# Patient Record
Sex: MALE | Race: BLACK OR AFRICAN AMERICAN | NOT HISPANIC OR LATINO | URBAN - METROPOLITAN AREA
[De-identification: names, ages, dates, MRNs, and addresses within clinical notes are randomized per-mention and may not be internally consistent; named-entity substitution may affect disease eponyms.]

---

## 2023-07-02 ENCOUNTER — INPATIENT (INPATIENT)
Facility: HOSPITAL | Age: 63
LOS: 4 days | Discharge: ANOTHER HOSPITAL | End: 2023-07-07
Attending: STUDENT IN AN ORGANIZED HEALTH CARE EDUCATION/TRAINING PROGRAM | Admitting: STUDENT IN AN ORGANIZED HEALTH CARE EDUCATION/TRAINING PROGRAM
Payer: COMMERCIAL

## 2023-07-02 VITALS
OXYGEN SATURATION: 100 % | HEART RATE: 86 BPM | TEMPERATURE: 98 F | RESPIRATION RATE: 17 BRPM | SYSTOLIC BLOOD PRESSURE: 146 MMHG | DIASTOLIC BLOOD PRESSURE: 65 MMHG

## 2023-07-02 DIAGNOSIS — Z71.1 PERSON WITH FEARED HEALTH COMPLAINT IN WHOM NO DIAGNOSIS IS MADE: ICD-10-CM

## 2023-07-02 LAB
ALBUMIN SERPL ELPH-MCNC: 3.5 G/DL — SIGNIFICANT CHANGE UP (ref 3.3–5)
ALP SERPL-CCNC: 135 U/L — HIGH (ref 40–120)
ALT FLD-CCNC: 17 U/L — SIGNIFICANT CHANGE UP (ref 4–41)
ANION GAP SERPL CALC-SCNC: 14 MMOL/L — SIGNIFICANT CHANGE UP (ref 7–14)
APTT BLD: 30.5 SEC — SIGNIFICANT CHANGE UP (ref 27–36.3)
APTT BLD: 30.8 SEC — SIGNIFICANT CHANGE UP (ref 27–36.3)
AST SERPL-CCNC: 44 U/L — HIGH (ref 4–40)
B PERT DNA SPEC QL NAA+PROBE: SIGNIFICANT CHANGE UP
B PERT+PARAPERT DNA PNL SPEC NAA+PROBE: SIGNIFICANT CHANGE UP
BASOPHILS # BLD AUTO: 0 K/UL — SIGNIFICANT CHANGE UP (ref 0–0.2)
BASOPHILS NFR BLD AUTO: 0 % — SIGNIFICANT CHANGE UP (ref 0–2)
BILIRUB SERPL-MCNC: 1.1 MG/DL — SIGNIFICANT CHANGE UP (ref 0.2–1.2)
BLD GP AB SCN SERPL QL: NEGATIVE — SIGNIFICANT CHANGE UP
BORDETELLA PARAPERTUSSIS (RAPRVP): SIGNIFICANT CHANGE UP
BUN SERPL-MCNC: 17 MG/DL — SIGNIFICANT CHANGE UP (ref 7–23)
C PNEUM DNA SPEC QL NAA+PROBE: SIGNIFICANT CHANGE UP
CALCIUM SERPL-MCNC: 9.2 MG/DL — SIGNIFICANT CHANGE UP (ref 8.4–10.5)
CHLORIDE SERPL-SCNC: 97 MMOL/L — LOW (ref 98–107)
CO2 SERPL-SCNC: 21 MMOL/L — LOW (ref 22–31)
CREAT SERPL-MCNC: 0.93 MG/DL — SIGNIFICANT CHANGE UP (ref 0.5–1.3)
EGFR: 93 ML/MIN/1.73M2 — SIGNIFICANT CHANGE UP
EOSINOPHIL # BLD AUTO: 0.68 K/UL — HIGH (ref 0–0.5)
EOSINOPHIL NFR BLD AUTO: 0.9 % — SIGNIFICANT CHANGE UP (ref 0–6)
FLUAV SUBTYP SPEC NAA+PROBE: SIGNIFICANT CHANGE UP
FLUBV RNA SPEC QL NAA+PROBE: SIGNIFICANT CHANGE UP
GLUCOSE SERPL-MCNC: 143 MG/DL — HIGH (ref 70–99)
HADV DNA SPEC QL NAA+PROBE: SIGNIFICANT CHANGE UP
HAPTOGLOB SERPL-MCNC: <20 MG/DL — LOW (ref 34–200)
HCOV 229E RNA SPEC QL NAA+PROBE: SIGNIFICANT CHANGE UP
HCOV HKU1 RNA SPEC QL NAA+PROBE: SIGNIFICANT CHANGE UP
HCOV NL63 RNA SPEC QL NAA+PROBE: SIGNIFICANT CHANGE UP
HCOV OC43 RNA SPEC QL NAA+PROBE: SIGNIFICANT CHANGE UP
HCT VFR BLD CALC: 19 % — CRITICAL LOW (ref 39–50)
HCT VFR BLD CALC: 19.3 % — CRITICAL LOW (ref 39–50)
HGB BLD-MCNC: 5.5 G/DL — CRITICAL LOW (ref 13–17)
HGB BLD-MCNC: 5.7 G/DL — CRITICAL LOW (ref 13–17)
HMPV RNA SPEC QL NAA+PROBE: SIGNIFICANT CHANGE UP
HPIV1 RNA SPEC QL NAA+PROBE: SIGNIFICANT CHANGE UP
HPIV2 RNA SPEC QL NAA+PROBE: SIGNIFICANT CHANGE UP
HPIV3 RNA SPEC QL NAA+PROBE: SIGNIFICANT CHANGE UP
HPIV4 RNA SPEC QL NAA+PROBE: SIGNIFICANT CHANGE UP
IANC: 1.88 K/UL — SIGNIFICANT CHANGE UP (ref 1.8–7.4)
INR BLD: 1.44 RATIO — HIGH (ref 0.88–1.16)
INR BLD: 1.49 RATIO — HIGH (ref 0.88–1.16)
LDH SERPL L TO P-CCNC: 925 U/L — HIGH (ref 135–225)
LYMPHOCYTES # BLD AUTO: 19.41 K/UL — HIGH (ref 1–3.3)
LYMPHOCYTES # BLD AUTO: 25.7 % — SIGNIFICANT CHANGE UP (ref 13–44)
M PNEUMO DNA SPEC QL NAA+PROBE: SIGNIFICANT CHANGE UP
MAGNESIUM SERPL-MCNC: 2 MG/DL — SIGNIFICANT CHANGE UP (ref 1.6–2.6)
MCHC RBC-ENTMCNC: 21.6 PG — LOW (ref 27–34)
MCHC RBC-ENTMCNC: 22.6 PG — LOW (ref 27–34)
MCHC RBC-ENTMCNC: 28.5 GM/DL — LOW (ref 32–36)
MCHC RBC-ENTMCNC: 30 GM/DL — LOW (ref 32–36)
MCV RBC AUTO: 75.4 FL — LOW (ref 80–100)
MCV RBC AUTO: 75.7 FL — LOW (ref 80–100)
MONOCYTES # BLD AUTO: 1.36 K/UL — HIGH (ref 0–0.9)
MONOCYTES NFR BLD AUTO: 1.8 % — LOW (ref 2–14)
NEUTROPHILS # BLD AUTO: 10.65 K/UL — HIGH (ref 1.8–7.4)
NEUTROPHILS NFR BLD AUTO: 14.1 % — LOW (ref 43–77)
PHOSPHATE SERPL-MCNC: 2.9 MG/DL — SIGNIFICANT CHANGE UP (ref 2.5–4.5)
PLATELET # BLD AUTO: 85 K/UL — LOW (ref 150–400)
PLATELET # BLD AUTO: 87 K/UL — LOW (ref 150–400)
POTASSIUM SERPL-MCNC: 5.1 MMOL/L — SIGNIFICANT CHANGE UP (ref 3.5–5.3)
POTASSIUM SERPL-SCNC: 5.1 MMOL/L — SIGNIFICANT CHANGE UP (ref 3.5–5.3)
PROT SERPL-MCNC: 6.6 G/DL — SIGNIFICANT CHANGE UP (ref 6–8.3)
PROTHROM AB SERPL-ACNC: 16.8 SEC — HIGH (ref 10.5–13.4)
PROTHROM AB SERPL-ACNC: 17.3 SEC — HIGH (ref 10.5–13.4)
RAPID RVP RESULT: SIGNIFICANT CHANGE UP
RBC # BLD: 2.52 M/UL — LOW (ref 4.2–5.8)
RBC # BLD: 2.55 M/UL — LOW (ref 4.2–5.8)
RBC # BLD: 2.55 M/UL — LOW (ref 4.2–5.8)
RBC # FLD: 19.9 % — HIGH (ref 10.3–14.5)
RBC # FLD: 20 % — HIGH (ref 10.3–14.5)
RETICS #: 98.2 K/UL — SIGNIFICANT CHANGE UP (ref 25–125)
RETICS/RBC NFR: 3.9 % — HIGH (ref 0.5–2.5)
RH IG SCN BLD-IMP: POSITIVE — SIGNIFICANT CHANGE UP
RSV RNA SPEC QL NAA+PROBE: SIGNIFICANT CHANGE UP
RV+EV RNA SPEC QL NAA+PROBE: SIGNIFICANT CHANGE UP
SARS-COV-2 RNA SPEC QL NAA+PROBE: SIGNIFICANT CHANGE UP
SODIUM SERPL-SCNC: 132 MMOL/L — LOW (ref 135–145)
TROPONIN T, HIGH SENSITIVITY RESULT: 27 NG/L — SIGNIFICANT CHANGE UP
TSH SERPL-MCNC: 12.35 UIU/ML — HIGH (ref 0.27–4.2)
URATE SERPL-MCNC: 7.5 MG/DL — SIGNIFICANT CHANGE UP (ref 3.4–8.8)
WBC # BLD: 75.51 K/UL — CRITICAL HIGH (ref 3.8–10.5)
WBC # BLD: 94.84 K/UL — CRITICAL HIGH (ref 3.8–10.5)
WBC # FLD AUTO: 75.51 K/UL — CRITICAL HIGH (ref 3.8–10.5)
WBC # FLD AUTO: 94.84 K/UL — CRITICAL HIGH (ref 3.8–10.5)

## 2023-07-02 PROCEDURE — 71045 X-RAY EXAM CHEST 1 VIEW: CPT | Mod: 26

## 2023-07-02 PROCEDURE — 76700 US EXAM ABDOM COMPLETE: CPT | Mod: 26

## 2023-07-02 PROCEDURE — 85060 BLOOD SMEAR INTERPRETATION: CPT

## 2023-07-02 PROCEDURE — 70498 CT ANGIOGRAPHY NECK: CPT | Mod: 26,MA

## 2023-07-02 PROCEDURE — 70496 CT ANGIOGRAPHY HEAD: CPT | Mod: 26,MA

## 2023-07-02 PROCEDURE — 99291 CRITICAL CARE FIRST HOUR: CPT

## 2023-07-02 PROCEDURE — G0452: CPT | Mod: 26

## 2023-07-02 RX ORDER — INSULIN LISPRO 100/ML
VIAL (ML) SUBCUTANEOUS
Refills: 0 | Status: DISCONTINUED | OUTPATIENT
Start: 2023-07-02 | End: 2023-07-07

## 2023-07-02 RX ORDER — SODIUM CHLORIDE 9 MG/ML
1000 INJECTION INTRAMUSCULAR; INTRAVENOUS; SUBCUTANEOUS ONCE
Refills: 0 | Status: COMPLETED | OUTPATIENT
Start: 2023-07-02 | End: 2023-07-02

## 2023-07-02 RX ORDER — HYDROXYUREA 500 MG/1
1500 CAPSULE ORAL
Refills: 0 | Status: DISCONTINUED | OUTPATIENT
Start: 2023-07-02 | End: 2023-07-07

## 2023-07-02 RX ORDER — DEXTROSE 50 % IN WATER 50 %
15 SYRINGE (ML) INTRAVENOUS ONCE
Refills: 0 | Status: DISCONTINUED | OUTPATIENT
Start: 2023-07-02 | End: 2023-07-07

## 2023-07-02 RX ORDER — DEXTROSE 50 % IN WATER 50 %
25 SYRINGE (ML) INTRAVENOUS ONCE
Refills: 0 | Status: DISCONTINUED | OUTPATIENT
Start: 2023-07-02 | End: 2023-07-07

## 2023-07-02 RX ORDER — DEXTROSE 50 % IN WATER 50 %
12.5 SYRINGE (ML) INTRAVENOUS ONCE
Refills: 0 | Status: DISCONTINUED | OUTPATIENT
Start: 2023-07-02 | End: 2023-07-07

## 2023-07-02 RX ORDER — SODIUM CHLORIDE 9 MG/ML
1000 INJECTION, SOLUTION INTRAVENOUS
Refills: 0 | Status: DISCONTINUED | OUTPATIENT
Start: 2023-07-02 | End: 2023-07-07

## 2023-07-02 RX ORDER — ALLOPURINOL 300 MG
300 TABLET ORAL DAILY
Refills: 0 | Status: DISCONTINUED | OUTPATIENT
Start: 2023-07-02 | End: 2023-07-07

## 2023-07-02 RX ORDER — SODIUM CHLORIDE 9 MG/ML
1000 INJECTION INTRAMUSCULAR; INTRAVENOUS; SUBCUTANEOUS
Refills: 0 | Status: DISCONTINUED | OUTPATIENT
Start: 2023-07-02 | End: 2023-07-05

## 2023-07-02 RX ORDER — GLUCAGON INJECTION, SOLUTION 0.5 MG/.1ML
1 INJECTION, SOLUTION SUBCUTANEOUS ONCE
Refills: 0 | Status: DISCONTINUED | OUTPATIENT
Start: 2023-07-02 | End: 2023-07-07

## 2023-07-02 RX ADMIN — SODIUM CHLORIDE 1000 MILLILITER(S): 9 INJECTION INTRAMUSCULAR; INTRAVENOUS; SUBCUTANEOUS at 16:41

## 2023-07-02 RX ADMIN — HYDROXYUREA 1500 MILLIGRAM(S): 500 CAPSULE ORAL at 23:36

## 2023-07-02 RX ADMIN — SODIUM CHLORIDE 100 MILLILITER(S): 9 INJECTION INTRAMUSCULAR; INTRAVENOUS; SUBCUTANEOUS at 23:35

## 2023-07-02 RX ADMIN — Medication 300 MILLIGRAM(S): at 23:36

## 2023-07-02 NOTE — ED PROVIDER NOTE - CARE PLAN
1 Principal Discharge DX:	Malaise   Principal Discharge DX:	Concern about leukemia without diagnosis

## 2023-07-02 NOTE — ED PROVIDER NOTE - ATTENDING CONTRIBUTION TO CARE
I performed a face-to-face evaluation of the patient and performed a history and physical examination. I agree with the history and physical examination. If this was a PA visit, I personally saw the patient with the PA and performed a substantive portion of the visit including all aspects of the medical decision making.    Jasbir: Recent hiccoughs (now stopped). P/w 4 days fatigue, loss of smell/taste/appetite, loss of balance when stands, malaise. Consider sub-acute CVA, COVID, ACS. Check trop, EKG, CT brain/neck angio, COVID. Consider CDU for MRI. I performed a face-to-face evaluation of the patient and performed a history and physical examination. I agree with the history and physical examination. If this was a PA visit, I personally saw the patient with the PA and performed a substantive portion of the visit including all aspects of the medical decision making.    Jasbir: Recent hiccoughs (now stopped). P/w 4 days fatigue, loss of smell/taste/appetite, loss of balance when stands, malaise. Consider sub-acute CVA, COVID, ACS. Check trop, EKG, CT brain/neck angio, COVID. Consider CDU for MRI.    I have personally provided the amount of critical care time documented below, excluding time spent on separate procedures. I spoke with several family member(s).

## 2023-07-02 NOTE — CONSULT NOTE ADULT - ASSESSMENT
64yo gentleman with PMH of DM on insulin p/w weight loss of 15-20 lbs, gait instability, fatigue and body aches, found to have leukocytosis, anemia and thrombocytopenia concerning for leukemia.    #Suspected leukemia  CBC found WBC 75.5, Hgb 5.5, plt 85, MCV 75.7, RDW 19.9  54% reactive lymphocytes  LDH is 925, uric acid 7.5.    Peripheral smear and cellovision reviewed, found polychromasia, some hypochromic RBCs, few elliptocyes. 1-2 schistocytes per HPF. True thrombocytopenia without platelet clumps or giant platelets, about 9 platelets per HPF. WBCs are mostly lymphocytes of varying maturity, some with visible nucleoli and high N:C ratio, some very minimal visible cytoplasm.    - High suspicion for ALL vs CLL, lower for lymphoma.   - Start hydrea 1500mg BID.   - Uric acid is 7.5. Start allopurinol 300mg PO qdaily.  - Provide supportive transfusion to Hgb of 7.   - Iron studies found 15% iron sat, ferritin ordered. B12 and folate ordered.   - Retic low at 3.9%   - Check G6PD level  - Check HIV, hep B SAg, hep B SAb, hep B total core Ab, HCV  - Check TTE   - Start IVNS at 100 cc/hr  - DAILY LABS: CBC with Diff, CMP, coags, uric acid, LDH, Phos, fibrinogen and d-dimer  - Provided Flow cytometry form to ED to to send green and purple tubes.  - transfuse for hg < 7.0 and platelets < 10k, < 20k if febrile and < 50k if bleeding  - Check Abd US to assess for hepatosplenomegaly    #Coagulopathy:   -PT elevated at 16.8, fibrinogen low at 161, D-dimer 4565, likely low level DIC  Give 10 units cryoprecipitate for fibrinogen under 150    #Elevated TSH: 12.35  - Check free T4    Studies and medications ordered.    Note not finalized until signed by attending.   Please do not hesitate to page with questions.     Alejandra Castillo MD PGY5   [phone]  Hematology-Oncology Fellow  [facility]- Please call  to page on-call fellow   64yo gentleman with PMH of DM on insulin p/w weight loss of 15-20 lbs, gait instability, fatigue and body aches, found to have leukocytosis, anemia and thrombocytopenia concerning for leukemia.    #Suspected leukemia  CBC found WBC 75.5, Hgb 5.5, plt 85, MCV 75.7, RDW 19.9  54% reactive lymphocytes  LDH is 925, uric acid 7.5.    Peripheral smear and cellovision reviewed, found polychromasia, some hypochromic RBCs, few elliptocyes. 1-2 schistocytes per HPF. True thrombocytopenia without platelet clumps or giant platelets, about 9 platelets per HPF. WBCs are mostly lymphocytes of varying maturity, some with visible nucleoli and high N:C ratio, some very minimal visible cytoplasm.    - High suspicion for ALL vs CLL, lower for lymphoma.   - Start hydrea 1500mg BID.   - Uric acid is 7.5. Start allopurinol 300mg PO qdaily.  - Provide supportive transfusion to Hgb of 7.   - Iron studies found 15% iron sat, ferritin ordered. B12 and folate ordered.   - Retic low at 3.9%   - Check G6PD level  - Check HIV, hep B SAg, hep B SAb, hep B total core Ab, HCV  - Check TTE   - Start IVNS at 100 cc/hr  - DAILY LABS: CBC with Diff, CMP, coags, uric acid, LDH, Phos, fibrinogen and d-dimer  - Provided Flow cytometry form to ED to to send green and purple tubes.  - transfuse for hg < 7.0 and platelets < 10k, < 20k if febrile and < 50k if bleeding  - Check Abd US to assess for hepatosplenomegaly    #Coagulopathy:   -PT elevated at 16.8, fibrinogen low at 161, D-dimer 4565, likely low level DIC  Give 10 units cryoprecipitate for fibrinogen under 150    #Elevated TSH: 12.35  - Check free T4    Studies and medications ordered.  CASE discussed with Dr. Garcia ([facility] attending) and. Dr. Childers. We will be awaiting result of flow cytometry to determine if transfer to [facility] is necessary. This result will probably come back tomorrow.     Note not finalized until signed by attending.   Please do not hesitate to page with questions.     Alejandra Castillo MD PGY5   [phone]  Hematology-Oncology Fellow  [facility]- Please call  to page on-call fellow

## 2023-07-02 NOTE — ED ADULT NURSE NOTE - NSFALLUNIVINTERV_ED_ALL_ED
Bed/Stretcher in lowest position, wheels locked, appropriate side rails in place/Call bell, personal items and telephone in reach/Instruct patient to call for assistance before getting out of bed/chair/stretcher/Non-slip footwear applied when patient is off stretcher/[place] to call system/Physically safe environment - no spills, clutter or unnecessary equipment/Purposeful proactive rounding/Room/bathroom lighting operational, light cord in reach

## 2023-07-02 NOTE — ED PROVIDER NOTE - CLINICAL SUMMARY MEDICAL DECISION MAKING FREE TEXT BOX
Jasbir: Recent hiccoughs (now stopped). P/w 4 days fatigue, loss of smell/taste/appetite, loss of balance when stands, malaise. Consider sub-acute CVA, COVID, ACS. Check trop, EKG, CT brain/neck angio, COVID. Consider CDU for MRI.

## 2023-07-02 NOTE — ED PROVIDER NOTE - PROGRESS NOTE DETAILS
al MUELLER PGY-1: Anemic to 5.6.  White count at 90.  Will repeat labs, adding on leukemia labs such as LDH, retic count, uric acid. al MUELLER PGY-1: Repeat white count at 75.  Blood count 5.5.  Heme-onc paged at this time. al MUELLER PGY-1: At this time, oncology discussed with.  They will see the patient.

## 2023-07-02 NOTE — ED ADULT TRIAGE NOTE - CHIEF COMPLAINT QUOTE
patient c/o low energy, unsteady gait, and general malaise x4 days. patient is well appearing. past medical history of diabetes mellitus.

## 2023-07-02 NOTE — CONSULT NOTE ADULT - SUBJECTIVE AND OBJECTIVE BOX
HPI:  This patient is a 64yo gentleman with PMH of DM on insulin p/w weight loss, gait instability and fatigue. Patient has had 15-20 lbs weight loss in the last 3 weeks, subject fevers, chills, sweats and diffuse body aches. He has not had regular medical care in the last 5-10 years. He has occasional blurry vision, but reports vision has been normal today. He denies any falls. No headache or lightheadedness. No chest pain, palpitations, cough, dyspnea. No abdominal pain, N/V/ diarrhea. No rashes.   He denies any known history of any blood disorders.   He denies any bleeding or bruising, gingival bleeding, epistaxis.     PAST MEDICAL & SURGICAL HISTORY:  DM on insulin    FAMILY HISTORY:  mother- breast cancer  father- CVA, heart disease  2 brothers and 1 sister- good health    Social History:  lives with older brother  social alcohol use  smoked cigarettes for about 7 years , less than 1ppd, quit 20 years ago  no drug use  works in sales at [employer]    REVIEW OF SYSTEMS  CONSTITUTIONAL: + fever, + chills, + fatigue, + sweats  EYES: No eye pain, + occasional blurry vision   ENMT:  No difficulty hearing, no throat pain  RESPIRATORY: No cough,  No shortness of breath  CARDIOVASCULAR: No chest pain, no palpitations  GASTROINTESTINAL: No abdominal pain, no nausea, no vomiting, no diarrhea, no constipation,  GENITOURINARY: No dysuria, no hematuria  NEUROLOGICAL: No numbness , no loss of strength, + occasional gait unsteadiness  SKIN: No itching, no rashes,  HEME/LYMPH: No easy bruising, bleeding    >>> <<<>>> <<<  >>> <<<  Allergies  Allergies    No Known Allergies    Intolerances        Medications  MEDICATIONS  (STANDING):    MEDICATIONS  (PRN):      PHYSICAL EXAM:  GENERAL: NAD, well-groomed  HEAD:  Atraumatic, Normocephalic  EYES: EOMI, PERRLA, conjunctiva and sclera clear  ENMT: No oropharyngeal exudates, Moist mucous membranes  NECK: Supple, no cervical lymphadenopathy  NERVOUS SYSTEM:  alert and conversant, moving all extremities spontaneously   CHEST/LUNG: Clear to auscultation bilaterally; no rhonchi  HEART: Regular rate and rhythm; No murmurs  ABDOMEN: soft, + splenomegaly 4cm below rib, nontender to palpation  EXTREMITIES:  2+ radial Pulses, No cyanosis, +trace pedal edema  SKIN: warm, dry, spots of hyperpigmentation  LYMPH: shotty LAD in axilla bilaterally  LABS:                        5.5    75.51 )-----------( 85       ( 02 Jul 2023 17:47 )             19.3     07-02    132<L>  |  97<L>  |  17  ----------------------------<  143<H>  5.1   |  21<L>  |  0.93    Ca    9.2      02 Jul 2023 16:51  Phos  2.9     07-02  Mg     2.00     07-02    TPro  6.6  /  Alb  3.5  /  TBili  1.1  /  DBili  x   /  AST  44<H>  /  ALT  17  /  AlkPhos  135<H>  07-02    PT/INR - ( 02 Jul 2023 18:02 )   PT: 16.8 sec;   INR: 1.44 ratio         PTT - ( 02 Jul 2023 18:02 )  PTT:30.8 sec  Urinalysis Basic - ( 02 Jul 2023 16:51 )    Color: x / Appearance: x / SG: x / pH: x  Gluc: 143 mg/dL / Ketone: x  / Bili: x / Urobili: x   Blood: x / Protein: x / Nitrite: x   Leuk Esterase: x / RBC: x / WBC x   Sq Epi: x / Non Sq Epi: x / Bacteria: x    RADIOLOGY & ADDITIONAL STUDIES:  PATHOLOGY:

## 2023-07-02 NOTE — ED PROVIDER NOTE - PHYSICAL EXAMINATION
Exam as stated below:   CONSTITUTIONAL: In NAD.   SKIN: Warm dry. No rashes noted.   HEAD: NCAT.   EYES: No scleral icterus. Conjunctiva pink.  CARD: RRR. No murmurs.  RESP: Clear to ausculation b/l. No Crackles noted. No Wheezing noted.  ABD: Soft. Non-tender. Not distended.   MSK: No pedal edema. No calf tenderness.  NEURO: UE/LE grossly intact. Motor UE/LE sensation grossly intact. CN II-XII grossly intact. romberg +.   Negative dysmetria bilateral, negative dysdiadochokinesia.  speech is slowed, able to follow commands, able to name multiple objects and recall intact.  PSYCH: Cooperative, appropriate.

## 2023-07-02 NOTE — ED PROVIDER NOTE - OBJECTIVE STATEMENT
HPI & ROS: 63-year-old male history of diabetes, other history unknown as he has not seen a primary in approximately a year, no surgical history or cardiac history,  presenting with 4 days of malaise.  4 days ago, patient started feeling malaise, generalized weakness associated  with instability on his feet.  Patient does not describe this as the room spinning or dizziness.  Patient also with hiccups.   Patient has not followed up with a primary care physician in a long time as he lost insurance.  Patient also with recent weight loss of approximately 10 pounds.  No flulike illnesses in the past week.  No chest pain or shortness of breath accompanying the past 4 days.  No abdominal pain.  No dark tarry stools.  No nausea no vomiting.   Sister who is present with him states that his speech is slower and it is taking him longer to process, this is not normal for him.    PCP: none

## 2023-07-02 NOTE — ED ADULT NURSE NOTE - OBJECTIVE STATEMENT
pt received to intake  , a&ox4 , ambulatory , phx of DM , p/w malaise, weight loss, vertigo symptoms x 4 day s . Pt breathing even and unlabored on room air. NSR on cardiac monitor. Denies fever, chills, cough, SOB, chest pain, palpitations, dizziness, N/V/D, constipation, numbness, tingling. IV placed. Labs collected and sent. EKG pending being preformed by HAI Ruiz .  pt educated on fall precautions and confirms understanding via teach back method. Stretcher locked in lowest position with siderails up x2. Call bell and personal items within reach.

## 2023-07-03 DIAGNOSIS — E11.9 TYPE 2 DIABETES MELLITUS WITHOUT COMPLICATIONS: ICD-10-CM

## 2023-07-03 DIAGNOSIS — R79.89 OTHER SPECIFIED ABNORMAL FINDINGS OF BLOOD CHEMISTRY: ICD-10-CM

## 2023-07-03 DIAGNOSIS — R09.89 OTHER SPECIFIED SYMPTOMS AND SIGNS INVOLVING THE CIRCULATORY AND RESPIRATORY SYSTEMS: ICD-10-CM

## 2023-07-03 DIAGNOSIS — D75.89 OTHER SPECIFIED DISEASES OF BLOOD AND BLOOD-FORMING ORGANS: ICD-10-CM

## 2023-07-03 DIAGNOSIS — C91.00 ACUTE LYMPHOBLASTIC LEUKEMIA NOT HAVING ACHIEVED REMISSION: ICD-10-CM

## 2023-07-03 DIAGNOSIS — D72.829 ELEVATED WHITE BLOOD CELL COUNT, UNSPECIFIED: ICD-10-CM

## 2023-07-03 DIAGNOSIS — Z29.9 ENCOUNTER FOR PROPHYLACTIC MEASURES, UNSPECIFIED: ICD-10-CM

## 2023-07-03 DIAGNOSIS — R01.1 CARDIAC MURMUR, UNSPECIFIED: ICD-10-CM

## 2023-07-03 DIAGNOSIS — D65 DISSEMINATED INTRAVASCULAR COAGULATION [DEFIBRINATION SYNDROME]: ICD-10-CM

## 2023-07-03 LAB
BASOPHILS # BLD AUTO: 0.05 K/UL — SIGNIFICANT CHANGE UP (ref 0–0.2)
BASOPHILS NFR BLD AUTO: 0.1 % — SIGNIFICANT CHANGE UP (ref 0–2)
EOSINOPHIL # BLD AUTO: 0.03 K/UL — SIGNIFICANT CHANGE UP (ref 0–0.5)
EOSINOPHIL NFR BLD AUTO: 0 % — SIGNIFICANT CHANGE UP (ref 0–6)
GLUCOSE BLDC GLUCOMTR-MCNC: 136 MG/DL — HIGH (ref 70–99)
GLUCOSE BLDC GLUCOMTR-MCNC: 162 MG/DL — HIGH (ref 70–99)
GLUCOSE BLDC GLUCOMTR-MCNC: 165 MG/DL — HIGH (ref 70–99)
GLUCOSE BLDC GLUCOMTR-MCNC: 200 MG/DL — HIGH (ref 70–99)
GLUCOSE BLDC GLUCOMTR-MCNC: 203 MG/DL — HIGH (ref 70–99)
HCT VFR BLD CALC: 18.5 % — CRITICAL LOW (ref 39–50)
HGB BLD-MCNC: 5.5 G/DL — CRITICAL LOW (ref 13–17)
IANC: 1.73 K/UL — LOW (ref 1.8–7.4)
IMM GRANULOCYTES NFR BLD AUTO: 0.2 % — SIGNIFICANT CHANGE UP (ref 0–0.9)
LYMPHOCYTES # BLD AUTO: 66.33 K/UL — HIGH (ref 1–3.3)
LYMPHOCYTES # BLD AUTO: 89.4 % — HIGH (ref 13–44)
MCHC RBC-ENTMCNC: 22.5 PG — LOW (ref 27–34)
MCHC RBC-ENTMCNC: 29.7 GM/DL — LOW (ref 32–36)
MCV RBC AUTO: 75.8 FL — LOW (ref 80–100)
MONOCYTES # BLD AUTO: 5.95 K/UL — HIGH (ref 0–0.9)
MONOCYTES NFR BLD AUTO: 8 % — SIGNIFICANT CHANGE UP (ref 2–14)
NEUTROPHILS # BLD AUTO: 1.73 K/UL — LOW (ref 1.8–7.4)
NEUTROPHILS NFR BLD AUTO: 2.3 % — LOW (ref 43–77)
NRBC # BLD: 0 /100 WBCS — SIGNIFICANT CHANGE UP (ref 0–0)
NRBC # FLD: 0.13 K/UL — HIGH (ref 0–0)
PLATELET # BLD AUTO: 66 K/UL — LOW (ref 150–400)
RBC # BLD: 2.44 M/UL — LOW (ref 4.2–5.8)
RBC # FLD: 20.2 % — HIGH (ref 10.3–14.5)
RH IG SCN BLD-IMP: POSITIVE — SIGNIFICANT CHANGE UP
TM INTERPRETATION: SIGNIFICANT CHANGE UP
WBC # BLD: 74.22 K/UL — CRITICAL HIGH (ref 3.8–10.5)
WBC # FLD AUTO: 74.22 K/UL — CRITICAL HIGH (ref 3.8–10.5)

## 2023-07-03 PROCEDURE — 88342 IMHCHEM/IMCYTCHM 1ST ANTB: CPT | Mod: 26,59

## 2023-07-03 PROCEDURE — 93971 EXTREMITY STUDY: CPT | Mod: 26,59

## 2023-07-03 PROCEDURE — 88364 INSITU HYBRIDIZATION (FISH): CPT | Mod: 26

## 2023-07-03 PROCEDURE — 88313 SPECIAL STAINS GROUP 2: CPT | Mod: 26

## 2023-07-03 PROCEDURE — 99233 SBSQ HOSP IP/OBS HIGH 50: CPT | Mod: GC

## 2023-07-03 PROCEDURE — 88341 IMHCHEM/IMCYTCHM EA ADD ANTB: CPT | Mod: 26,59

## 2023-07-03 PROCEDURE — 88305 TISSUE EXAM BY PATHOLOGIST: CPT | Mod: 26

## 2023-07-03 PROCEDURE — 88360 TUMOR IMMUNOHISTOCHEM/MANUAL: CPT | Mod: 26,59

## 2023-07-03 PROCEDURE — 85097 BONE MARROW INTERPRETATION: CPT

## 2023-07-03 PROCEDURE — 93970 EXTREMITY STUDY: CPT | Mod: 26

## 2023-07-03 PROCEDURE — 12345: CPT | Mod: NC

## 2023-07-03 PROCEDURE — 99223 1ST HOSP IP/OBS HIGH 75: CPT | Mod: GC

## 2023-07-03 PROCEDURE — 88365 INSITU HYBRIDIZATION (FISH): CPT | Mod: 26,59

## 2023-07-03 PROCEDURE — 93306 TTE W/DOPPLER COMPLETE: CPT | Mod: 26

## 2023-07-03 PROCEDURE — 88189 FLOWCYTOMETRY/READ 16 & >: CPT

## 2023-07-03 RX ORDER — LANOLIN ALCOHOL/MO/W.PET/CERES
3 CREAM (GRAM) TOPICAL AT BEDTIME
Refills: 0 | Status: DISCONTINUED | OUTPATIENT
Start: 2023-07-03 | End: 2023-07-07

## 2023-07-03 RX ORDER — HEPARIN SODIUM 5000 [USP'U]/ML
5000 INJECTION INTRAVENOUS; SUBCUTANEOUS ONCE
Refills: 0 | Status: DISCONTINUED | OUTPATIENT
Start: 2023-07-03 | End: 2023-07-05

## 2023-07-03 RX ORDER — LEVOTHYROXINE SODIUM 125 MCG
100 TABLET ORAL DAILY
Refills: 0 | Status: DISCONTINUED | OUTPATIENT
Start: 2023-07-03 | End: 2023-07-07

## 2023-07-03 RX ORDER — INSULIN LISPRO 100/ML
VIAL (ML) SUBCUTANEOUS AT BEDTIME
Refills: 0 | Status: DISCONTINUED | OUTPATIENT
Start: 2023-07-03 | End: 2023-07-07

## 2023-07-03 RX ORDER — LIDOCAINE HCL 20 MG/ML
20 VIAL (ML) INJECTION ONCE
Refills: 0 | Status: DISCONTINUED | OUTPATIENT
Start: 2023-07-03 | End: 2023-07-07

## 2023-07-03 RX ORDER — ACETAMINOPHEN 500 MG
650 TABLET ORAL EVERY 6 HOURS
Refills: 0 | Status: DISCONTINUED | OUTPATIENT
Start: 2023-07-03 | End: 2023-07-07

## 2023-07-03 RX ADMIN — Medication 4: at 12:39

## 2023-07-03 RX ADMIN — Medication 650 MILLIGRAM(S): at 15:57

## 2023-07-03 RX ADMIN — HYDROXYUREA 1500 MILLIGRAM(S): 500 CAPSULE ORAL at 05:56

## 2023-07-03 RX ADMIN — HYDROXYUREA 1500 MILLIGRAM(S): 500 CAPSULE ORAL at 17:57

## 2023-07-03 RX ADMIN — Medication 2: at 17:58

## 2023-07-03 RX ADMIN — Medication 300 MILLIGRAM(S): at 12:38

## 2023-07-03 RX ADMIN — Medication 3 MILLIGRAM(S): at 22:15

## 2023-07-03 RX ADMIN — Medication 650 MILLIGRAM(S): at 16:57

## 2023-07-03 RX ADMIN — Medication 100 MICROGRAM(S): at 05:56

## 2023-07-03 NOTE — PROCEDURE NOTE - ADDITIONAL PROCEDURE DETAILS
Hematology/Oncology Procedure Note    Bone Marrow Aspiration/Biopsy    Indication: Suspected leukemia/lymphoma    Bone marrow aspiration and biopsy procedure description, risks, and benefits were discussed in detail with the patient.  All questions were answered.  Informed consent was obtained and time-out performed.      The area of the left posterior iliac crest was prepped and draped using sterile technique. Local anesthetic with 2% Lidocaine.    Bone marrow aspiration and biopsy was performed using sterile technique by Dr. Derrek Cordero with Dr. Alejandra Castillo assist and supervision. Specimens were obtained. No complications and less than 2 cc of blood loss.     The procedure was well tolerated and no local bleeding or other complications were observed.  Pressure was applied to the procedure site and a wound dressing was placed.  The patient and nursing staff were advised that the patient is to lie flat for 30 minutes post procedure and not to shower or change the dressing for 24 hours. Tylenol may be used if no contraindications for pain at the procedure site.

## 2023-07-03 NOTE — H&P ADULT - NSHPREVIEWOFSYSTEMS_GEN_ALL_CORE
CONSTITUTIONAL: + fevers, +chills, +malaise, +night sweats, +weight loss  EYES/ENT: +rare blurry vision;  No vertigo or throat pain   NECK: No pain or stiffness  RESPIRATORY: No cough, wheezing, hemoptysis; No shortness of breath  CARDIOVASCULAR: No chest pain or palpitations. No orthopnea, paroxysmal nocturnal dysuria, or lower extremity edema  GASTROINTESTINAL: No abdominal or epigastric pain. No nausea, vomiting, or hematemesis; No diarrhea or constipation. No melena or hematochezia.  GENITOURINARY: No dysuria, frequency or hematuria  NEUROLOGICAL: +gait instability, No numbness, weakness, tingling. No headache, no visual changes  SKIN: No itching, rashes  HEME: no easy bruising, no bleeding  [x] All other systems negative  [ ] Unable to assess ROS because ________ CONSTITUTIONAL: + fevers, +chills, +malaise, +night sweats, +weight loss  EYES/ENT: +rare blurry vision;  No vertigo or throat pain   NECK: No pain or stiffness  RESPIRATORY: No cough, wheezing, hemoptysis; No shortness of breath  CARDIOVASCULAR: No chest pain or palpitations. No orthopnea, paroxysmal nocturnal dysuria, or lower extremity edema  GASTROINTESTINAL: No abdominal or epigastric pain. No nausea, vomiting, or hematemesis; No diarrhea or constipation. No melena or hematochezia.  GENITOURINARY: No dysuria, frequency or hematuria  NEUROLOGICAL: +gait instability, No focal numbness, weakness, tingling. No headache, no visual changes  SKIN: No itching, rashes  HEME: no easy bruising, no bleeding  [x] All other systems negative  [ ] Unable to assess ROS because ________

## 2023-07-03 NOTE — H&P ADULT - NSICDXFAMILYHX_GEN_ALL_CORE_FT
FAMILY HISTORY:  Father  Still living? Unknown  FH: CVA (cerebrovascular accident), Age at diagnosis: Age Unknown  FH: heart disease, Age at diagnosis: Age Unknown    Mother  Still living? Unknown  FH: breast cancer, Age at diagnosis: Age Unknown

## 2023-07-03 NOTE — PROGRESS NOTE ADULT - PROBLEM SELECTOR PLAN 4
Pt. found to have elevated TSH to 12.35 on admission w/ symptoms of malaise, fatigue. Pt. has not seen a PCP in some time    Plan:  - f/u FT4  - start levothyroxine 100mcg qd (1.7 mcg/kg); repeat TFTs as an outpatient in 4-6 weeks for dose adjustments

## 2023-07-03 NOTE — H&P ADULT - NSHPLABSRESULTS_GEN_ALL_CORE
EKG: personally reviewed-    LABS: personally reviewed                        5.5    75.51 )-----------( 85       ( 02 Jul 2023 17:47 )             19.3     07-02    132<L>  |  97<L>  |  17  ----------------------------<  143<H>  5.1   |  21<L>  |  0.93    Ca    9.2      02 Jul 2023 16:51  Phos  2.9     07-02  Mg     2.00     07-02    TPro  6.6  /  Alb  3.5  /  TBili  1.1  /  DBili  x   /  AST  44<H>  /  ALT  17  /  AlkPhos  135<H>  07-02    PT/INR - ( 02 Jul 2023 18:02 )   PT: 16.8 sec;   INR: 1.44 ratio     PTT - ( 02 Jul 2023 18:02 )  PTT:30.8 sec    Urinalysis Basic - ( 02 Jul 2023 16:51 )    Color: x / Appearance: x / SG: x / pH: x  Gluc: 143 mg/dL / Ketone: x  / Bili: x / Urobili: x   Blood: x / Protein: x / Nitrite: x   Leuk Esterase: x / RBC: x / WBC x   Sq Epi: x / Non Sq Epi: x / Bacteria: x    IMAGING: personally reviewed  < from: CT Head and CT Angio Head/Neck w/ IV Cont (07.02.23 @ 17:21) >    IMPRESSION:    CT brain:  No hydrocephalus, acute intracranial hemorrhage, mass effect, or brain   edema.    No abnormal intracranial enhancement    CTA brain:  Limited by poor timing of the contrast bolus.  No gross evidence for flow-limiting stenosis or vascular aneurysm.    CTA neck:  No flow-limiting stenosis, evidence for arterial dissection, or vascular   aneurysm.    < from: Xray Chest 1 View- PORTABLE-Urgent (07.02.23 @ 18:51) >    The heart is not accurately assessed in this AP projection.  Low lung volumes. The lungs are clear.  There is no pleural effusion.  There is no pneumothorax.  No acute bony abnormality.    IMPRESSION:  Clear lungs.    < from: US Abdomen Complete (US Abdomen Complete .) (07.02.23 @ 23:18) >    FINDINGS:  Liver: Enlarged at 19.4 cm. There is hepatopedal flow on color Doppler of   the main portal vein  Bile ducts: Normal caliber. Common bile duct measures 3 mm.  Gallbladder: Contracted gallbladder filled with stones. No   pericholecystic fluid. Negative sonographic Mello sign.  Pancreas: Poorly visualized.  Spleen: Markedly enlarged at  27.3 cm.  Right kidney: 13.2 cm. No hydronephrosis.  Left kidney: 13.2 cm. No hydronephrosis.  Ascites: None.  Aorta and IVC: Ectatic proximal aorta measuring 2.5 cm. Increased caliber   of the IVC measuring 2.5 cm    IMPRESSION:  Contracted gallbladder filled with stones. No secondary findings of acute   cholecystitis.    Hepatosplenomegaly. EKG: personally reviewed- sinus rhythm w/ 1st degree AV block (ID 240ms), no ST/T-wave changes, QTc 420ms    LABS: personally reviewed                        5.5    75.51 )-----------( 85       ( 02 Jul 2023 17:47 )             19.3     07-02    132<L>  |  97<L>  |  17  ----------------------------<  143<H>  5.1   |  21<L>  |  0.93    Ca    9.2      02 Jul 2023 16:51  Phos  2.9     07-02  Mg     2.00     07-02    TPro  6.6  /  Alb  3.5  /  TBili  1.1  /  DBili  x   /  AST  44<H>  /  ALT  17  /  AlkPhos  135<H>  07-02    PT/INR - ( 02 Jul 2023 18:02 )   PT: 16.8 sec;   INR: 1.44 ratio     PTT - ( 02 Jul 2023 18:02 )  PTT:30.8 sec    Urinalysis Basic - ( 02 Jul 2023 16:51 )    Color: x / Appearance: x / SG: x / pH: x  Gluc: 143 mg/dL / Ketone: x  / Bili: x / Urobili: x   Blood: x / Protein: x / Nitrite: x   Leuk Esterase: x / RBC: x / WBC x   Sq Epi: x / Non Sq Epi: x / Bacteria: x    IMAGING: personally reviewed  < from: CT Head and CT Angio Head/Neck w/ IV Cont (07.02.23 @ 17:21) >    IMPRESSION:    CT brain:  No hydrocephalus, acute intracranial hemorrhage, mass effect, or brain   edema.    No abnormal intracranial enhancement    CTA brain:  Limited by poor timing of the contrast bolus.  No gross evidence for flow-limiting stenosis or vascular aneurysm.    CTA neck:  No flow-limiting stenosis, evidence for arterial dissection, or vascular   aneurysm.    < from: Xray Chest 1 View- PORTABLE-Urgent (07.02.23 @ 18:51) >    The heart is not accurately assessed in this AP projection.  Low lung volumes. The lungs are clear.  There is no pleural effusion.  There is no pneumothorax.  No acute bony abnormality.    IMPRESSION:  Clear lungs.    < from: US Abdomen Complete (US Abdomen Complete .) (07.02.23 @ 23:18) >    FINDINGS:  Liver: Enlarged at 19.4 cm. There is hepatopedal flow on color Doppler of   the main portal vein  Bile ducts: Normal caliber. Common bile duct measures 3 mm.  Gallbladder: Contracted gallbladder filled with stones. No   pericholecystic fluid. Negative sonographic Mello sign.  Pancreas: Poorly visualized.  Spleen: Markedly enlarged at  27.3 cm.  Right kidney: 13.2 cm. No hydronephrosis.  Left kidney: 13.2 cm. No hydronephrosis.  Ascites: None.  Aorta and IVC: Ectatic proximal aorta measuring 2.5 cm. Increased caliber   of the IVC measuring 2.5 cm    IMPRESSION:  Contracted gallbladder filled with stones. No secondary findings of acute   cholecystitis.    Hepatosplenomegaly. EKG: personally reviewed- sinus rhythm w/ 1st degree AV block (PA 240ms), no ST/T-wave changes, QTc 420ms    LABS: personally reviewed                        5.5    75.51 )-----------( 85       ( 02 Jul 2023 17:47 )             19.3     07-02    132<L>  |  97<L>  |  17  ----------------------------<  143<H>  5.1   |  21<L>  |  0.93    Ca    9.2      02 Jul 2023 16:51  Phos  2.9     07-02  Mg     2.00     07-02    Trop 27 -> 22    DDimer 4565  Fibrinogen 161  Uric Acid 7.5    TSH 12.35    TPro  6.6  /  Alb  3.5  /  TBili  1.1  /  DBili  x   /  AST  44<H>  /  ALT  17  /  AlkPhos  135<H>  07-02    PT/INR - ( 02 Jul 2023 18:02 )   PT: 16.8 sec;   INR: 1.44 ratio     PTT - ( 02 Jul 2023 18:02 )  PTT:30.8 sec    RVP negative    Urinalysis Basic - ( 02 Jul 2023 16:51 )  Color: x / Appearance: x / SG: x / pH: x  Gluc: 143 mg/dL / Ketone: x  / Bili: x / Urobili: x   Blood: x / Protein: x / Nitrite: x   Leuk Esterase: x / RBC: x / WBC x   Sq Epi: x / Non Sq Epi: x / Bacteria: x    IMAGING: personally reviewed  < from: CT Head and CT Angio Head/Neck w/ IV Cont (07.02.23 @ 17:21) >    IMPRESSION:    CT brain:  No hydrocephalus, acute intracranial hemorrhage, mass effect, or brain   edema.    No abnormal intracranial enhancement    CTA brain:  Limited by poor timing of the contrast bolus.  No gross evidence for flow-limiting stenosis or vascular aneurysm.    CTA neck:  No flow-limiting stenosis, evidence for arterial dissection, or vascular   aneurysm.    < from: Xray Chest 1 View- PORTABLE-Urgent (07.02.23 @ 18:51) >    The heart is not accurately assessed in this AP projection.  Low lung volumes. The lungs are clear.  There is no pleural effusion.  There is no pneumothorax.  No acute bony abnormality.    IMPRESSION:  Clear lungs.    < from: US Abdomen Complete (US Abdomen Complete .) (07.02.23 @ 23:18) >    FINDINGS:  Liver: Enlarged at 19.4 cm. There is hepatopedal flow on color Doppler of   the main portal vein  Bile ducts: Normal caliber. Common bile duct measures 3 mm.  Gallbladder: Contracted gallbladder filled with stones. No   pericholecystic fluid. Negative sonographic Mello sign.  Pancreas: Poorly visualized.  Spleen: Markedly enlarged at  27.3 cm.  Right kidney: 13.2 cm. No hydronephrosis.  Left kidney: 13.2 cm. No hydronephrosis.  Ascites: None.  Aorta and IVC: Ectatic proximal aorta measuring 2.5 cm. Increased caliber   of the IVC measuring 2.5 cm    IMPRESSION:  Contracted gallbladder filled with stones. No secondary findings of acute   cholecystitis.    Hepatosplenomegaly.

## 2023-07-03 NOTE — PROGRESS NOTE ADULT - PROBLEM SELECTOR PLAN 5
Hx of DM2, on insulin (Novolin N) 35U qAM/20U qPM at home  - BG since admission 140s-170s    Plan:  - f/u A1c and lipid panel in AM  - hold home antidiabetic medications at this time  - start ISS; adjust per patient's insulin requirements  - monitor FS for goal -180 while inpatient none required

## 2023-07-03 NOTE — H&P ADULT - NSHPPHYSICALEXAM_GEN_ALL_CORE
VITALS:   Vital Signs Last 24 Hrs  T(C): 37.1 (03 Jul 2023 00:57), Max: 37.1 (03 Jul 2023 00:57)  T(F): 98.7 (03 Jul 2023 00:57), Max: 98.7 (03 Jul 2023 00:57)  HR: 83 (03 Jul 2023 00:57) (83 - 86)  BP: 133/60 (03 Jul 2023 00:57) (133/60 - 146/65)  BP(mean): --  RR: 19 (03 Jul 2023 00:57) (17 - 19)  SpO2: 100% (03 Jul 2023 00:57) (100% - 100%)    Parameters below as of 03 Jul 2023 00:57  Patient On (Oxygen Delivery Method): room air    GENERAL: NAD, lying in bed comfortably  HEAD:  Atraumatic, normocephalic  EYES: EOMI, PERRLA, conjunctiva and sclera clear  ENT: Moist mucous membranes  NECK: Supple, no JVD, no cervical LAD  HEART: S1, S2, regular rate and rhythm, no murmurs, rubs, or gallops  LUNGS: Unlabored respirations.  Clear to auscultation bilaterally, no crackles, wheezing, or rhonchi  ABDOMEN: Soft, nontender, nondistended, +BS, +splenomegaly  EXTREMITIES: 2+ peripheral pulses bilaterally. +trace edema in feet b/l  NERVOUS SYSTEM:  A&Ox3, no focal deficits   SKIN: +scattered hyperpigmentation. No ecchymoses or petechiae  LYMPH: +axillary LAD VITALS:   Vital Signs Last 24 Hrs  T(C): 37.1 (03 Jul 2023 00:57), Max: 37.1 (03 Jul 2023 00:57)  T(F): 98.7 (03 Jul 2023 00:57), Max: 98.7 (03 Jul 2023 00:57)  HR: 83 (03 Jul 2023 00:57) (83 - 86)  BP: 133/60 (03 Jul 2023 00:57) (133/60 - 146/65)  BP(mean): --  RR: 19 (03 Jul 2023 00:57) (17 - 19)  SpO2: 100% (03 Jul 2023 00:57) (100% - 100%)    Parameters below as of 03 Jul 2023 00:57  Patient On (Oxygen Delivery Method): room air    GENERAL: NAD, lying in bed comfortably  HEAD:  Atraumatic, normocephalic  EYES: EOMI, PERRLA, conjunctiva and sclera clear  ENT: Moist mucous membranes  NECK: Supple, no JVD, no cervical LAD  HEART: S1, S2, regular rate and rhythm, no murmurs, rubs, or gallops  LUNGS: Unlabored respirations.  Clear to auscultation bilaterally, no crackles, wheezing, or rhonchi  ABDOMEN: Soft, nontender, nondistended, +BS, +splenomegaly  EXTREMITIES: 2+ peripheral pulses bilaterally. +trace edema in feet b/l  NERVOUS SYSTEM:  A&Ox3, CN II-XII grossly intact, normal strength/sensation in all extremities  SKIN: +scattered hyperpigmentation. No ecchymoses or petechiae  LYMPH: +axillary LAD VITALS:   Vital Signs Last 24 Hrs  T(C): 37.1 (03 Jul 2023 00:57), Max: 37.1 (03 Jul 2023 00:57)  T(F): 98.7 (03 Jul 2023 00:57), Max: 98.7 (03 Jul 2023 00:57)  HR: 83 (03 Jul 2023 00:57) (83 - 86)  BP: 133/60 (03 Jul 2023 00:57) (133/60 - 146/65)  BP(mean): --  RR: 19 (03 Jul 2023 00:57) (17 - 19)  SpO2: 100% (03 Jul 2023 00:57) (100% - 100%)    Parameters below as of 03 Jul 2023 00:57  Patient On (Oxygen Delivery Method): room air    GENERAL: NAD, lying in bed comfortably  HEAD:  Atraumatic, normocephalic  EYES: EOMI, PERRLA, conjunctiva and sclera clear  ENT: Moist mucous membranes  NECK: Supple, no JVD, no cervical LAD  HEART: S1, S2, regular rate and rhythm, +systolic murmur best appreciated in LUSB  LUNGS: Unlabored respirations.  Clear to auscultation bilaterally, no crackles, wheezing, or rhonchi  ABDOMEN: Soft, nontender, nondistended, +BS, +splenomegaly  EXTREMITIES: 2+ peripheral pulses bilaterally. +trace edema in feet b/l  NERVOUS SYSTEM:  A&Ox3, CN II-XII grossly intact, normal strength/sensation in all extremities  SKIN: +scattered hyperpigmentation. No ecchymoses or petechiae  LYMPH: +axillary LAD VITALS:   Vital Signs Last 24 Hrs  T(C): 37.1 (03 Jul 2023 00:57), Max: 37.1 (03 Jul 2023 00:57)  T(F): 98.7 (03 Jul 2023 00:57), Max: 98.7 (03 Jul 2023 00:57)  HR: 83 (03 Jul 2023 00:57) (83 - 86)  BP: 133/60 (03 Jul 2023 00:57) (133/60 - 146/65)  BP(mean): --  RR: 19 (03 Jul 2023 00:57) (17 - 19)  SpO2: 100% (03 Jul 2023 00:57) (100% - 100%)    Parameters below as of 03 Jul 2023 00:57  Patient On (Oxygen Delivery Method): room air    GENERAL: NAD, lying in bed comfortably  HEAD:  Atraumatic, normocephalic  EYES: EOMI, PERRL, conjunctiva and sclera clear  ENT: Moist mucous membranes  NECK: Supple, no JVD, no cervical LAD  HEART: S1, S2, regular rate and rhythm, +systolic murmur best appreciated in LUSB  LUNGS: Unlabored respirations.  Clear to auscultation bilaterally, no crackles, wheezing, or rhonchi  ABDOMEN: Soft, nontender, nondistended, +BS, +splenomegaly  EXTREMITIES: 2+ peripheral pulses bilaterally. +trace edema in feet b/l  NERVOUS SYSTEM:  A&Ox3, CN II-XII grossly intact, normal strength/sensation in all extremities  SKIN: +scattered hyperpigmentation. No ecchymoses or petechiae  LYMPH: +axillary LAD

## 2023-07-03 NOTE — H&P ADULT - PROBLEM SELECTOR PLAN 6
DVT PPx: holding VTE ppx given severe anemia and thrombocytopenia, if VTE study negative consider SCDs  Diet: Consistent carbs  Dispo: pending clinical improvement  Code Status: full code

## 2023-07-03 NOTE — PROGRESS NOTE ADULT - ASSESSMENT
64yo gentleman with PMH of DM on insulin p/w weight loss of 15-20 lbs, gait instability, fatigue and body aches, found to have leukocytosis, anemia and thrombocytopenia concerning for leukemia.    #Suspected leukemia  On admission: CBC found WBC 75.5, Hgb 5.5, plt 85, MCV 75.7, RDW 19.9- 54% reactive lymphocytes  LDH is 925, uric acid 7.5.    Peripheral smear and cellovision reviewed, found polychromasia, some hypochromic RBCs, few elliptocyes. 1-2 schistocytes per HPF. True thrombocytopenia without platelet clumps or giant platelets, about 9 platelets per HPF. WBCs are mostly lymphocytes of varying maturity, some with visible nucleoli and high N:C ratio, some very minimal visible cytoplasm.    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   The differential diagnosis includes B-prolymphocytic leukemia/lymphoma (based on morphology), marginal zone lineage, lymphoplasmacytic lineage, mantle cell lineage (which may occasionally be CD5 negative; suggest FISH for t(11;14) to rule out), CD10 negative follicular lineage, or other B-cell lymphoma.   - Patient was started on hydrea 1500mg BID and allopurinol 300mg qd on 7/3/23.   - Iron studies found 15% iron sat, ferritin ordered. B12 and folate ordered.   - Retic low at 3.9%   - Labs are still pending:  G6PD level, HIV, hep B SAg, hep B SAb, hep B total core Ab, HCV  - Check TTE   - Start IVNS at 100 cc/hr. Continue IVF.   - Please obtain DAILY LABS: CBC with Diff, CMP, coags, uric acid, LDH, Phos, fibrinogen and d-dimer  - Continue supportive transfusion for hg < 7.0 and platelets < 10k, < 20k if febrile and < 50k if bleeding  - 7/3/23- Abd US found hepatomegaly (19.4cm). Contracted gallbladder filled with stones. Splenomegaly (27.3cm)   Duplex BLE US- No DVT  - Bone marrow biopsy and aspirate performed on 7/3/23. Will send studies for Clonoseq.   We discussed with the patient that his labs are concerning for possible lymphocytic lymphoma vs leukemia. Bone marrow biopsy and aspirate will assist in making a definitive diagnosis so that we can develop a treatment plan.    #Thrombophlebitis  Duplex BUE US- No evidence of deep vein thrombosis in the left upper extremity.  Superficial vein thrombosis noted within the the left basilic vein at the distal upper arm.  Patient reports thrombophlebitis was related to peripheral IV use. Please apply hot compresses.     #Coagulopathy:   -PT elevated at 16.8, fibrinogen low at 161, D-dimer 4565, likely low level DIC  Give 10 units cryoprecipitate for fibrinogen under 150    #Elevated TSH: 12.35  - Check free T4    Note not finalized until signed by attending.   Please do not hesitate to page with questions.     Alejandra Castillo MD PGY5   [phone]  Hematology-Oncology Fellow  [facility]- Please call  to page on-call fellow

## 2023-07-03 NOTE — H&P ADULT - PROBLEM SELECTOR PLAN 5
DVT PPx: holding VTE ppx given severe anemia and thrombocytopenia  Diet: Consistent carbs  Dispo: pending clinical improvement  Code Status: full code Hx of DM2, on insulin (Novolin N) 35U qAM/20U qPM at home  - BG since admission 140s-170s    Plan:  - f/u A1c and lipid panel in AM  - hold home antidiabetic medications at this time  - start ISS; adjust per patient's insulin requirements  - monitor FS for goal -180 while inpatient

## 2023-07-03 NOTE — H&P ADULT - PROBLEM SELECTOR PLAN 2
Pt. found to have severe anemia (Hgb 5.7) and thrombocytopenia (plt 87k), likely 2/2 bone marrow suppression 2/2 suspected leukemia as above  - hematology following; appreciate recs  - no active signs of bleeding currently  - ordered for 2U PRBC    Plan:  - f/u post-transfusion CBC  - monitor CBC w/ diff and transfuse for Hgb<7, plt<10k, <20k and febrile, or <50k and actively bleeding/pending procedure   - management of leukocytosis/suspected leukemia as above Pt. found to have elevated TSH to 12.35 on admission w/ symptoms of malaise, fatigue. Pt. has not seen a PCP in some time    Plan:  - f/u FT4  - start levothyroxine 100mcg qd (1.7 mcg/kg); repeat TFTs as an outpatient in 4-6 weeks for dose adjustments Noted to have systolic murmur on exam, best appreciated in LUSB, possibly flow murmur 2/2 severe anemia vs. undiagnosed aortic valve pathology     Plan:  - f/u TTE as above  - management of acute anemia as above Patient with anemia and thrombocytopenia with some schistocytes on smear, labs with elevated DDimer, low fibrinogen, prolonged PT, elevated LDH with low haptoglobin -> concerning for acute DIC    Plan:  - Trend coags, DDimer, fibrinogen, TBili/LFTs  - cryoprecipitate for fibrinogen < 150  - Noted to have trace b/l LE edema and new L UE edema (pt states he had IV infiltration on the L UE) -> [facility] Duplex L UE and b/l LE ordered for DVT r/o, monitor L UE edema/sensation/strength/pulse (currently with 4+/5 strength of the L UE when compared to R UE likely in setting of the significant edema from IV infiltration but otherwise no acute findings)

## 2023-07-03 NOTE — PROGRESS NOTE ADULT - PROBLEM SELECTOR PLAN 1
Pt. p/w malaise, B-symptoms (subjective fevers/chills, weight loss, night sweats), marked leukocytosis (95k --> 75k), severe anemia (Hgb 5.7), thrombocytopenia (plt 87k), and hepatosplenomegaly c/f new-onset leukemia  - hematology following; appreciate recs- high suspicion for ALL vs. CLL, lower for lymphoma  - peripheral smear showing thrombocytopenia, lymphocytes of varying maturity w/ high N:C ratio, visible nucleoli, 1-2 schistocytes per HPF  - ordered for 2U PRBC    Plan:  - f/u post-transfusion CBC  - f/u HIV, HepBsAg, HepBsAb, HepB total core Ab, HCV, G6PD (ordered by hematology)  - f/u flow cytometry; pending results, may be transferred to [facility] for treatment  - f/u TTE   - c/w Hydrea 1500mg BID  - c/w allopurinol 300mg qd  - c/w NS 100cc/hr  - monitor daily labs: CBC w/ diff, CMP, coags, uric acid, LDH, phos, fibrinogen, d-dimer  - transfuse for Hgb<7, plt<10k, <20k and febrile, or <50k and actively bleeding/pending procedure   - monitor temperature curve and vital signs Pt. p/w malaise, B-symptoms (subjective fevers/chills, weight loss, night sweats), marked leukocytosis (95k --> 75k), severe anemia (Hgb 5.7), thrombocytopenia (plt 87k), and hepatosplenomegaly c/f new-onset leukemia  - hematology following; appreciate recs- high suspicion for ALL vs. CLL, lower for lymphoma  - peripheral smear showing thrombocytopenia, lymphocytes of varying maturity w/ high N:C ratio, visible nucleoli, 1-2 schistocytes per HPF  - s/p 2U PRBC, had fever 100.4 after 2nd unit treated with Tylenol 650mg PRN    Plan:  - f/u post-transfusion CBC  - f/u HIV, HepBsAg, HepBsAb, HepB total core Ab, HCV, G6PD (ordered by hematology)  - May be transferred to [facility] for treatment per flow cytometry results  - f/u TTE   - c/w Hydrea 1500mg BID  - c/w allopurinol 300mg qd  - c/w NS 100cc/hr  - monitor daily labs: CBC w/ diff, CMP, coags, uric acid, LDH, phos, fibrinogen, d-dimer  - transfuse for Hgb<7, plt<10k, <20k and febrile, or <50k and actively bleeding/pending procedure   - monitor temperature curve and vital signs Pt. 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  - peripheral smear showing thrombocytopenia, lymphocytes of varying maturity w/ high N:C ratio, visible nucleoli, 1-2 schistocytes per HPF  - s/p 2U PRBC, fever 100.4 post 2nd transfusion given Tylenol 650mg     Plan:  - f/u post-transfusion CBC  - f/u HIV, HepBsAg, HepBsAb, HepB total core Ab, HCV, G6PD (ordered by hematology)  - May be transferred to [facility] for treatment per flow cytometry results  - f/u TTE   - c/w Hydrea 1500mg BID  - c/w allopurinol 300mg qd  - c/w NS 100cc/hr  - monitor daily labs: CBC w/ diff, CMP, coags, uric acid, LDH, phos, fibrinogen, d-dimer  - transfuse for Hgb<7, plt<10k, <20k and febrile, or <50k and actively bleeding/pending procedure   - monitor temperature curve and vital signs

## 2023-07-03 NOTE — H&P ADULT - HISTORY OF PRESENT ILLNESS
63M w/ DM2 but no other known hx (pt. has not seen PCP in some time) who presents with 4d of generalized malaise.    Pt. reports feeling malaise, generalized weakness, and gait instability that began 4d ago associated with 15-20lb weight loss over 3 weeks, subjective fevers/chills, night sweats, and diffuse fatigue and body aches. He reports losing his health insurance and has not followed up with a PCP in some time. He denies chest pain, palpitations, SOB, cough, dizziness/lightheadedness, any candace bleeding (hematemesis, hematochezia, melena, hemoptysis, hematuria, gingival, epistaxis), prior anticoagulant/antiplatelet/NSAID use, family or personal hx of blood disorders, or easy bruising, He denies headaches, abdominal pain, N/V/D but does report occasional blurry vision which is not currently present.    On admission, vitals were overall stable. Labs notable for marked leukocytosis (95k), severe anemia (Hgb 5.7), thrombocytopenia (87k) w/ smear showing smudge cells. D-dimer elevated (4565) and fibrinogen decreased (161). Haptoglobin decreased (<20) w/ LDH elevated (925). Trops borderline (27 --> 22). TSH elevated (12.35). Neuroimaging (CT head, CTA head/neck) negative. CXR negative. US abdomen showing hepatosplenomegaly. Pt. seen by hematology and started on Hydrea, allopurinol, IVFs and ordered for 2U PRBC.  63M w/ DM2 but no other known hx (pt. has not seen PCP in some time) who presents with 4d of generalized malaise.    Pt. reports feeling malaise, generalized weakness, and gait instability that began 4d ago. Pt. reports that he felt unsteady on his feet, as if he was off balance and needed to grab onto something but denies vertigo-like symptoms ("room spinning"). Pt. has also had associated 15-20lb weight loss over 1.5 weeks, subjective fevers/chills, night sweats, and diffuse fatigue and body aches. He reports losing his health insurance and has not followed up with a PCP in some time. He denies chest pain, palpitations, SOB, cough, lightheadedness, any candace bleeding (hematemesis, hematochezia, melena, hemoptysis, hematuria, gingival, epistaxis), prior anticoagulant/antiplatelet/NSAID use, family or personal hx of blood disorders, or easy bruising, He denies headaches, abdominal pain, N/V/D but does report occasional blurry vision which is not currently present.    On admission, vitals were overall stable. Labs notable for marked leukocytosis (95k), severe anemia (Hgb 5.7), thrombocytopenia (87k) w/ smear showing smudge cells. D-dimer elevated (4565) and fibrinogen decreased (161). Haptoglobin decreased (<20) w/ LDH elevated (925). Trops borderline (27 --> 22). TSH elevated (12.35). Neuroimaging (CT head, CTA head/neck) negative. CXR negative. US abdomen showing hepatosplenomegaly. Pt. seen by hematology and started on Hydrea, allopurinol, IVFs and ordered for 2U PRBC.  63M w/ DM2 but no other known hx (pt. has not seen PCP in some time) who presents with 4d of generalized malaise.    Pt. reports feeling malaise, generalized weakness, and gait instability that began 4d ago. Pt. reports that he felt unsteady on his feet, as if he was off balance and needed to grab onto something but denies vertigo-like symptoms ("room spinning"). or focal neurological complaints Pt. also has associated 15-20lb weight loss over 3-4  weeks, subjective fevers/chills, night sweats, and diffuse fatigue and body aches. He reports losing his health insurance and has not followed up with a PCP in some time. He denies chest pain, palpitations, SOB, cough, lightheadedness, any candace bleeding (hematemesis, hematochezia, melena, hemoptysis, hematuria, gingival, epistaxis), prior anticoagulant/antiplatelet/NSAID use, family or personal hx of blood disorders, or easy bruising, He denies headaches, abdominal pain, N/V/D but does report occasional blurry vision which is not currently present.    On admission, vitals were overall stable. Labs notable for marked leukocytosis (95k), severe anemia (Hgb 5.7), thrombocytopenia (87k) w/ smear showing smudge cells. D-dimer elevated (4565) and fibrinogen decreased (161). Haptoglobin decreased (<20) w/ LDH elevated (925). Trops borderline (27 --> 22). TSH elevated (12.35). Neuroimaging (CT head, CTA head/neck) negative. CXR negative. US abdomen showing hepatosplenomegaly. Pt. seen by hematology and started on Hydrea, allopurinol, IVFs and ordered for 2U PRBC.

## 2023-07-03 NOTE — PROGRESS NOTE ADULT - SUBJECTIVE AND OBJECTIVE BOX
Hematology Oncology Follow-up    INTERVAL HPI/OVERNIGHT EVENTS:        VITAL SIGNS:  T(F): 99.3 (07-03-23 @ 21:11)  HR: 80 (07-03-23 @ 21:11)  BP: 137/55 (07-03-23 @ 21:11)  RR: 18 (07-03-23 @ 21:11)  SpO2: 100% (07-03-23 @ 21:11)  Wt(kg): --    07-03-23 @ 07:01  -  07-03-23 @ 21:48  --------------------------------------------------------  IN: 1100 mL / OUT: 339 mL / NET: 761 mL        PHYSICAL EXAM:  GENERAL: NAD, well-groomed  HEAD:  Atraumatic, Normocephalic  EYES: EOMI, PERRLA, conjunctiva and sclera clear  ENMT: No oropharyngeal exudates, Moist mucous membranes  NECK: Supple, no cervical lymphadenopathy  NERVOUS SYSTEM:  alert and conversant, moving all extremities spontaneously   CHEST/LUNG: Clear to auscultation bilaterally; no rhonchi  HEART: Regular rate and rhythm; No murmurs  ABDOMEN: Soft, Nontender, Nondistended  EXTREMITIES:  2+ radial Pulses, No cyanosis or edema  SKIN: warm, dry    Medications  MEDICATIONS  (STANDING):  allopurinol 300 milliGRAM(s) Oral daily  dextrose 5%. 1000 milliLiter(s) (50 mL/Hr) IV Continuous <Continuous>  dextrose 5%. 1000 milliLiter(s) (100 mL/Hr) IV Continuous <Continuous>  dextrose 50% Injectable 25 Gram(s) IV Push once  dextrose 50% Injectable 12.5 Gram(s) IV Push once  dextrose 50% Injectable 25 Gram(s) IV Push once  glucagon  Injectable 1 milliGRAM(s) IntraMuscular once  heparin   Injectable 5000 Unit(s) SubCutaneous once  hydroxyurea 1500 milliGRAM(s) Oral two times a day  insulin lispro (ADMELOG) corrective regimen sliding scale   SubCutaneous three times a day before meals  insulin lispro (ADMELOG) corrective regimen sliding scale   SubCutaneous at bedtime  levothyroxine 100 MICROGram(s) Oral daily  lidocaine 2% Injectable 20 milliLiter(s) Local Injection once  melatonin 3 milliGRAM(s) Oral at bedtime  sodium chloride 0.9%. 1000 milliLiter(s) (100 mL/Hr) IV Continuous <Continuous>    MEDICATIONS  (PRN):  acetaminophen     Tablet .. 650 milliGRAM(s) Oral every 6 hours PRN Temp greater or equal to 38C (100.4F), Mild Pain (1 - 3)  dextrose Oral Gel 15 Gram(s) Oral once PRN Blood Glucose LESS THAN 70 milliGRAM(s)/deciliter      Allergies: No Known Allergies      LABS:                        5.5    75.51 )-----------( 85       ( 02 Jul 2023 17:47 )             19.3     07-02    132<L>  |  97<L>  |  17  ----------------------------<  143<H>  5.1   |  21<L>  |  0.93    Ca    9.2      02 Jul 2023 16:51  Phos  2.9     07-02  Mg     2.00     07-02    TPro  6.6  /  Alb  3.5  /  TBili  1.1  /  DBili  x   /  AST  44<H>  /  ALT  17  /  AlkPhos  135<H>  07-02    PT/INR - ( 02 Jul 2023 18:02 )   PT: 16.8 sec;   INR: 1.44 ratio         PTT - ( 02 Jul 2023 18:02 )  PTT:30.8 sec   Urinalysis Basic - ( 02 Jul 2023 16:51 )    Color: x / Appearance: x / SG: x / pH: x  Gluc: 143 mg/dL / Ketone: x  / Bili: x / Urobili: x   Blood: x / Protein: x / Nitrite: x   Leuk Esterase: x / RBC: x / WBC x   Sq Epi: x / Non Sq Epi: x / Bacteria: x        Iron - Total Binding Capacity.: 224 ug/dL (07-02-23 @ 17:47)  % Saturation, Iron: 15 % (07-02-23 @ 17:47)  Iron Total: 33 ug/dL *L* (07-02-23 @ 17:47)  Unsaturated Iron Binding Capacity: 191 ug/dL (07-02-23 @ 17:47)          RADIOLOGY & ADDITIONAL TESTS:  Studies reviewed. Hematology Oncology Follow-up    INTERVAL HPI/OVERNIGHT EVENTS:  Patient state he feels improved from yesterday. He did not have a good night of sleep because he was in the ED, and then had trouble with his IV in the LUE.  He still feels a bit unsteady when he tries to walk.     VITAL SIGNS:  T(F): 99.3 (07-03-23 @ 21:11)  HR: 80 (07-03-23 @ 21:11)  BP: 137/55 (07-03-23 @ 21:11)  RR: 18 (07-03-23 @ 21:11)  SpO2: 100% (07-03-23 @ 21:11)  Wt(kg): --    07-03-23 @ 07:01  -  07-03-23 @ 21:48  --------------------------------------------------------  IN: 1100 mL / OUT: 339 mL / NET: 761 mL    PHYSICAL EXAM:  GENERAL: NAD, well-groomed  HEAD:  Atraumatic, Normocephalic  EYES: EOMI, PERRLA, conjunctiva and sclera clear  ENMT: No oropharyngeal exudates, Moist mucous membranes  NECK: Supple, no cervical lymphadenopathy  NERVOUS SYSTEM:  alert and conversant, moving all extremities spontaneously   CHEST/LUNG: Clear to auscultation bilaterally; no rhonchi  HEART: Regular rate and rhythm; No murmurs  ABDOMEN: Soft, + hepatosplenomegaly   EXTREMITIES:  2+ radial Pulses, No cyanosis or edema  SKIN: warm, dry    Medications  MEDICATIONS  (STANDING):  allopurinol 300 milliGRAM(s) Oral daily  dextrose 5%. 1000 milliLiter(s) (50 mL/Hr) IV Continuous <Continuous>  dextrose 5%. 1000 milliLiter(s) (100 mL/Hr) IV Continuous <Continuous>  dextrose 50% Injectable 25 Gram(s) IV Push once  dextrose 50% Injectable 12.5 Gram(s) IV Push once  dextrose 50% Injectable 25 Gram(s) IV Push once  glucagon  Injectable 1 milliGRAM(s) IntraMuscular once  heparin   Injectable 5000 Unit(s) SubCutaneous once  hydroxyurea 1500 milliGRAM(s) Oral two times a day  insulin lispro (ADMELOG) corrective regimen sliding scale   SubCutaneous three times a day before meals  insulin lispro (ADMELOG) corrective regimen sliding scale   SubCutaneous at bedtime  levothyroxine 100 MICROGram(s) Oral daily  lidocaine 2% Injectable 20 milliLiter(s) Local Injection once  melatonin 3 milliGRAM(s) Oral at bedtime  sodium chloride 0.9%. 1000 milliLiter(s) (100 mL/Hr) IV Continuous <Continuous>    MEDICATIONS  (PRN):  acetaminophen     Tablet .. 650 milliGRAM(s) Oral every 6 hours PRN Temp greater or equal to 38C (100.4F), Mild Pain (1 - 3)  dextrose Oral Gel 15 Gram(s) Oral once PRN Blood Glucose LESS THAN 70 milliGRAM(s)/deciliter      Allergies: No Known Allergies      LABS:                        5.5    75.51 )-----------( 85       ( 02 Jul 2023 17:47 )             19.3     07-02    132<L>  |  97<L>  |  17  ----------------------------<  143<H>  5.1   |  21<L>  |  0.93    Ca    9.2      02 Jul 2023 16:51  Phos  2.9     07-02  Mg     2.00     07-02    TPro  6.6  /  Alb  3.5  /  TBili  1.1  /  DBili  x   /  AST  44<H>  /  ALT  17  /  AlkPhos  135<H>  07-02    PT/INR - ( 02 Jul 2023 18:02 )   PT: 16.8 sec;   INR: 1.44 ratio         PTT - ( 02 Jul 2023 18:02 )  PTT:30.8 sec   Urinalysis Basic - ( 02 Jul 2023 16:51 )    Color: x / Appearance: x / SG: x / pH: x  Gluc: 143 mg/dL / Ketone: x  / Bili: x / Urobili: x   Blood: x / Protein: x / Nitrite: x   Leuk Esterase: x / RBC: x / WBC x   Sq Epi: x / Non Sq Epi: x / Bacteria: x        Iron - Total Binding Capacity.: 224 ug/dL (07-02-23 @ 17:47)  % Saturation, Iron: 15 % (07-02-23 @ 17:47)  Iron Total: 33 ug/dL *L* (07-02-23 @ 17:47)  Unsaturated Iron Binding Capacity: 191 ug/dL (07-02-23 @ 17:47)          RADIOLOGY & ADDITIONAL TESTS:  Studies reviewed.

## 2023-07-03 NOTE — H&P ADULT - ATTENDING COMMENTS
Patient seen and examined on 7/3/23, case discussed with Dr. Jayant Lee. 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 Patient denies other acute complaints at present. Currently on examination, patient laying in bed in NAD, heart sounds regular without m/r/g, lungs CTAB, abdomen soft with good bowel sounds with significant hepatosplenomegaly, L UE with edema (pt reports IV infiltration) with good sensation intact pulses but slightly decreased strength when compared to R UE, b/l LE with trace edema with good peripheral pulses. Labs, EKG, and imaging reviewed. Hematology consult appreciated.     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

## 2023-07-03 NOTE — PROCEDURE NOTE - GENERAL PROCEDURE NAME
[facility] ambulatory encounter  FAMILY PRACTICE OFFICE VISIT    CHIEF COMPLAINT:    Chief Complaint   Patient presents with   • ER F/U     Headaches       SUBJECTIVE:  Osbaldo Tipton is a 62 year old male who presented requesting evaluation for follow up on his ER visit from 2/25/17 for headaches. Patient has been experiencing an intermittent sharp/stabbing left-sided temporal headache for the past 2 weeks. It occurs approximately every other day and lasts for up to 4 hours at a time. It causes his left eyelid water. No associated eye redness. Prior to the onset of headache, he feels a tingling in his left cheek. He also has photosensitivity associated with the headache. He otherwise denies any numbness/tingling/weakness in his extremities, confusion, or nausea. No prior history of headaches. There is no family history of neurological disease. Patient denies any changes in stress, caffeine, alcohol, or tobacco use. He initially thought that the headache was related to dental issues, as he was started on amoxicillin by his dentist for cavities; however, he had his cavities filled 5 days ago and continues to get these headaches. In the ER, patient had a normal CBC, CMP, and head CT.    Review of systems:   All other systems are reviewed and are negative except as documented in the history of present illness.     OBJECTIVE:  PROBLEM LIST:   There is no problem list on file for this patient.      PAST HISTORIES:   I have reviewed the past medical history, family history, social history, medications and allergies listed in the medical record as obtained by my nursing staff and support staff and agree with their documentation.  ALLERGIES:  No Known Allergies  Current Outpatient Prescriptions   Medication Sig Dispense Refill   • SUMAtriptan (IMITREX) 20 MG/ACT nasal spray 1 spray by Right Nare route as needed for Migraine. 6 each 0   • predniSONE (DELTASONE) 10 MG tablet 60 mg daily for 5 days, then 50 mg QD x 1 day, 40 mg  QD x 1 day, 30 mg QD x 1 day, 20 mg QD x 1 day, 10 mg QD x 1 day 45 tablet 0     No current facility-administered medications for this visit.      Past Medical History:   Diagnosis Date   • Sinus infection        PHYSICAL EXAM:   Vital Signs:    Visit Vitals   • /78   • Pulse 67   • Temp 97 °F (36.1 °C) (Tympanic)   • Resp 16   • Ht 5' 9\" (1.753 m)   • Wt 65.3 kg   • BMI 21.27 kg/m2     General:   Alert, cooperative, conversive in no acute distress.  Skin:  Warm and dry without rash.    Head:  Normocephalic, atraumatic. No lymphadenopathy.  Neck:  Trachea is midline. No adenopathy.   Eyes:  Normal conjunctivae and sclerae.  Pupils equal, round and reactive to light. Extraocular movements intact.    ENT:   Mucous membranes are moist.  Normal tympanic membranes and external auditory canals bilaterally.  No pharyngeal erythema or exudate.  No facial tenderness.  Normal nasal mucosa.  Cardiovascular:  Symmetrical pulses.  Regular rate and rhythm without murmur.  Respiratory:   Normal respiratory effort. Clear to auscultation.  No wheezes, rales or rhonchi.  Neurologic:   Oriented x4.  Cranial nerves II-XII are intact.  No nystagmus.  No focal deficits or lateralizing signs.  Normal gait without ataxia.  Psychiatric:   Cooperative.  Appropriate mood and affect.  Normal judgment.    LAB RESULTS:   No lab tests performed today    ASSESSMENT:   1. New onset of headaches    2. Encounter for screening for malignant neoplasm of colon        PLAN:   Orders Placed This Encounter   • SERVICE TO GASTROENTEROLOGY   • SUMAtriptan (IMITREX) 20 MG/ACT nasal spray   • predniSONE (DELTASONE) 10 MG tablet       Prednisone taper: 60 mg daily ×5 days and then taper down by 10 mg each day until done. Also prescribed Imitrex to use as needed for headache. We will check a brain MRI, further recommendations pending results. Provided the patient with a referral for colonoscopy.    No Follow-up on file.    Instructions provided as  documented in the after visit summary.    The patient indicated understanding of the diagnosis and agreed with the plan of care.     Mary Delgado PA-C     Bone Marrow Biopsy and Aspiration normal (ped)...

## 2023-07-03 NOTE — H&P ADULT - PROBLEM SELECTOR PLAN 3
Pt. found to have elevated TSH to 12.35 on admission w/ symptoms of malaise, fatigue. Pt. has not seen a PCP in some time    Plan:  - f/u FT4  - start levothyroxine? Pt. found to have elevated TSH to 12.35 on admission w/ symptoms of malaise, fatigue. Pt. has not seen a PCP in some time    Plan:  - f/u FT4  - start levothyroxine 100mcg qd; repeat TFTs as an outpatient in 4-6 weeks for dose adjustments Hx of DM2, on insulin (unknown type) 35U qAM/20U qPM at home  - BG since admission 140s-170s    Plan:  - f/u A1c and lipid panel in AM  - hold home antidiabetic medications at this time  - start ISS; adjust per patient's insulin requirements  - monitor FS for goal -180 while inpatient Pt. found to have elevated TSH to 12.35 on admission w/ symptoms of malaise, fatigue. Pt. has not seen a PCP in some time    Plan:  - f/u FT4  - start levothyroxine 100mcg qd (1.7 mcg/kg); repeat TFTs as an outpatient in 4-6 weeks for dose adjustments Noted to have systolic murmur on exam, best appreciated in LUSB, possibly flow murmur 2/2 severe anemia vs. undiagnosed aortic valve pathology     Plan:  - f/u TTE as above  - management of acute anemia as above

## 2023-07-03 NOTE — PROGRESS NOTE ADULT - PROBLEM SELECTOR PLAN 6
DVT PPx: holding VTE ppx given severe anemia and thrombocytopenia, if VTE study negative consider SCDs  Diet: Consistent carbs  Dispo: pending clinical improvement  Code Status: full code DVT PPx: holding VTE ppx given severe anemia and thrombocytopenia  Diet: Consistent carbs  Dispo: pending clinical improvement  Code Status: full code

## 2023-07-03 NOTE — H&P ADULT - ASSESSMENT
63M w/ DM2 but no other known hx (pt. has not seen PCP in some time), who presents with malaise, B-symptoms (fevers/chills, weight loss, night sweats), hepatosplenomegaly and labs c/f hematologic malignancy (marked leukocytosis, severe anemia, thrombocytopenia), admitted for further workup and management.

## 2023-07-03 NOTE — PROGRESS NOTE ADULT - SUBJECTIVE AND OBJECTIVE BOX
Srdihar Pastrana MD  PGY 1 Department of Internal Medicine        Patient is a 63y old  Male who presents with a chief complaint of anemia, general malaise (03 Jul 2023 03:40)      SUBJECTIVE / OVERNIGHT EVENTS: Pt seen and examined. No acute overnight events. Denies fevers, chills, CP, SOB, Abdominal pain, N/V, Constipation, Diarrhea        MEDICATIONS  (STANDING):  allopurinol 300 milliGRAM(s) Oral daily  dextrose 5%. 1000 milliLiter(s) (50 mL/Hr) IV Continuous <Continuous>  dextrose 5%. 1000 milliLiter(s) (100 mL/Hr) IV Continuous <Continuous>  dextrose 50% Injectable 12.5 Gram(s) IV Push once  dextrose 50% Injectable 25 Gram(s) IV Push once  dextrose 50% Injectable 25 Gram(s) IV Push once  glucagon  Injectable 1 milliGRAM(s) IntraMuscular once  hydroxyurea 1500 milliGRAM(s) Oral two times a day  insulin lispro (ADMELOG) corrective regimen sliding scale   SubCutaneous at bedtime  insulin lispro (ADMELOG) corrective regimen sliding scale   SubCutaneous three times a day before meals  levothyroxine 100 MICROGram(s) Oral daily  sodium chloride 0.9%. 1000 milliLiter(s) (100 mL/Hr) IV Continuous <Continuous>    MEDICATIONS  (PRN):  acetaminophen     Tablet .. 650 milliGRAM(s) Oral every 6 hours PRN Temp greater or equal to 38C (100.4F), Mild Pain (1 - 3)  dextrose Oral Gel 15 Gram(s) Oral once PRN Blood Glucose LESS THAN 70 milliGRAM(s)/deciliter      I&O's Summary      Vital Signs Last 24 Hrs  T(C): 37.2 (03 Jul 2023 07:02), Max: 37.2 (03 Jul 2023 07:02)  T(F): 99 (03 Jul 2023 07:02), Max: 99 (03 Jul 2023 07:02)  HR: 74 (03 Jul 2023 07:02) (72 - 86)  BP: 116/52 (03 Jul 2023 07:02) (116/52 - 146/65)  BP(mean): --  RR: 19 (03 Jul 2023 07:02) (17 - 19)  SpO2: 100% (03 Jul 2023 07:02) (100% - 100%)    Parameters below as of 03 Jul 2023 07:02  Patient On (Oxygen Delivery Method): room air        CAPILLARY BLOOD GLUCOSE      POCT Blood Glucose.: 165 mg/dL (03 Jul 2023 00:28)  POCT Blood Glucose.: 118 mg/dL (02 Jul 2023 18:52)  POCT Blood Glucose.: 174 mg/dL (02 Jul 2023 15:55)      PHYSICAL EXAM:  GENERAL: NAD,   HEAD:  Atraumatic, Normocephalic  EYES: EOMI, PERRL, conjunctiva and sclera clear  NECK: No JVD  CHEST/LUNG: Clear to auscultation bilaterally; No wheeze  HEART: Regular rate and rhythm; No murmurs, rubs, or gallops  ABDOMEN: Soft, Nontender, Nondistended; Bowel sounds present  EXTREMITIES:  2+ Peripheral Pulses, No clubbing, cyanosis, or edema  PSYCH: AAOx3  NEUROLOGY: non-focal  SKIN: No rashes or lesions       LABS:                        5.5    75.51 )-----------( 85       ( 02 Jul 2023 17:47 )             19.3     Auto Eosinophil # 0.68  / Auto Eosinophil % 0.9   / Auto Neutrophil # 10.65 / Auto Neutrophil % 14.1  / BANDS % x                            5.7    94.84 )-----------( 87       ( 02 Jul 2023 16:51 )             19.0     Auto Eosinophil # x     / Auto Eosinophil % x     / Auto Neutrophil # x     / Auto Neutrophil % x     / BANDS % x        07-02    132<L>  |  97<L>  |  17  ----------------------------<  143<H>  5.1   |  21<L>  |  0.93    Ca    9.2      02 Jul 2023 16:51  Mg     2.00     07-02  Phos  2.9     07-02  TPro  6.6  /  Alb  3.5  /  TBili  1.1  /  DBili  x   /  AST  44<H>  /  ALT  17  /  AlkPhos  135<H>  07-02    PT/INR - ( 02 Jul 2023 18:02 )   PT: 16.8 sec;   INR: 1.44 ratio         PTT - ( 02 Jul 2023 18:02 )  PTT:30.8 sec      Urinalysis Basic - ( 02 Jul 2023 16:51 )    Color: x / Appearance: x / SG: x / pH: x  Gluc: 143 mg/dL / Ketone: x  / Bili: x / Urobili: x   Blood: x / Protein: x / Nitrite: x   Leuk Esterase: x / RBC: x / WBC x   Sq Epi: x / Non Sq Epi: x / Bacteria: x            RADIOLOGY & ADDITIONAL TESTS:    Imaging Personally Reviewed:    Consultant(s) Notes Reviewed:      Care Discussed with Consultants/Other Providers:   Sridhar Pastrana MD  PGY 1 Department of Internal Medicine        Patient is a 63y old  Male who presents with a chief complaint of anemia, general malaise (03 Jul 2023 03:40)      SUBJECTIVE / OVERNIGHT EVENTS: Pt seen and examined. No acute overnight events. He complains of fatigue which has improved from yesterday but denies fevers, chills, CP, SOB, Abdominal pain, N/V, Constipation, Diarrhea        MEDICATIONS  (STANDING):  allopurinol 300 milliGRAM(s) Oral daily  dextrose 5%. 1000 milliLiter(s) (50 mL/Hr) IV Continuous <Continuous>  dextrose 5%. 1000 milliLiter(s) (100 mL/Hr) IV Continuous <Continuous>  dextrose 50% Injectable 12.5 Gram(s) IV Push once  dextrose 50% Injectable 25 Gram(s) IV Push once  dextrose 50% Injectable 25 Gram(s) IV Push once  glucagon  Injectable 1 milliGRAM(s) IntraMuscular once  hydroxyurea 1500 milliGRAM(s) Oral two times a day  insulin lispro (ADMELOG) corrective regimen sliding scale   SubCutaneous at bedtime  insulin lispro (ADMELOG) corrective regimen sliding scale   SubCutaneous three times a day before meals  levothyroxine 100 MICROGram(s) Oral daily  sodium chloride 0.9%. 1000 milliLiter(s) (100 mL/Hr) IV Continuous <Continuous>    MEDICATIONS  (PRN):  acetaminophen     Tablet .. 650 milliGRAM(s) Oral every 6 hours PRN Temp greater or equal to 38C (100.4F), Mild Pain (1 - 3)  dextrose Oral Gel 15 Gram(s) Oral once PRN Blood Glucose LESS THAN 70 milliGRAM(s)/deciliter      I&O's Summary      Vital Signs Last 24 Hrs  T(C): 37.2 (03 Jul 2023 07:02), Max: 37.2 (03 Jul 2023 07:02)  T(F): 99 (03 Jul 2023 07:02), Max: 99 (03 Jul 2023 07:02)  HR: 74 (03 Jul 2023 07:02) (72 - 86)  BP: 116/52 (03 Jul 2023 07:02) (116/52 - 146/65)  BP(mean): --  RR: 19 (03 Jul 2023 07:02) (17 - 19)  SpO2: 100% (03 Jul 2023 07:02) (100% - 100%)    Parameters below as of 03 Jul 2023 07:02  Patient On (Oxygen Delivery Method): room air        CAPILLARY BLOOD GLUCOSE      POCT Blood Glucose.: 165 mg/dL (03 Jul 2023 00:28)  POCT Blood Glucose.: 118 mg/dL (02 Jul 2023 18:52)  POCT Blood Glucose.: 174 mg/dL (02 Jul 2023 15:55)      PHYSICAL EXAM:  GENERAL: NAD, resting comfortably  HEAD:  Atraumatic, Normocephalic  EYES: EOMI, PERRL, conjunctiva and sclera clear  NECK: No JVD  CHEST/LUNG: Clear to auscultation bilaterally; No wheeze  HEART: Regular rate and rhythm; No murmurs, rubs, or gallops  ABDOMEN: Soft, Nontender, Nondistended; Bowel sounds present  EXTREMITIES:  2+ Peripheral Pulses, No clubbing, cyanosis, or edema  PSYCH: AAOx3  NEUROLOGY: non-focal  SKIN: No rashes or lesions, hyperpigmentation of abdomen        LABS:                        5.5    75.51 )-----------( 85       ( 02 Jul 2023 17:47 )             19.3     Auto Eosinophil # 0.68  / Auto Eosinophil % 0.9   / Auto Neutrophil # 10.65 / Auto Neutrophil % 14.1  / BANDS % x                            5.7    94.84 )-----------( 87       ( 02 Jul 2023 16:51 )             19.0     Auto Eosinophil # x     / Auto Eosinophil % x     / Auto Neutrophil # x     / Auto Neutrophil % x     / BANDS % x        07-02    132<L>  |  97<L>  |  17  ----------------------------<  143<H>  5.1   |  21<L>  |  0.93    Ca    9.2      02 Jul 2023 16:51  Mg     2.00     07-02  Phos  2.9     07-02  TPro  6.6  /  Alb  3.5  /  TBili  1.1  /  DBili  x   /  AST  44<H>  /  ALT  17  /  AlkPhos  135<H>  07-02    PT/INR - ( 02 Jul 2023 18:02 )   PT: 16.8 sec;   INR: 1.44 ratio         PTT - ( 02 Jul 2023 18:02 )  PTT:30.8 sec      Urinalysis Basic - ( 02 Jul 2023 16:51 )    Color: x / Appearance: x / SG: x / pH: x  Gluc: 143 mg/dL / Ketone: x  / Bili: x / Urobili: x   Blood: x / Protein: x / Nitrite: x   Leuk Esterase: x / RBC: x / WBC x   Sq Epi: x / Non Sq Epi: x / Bacteria: x      Flow Cytometry:  DIAGNOSIS:  Peripheral blood:       - Monotypic B-cells (61.47% of cells), positive for kappa, CD19, CD20, FMC-7, CD79b; negative  CD5, CD10, , CD38, CD11c, CD23 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  Please see interpretation.    INTERPRETATION:  MORPHOLOGY: Absolute lymphocytosis; atypical lymphocytes, medium in size, with prominent nucleoli  (some singly prominent), and cytoplasmic blebbing (projections).    IMMUNOPHENOTYPE: _  Monotypic B-cells (61.47% of cells), positive for kappa, CD19, CD20, FMC-7, CD79b; negative CD5,  CD10, , CD38, CD11c, CD23.    The findings are consistent with CD5 negative, CD10 negative B-cell lymphoproliferative  disorder/lymphoma. The differential diagnosis includes B-prolymphocytic leukemia/lymphoma (based on  morphology), marginal zone lineage, lymphoplasmacytic lineage, mantle cell lineage (which may  occasionally be CD5 negative; suggest FISH for t(11;14) to rule out), CD10 negative follicular  lineage, or other B-cell lymphoma.      RADIOLOGY & ADDITIONAL TESTS:    Imaging Personally Reviewed:    Consultant(s) Notes Reviewed:      Care Discussed with Consultants/Other Providers:   Sridhar Pastrana MD  PGY 1 Department of Internal Medicine        Patient is a 63y old  Male who presents with a chief complaint of anemia, general malaise (03 Jul 2023 03:40)      SUBJECTIVE / OVERNIGHT EVENTS: Pt seen and examined. No acute overnight events. He complains of fatigue which has improved from yesterday but denies fevers, chills, CP, SOB, Abdominal pain, N/V, Constipation, Diarrhea        MEDICATIONS  (STANDING):  allopurinol 300 milliGRAM(s) Oral daily  dextrose 5%. 1000 milliLiter(s) (50 mL/Hr) IV Continuous <Continuous>  dextrose 5%. 1000 milliLiter(s) (100 mL/Hr) IV Continuous <Continuous>  dextrose 50% Injectable 12.5 Gram(s) IV Push once  dextrose 50% Injectable 25 Gram(s) IV Push once  dextrose 50% Injectable 25 Gram(s) IV Push once  glucagon  Injectable 1 milliGRAM(s) IntraMuscular once  hydroxyurea 1500 milliGRAM(s) Oral two times a day  insulin lispro (ADMELOG) corrective regimen sliding scale   SubCutaneous at bedtime  insulin lispro (ADMELOG) corrective regimen sliding scale   SubCutaneous three times a day before meals  levothyroxine 100 MICROGram(s) Oral daily  sodium chloride 0.9%. 1000 milliLiter(s) (100 mL/Hr) IV Continuous <Continuous>    MEDICATIONS  (PRN):  acetaminophen     Tablet .. 650 milliGRAM(s) Oral every 6 hours PRN Temp greater or equal to 38C (100.4F), Mild Pain (1 - 3)  dextrose Oral Gel 15 Gram(s) Oral once PRN Blood Glucose LESS THAN 70 milliGRAM(s)/deciliter      I&O's Summary      Vital Signs Last 24 Hrs  T(C): 37.2 (03 Jul 2023 07:02), Max: 37.2 (03 Jul 2023 07:02)  T(F): 99 (03 Jul 2023 07:02), Max: 99 (03 Jul 2023 07:02)  HR: 74 (03 Jul 2023 07:02) (72 - 86)  BP: 116/52 (03 Jul 2023 07:02) (116/52 - 146/65)  BP(mean): --  RR: 19 (03 Jul 2023 07:02) (17 - 19)  SpO2: 100% (03 Jul 2023 07:02) (100% - 100%)    Parameters below as of 03 Jul 2023 07:02  Patient On (Oxygen Delivery Method): room air        CAPILLARY BLOOD GLUCOSE      POCT Blood Glucose.: 165 mg/dL (03 Jul 2023 00:28)  POCT Blood Glucose.: 118 mg/dL (02 Jul 2023 18:52)  POCT Blood Glucose.: 174 mg/dL (02 Jul 2023 15:55)      PHYSICAL EXAM:  GENERAL: NAD, resting comfortably  HEAD:  Atraumatic, Normocephalic  EYES: EOMI, PERRL, conjunctiva and sclera clear  NECK: No JVD  CHEST/LUNG: Clear to auscultation bilaterally; No wheeze  HEART: Regular rate and rhythm; No murmurs, rubs, or gallops  ABDOMEN: Soft, Nontender, Nondistended; Bowel sounds present  EXTREMITIES:  2+ Peripheral Pulses, trace pedal edema bilaterally. Previous IV site of left forearm is swollen and firm but there is no erythema, warmth or tenderness  PSYCH: AAOx3  NEUROLOGY: 5/5 strength in RUE, 4/5 strength in LUE likely due to swelling  SKIN: No rashes or lesions, hyperpigmentation of upper abdomen        LABS:                        5.5    75.51 )-----------( 85       ( 02 Jul 2023 17:47 )             19.3     Auto Eosinophil # 0.68  / Auto Eosinophil % 0.9   / Auto Neutrophil # 10.65 / Auto Neutrophil % 14.1  / BANDS % x                            5.7    94.84 )-----------( 87       ( 02 Jul 2023 16:51 )             19.0     Auto Eosinophil # x     / Auto Eosinophil % x     / Auto Neutrophil # x     / Auto Neutrophil % x     / BANDS % x        07-02    132<L>  |  97<L>  |  17  ----------------------------<  143<H>  5.1   |  21<L>  |  0.93    Ca    9.2      02 Jul 2023 16:51  Mg     2.00     07-02  Phos  2.9     07-02  TPro  6.6  /  Alb  3.5  /  TBili  1.1  /  DBili  x   /  AST  44<H>  /  ALT  17  /  AlkPhos  135<H>  07-02    PT/INR - ( 02 Jul 2023 18:02 )   PT: 16.8 sec;   INR: 1.44 ratio         PTT - ( 02 Jul 2023 18:02 )  PTT:30.8 sec      Urinalysis Basic - ( 02 Jul 2023 16:51 )    Color: x / Appearance: x / SG: x / pH: x  Gluc: 143 mg/dL / Ketone: x  / Bili: x / Urobili: x   Blood: x / Protein: x / Nitrite: x   Leuk Esterase: x / RBC: x / WBC x   Sq Epi: x / Non Sq Epi: x / Bacteria: x      Flow Cytometry:  DIAGNOSIS:  Peripheral blood:       - Monotypic B-cells (61.47% of cells), positive for kappa, CD19, CD20, FMC-7, CD79b; negative  CD5, CD10, , CD38, CD11c, CD23 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  Please see interpretation.    INTERPRETATION:  MORPHOLOGY: Absolute lymphocytosis; atypical lymphocytes, medium in size, with prominent nucleoli  (some singly prominent), and cytoplasmic blebbing (projections).    IMMUNOPHENOTYPE: _  Monotypic B-cells (61.47% of cells), positive for kappa, CD19, CD20, FMC-7, CD79b; negative CD5,  CD10, , CD38, CD11c, CD23.    The findings are consistent with CD5 negative, CD10 negative B-cell lymphoproliferative  disorder/lymphoma. The differential diagnosis includes B-prolymphocytic leukemia/lymphoma (based on  morphology), marginal zone lineage, lymphoplasmacytic lineage, mantle cell lineage (which may  occasionally be CD5 negative; suggest FISH for t(11;14) to rule out), CD10 negative follicular  lineage, or other B-cell lymphoma.      RADIOLOGY & ADDITIONAL TESTS:    Imaging Personally Reviewed:    Consultant(s) Notes Reviewed:      Care Discussed with Consultants/Other Providers:

## 2023-07-03 NOTE — H&P ADULT - PROBLEM SELECTOR PLAN 4
Hx of DM2, on _____ at home  - BG since admission 140s-170s    Plan:  - f/u A1c and lipid panel in AM  - hold home antidiabetic medications at this time  - start ISS; adjust per patient's insulin requirements  - monitor FS for goal -180 while inpatient Hx of DM2, on insulin (unknown type) 35U qAM/20U qPM at home  - BG since admission 140s-170s    Plan:  - f/u A1c and lipid panel in AM  - hold home antidiabetic medications at this time  - start ISS; adjust per patient's insulin requirements  - monitor FS for goal -180 while inpatient DVT PPx: holding VTE ppx given severe anemia and thrombocytopenia  Diet: Consistent carbs  Dispo: pending clinical improvement  Code Status: full code Pt. found to have elevated TSH to 12.35 on admission w/ symptoms of malaise, fatigue. Pt. has not seen a PCP in some time    Plan:  - f/u FT4  - start levothyroxine 100mcg qd (1.7 mcg/kg); repeat TFTs as an outpatient in 4-6 weeks for dose adjustments

## 2023-07-03 NOTE — PROGRESS NOTE ADULT - PROBLEM SELECTOR PLAN 2
Patient with anemia and thrombocytopenia with some schistocytes on smear, labs with elevated DDimer, low fibrinogen, prolonged PT, elevated LDH with low haptoglobin -> concerning for acute DIC    Plan:  - Trend coags, DDimer, fibrinogen, TBili/LFTs  - cryoprecipitate for fibrinogen < 150  - Noted to have trace b/l LE edema and new L UE edema (pt states he had IV infiltration on the L UE) -> VA Duplex L UE and b/l LE ordered for DVT r/o, monitor L UE edema/sensation/strength/pulse (currently with 4+/5 strength of the L UE when compared to R UE likely in setting of the significant edema from IV infiltration but otherwise no acute findings) Patient with anemia and thrombocytopenia with some schistocytes on smear, labs with elevated DDimer, low fibrinogen, prolonged PT, elevated LDH with low haptoglobin -> concerning for acute DIC    Plan:  - Trend coags, DDimer, fibrinogen, TBili/LFTs  - cryoprecipitate for fibrinogen < 150  - Noted to have trace b/l LE edema and new L UE edema (pt states he had IV infiltration on the L UE) -> VA Duplex L UE and b/l LE ordered for DVT r/o, monitor L UE edema/sensation/strength/pulse (currently with 4+/5 strength of the L UE when compared to R UE likely in setting of the significant edema from IV infiltration but otherwise no acute findings)  - superficial vein thrombosis on VA Duplex LUE Patient with anemia and thrombocytopenia with some schistocytes on smear, labs with elevated DDimer, low fibrinogen, prolonged PT, elevated LDH with low haptoglobin -> concerning for acute DIC    Plan:  - Trend coags, DDimer, fibrinogen, TBili/LFTs  - cryoprecipitate for fibrinogen < 150  - Noted to have trace b/l LE edema and new LUE edema (pt states he had IV infiltration on the LUE) -> VA Duplex LUE and b/l LE ordered for DVT r/o  - VA duplex b/l LE negative  - VA duplex LUE found superficial vein thrombosis but otherwise negative, will continue to monitor

## 2023-07-03 NOTE — PROGRESS NOTE ADULT - PROBLEM SELECTOR PLAN 3
Noted to have systolic murmur on exam, best appreciated in LUSB, possibly flow murmur 2/2 severe anemia vs. undiagnosed aortic valve pathology     Plan:  - f/u TTE as above  - management of acute anemia as above

## 2023-07-03 NOTE — H&P ADULT - PROBLEM SELECTOR PLAN 1
Pt. p/w malaise, B-symptoms (subjective fevers/chills, weight loss, night sweats), marked leukocytosis (95k --> 75k), severe anemia (Hgb 5.7), thrombocytopenia (plt 87k), and hepatosplenomegaly c/f new-onset leukemia  - hematology following; appreciate recs- high suspicion for ALL vs. CLL, lower for lymphoma  - peripheral smear showing thrombocytopenia, lymphocytes of varying maturity w/ high N:C ratio, visible nucleoli, 1-2 schistocytes per HPF    Plan:  - f/u HIV, HepBsAg, HepBsAb, HepB total core Ab, HCV, G6PD (ordered by hematology)  - f/u flow cytometry; pending results, may be transferred to [facility] for treatment  - f/u TTE  - c/w Hydrea 1500mg BID  - c/w allopurinol 300mg qd  - c/w NS 100cc/hr  - monitor daily labs: CBC w/ diff, CMP, coags, uric acid, LDH, phos, fibrinogen, d-dimer  - monitor temperature curve and vital signs Pt. p/w malaise, B-symptoms (subjective fevers/chills, weight loss, night sweats), marked leukocytosis (95k --> 75k), severe anemia (Hgb 5.7), thrombocytopenia (plt 87k), and hepatosplenomegaly c/f new-onset leukemia  - hematology following; appreciate recs- high suspicion for ALL vs. CLL, lower for lymphoma  - peripheral smear showing thrombocytopenia, lymphocytes of varying maturity w/ high N:C ratio, visible nucleoli, 1-2 schistocytes per HPF  - ordered for 2U PRBC    Plan:  - f/u post-transfusion CBC  - f/u HIV, HepBsAg, HepBsAb, HepB total core Ab, HCV, G6PD (ordered by hematology)  - f/u flow cytometry; pending results, may be transferred to [facility] for treatment  - f/u TTE  - c/w Hydrea 1500mg BID  - c/w allopurinol 300mg qd  - c/w NS 100cc/hr  - monitor daily labs: CBC w/ diff, CMP, coags, uric acid, LDH, phos, fibrinogen, d-dimer  - transfuse for Hgb<7, plt<10k, <20k and febrile, or <50k and actively bleeding/pending procedure   - monitor temperature curve and vital signs Pt. p/w malaise, B-symptoms (subjective fevers/chills, weight loss, night sweats), marked leukocytosis (95k --> 75k), severe anemia (Hgb 5.7), thrombocytopenia (plt 87k), and hepatosplenomegaly c/f new-onset leukemia  - hematology following; appreciate recs- high suspicion for ALL vs. CLL, lower for lymphoma  - peripheral smear showing thrombocytopenia, lymphocytes of varying maturity w/ high N:C ratio, visible nucleoli, 1-2 schistocytes per HPF  - ordered for 2U PRBC    Plan:  - f/u post-transfusion CBC  - f/u HIV, HepBsAg, HepBsAb, HepB total core Ab, HCV, G6PD (ordered by hematology)  - f/u flow cytometry; pending results, may be transferred to [facility] for treatment  - f/u TTE   - c/w Hydrea 1500mg BID  - c/w allopurinol 300mg qd  - c/w NS 100cc/hr  - monitor daily labs: CBC w/ diff, CMP, coags, uric acid, LDH, phos, fibrinogen, d-dimer  - transfuse for Hgb<7, plt<10k, <20k and febrile, or <50k and actively bleeding/pending procedure   - monitor temperature curve and vital signs

## 2023-07-04 DIAGNOSIS — I80.8 PHLEBITIS AND THROMBOPHLEBITIS OF OTHER SITES: ICD-10-CM

## 2023-07-04 DIAGNOSIS — E87.1 HYPO-OSMOLALITY AND HYPONATREMIA: ICD-10-CM

## 2023-07-04 LAB
ALBUMIN SERPL ELPH-MCNC: 2.7 G/DL — LOW (ref 3.3–5)
ALBUMIN SERPL ELPH-MCNC: 3 G/DL — LOW (ref 3.3–5)
ALP SERPL-CCNC: 119 U/L — SIGNIFICANT CHANGE UP (ref 40–120)
ALP SERPL-CCNC: 126 U/L — HIGH (ref 40–120)
ALT FLD-CCNC: 13 U/L — SIGNIFICANT CHANGE UP (ref 4–41)
ALT FLD-CCNC: 13 U/L — SIGNIFICANT CHANGE UP (ref 4–41)
ANION GAP SERPL CALC-SCNC: 11 MMOL/L — SIGNIFICANT CHANGE UP (ref 7–14)
ANION GAP SERPL CALC-SCNC: 11 MMOL/L — SIGNIFICANT CHANGE UP (ref 7–14)
APTT BLD: 31.3 SEC — SIGNIFICANT CHANGE UP (ref 27–36.3)
AST SERPL-CCNC: 32 U/L — SIGNIFICANT CHANGE UP (ref 4–40)
AST SERPL-CCNC: 35 U/L — SIGNIFICANT CHANGE UP (ref 4–40)
BASOPHILS # BLD AUTO: 0.15 K/UL — SIGNIFICANT CHANGE UP (ref 0–0.2)
BASOPHILS NFR BLD AUTO: 0.2 % — SIGNIFICANT CHANGE UP (ref 0–2)
BILIRUB SERPL-MCNC: 1 MG/DL — SIGNIFICANT CHANGE UP (ref 0.2–1.2)
BILIRUB SERPL-MCNC: 1.1 MG/DL — SIGNIFICANT CHANGE UP (ref 0.2–1.2)
BUN SERPL-MCNC: 15 MG/DL — SIGNIFICANT CHANGE UP (ref 7–23)
BUN SERPL-MCNC: 17 MG/DL — SIGNIFICANT CHANGE UP (ref 7–23)
CALCIUM SERPL-MCNC: 8.3 MG/DL — LOW (ref 8.4–10.5)
CALCIUM SERPL-MCNC: 8.8 MG/DL — SIGNIFICANT CHANGE UP (ref 8.4–10.5)
CHLORIDE SERPL-SCNC: 99 MMOL/L — SIGNIFICANT CHANGE UP (ref 98–107)
CHLORIDE SERPL-SCNC: 99 MMOL/L — SIGNIFICANT CHANGE UP (ref 98–107)
CHOLEST SERPL-MCNC: 69 MG/DL — SIGNIFICANT CHANGE UP
CO2 SERPL-SCNC: 19 MMOL/L — LOW (ref 22–31)
CO2 SERPL-SCNC: 22 MMOL/L — SIGNIFICANT CHANGE UP (ref 22–31)
CREAT ?TM UR-MCNC: 50 MG/DL — SIGNIFICANT CHANGE UP
CREAT SERPL-MCNC: 0.9 MG/DL — SIGNIFICANT CHANGE UP (ref 0.5–1.3)
CREAT SERPL-MCNC: 1.04 MG/DL — SIGNIFICANT CHANGE UP (ref 0.5–1.3)
D DIMER BLD IA.RAPID-MCNC: 3816 NG/ML DDU — HIGH
D DIMER BLD IA.RAPID-MCNC: 5702 NG/ML DDU — SIGNIFICANT CHANGE UP
EGFR: 82 ML/MIN/1.73M2 — SIGNIFICANT CHANGE UP
EGFR: 97 ML/MIN/1.73M2 — SIGNIFICANT CHANGE UP
EOSINOPHIL # BLD AUTO: 0.02 K/UL — SIGNIFICANT CHANGE UP (ref 0–0.5)
EOSINOPHIL NFR BLD AUTO: 0 % — SIGNIFICANT CHANGE UP (ref 0–6)
FIBRINOGEN PPP-MCNC: 150 MG/DL — LOW (ref 200–465)
FIBRINOGEN PPP-MCNC: 157 MG/DL — LOW (ref 200–465)
GLUCOSE BLDC GLUCOMTR-MCNC: 221 MG/DL — HIGH (ref 70–99)
GLUCOSE BLDC GLUCOMTR-MCNC: 221 MG/DL — HIGH (ref 70–99)
GLUCOSE BLDC GLUCOMTR-MCNC: 236 MG/DL — HIGH (ref 70–99)
GLUCOSE BLDC GLUCOMTR-MCNC: 270 MG/DL — HIGH (ref 70–99)
GLUCOSE SERPL-MCNC: 205 MG/DL — HIGH (ref 70–99)
GLUCOSE SERPL-MCNC: 285 MG/DL — HIGH (ref 70–99)
HAPTOGLOB SERPL-MCNC: <20 MG/DL — LOW (ref 34–200)
HCT VFR BLD CALC: 23.9 % — LOW (ref 39–50)
HCT VFR BLD CALC: 24.9 % — LOW (ref 39–50)
HDLC SERPL-MCNC: 12 MG/DL — LOW
HGB BLD-MCNC: 7.4 G/DL — LOW (ref 13–17)
HGB BLD-MCNC: 7.7 G/DL — LOW (ref 13–17)
IANC: 2.12 K/UL — SIGNIFICANT CHANGE UP (ref 1.8–7.4)
IMM GRANULOCYTES NFR BLD AUTO: 0.2 % — SIGNIFICANT CHANGE UP (ref 0–0.9)
INR BLD: 1.45 RATIO — HIGH (ref 0.88–1.16)
LDH SERPL L TO P-CCNC: 857 U/L — HIGH (ref 135–225)
LDH SERPL L TO P-CCNC: 880 U/L — HIGH (ref 135–225)
LIPID PNL WITH DIRECT LDL SERPL: 31 MG/DL — SIGNIFICANT CHANGE UP
LYMPHOCYTES # BLD AUTO: 67.49 K/UL — HIGH (ref 1–3.3)
LYMPHOCYTES # BLD AUTO: 87.8 % — HIGH (ref 13–44)
MAGNESIUM SERPL-MCNC: 1.9 MG/DL — SIGNIFICANT CHANGE UP (ref 1.6–2.6)
MCHC RBC-ENTMCNC: 24 PG — LOW (ref 27–34)
MCHC RBC-ENTMCNC: 24.2 PG — LOW (ref 27–34)
MCHC RBC-ENTMCNC: 30.9 GM/DL — LOW (ref 32–36)
MCHC RBC-ENTMCNC: 31 GM/DL — LOW (ref 32–36)
MCV RBC AUTO: 77.6 FL — LOW (ref 80–100)
MCV RBC AUTO: 78.1 FL — LOW (ref 80–100)
MONOCYTES # BLD AUTO: 6.89 K/UL — HIGH (ref 0–0.9)
MONOCYTES NFR BLD AUTO: 9 % — SIGNIFICANT CHANGE UP (ref 2–14)
NEUTROPHILS # BLD AUTO: 2.12 K/UL — SIGNIFICANT CHANGE UP (ref 1.8–7.4)
NEUTROPHILS NFR BLD AUTO: 2.8 % — LOW (ref 43–77)
NON HDL CHOLESTEROL: 57 MG/DL — SIGNIFICANT CHANGE UP
NRBC # BLD: 0 /100 WBCS — SIGNIFICANT CHANGE UP (ref 0–0)
NRBC # BLD: 0 /100 WBCS — SIGNIFICANT CHANGE UP (ref 0–0)
NRBC # FLD: 0.13 K/UL — HIGH (ref 0–0)
NRBC # FLD: 0.17 K/UL — HIGH (ref 0–0)
OSMOLALITY SERPL: 291 MOSM/KG — SIGNIFICANT CHANGE UP (ref 275–295)
OSMOLALITY UR: 468 MOSM/KG — SIGNIFICANT CHANGE UP (ref 50–1200)
PHOSPHATE SERPL-MCNC: 2.9 MG/DL — SIGNIFICANT CHANGE UP (ref 2.5–4.5)
PLATELET # BLD AUTO: 62 K/UL — LOW (ref 150–400)
PLATELET # BLD AUTO: 67 K/UL — LOW (ref 150–400)
POTASSIUM SERPL-MCNC: 5 MMOL/L — SIGNIFICANT CHANGE UP (ref 3.5–5.3)
POTASSIUM SERPL-MCNC: 5.2 MMOL/L — SIGNIFICANT CHANGE UP (ref 3.5–5.3)
POTASSIUM SERPL-SCNC: 5 MMOL/L — SIGNIFICANT CHANGE UP (ref 3.5–5.3)
POTASSIUM SERPL-SCNC: 5.2 MMOL/L — SIGNIFICANT CHANGE UP (ref 3.5–5.3)
PROT SERPL-MCNC: 5.7 G/DL — LOW (ref 6–8.3)
PROT SERPL-MCNC: 6.4 G/DL — SIGNIFICANT CHANGE UP (ref 6–8.3)
PROTHROM AB SERPL-ACNC: 16.9 SEC — HIGH (ref 10.5–13.4)
RBC # BLD: 3.06 M/UL — LOW (ref 4.2–5.8)
RBC # BLD: 3.21 M/UL — LOW (ref 4.2–5.8)
RBC # FLD: 20.6 % — HIGH (ref 10.3–14.5)
RBC # FLD: 20.9 % — HIGH (ref 10.3–14.5)
SODIUM SERPL-SCNC: 129 MMOL/L — LOW (ref 135–145)
SODIUM SERPL-SCNC: 132 MMOL/L — LOW (ref 135–145)
SODIUM UR-SCNC: 108 MMOL/L — SIGNIFICANT CHANGE UP
T4 FREE SERPL-MCNC: 1.4 NG/DL — SIGNIFICANT CHANGE UP (ref 0.9–1.8)
TRIGL SERPL-MCNC: 128 MG/DL — SIGNIFICANT CHANGE UP
URATE SERPL-MCNC: 5.3 MG/DL — SIGNIFICANT CHANGE UP (ref 3.4–8.8)
URATE SERPL-MCNC: 5.4 MG/DL — SIGNIFICANT CHANGE UP (ref 3.4–8.8)
WBC # BLD: 76.52 K/UL — CRITICAL HIGH (ref 3.8–10.5)
WBC # BLD: 76.84 K/UL — CRITICAL HIGH (ref 3.8–10.5)
WBC # FLD AUTO: 76.52 K/UL — CRITICAL HIGH (ref 3.8–10.5)
WBC # FLD AUTO: 76.84 K/UL — CRITICAL HIGH (ref 3.8–10.5)

## 2023-07-04 PROCEDURE — 99232 SBSQ HOSP IP/OBS MODERATE 35: CPT | Mod: GC

## 2023-07-04 RX ORDER — ACETAMINOPHEN 500 MG
650 TABLET ORAL ONCE
Refills: 0 | Status: COMPLETED | OUTPATIENT
Start: 2023-07-04 | End: 2023-07-04

## 2023-07-04 RX ADMIN — Medication 3 MILLIGRAM(S): at 21:56

## 2023-07-04 RX ADMIN — Medication 4: at 18:11

## 2023-07-04 RX ADMIN — Medication 300 MILLIGRAM(S): at 12:32

## 2023-07-04 RX ADMIN — Medication 100 MICROGRAM(S): at 05:54

## 2023-07-04 RX ADMIN — Medication 6: at 12:32

## 2023-07-04 RX ADMIN — Medication 650 MILLIGRAM(S): at 01:16

## 2023-07-04 RX ADMIN — HYDROXYUREA 1500 MILLIGRAM(S): 500 CAPSULE ORAL at 05:54

## 2023-07-04 RX ADMIN — Medication 4: at 08:42

## 2023-07-04 RX ADMIN — HYDROXYUREA 1500 MILLIGRAM(S): 500 CAPSULE ORAL at 18:11

## 2023-07-04 NOTE — PROGRESS NOTE ADULT - SUBJECTIVE AND OBJECTIVE BOX
Sridhar Pastrana MD  PGY 1 Department of Internal Medicine        Patient is a 63y old  Male who presents with a chief complaint of anemia, general malaise (03 Jul 2023 21:47)      SUBJECTIVE / OVERNIGHT EVENTS: Pt seen and examined. No acute overnight events. Denies fevers, chills, CP, SOB, Abdominal pain, N/V, Constipation, Diarrhea        MEDICATIONS  (STANDING):  allopurinol 300 milliGRAM(s) Oral daily  dextrose 5%. 1000 milliLiter(s) (50 mL/Hr) IV Continuous <Continuous>  dextrose 5%. 1000 milliLiter(s) (100 mL/Hr) IV Continuous <Continuous>  dextrose 50% Injectable 25 Gram(s) IV Push once  dextrose 50% Injectable 12.5 Gram(s) IV Push once  dextrose 50% Injectable 25 Gram(s) IV Push once  glucagon  Injectable 1 milliGRAM(s) IntraMuscular once  heparin   Injectable 5000 Unit(s) SubCutaneous once  hydroxyurea 1500 milliGRAM(s) Oral two times a day  insulin lispro (ADMELOG) corrective regimen sliding scale   SubCutaneous at bedtime  insulin lispro (ADMELOG) corrective regimen sliding scale   SubCutaneous three times a day before meals  levothyroxine 100 MICROGram(s) Oral daily  lidocaine 2% Injectable 20 milliLiter(s) Local Injection once  melatonin 3 milliGRAM(s) Oral at bedtime  sodium chloride 0.9%. 1000 milliLiter(s) (100 mL/Hr) IV Continuous <Continuous>    MEDICATIONS  (PRN):  acetaminophen     Tablet .. 650 milliGRAM(s) Oral every 6 hours PRN Temp greater or equal to 38C (100.4F), Mild Pain (1 - 3)  dextrose Oral Gel 15 Gram(s) Oral once PRN Blood Glucose LESS THAN 70 milliGRAM(s)/deciliter      I&O's Summary    03 Jul 2023 07:01  -  04 Jul 2023 07:00  --------------------------------------------------------  IN: 1100 mL / OUT: 339 mL / NET: 761 mL        Vital Signs Last 24 Hrs  T(C): 36.7 (04 Jul 2023 05:09), Max: 38 (03 Jul 2023 15:40)  T(F): 98 (04 Jul 2023 05:09), Max: 100.4 (03 Jul 2023 15:40)  HR: 65 (04 Jul 2023 05:09) (65 - 80)  BP: 131/52 (04 Jul 2023 05:09) (118/58 - 145/66)  BP(mean): --  RR: 17 (04 Jul 2023 05:09) (17 - 18)  SpO2: 99% (04 Jul 2023 05:09) (99% - 100%)    Parameters below as of 04 Jul 2023 05:09  Patient On (Oxygen Delivery Method): room air        CAPILLARY BLOOD GLUCOSE      POCT Blood Glucose.: 200 mg/dL (03 Jul 2023 21:05)  POCT Blood Glucose.: 162 mg/dL (03 Jul 2023 17:36)  POCT Blood Glucose.: 203 mg/dL (03 Jul 2023 12:24)  POCT Blood Glucose.: 136 mg/dL (03 Jul 2023 08:22)      PHYSICAL EXAM:  GENERAL: NAD,   HEAD:  Atraumatic, Normocephalic  EYES: EOMI, PERRL, conjunctiva and sclera clear  NECK: No JVD  CHEST/LUNG: Clear to auscultation bilaterally; No wheeze  HEART: Regular rate and rhythm; No murmurs, rubs, or gallops  ABDOMEN: Soft, Nontender, Nondistended; Bowel sounds present  EXTREMITIES:  2+ Peripheral Pulses, No clubbing, cyanosis, or edema  PSYCH: AAOx3  NEUROLOGY: non-focal  SKIN: No rashes or lesions       LABS:                        5.5    74.22 )-----------( 66       ( 03 Jul 2023 23:40 )             18.5     Auto Eosinophil # 0.03  / Auto Eosinophil % 0.0   / Auto Neutrophil # 1.73  / Auto Neutrophil % 2.3   / BANDS % x                            5.5    75.51 )-----------( 85       ( 02 Jul 2023 17:47 )             19.3     Auto Eosinophil # 0.68  / Auto Eosinophil % 0.9   / Auto Neutrophil # 10.65 / Auto Neutrophil % 14.1  / BANDS % x                            5.7    94.84 )-----------( 87       ( 02 Jul 2023 16:51 )             19.0     Auto Eosinophil # x     / Auto Eosinophil % x     / Auto Neutrophil # x     / Auto Neutrophil % x     / BANDS % x        07-03    129<L>  |  99  |  15  ----------------------------<  205<H>  5.2   |  19<L>  |  1.04  07-02    132<L>  |  97<L>  |  17  ----------------------------<  143<H>  5.1   |  21<L>  |  0.93    Ca    8.3<L>      03 Jul 2023 23:40  Mg     2.00     07-02  Phos  2.9     07-02  TPro  5.7<L>  /  Alb  2.7<L>  /  TBili  1.0  /  DBili  x   /  AST  35  /  ALT  13  /  AlkPhos  119  07-03  TPro  6.6  /  Alb  3.5  /  TBili  1.1  /  DBili  x   /  AST  44<H>  /  ALT  17  /  AlkPhos  135<H>  07-02    PT/INR - ( 02 Jul 2023 18:02 )   PT: 16.8 sec;   INR: 1.44 ratio         PTT - ( 02 Jul 2023 18:02 )  PTT:30.8 sec      Urinalysis Basic - ( 03 Jul 2023 23:40 )    Color: x / Appearance: x / SG: x / pH: x  Gluc: 205 mg/dL / Ketone: x  / Bili: x / Urobili: x   Blood: x / Protein: x / Nitrite: x   Leuk Esterase: x / RBC: x / WBC x   Sq Epi: x / Non Sq Epi: x / Bacteria: x            RADIOLOGY & ADDITIONAL TESTS:    Imaging Personally Reviewed:    Consultant(s) Notes Reviewed:      Care Discussed with Consultants/Other Providers:   Sridhar Pastrana MD  PGY 1 Department of Internal Medicine        Patient is a 63y old  Male who presents with a chief complaint of anemia, general malaise (03 Jul 2023 21:47)      SUBJECTIVE / OVERNIGHT EVENTS: Pt seen and examined. No acute overnight events. Overnight had critical Hgb 5.5 now s/p 2units pRBCs. Complains of fatigue improved from yesterday. Denies fevers, chills, CP, SOB, Abdominal pain, N/V, Constipation, Diarrhea        MEDICATIONS  (STANDING):  allopurinol 300 milliGRAM(s) Oral daily  dextrose 5%. 1000 milliLiter(s) (50 mL/Hr) IV Continuous <Continuous>  dextrose 5%. 1000 milliLiter(s) (100 mL/Hr) IV Continuous <Continuous>  dextrose 50% Injectable 25 Gram(s) IV Push once  dextrose 50% Injectable 12.5 Gram(s) IV Push once  dextrose 50% Injectable 25 Gram(s) IV Push once  glucagon  Injectable 1 milliGRAM(s) IntraMuscular once  heparin   Injectable 5000 Unit(s) SubCutaneous once  hydroxyurea 1500 milliGRAM(s) Oral two times a day  insulin lispro (ADMELOG) corrective regimen sliding scale   SubCutaneous at bedtime  insulin lispro (ADMELOG) corrective regimen sliding scale   SubCutaneous three times a day before meals  levothyroxine 100 MICROGram(s) Oral daily  lidocaine 2% Injectable 20 milliLiter(s) Local Injection once  melatonin 3 milliGRAM(s) Oral at bedtime  sodium chloride 0.9%. 1000 milliLiter(s) (100 mL/Hr) IV Continuous <Continuous>    MEDICATIONS  (PRN):  acetaminophen     Tablet .. 650 milliGRAM(s) Oral every 6 hours PRN Temp greater or equal to 38C (100.4F), Mild Pain (1 - 3)  dextrose Oral Gel 15 Gram(s) Oral once PRN Blood Glucose LESS THAN 70 milliGRAM(s)/deciliter      I&O's Summary    03 Jul 2023 07:01  -  04 Jul 2023 07:00  --------------------------------------------------------  IN: 1100 mL / OUT: 339 mL / NET: 761 mL        Vital Signs Last 24 Hrs  T(C): 36.7 (04 Jul 2023 05:09), Max: 38 (03 Jul 2023 15:40)  T(F): 98 (04 Jul 2023 05:09), Max: 100.4 (03 Jul 2023 15:40)  HR: 65 (04 Jul 2023 05:09) (65 - 80)  BP: 131/52 (04 Jul 2023 05:09) (118/58 - 145/66)  BP(mean): --  RR: 17 (04 Jul 2023 05:09) (17 - 18)  SpO2: 99% (04 Jul 2023 05:09) (99% - 100%)    Parameters below as of 04 Jul 2023 05:09  Patient On (Oxygen Delivery Method): room air        CAPILLARY BLOOD GLUCOSE      POCT Blood Glucose.: 200 mg/dL (03 Jul 2023 21:05)  POCT Blood Glucose.: 162 mg/dL (03 Jul 2023 17:36)  POCT Blood Glucose.: 203 mg/dL (03 Jul 2023 12:24)  POCT Blood Glucose.: 136 mg/dL (03 Jul 2023 08:22)    PHYSICAL EXAM:  GENERAL: NAD, resting comfortably  HEAD:  Atraumatic, Normocephalic  EYES: EOMI, PERRL, conjunctiva and sclera clear  NECK: No JVD  CHEST/LUNG: Faint crackles at bilateral bases but no wheezes  HEART: Regular rate and rhythm; systolic murmur LUSB less prevalent from yesterday  ABDOMEN: Soft, Nontender, Nondistended; Bowel sounds present  EXTREMITIES:  2+ Peripheral Pulses, no lower extremity edema. Firm swelling of previous IV site has improved  PSYCH: AAOx3  NEUROLOGY: 5/5 strength in bilateral upper extremities  SKIN: No rashes or lesions, hyperpigmentation of upper abdomen     LABS:                        5.5    74.22 )-----------( 66       ( 03 Jul 2023 23:40 )             18.5     Auto Eosinophil # 0.03  / Auto Eosinophil % 0.0   / Auto Neutrophil # 1.73  / Auto Neutrophil % 2.3   / BANDS % x                            5.5    75.51 )-----------( 85       ( 02 Jul 2023 17:47 )             19.3     Auto Eosinophil # 0.68  / Auto Eosinophil % 0.9   / Auto Neutrophil # 10.65 / Auto Neutrophil % 14.1  / BANDS % x                            5.7    94.84 )-----------( 87       ( 02 Jul 2023 16:51 )             19.0     Auto Eosinophil # x     / Auto Eosinophil % x     / Auto Neutrophil # x     / Auto Neutrophil % x     / BANDS % x        07-03    129<L>  |  99  |  15  ----------------------------<  205<H>  5.2   |  19<L>  |  1.04  07-02    132<L>  |  97<L>  |  17  ----------------------------<  143<H>  5.1   |  21<L>  |  0.93    Ca    8.3<L>      03 Jul 2023 23:40  Mg     2.00     07-02  Phos  2.9     07-02  TPro  5.7<L>  /  Alb  2.7<L>  /  TBili  1.0  /  DBili  x   /  AST  35  /  ALT  13  /  AlkPhos  119  07-03  TPro  6.6  /  Alb  3.5  /  TBili  1.1  /  DBili  x   /  AST  44<H>  /  ALT  17  /  AlkPhos  135<H>  07-02    PT/INR - ( 02 Jul 2023 18:02 )   PT: 16.8 sec;   INR: 1.44 ratio         PTT - ( 02 Jul 2023 18:02 )  PTT:30.8 sec      Urinalysis Basic - ( 03 Jul 2023 23:40 )    Color: x / Appearance: x / SG: x / pH: x  Gluc: 205 mg/dL / Ketone: x  / Bili: x / Urobili: x   Blood: x / Protein: x / Nitrite: x   Leuk Esterase: x / RBC: x / WBC x   Sq Epi: x / Non Sq Epi: x / Bacteria: x            RADIOLOGY & ADDITIONAL TESTS:    Imaging Personally Reviewed:    Consultant(s) Notes Reviewed:      Care Discussed with Consultants/Other Providers:

## 2023-07-04 NOTE — PROGRESS NOTE ADULT - PROBLEM SELECTOR PLAN 3
Noted to have systolic murmur on exam, best appreciated in LUSB, possibly flow murmur 2/2 severe anemia vs. undiagnosed aortic valve pathology     Plan:  - f/u TTE as above  - management of acute anemia as above Noted to have systolic murmur of LUSB on exam, less appreciated today. Echo findings unremarkable so likely flow murmur 2/2 severe anemia rather than undiagnosed aortic valve pathology.     Plan:  - Manage anemia as above

## 2023-07-04 NOTE — PROGRESS NOTE ADULT - PROBLEM SELECTOR PLAN 4
Pt. found to have elevated TSH to 12.35 on admission w/ symptoms of malaise, fatigue. Pt. has not seen a PCP in some time    Plan:  - f/u FT4  - start levothyroxine 100mcg qd (1.7 mcg/kg); repeat TFTs as an outpatient in 4-6 weeks for dose adjustments Pt. found to have elevated TSH to 12.35 on admission w/ symptoms of malaise, fatigue. Free T4 1.4. Pt. has not seen a PCP in some time    Plan:  - start levothyroxine 100mcg qd (1.7 mcg/kg); repeat TFTs as an outpatient in 4-6 weeks for dose adjustments patient has mild hyponatremia trending 132 > 129 > 132. Patient mildly fatigued (likely from anemia) otherwise asymptomatic  - f/u Urine sodium, urine creatinine, urine osmolality, serum osmolality  - Trend Na

## 2023-07-04 NOTE — DIETITIAN INITIAL EVALUATION ADULT - PROBLEM SELECTOR PLAN 1
Pt. p/w malaise, B-symptoms (subjective fevers/chills, weight loss, night sweats), marked leukocytosis (95k --> 75k), severe anemia (Hgb 5.7), thrombocytopenia (plt 87k), and hepatosplenomegaly c/f new-onset leukemia  - hematology following; appreciate recs- high suspicion for ALL vs. CLL, lower for lymphoma  - peripheral smear showing thrombocytopenia, lymphocytes of varying maturity w/ high N:C ratio, visible nucleoli, 1-2 schistocytes per HPF  - ordered for 2U PRBC    Plan:  - f/u post-transfusion CBC  - f/u HIV, HepBsAg, HepBsAb, HepB total core Ab, HCV, G6PD (ordered by hematology)  - f/u flow cytometry; pending results, may be transferred to [facility] for treatment  - f/u TTE   - c/w Hydrea 1500mg BID  - c/w allopurinol 300mg qd  - c/w NS 100cc/hr  - monitor daily labs: CBC w/ diff, CMP, coags, uric acid, LDH, phos, fibrinogen, d-dimer  - transfuse for Hgb<7, plt<10k, <20k and febrile, or <50k and actively bleeding/pending procedure   - monitor temperature curve and vital signs

## 2023-07-04 NOTE — DIETITIAN INITIAL EVALUATION ADULT - PROBLEM SELECTOR PLAN 2
Patient with anemia and thrombocytopenia with some schistocytes on smear, labs with elevated DDimer, low fibrinogen, prolonged PT, elevated LDH with low haptoglobin -> concerning for acute DIC    Plan:  - Trend coags, DDimer, fibrinogen, TBili/LFTs  - cryoprecipitate for fibrinogen < 150  - Noted to have trace b/l LE edema and new L UE edema (pt states he had IV infiltration on the L UE) -> [facility] Duplex L UE and b/l LE ordered for DVT r/o, monitor L UE edema/sensation/strength/pulse (currently with 4+/5 strength of the L UE when compared to R UE likely in setting of the significant edema from IV infiltration but otherwise no acute findings)

## 2023-07-04 NOTE — PROGRESS NOTE ADULT - PROBLEM SELECTOR PLAN 1
Pt. 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  - peripheral smear showing thrombocytopenia, lymphocytes of varying maturity w/ high N:C ratio, visible nucleoli, 1-2 schistocytes per HPF  - s/p 2U PRBC, fever 100.4 post 2nd transfusion given Tylenol 650mg     Plan:  - f/u post-transfusion CBC  - f/u HIV, HepBsAg, HepBsAb, HepB total core Ab, HCV, G6PD (ordered by hematology)  - May be transferred to [facility] for treatment per flow cytometry results  - f/u TTE   - c/w Hydrea 1500mg BID  - c/w allopurinol 300mg qd  - c/w NS 100cc/hr  - monitor daily labs: CBC w/ diff, CMP, coags, uric acid, LDH, phos, fibrinogen, d-dimer  - transfuse for Hgb<7, plt<10k, <20k and febrile, or <50k and actively bleeding/pending procedure   - monitor temperature curve and vital signs Pt. 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  - peripheral smear showing thrombocytopenia, lymphocytes of varying maturity w/ high N:C ratio, visible nucleoli, 1-2 schistocytes per HPF  - s/p 4U PRBC (Hgb 7.4)    Plan:  - f/u post-transfusion CBC  - f/u HIV, HepBsAg, HepBsAb, HepB total core Ab, HCV, G6PD (ordered by hematology)  - May be transferred to [facility] for treatment per flow cytometry results  - f/u TTE   - c/w Hydrea 1500mg BID  - c/w allopurinol 300mg qd  - c/w NS 100cc/hr  - monitor daily labs: CBC w/ diff, CMP, coags, uric acid, LDH, phos, fibrinogen, d-dimer  - transfuse for Hgb<7, plt<10k, <20k and febrile, or <50k and actively bleeding/pending procedure   - monitor temperature curve and vital signs Pt. 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  - peripheral smear showing thrombocytopenia, lymphocytes of varying maturity w/ high N:C ratio, visible nucleoli, 1-2 schistocytes per HPF  - s/p 4U PRBC (Hgb 7.4)    Plan:  - f/u HIV, HepBsAg, HepBsAb, HepB total core Ab, HCV, G6PD (ordered by hematology)  - May be transferred to [facility] for treatment per flow cytometry results  - c/w Hydrea 1500mg BID  - c/w allopurinol 300mg qd  - c/w NS 100cc/hr  - monitor daily labs: CBC w/ diff, CMP, coags, uric acid, LDH, phos, fibrinogen, d-dimer  - transfuse for Hgb<7, plt<10k, <20k and febrile, or <50k and actively bleeding/pending procedure   - monitor temperature curve and vital signs

## 2023-07-04 NOTE — DIETITIAN INITIAL EVALUATION ADULT - ADD RECOMMEND
1. Recommend obtain new weight as major discrepancy in current weight trend.   2. Continue to monitor diet/supplement tolerance, GI distress.

## 2023-07-04 NOTE — PROGRESS NOTE ADULT - PROBLEM SELECTOR PLAN 6
DVT PPx: holding VTE ppx given severe anemia and thrombocytopenia  Diet: Consistent carbs  Dispo: pending clinical improvement  Code Status: full code Hx of DM2, on insulin (Novolin N) 35U qAM/20U qPM at home  - BG since admission 140s-170s    Plan:  - lipid panel unremarkable  - f/u A1C  - hold home antidiabetic medications at this time  - start ISS; adjust per patient's insulin requirements  - monitor FS for goal -180 while inpatient

## 2023-07-04 NOTE — DIETITIAN INITIAL EVALUATION ADULT - PERTINENT MEDS FT
MEDICATIONS  (STANDING):  allopurinol 300 milliGRAM(s) Oral daily  dextrose 5%. 1000 milliLiter(s) (50 mL/Hr) IV Continuous <Continuous>  dextrose 5%. 1000 milliLiter(s) (100 mL/Hr) IV Continuous <Continuous>  dextrose 50% Injectable 25 Gram(s) IV Push once  dextrose 50% Injectable 12.5 Gram(s) IV Push once  dextrose 50% Injectable 25 Gram(s) IV Push once  glucagon  Injectable 1 milliGRAM(s) IntraMuscular once  heparin   Injectable 5000 Unit(s) SubCutaneous once  hydroxyurea 1500 milliGRAM(s) Oral two times a day  insulin lispro (ADMELOG) corrective regimen sliding scale   SubCutaneous at bedtime  insulin lispro (ADMELOG) corrective regimen sliding scale   SubCutaneous three times a day before meals  levothyroxine 100 MICROGram(s) Oral daily  lidocaine 2% Injectable 20 milliLiter(s) Local Injection once  melatonin 3 milliGRAM(s) Oral at bedtime  sodium chloride 0.9%. 1000 milliLiter(s) (100 mL/Hr) IV Continuous <Continuous>    MEDICATIONS  (PRN):  acetaminophen     Tablet .. 650 milliGRAM(s) Oral every 6 hours PRN Temp greater or equal to 38C (100.4F), Mild Pain (1 - 3)  dextrose Oral Gel 15 Gram(s) Oral once PRN Blood Glucose LESS THAN 70 milliGRAM(s)/deciliter

## 2023-07-04 NOTE — PROGRESS NOTE ADULT - PROBLEM SELECTOR PLAN 8
DVT PPx: holding VTE ppx given severe anemia and thrombocytopenia  Diet: Consistent carbs  Dispo: pending clinical improvement  Code Status: full code

## 2023-07-04 NOTE — PROGRESS NOTE ADULT - PROBLEM SELECTOR PLAN 5
Hx of DM2, on insulin (Novolin N) 35U qAM/20U qPM at home  - BG since admission 140s-170s    Plan:  - f/u A1c and lipid panel in AM  - hold home antidiabetic medications at this time  - start ISS; adjust per patient's insulin requirements  - monitor FS for goal -180 while inpatient Pt. found to have elevated TSH to 12.35 on admission w/ symptoms of malaise, fatigue. Free T4 1.4. Pt. has not seen a PCP in some time    Plan:  - start levothyroxine 100mcg qd (1.7 mcg/kg); repeat TFTs as an outpatient in 4-6 weeks for dose adjustments

## 2023-07-04 NOTE — PROVIDER CONTACT NOTE (CRITICAL VALUE NOTIFICATION) - DATE AND TIME:
04-Jul-2023 18:39 04-Jul-2023 18:35 Inability to Tolerate Liquids or Foods/Bleeding that does not stop/Fever greater than 101/GYN Fever>100.4

## 2023-07-04 NOTE — PROGRESS NOTE ADULT - PROBLEM SELECTOR PLAN 2
Patient with anemia and thrombocytopenia with some schistocytes on smear, labs with elevated DDimer, low fibrinogen, prolonged PT, elevated LDH with low haptoglobin -> concerning for acute DIC    Plan:  - Trend coags, DDimer, fibrinogen, TBili/LFTs  - cryoprecipitate for fibrinogen < 150  - Noted to have trace b/l LE edema and new LUE edema (pt states he had IV infiltration on the LUE) -> [facility] Duplex LUE and b/l LE ordered for DVT r/o  - VA duplex b/l LE negative  - VA duplex LUE found superficial vein thrombosis but otherwise negative, will continue to monitor

## 2023-07-04 NOTE — DIETITIAN INITIAL EVALUATION ADULT - PROBLEM SELECTOR PLAN 5
Hx of DM2, on insulin (Novolin N) 35U qAM/20U qPM at home  - BG since admission 140s-170s    Plan:  - f/u A1c and lipid panel in AM  - hold home antidiabetic medications at this time  - start ISS; adjust per patient's insulin requirements  - monitor FS for goal -180 while inpatient

## 2023-07-04 NOTE — DIETITIAN INITIAL EVALUATION ADULT - OTHER INFO
As per chart, pt is a 63M w/ DM2 but no other known hx (pt. has not seen PCP in some time), who presents with malaise, B-symptoms (fevers/chills, weight loss, night sweats), hepatosplenomegaly and labs c/f hematologic malignancy (marked leukocytosis, severe anemia, thrombocytopenia), admitted for further workup and management.    Pt is A&O x 3. Patient stated his taste and po intake recovered since the past Sunday. As per flow sheet, pt completed % of meals. Noticed IVF in use. No reported GI issues (nausea/vomiting/diarrhea/constipation). Last BM 7/3 as per patient. Denied swallowing/chewing difficulties. Patient appeared with high stress level related to his dx. Requested to have supplement for additional intake. Recommend Glucerna 1x daily. Encouraged po intake. Patient is noticed with elevated POCT (>200mg/dl). A1c pending.  As per chart, pt is a 63M w/ DM2 but no other known hx (pt. has not seen PCP in some time), who presents with malaise, B-symptoms (fevers/chills, weight loss, night sweats), hepatosplenomegaly and labs c/f hematologic malignancy (marked leukocytosis, severe anemia, thrombocytopenia), admitted for further workup and management.    Pt is A&O x 3. Patient stated his taste and po intake recovered since the past Sunday. As per flow sheet, pt completed % of meals. Noticed IVF in use. No reported GI issues (nausea/vomiting/diarrhea/constipation). Last BM 7/3 as per patient. Denied swallowing/chewing difficulties. Weight as per flow sheet 60kg. Appeared to be inconsistent with patient's appearance. Bed scaled was used, 84.1 kg. Patient appeared with high stress level related to his medical condition. Requested to have supplement for additional intake. Recommend Glucerna 1x daily. Encouraged po intake. Patient is noticed with elevated POCT (>200mg/dl). A1c pending.

## 2023-07-04 NOTE — DIETITIAN INITIAL EVALUATION ADULT - ORAL INTAKE PTA/DIET HISTORY
As per patient, dx with DM for 25 years. Patient follows a consistent carb diet with low starch, added sugar and fast food. Patient takes multivitamin and vitamin D3 PTA. His po intake reduced about 1.5 weeks ago d/t sudden taste change. He thinks that he loss weight, however, unable to recall specific weight PTA. Ht 69".

## 2023-07-05 ENCOUNTER — TRANSCRIPTION ENCOUNTER (OUTPATIENT)
Age: 63
End: 2023-07-05

## 2023-07-05 LAB
A1C WITH ESTIMATED AVERAGE GLUCOSE RESULT: 5.7 % — HIGH (ref 4–5.6)
ALBUMIN SERPL ELPH-MCNC: 2.9 G/DL — LOW (ref 3.3–5)
ALP SERPL-CCNC: 120 U/L — SIGNIFICANT CHANGE UP (ref 40–120)
ALT FLD-CCNC: 11 U/L — SIGNIFICANT CHANGE UP (ref 4–41)
ANION GAP SERPL CALC-SCNC: 11 MMOL/L — SIGNIFICANT CHANGE UP (ref 7–14)
APTT BLD: 29.8 SEC — SIGNIFICANT CHANGE UP (ref 27–36.3)
AST SERPL-CCNC: 29 U/L — SIGNIFICANT CHANGE UP (ref 4–40)
BASOPHILS # BLD AUTO: 0.15 K/UL — SIGNIFICANT CHANGE UP (ref 0–0.2)
BASOPHILS NFR BLD AUTO: 0.2 % — SIGNIFICANT CHANGE UP (ref 0–2)
BCR/ABL BY RT - PCR QUANTITATIVE: SIGNIFICANT CHANGE UP
BILIRUB SERPL-MCNC: 1.2 MG/DL — SIGNIFICANT CHANGE UP (ref 0.2–1.2)
BUN SERPL-MCNC: 18 MG/DL — SIGNIFICANT CHANGE UP (ref 7–23)
CALCIUM SERPL-MCNC: 8.4 MG/DL — SIGNIFICANT CHANGE UP (ref 8.4–10.5)
CHLORIDE SERPL-SCNC: 95 MMOL/L — LOW (ref 98–107)
CO2 SERPL-SCNC: 22 MMOL/L — SIGNIFICANT CHANGE UP (ref 22–31)
CREAT SERPL-MCNC: 0.8 MG/DL — SIGNIFICANT CHANGE UP (ref 0.5–1.3)
D DIMER BLD IA.RAPID-MCNC: SIGNIFICANT CHANGE UP NG/ML DDU
EGFR: 101 ML/MIN/1.73M2 — SIGNIFICANT CHANGE UP
EOSINOPHIL # BLD AUTO: 0.06 K/UL — SIGNIFICANT CHANGE UP (ref 0–0.5)
EOSINOPHIL NFR BLD AUTO: 0.1 % — SIGNIFICANT CHANGE UP (ref 0–6)
ESTIMATED AVERAGE GLUCOSE: 117 — SIGNIFICANT CHANGE UP
FIBRINOGEN PPP-MCNC: 160 MG/DL — LOW (ref 200–465)
GLUCOSE BLDC GLUCOMTR-MCNC: 218 MG/DL — HIGH (ref 70–99)
GLUCOSE BLDC GLUCOMTR-MCNC: 254 MG/DL — HIGH (ref 70–99)
GLUCOSE BLDC GLUCOMTR-MCNC: 297 MG/DL — HIGH (ref 70–99)
GLUCOSE SERPL-MCNC: 224 MG/DL — HIGH (ref 70–99)
HCT VFR BLD CALC: 23.2 % — LOW (ref 39–50)
HGB BLD-MCNC: 7.3 G/DL — LOW (ref 13–17)
IANC: 1.99 K/UL — SIGNIFICANT CHANGE UP (ref 1.8–7.4)
IMM GRANULOCYTES NFR BLD AUTO: 0.2 % — SIGNIFICANT CHANGE UP (ref 0–0.9)
INR BLD: 1.38 RATIO — HIGH (ref 0.88–1.16)
LDH SERPL L TO P-CCNC: 820 U/L — HIGH (ref 135–225)
LYMPHOCYTES # BLD AUTO: 62.78 K/UL — HIGH (ref 1–3.3)
LYMPHOCYTES # BLD AUTO: 80.8 % — HIGH (ref 13–44)
MCHC RBC-ENTMCNC: 24.7 PG — LOW (ref 27–34)
MCHC RBC-ENTMCNC: 31.5 GM/DL — LOW (ref 32–36)
MCV RBC AUTO: 78.6 FL — LOW (ref 80–100)
MONOCYTES # BLD AUTO: 12.55 K/UL — HIGH (ref 0–0.9)
MONOCYTES NFR BLD AUTO: 16.2 % — HIGH (ref 2–14)
NEUTROPHILS # BLD AUTO: 1.99 K/UL — SIGNIFICANT CHANGE UP (ref 1.8–7.4)
NEUTROPHILS NFR BLD AUTO: 2.5 % — LOW (ref 43–77)
NRBC # BLD: 0 /100 WBCS — SIGNIFICANT CHANGE UP (ref 0–0)
NRBC # FLD: 0.19 K/UL — HIGH (ref 0–0)
PHOSPHATE SERPL-MCNC: 2.7 MG/DL — SIGNIFICANT CHANGE UP (ref 2.5–4.5)
PLATELET # BLD AUTO: 60 K/UL — LOW (ref 150–400)
POTASSIUM SERPL-MCNC: 4.8 MMOL/L — SIGNIFICANT CHANGE UP (ref 3.5–5.3)
POTASSIUM SERPL-SCNC: 4.8 MMOL/L — SIGNIFICANT CHANGE UP (ref 3.5–5.3)
PROT SERPL-MCNC: 6.2 G/DL — SIGNIFICANT CHANGE UP (ref 6–8.3)
PROTHROM AB SERPL-ACNC: 16.1 SEC — HIGH (ref 10.5–13.4)
RBC # BLD: 2.95 M/UL — LOW (ref 4.2–5.8)
RBC # FLD: 20.9 % — HIGH (ref 10.3–14.5)
SODIUM SERPL-SCNC: 128 MMOL/L — LOW (ref 135–145)
URATE SERPL-MCNC: 4.3 MG/DL — SIGNIFICANT CHANGE UP (ref 3.4–8.8)
WBC # BLD: 77.69 K/UL — CRITICAL HIGH (ref 3.8–10.5)
WBC # FLD AUTO: 77.69 K/UL — CRITICAL HIGH (ref 3.8–10.5)

## 2023-07-05 PROCEDURE — 99232 SBSQ HOSP IP/OBS MODERATE 35: CPT | Mod: GC

## 2023-07-05 PROCEDURE — 99233 SBSQ HOSP IP/OBS HIGH 50: CPT

## 2023-07-05 RX ORDER — LEVOTHYROXINE SODIUM 125 MCG
1 TABLET ORAL
Qty: 0 | Refills: 0 | DISCHARGE
Start: 2023-07-05

## 2023-07-05 RX ORDER — ALLOPURINOL 300 MG
1 TABLET ORAL
Qty: 0 | Refills: 0 | DISCHARGE
Start: 2023-07-05

## 2023-07-05 RX ORDER — HYDROXYUREA 500 MG/1
3 CAPSULE ORAL
Qty: 0 | Refills: 0 | DISCHARGE
Start: 2023-07-05

## 2023-07-05 RX ORDER — LANOLIN ALCOHOL/MO/W.PET/CERES
1 CREAM (GRAM) TOPICAL
Qty: 0 | Refills: 0 | DISCHARGE
Start: 2023-07-05

## 2023-07-05 RX ORDER — ACETAMINOPHEN 500 MG
2 TABLET ORAL
Qty: 0 | Refills: 0 | DISCHARGE
Start: 2023-07-05

## 2023-07-05 RX ADMIN — Medication 4: at 08:46

## 2023-07-05 RX ADMIN — Medication 6: at 12:06

## 2023-07-05 RX ADMIN — Medication 3 MILLIGRAM(S): at 22:25

## 2023-07-05 RX ADMIN — HYDROXYUREA 1500 MILLIGRAM(S): 500 CAPSULE ORAL at 05:39

## 2023-07-05 RX ADMIN — Medication 300 MILLIGRAM(S): at 12:07

## 2023-07-05 RX ADMIN — Medication 6: at 17:42

## 2023-07-05 RX ADMIN — HYDROXYUREA 1500 MILLIGRAM(S): 500 CAPSULE ORAL at 17:42

## 2023-07-05 RX ADMIN — Medication 100 MICROGRAM(S): at 05:38

## 2023-07-05 NOTE — PROGRESS NOTE ADULT - PROBLEM SELECTOR PLAN 2
Patient with anemia and thrombocytopenia with some schistocytes on smear, labs with elevated DDimer, low fibrinogen, prolonged PT, elevated LDH with low haptoglobin -> concerning for acute DIC    Plan:  - Trend coags, DDimer, fibrinogen, TBili/LFTs  - cryoprecipitate for fibrinogen < 150  - Noted to have trace b/l LE edema and new LUE edema (pt states he had IV infiltration on the LUE) -> VA Duplex LUE and b/l LE ordered for DVT r/o  - VA duplex b/l LE negative  - VA duplex LUE found superficial vein thrombosis but otherwise negative, will continue to monitor Patient with anemia and thrombocytopenia with some schistocytes on smear, labs with elevated DDimer, low fibrinogen, prolonged PT, elevated LDH with low haptoglobin -> concerning for acute DIC    Plan:  - Trend coags, DDimer, fibrinogen, TBili/LFTs  - cryoprecipitate 10 units for fibrinogen < 150  - Noted to have trace b/l LE edema and new LUE edema (now resolved) -> VA Duplex LUE and b/l LE ordered for DVT r/o  - VA duplex b/l LE negative  - VA duplex LUE found superficial vein thrombosis but otherwise negative, will continue to monitor

## 2023-07-05 NOTE — PROGRESS NOTE ADULT - SUBJECTIVE AND OBJECTIVE BOX
Hematology Consult Note    HPI as per admitting team:   63M w/ DM2 but no other known hx (pt. has not seen PCP in some time) who presents with 4d of generalized malaise.    Pt. reports feeling malaise, generalized weakness, and gait instability that began 4d ago. Pt. reports that he felt unsteady on his feet, as if he was off balance and needed to grab onto something but denies vertigo-like symptoms ("room spinning"). or focal neurological complaints Pt. also has associated 15-20lb weight loss over 3-4  weeks, subjective fevers/chills, night sweats, and diffuse fatigue and body aches. He reports losing his health insurance and has not followed up with a PCP in some time. He denies chest pain, palpitations, SOB, cough, lightheadedness, any candace bleeding (hematemesis, hematochezia, melena, hemoptysis, hematuria, gingival, epistaxis), prior anticoagulant/antiplatelet/NSAID use, family or personal hx of blood disorders, or easy bruising, He denies headaches, abdominal pain, N/V/D but does report occasional blurry vision which is not currently present.    On admission, vitals were overall stable. Labs notable for marked leukocytosis (95k), severe anemia (Hgb 5.7), thrombocytopenia (87k) w/ smear showing smudge cells. D-dimer elevated (4565) and fibrinogen decreased (161). Haptoglobin decreased (<20) w/ LDH elevated (925). Trops borderline (27 --> 22). TSH elevated (12.35). Neuroimaging (CT head, CTA head/neck) negative. CXR negative. US abdomen showing hepatosplenomegaly. Pt. seen by hematology and started on Hydrea, allopurinol, IVFs and ordered for 2U PRBC.  (03 Jul 2023 03:40)        REVIEW OF SYSTEMS:    CONSTITUTIONAL: No weakness, fevers or chills  EYES/ENT: No visual changes;  No vertigo or throat pain   NECK: No pain or stiffness  RESPIRATORY: No cough, wheezing, hemoptysis; No shortness of breath  CARDIOVASCULAR: No chest pain or palpitations  GASTROINTESTINAL: No abdominal or epigastric pain. No nausea, vomiting, or hematemesis; No diarrhea or constipation. No melena or hematochezia.  GENITOURINARY: No dysuria, frequency or hematuria  NEUROLOGICAL: No numbness or weakness  SKIN: No itching, burning, rashes, or lesions   All other review of systems is negative unless indicated above.    PAST MEDICAL & SURGICAL HISTORY:  DM (diabetes mellitus)      No significant past surgical history          FAMILY HISTORY:  FH: breast cancer (Mother)    FH: CVA (cerebrovascular accident) (Father)    FH: heart disease (Father)        SOCIAL HISTORY:     Allergies    No Known Allergies    Intolerances        MEDICATIONS  (STANDING):  allopurinol 300 milliGRAM(s) Oral daily  dextrose 5%. 1000 milliLiter(s) (50 mL/Hr) IV Continuous <Continuous>  dextrose 5%. 1000 milliLiter(s) (100 mL/Hr) IV Continuous <Continuous>  dextrose 50% Injectable 25 Gram(s) IV Push once  dextrose 50% Injectable 25 Gram(s) IV Push once  dextrose 50% Injectable 12.5 Gram(s) IV Push once  glucagon  Injectable 1 milliGRAM(s) IntraMuscular once  heparin   Injectable 5000 Unit(s) SubCutaneous once  hydroxyurea 1500 milliGRAM(s) Oral two times a day  insulin lispro (ADMELOG) corrective regimen sliding scale   SubCutaneous at bedtime  insulin lispro (ADMELOG) corrective regimen sliding scale   SubCutaneous three times a day before meals  levothyroxine 100 MICROGram(s) Oral daily  lidocaine 2% Injectable 20 milliLiter(s) Local Injection once  melatonin 3 milliGRAM(s) Oral at bedtime    MEDICATIONS  (PRN):  acetaminophen     Tablet .. 650 milliGRAM(s) Oral every 6 hours PRN Temp greater or equal to 38C (100.4F), Mild Pain (1 - 3)  dextrose Oral Gel 15 Gram(s) Oral once PRN Blood Glucose LESS THAN 70 milliGRAM(s)/deciliter      OBJECTIVE       T(F): 97.9 (07-05-23 @ 12:07), Max: 98.3 (07-04-23 @ 21:47)  HR: 67 (07-05-23 @ 12:07)  BP: 128/68 (07-05-23 @ 12:07)  RR: 17 (07-05-23 @ 12:07)  SpO2: 100% (07-05-23 @ 12:07)  Wt(kg): --    PHYSICAL EXAM   GENERAL: NAD, well-developed  HEAD:  Atraumatic, Normocephalic  EYES: EOMI, PERRLA, conjunctiva and sclera clear  NECK: Supple, No JVD  CHEST/LUNG: Clear to auscultation bilaterally; No wheeze  HEART: Regular rate and rhythm; No murmurs, rubs, or gallops  ABDOMEN: Soft, Nontender, Nondistended; Bowel sounds present  EXTREMITIES:  2+ Peripheral Pulses, No clubbing, cyanosis, or edema  NEUROLOGY: non-focal  SKIN: No rashes or lesions                          7.3    77.69 )-----------( 60       ( 05 Jul 2023 06:15 )             23.2       07-05    128<L>  |  95<L>  |  18  ----------------------------<  224<H>  4.8   |  22  |  0.80    Ca    8.4      05 Jul 2023 06:15  Phos  2.7     07-05  Mg     1.90     07-04    TPro  6.2  /  Alb  2.9<L>  /  TBili  1.2  /  DBili  x   /  AST  29  /  ALT  11  /  AlkPhos  120  07-05      Uric Acid: 4.3 mg/dL (07-05 @ 06:15)  Lactate Dehydrogenase, Serum: 820 U/L (07-05 @ 06:15)  Phosphorus: 2.7 mg/dL (07-05 @ 06:15)       Hematology Consult Note    HPI as per admitting team:   63M w/ DM2 but no other known hx (pt. has not seen PCP in some time) who presents with 4d of generalized malaise.    Pt. reports feeling malaise, generalized weakness, and gait instability that began 4d ago. Pt. reports that he felt unsteady on his feet, as if he was off balance and needed to grab onto something but denies vertigo-like symptoms ("room spinning"). or focal neurological complaints Pt. also has associated 15-20lb weight loss over 3-4  weeks, subjective fevers/chills, night sweats, and diffuse fatigue and body aches. He reports losing his health insurance and has not followed up with a PCP in some time. He denies chest pain, palpitations, SOB, cough, lightheadedness, any candace bleeding (hematemesis, hematochezia, melena, hemoptysis, hematuria, gingival, epistaxis), prior anticoagulant/antiplatelet/NSAID use, family or personal hx of blood disorders, or easy bruising, He denies headaches, abdominal pain, N/V/D but does report occasional blurry vision which is not currently present.    On admission, vitals were overall stable. Labs notable for marked leukocytosis (95k), severe anemia (Hgb 5.7), thrombocytopenia (87k) w/ smear showing smudge cells. D-dimer elevated (4565) and fibrinogen decreased (161). Haptoglobin decreased (<20) w/ LDH elevated (925). Trops borderline (27 --> 22). TSH elevated (12.35). Neuroimaging (CT head, CTA head/neck) negative. CXR negative. US abdomen showing hepatosplenomegaly. Pt. seen by hematology and started on Hydrea, allopurinol, IVFs and ordered for 2U PRBC.  (03 Jul 2023 03:40)      REVIEW OF SYSTEMS:  CONSTITUTIONAL: No weakness, fevers or chills  EYES/ENT: No visual changes;  No vertigo or throat pain   NECK: No pain or stiffness  RESPIRATORY: No cough, wheezing, hemoptysis; No shortness of breath  CARDIOVASCULAR: No chest pain or palpitations  GASTROINTESTINAL: No abdominal or epigastric pain. No nausea, vomiting, or hematemesis; No diarrhea or constipation. No melena or hematochezia.  GENITOURINARY: No dysuria, frequency or hematuria  NEUROLOGICAL: No numbness or weakness  SKIN: No itching, burning, rashes, or lesions   All other review of systems is negative unless indicated above.    PAST MEDICAL & SURGICAL HISTORY:  DM (diabetes mellitus)      No significant past surgical history          FAMILY HISTORY:  FH: breast cancer (Mother)    FH: CVA (cerebrovascular accident) (Father)    FH: heart disease (Father)        SOCIAL HISTORY:     Allergies    No Known Allergies    Intolerances        MEDICATIONS  (STANDING):  allopurinol 300 milliGRAM(s) Oral daily  dextrose 5%. 1000 milliLiter(s) (50 mL/Hr) IV Continuous <Continuous>  dextrose 5%. 1000 milliLiter(s) (100 mL/Hr) IV Continuous <Continuous>  dextrose 50% Injectable 25 Gram(s) IV Push once  dextrose 50% Injectable 25 Gram(s) IV Push once  dextrose 50% Injectable 12.5 Gram(s) IV Push once  glucagon  Injectable 1 milliGRAM(s) IntraMuscular once  heparin   Injectable 5000 Unit(s) SubCutaneous once  hydroxyurea 1500 milliGRAM(s) Oral two times a day  insulin lispro (ADMELOG) corrective regimen sliding scale   SubCutaneous at bedtime  insulin lispro (ADMELOG) corrective regimen sliding scale   SubCutaneous three times a day before meals  levothyroxine 100 MICROGram(s) Oral daily  lidocaine 2% Injectable 20 milliLiter(s) Local Injection once  melatonin 3 milliGRAM(s) Oral at bedtime    MEDICATIONS  (PRN):  acetaminophen     Tablet .. 650 milliGRAM(s) Oral every 6 hours PRN Temp greater or equal to 38C (100.4F), Mild Pain (1 - 3)  dextrose Oral Gel 15 Gram(s) Oral once PRN Blood Glucose LESS THAN 70 milliGRAM(s)/deciliter      OBJECTIVE       T(F): 97.9 (07-05-23 @ 12:07), Max: 98.3 (07-04-23 @ 21:47)  HR: 67 (07-05-23 @ 12:07)  BP: 128/68 (07-05-23 @ 12:07)  RR: 17 (07-05-23 @ 12:07)  SpO2: 100% (07-05-23 @ 12:07)  Wt(kg): --    PHYSICAL EXAM   GENERAL: NAD, well-developed  HEAD:  Atraumatic, Normocephalic  EYES: EOMI, PERRLA, conjunctiva and sclera clear  CHEST/LUNG: Clear to auscultation bilaterally; No wheeze  HEART: Regular rate and rhythm; No murmurs, rubs, or gallops  ABDOMEN: Soft, Nontender, Nondistended; Bowel sounds present  EXTREMITIES:  2+ Peripheral Pulses, No clubbing, cyanosis, or edema  NEUROLOGY: non-focal  SKIN: No rashes or lesions                          7.3    77.69 )-----------( 60       ( 05 Jul 2023 06:15 )             23.2       07-05    128<L>  |  95<L>  |  18  ----------------------------<  224<H>  4.8   |  22  |  0.80    Ca    8.4      05 Jul 2023 06:15  Phos  2.7     07-05  Mg     1.90     07-04    TPro  6.2  /  Alb  2.9<L>  /  TBili  1.2  /  DBili  x   /  AST  29  /  ALT  11  /  AlkPhos  120  07-05      Uric Acid: 4.3 mg/dL (07-05 @ 06:15)  Lactate Dehydrogenase, Serum: 820 U/L (07-05 @ 06:15)  Phosphorus: 2.7 mg/dL (07-05 @ 06:15)

## 2023-07-05 NOTE — PROGRESS NOTE ADULT - PROBLEM SELECTOR PLAN 4
patient has mild hyponatremia trending 132 > 129 > 132. Patient mildly fatigued (likely from anemia) otherwise asymptomatic  - f/u Urine sodium, urine creatinine, urine osmolality, serum osmolality  - Trend Na patient has mild hyponatremia trending 132 > 129 > 132. Patient mildly fatigued (likely from anemia) otherwise asymptomatic. Serum osmolality 291, UCr 50, Daniela 108, UOsm 468. Hyponatremia likely in setting of SIADH 2/2 hypothyroidism  - Fluid restrict 1.5L   - Trend Na patient has mild hyponatremia trending 132 > 129 > 132. > 128 Patient mildly fatigued (likely from anemia) otherwise asymptomatic. Serum osmolality 291, UCr 50, Daniela 108, UOsm 468. Hyponatremia likely in setting of SIADH 2/2 hypothyroidism  - Fluid restrict 1.5L   - Trend Na

## 2023-07-05 NOTE — PROGRESS NOTE ADULT - PROBLEM SELECTOR PLAN 3
Noted to have systolic murmur of LUSB on exam, less appreciated today. Echo findings unremarkable so likely flow murmur 2/2 severe anemia rather than undiagnosed aortic valve pathology.     Plan:  - Manage anemia as above

## 2023-07-05 NOTE — DISCHARGE NOTE PROVIDER - HOSPITAL COURSE
OUTSTANDING ISSUES AT DISCHARGE  [ ]  [ ]     HOSPITAL COURSE    PROBLEM COURSE    Medications initiated:  Medications stopped:  Incidental findings:  Relevant f/u:   OUTSTANDING ISSUES AT DISCHARGE  [ ] initiating chemo vs targeted therapy for acute lymphoblastic leukemia vs lymphoma    Admission HPI:  63M w/ DM2 but no other known hx (pt. has not seen PCP in some time) who presents with 4d of generalized malaise.    Pt. reports feeling malaise, generalized weakness, and gait instability that began 4d ago. Pt. reports that he felt unsteady on his feet, as if he was off balance and needed to grab onto something but denies vertigo-like symptoms ("room spinning"). or focal neurological complaints Pt. also has associated 15-20lb weight loss over 3-4  weeks, subjective fevers/chills, night sweats, and diffuse fatigue and body aches. He reports losing his health insurance and has not followed up with a PCP in some time. He denies chest pain, palpitations, SOB, cough, lightheadedness, any candace bleeding (hematemesis, hematochezia, melena, hemoptysis, hematuria, gingival, epistaxis), prior anticoagulant/antiplatelet/NSAID use, family or personal hx of blood disorders, or easy bruising, He denies headaches, abdominal pain, N/V/D but does report occasional blurry vision which is not currently present.    On admission, vitals were overall stable. Labs notable for marked leukocytosis (95k), severe anemia (Hgb 5.7), thrombocytopenia (87k) w/ smear showing smudge cells. D-dimer elevated (4565) and fibrinogen decreased (161). Haptoglobin decreased (<20) w/ LDH elevated (925). Trops borderline (27 --> 22). TSH elevated (12.35). Neuroimaging (CT head, CTA head/neck) negative. CXR negative. US abdomen showing hepatosplenomegaly.     HOSPITAL COURSE  Pt. seen by hematology and started on Hydrea, allopurinol, IVFs and ordered for 2U PRBC. Required a total of 4u pRBCs during hospitalization.     Pt stable and medically cleared for transfer to [facility] for initiation of therapy for ALL.       Medications initiated: hydrea, allopurinol  Medications stopped: none  Incidental findings: Leukocytosis to 75k w/ flow cytometry c/f acute lymphoblastic leukemia vs leukemia  Relevant f/u: Heme/onc   OUTSTANDING ISSUES AT DISCHARGE  [ ] initiating chemo vs targeted therapy for acute lymphoblastic leukemia vs lymphoma  [ ] CT A/P for hepatosplenomegaly    Admission HPI:  63M w/ DM2 but no other known hx (pt. has not seen PCP in some time) who presents with 4d of generalized malaise.    Pt. reports feeling malaise, generalized weakness, and gait instability that began 4d ago. Pt. reports that he felt unsteady on his feet, as if he was off balance and needed to grab onto something but denies vertigo-like symptoms ("room spinning"). or focal neurological complaints Pt. also has associated 15-20lb weight loss over 3-4  weeks, subjective fevers/chills, night sweats, and diffuse fatigue and body aches. He reports losing his health insurance and has not followed up with a PCP in some time. He denies chest pain, palpitations, SOB, cough, lightheadedness, any candace bleeding (hematemesis, hematochezia, melena, hemoptysis, hematuria, gingival, epistaxis), prior anticoagulant/antiplatelet/NSAID use, family or personal hx of blood disorders, or easy bruising, He denies headaches, abdominal pain, N/V/D but does report occasional blurry vision which is not currently present.    On admission, vitals were overall stable. Labs notable for marked leukocytosis (95k), severe anemia (Hgb 5.7), thrombocytopenia (87k) w/ smear showing smudge cells. D-dimer elevated (4565) and fibrinogen decreased (161). Haptoglobin decreased (<20) w/ LDH elevated (925). Trops borderline (27 --> 22). TSH elevated (12.35). Neuroimaging (CT head, CTA head/neck) negative. CXR negative. US abdomen showing hepatosplenomegaly.     HOSPITAL COURSE  Pt. seen by hematology and started on Hydrea, allopurinol, IVFs and ordered for 2U PRBC. Required a total of 4u pRBCs during hospitalization without stigmata of bleeding. 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 BMBx also completed during hospitalization @ [facility], path results pending. Course c/b superficial LUE thrombophlebitis and low level DIC though pt required no cryoprecipitate. FUrther complicated by mild hyponatremia to 130, possibly SIADH in etiology for which pt was initiated on fluid restriction to 1.5L.     Pt stable and medically cleared for transfer to [facility] for initiation of therapy for neoplastic process.        Medications initiated: hydrea, allopurinol  Medications stopped: none  Incidental findings: Leukocytosis to 75k w/ flow cytometry c/f acute lymphoblastic leukemia vs leukemia  Relevant f/u: Heme/onc   OUTSTANDING ISSUES AT DISCHARGE  [ ] initiating chemo vs targeted therapy for acute lymphoblastic leukemia vs lymphoma  [ ] CT A/P for hepatosplenomegaly    Admission HPI:  63M w/ DM2 but no other known hx (pt. has not seen PCP in some time) who presents with 4d of generalized malaise.    Pt. reports feeling malaise, generalized weakness, and gait instability that began 4d ago. Pt. reports that he felt unsteady on his feet, as if he was off balance and needed to grab onto something but denies vertigo-like symptoms ("room spinning"). or focal neurological complaints Pt. also has associated 15-20lb weight loss over 3-4  weeks, subjective fevers/chills, night sweats, and diffuse fatigue and body aches. He reports losing his health insurance and has not followed up with a PCP in some time. He denies chest pain, palpitations, SOB, cough, lightheadedness, any candace bleeding (hematemesis, hematochezia, melena, hemoptysis, hematuria, gingival, epistaxis), prior anticoagulant/antiplatelet/NSAID use, family or personal hx of blood disorders, or easy bruising, He denies headaches, abdominal pain, N/V/D but does report occasional blurry vision which is not currently present.    On admission, vitals were overall stable. Labs notable for marked leukocytosis (95k), severe anemia (Hgb 5.7), thrombocytopenia (87k) w/ smear showing smudge cells. D-dimer elevated (4565) and fibrinogen decreased (161). Haptoglobin decreased (<20) w/ LDH elevated (925). Trops borderline (27 --> 22). TSH elevated (12.35). Neuroimaging (CT head, CTA head/neck) negative. CXR negative. US abdomen showing hepatosplenomegaly.     HOSPITAL COURSE  Pt. seen by hematology and started on Hydrea, allopurinol, IVFs and ordered for 2U PRBC. Required a total of 4u pRBCs during hospitalization without stigmata of bleeding. 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 BMBx also completed during hospitalization @ [facility], path results pending. Course c/b superficial LUE thrombophlebitis and low level DIC though pt required no cryoprecipitate. Further complicated by mild hyponatremia to 130, possibly SIADH in etiology for which pt was initiated on fluid restriction to 1.5L as well as elevated TSH for which synthroid 100mcg was initiated.    Pt stable and medically cleared for transfer to [facility] for initiation of therapy for neoplastic process.        Medications initiated: hydrea, allopurinol, synthroid  Medications stopped: none  Incidental findings: Leukocytosis to 75k w/ flow cytometry c/f acute lymphoblastic leukemia vs leukemia  Relevant f/u: Heme/onc

## 2023-07-05 NOTE — DISCHARGE NOTE PROVIDER - NSDCCPTREATMENT_GEN_ALL_CORE_FT
PRINCIPAL PROCEDURE  Procedure: Abdomen CT  Findings and Treatment:      PRINCIPAL PROCEDURE  Procedure: Flow cytometry performed  Findings and Treatment: INTERPRETATION:   MORPHOLOGY: Absolute lymphocytosis; atypical lymphocytes, medium in size, with prominent nucleoli   (some singly prominent), and cytoplasmic blebbing (projections).   IMMUNOPHENOTYPE:   Monotypic B-cells (61.47% of cells), positive for kappa, CD19, CD20, FMC-7, CD79b; negative CD5,   CD10, , CD38, CD11c, CD23.   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 The differential diagnosis includes B-prolymphocytic leukemia/lymphoma (based on   morphology), marginal zone lineage, lymphoplasmacytic lineage, mantle cell lineage (which may   occasionally be CD5 negative; suggest FISH for t(11;14) to rule out), CD10 negative follicular   lineage, or other B-cell lymphoma. Correlation with clinicalfindings, molecular testing and bone   marrow correlation is necessary.

## 2023-07-05 NOTE — PROGRESS NOTE ADULT - PROBLEM SELECTOR PLAN 8
DVT PPx: holding VTE ppx given severe anemia and thrombocytopenia  Diet: Consistent carbs  Dispo: pending clinical improvement  Code Status: full code DVT PPx: SCD, holding VTE ppx given severe anemia and thrombocytopenia  Diet: Consistent carbs  Dispo: pending clinical improvement  Code Status: full code

## 2023-07-05 NOTE — DISCHARGE NOTE PROVIDER - ATTENDING DISCHARGE PHYSICAL EXAMINATION:
Patient seen and examined at bedside on day of discharge (7/7/23). Patient overall reports feeling well. Denies any acute complaints. VSS. Exam largely unremarkable. Planned for transfer to [address] today for chemotherapy with heme/onc. Otherwise medically stable for transfer to [facility] for further management.

## 2023-07-05 NOTE — PROGRESS NOTE ADULT - SUBJECTIVE AND OBJECTIVE BOX
Sridhar Pastrana MD  PGY 1 Department of Internal Medicine        Patient is a 63y old  Male who presents with a chief complaint of Person with feared complaint in whom no diagnosis is made     (04 Jul 2023 15:27)      SUBJECTIVE / OVERNIGHT EVENTS: Pt seen and examined. No acute overnight events. Denies fevers, chills, CP, SOB, Abdominal pain, N/V, Constipation, Diarrhea        MEDICATIONS  (STANDING):  allopurinol 300 milliGRAM(s) Oral daily  dextrose 5%. 1000 milliLiter(s) (50 mL/Hr) IV Continuous <Continuous>  dextrose 5%. 1000 milliLiter(s) (100 mL/Hr) IV Continuous <Continuous>  dextrose 50% Injectable 25 Gram(s) IV Push once  dextrose 50% Injectable 12.5 Gram(s) IV Push once  dextrose 50% Injectable 25 Gram(s) IV Push once  glucagon  Injectable 1 milliGRAM(s) IntraMuscular once  heparin   Injectable 5000 Unit(s) SubCutaneous once  hydroxyurea 1500 milliGRAM(s) Oral two times a day  insulin lispro (ADMELOG) corrective regimen sliding scale   SubCutaneous three times a day before meals  insulin lispro (ADMELOG) corrective regimen sliding scale   SubCutaneous at bedtime  levothyroxine 100 MICROGram(s) Oral daily  lidocaine 2% Injectable 20 milliLiter(s) Local Injection once  melatonin 3 milliGRAM(s) Oral at bedtime  sodium chloride 0.9%. 1000 milliLiter(s) (100 mL/Hr) IV Continuous <Continuous>    MEDICATIONS  (PRN):  acetaminophen     Tablet .. 650 milliGRAM(s) Oral every 6 hours PRN Temp greater or equal to 38C (100.4F), Mild Pain (1 - 3)  dextrose Oral Gel 15 Gram(s) Oral once PRN Blood Glucose LESS THAN 70 milliGRAM(s)/deciliter      I&O's Summary      Vital Signs Last 24 Hrs  T(C): 36.8 (05 Jul 2023 05:38), Max: 36.8 (04 Jul 2023 12:35)  T(F): 98.2 (05 Jul 2023 05:38), Max: 98.3 (04 Jul 2023 12:35)  HR: 73 (05 Jul 2023 05:38) (71 - 73)  BP: 146/71 (05 Jul 2023 05:38) (143/66 - 146/71)  BP(mean): --  RR: 16 (05 Jul 2023 05:38) (16 - 19)  SpO2: 99% (05 Jul 2023 05:38) (99% - 100%)    Parameters below as of 05 Jul 2023 05:38  Patient On (Oxygen Delivery Method): room air        CAPILLARY BLOOD GLUCOSE      POCT Blood Glucose.: 221 mg/dL (04 Jul 2023 21:45)  POCT Blood Glucose.: 221 mg/dL (04 Jul 2023 18:04)  POCT Blood Glucose.: 270 mg/dL (04 Jul 2023 12:16)  POCT Blood Glucose.: 236 mg/dL (04 Jul 2023 08:38)      PHYSICAL EXAM:  GENERAL: NAD,   HEAD:  Atraumatic, Normocephalic  EYES: EOMI, PERRL, conjunctiva and sclera clear  NECK: No JVD  CHEST/LUNG: Clear to auscultation bilaterally; No wheeze  HEART: Regular rate and rhythm; No murmurs, rubs, or gallops  ABDOMEN: Soft, Nontender, Nondistended; Bowel sounds present  EXTREMITIES:  2+ Peripheral Pulses, No clubbing, cyanosis, or edema  PSYCH: AAOx3  NEUROLOGY: non-focal  SKIN: No rashes or lesions       LABS:                        7.7    76.52 )-----------( 62       ( 04 Jul 2023 17:50 )             24.9     Auto Eosinophil # x     / Auto Eosinophil % x     / Auto Neutrophil # x     / Auto Neutrophil % x     / BANDS % x                            7.4    76.84 )-----------( 67       ( 04 Jul 2023 09:51 )             23.9     Auto Eosinophil # 0.02  / Auto Eosinophil % 0.0   / Auto Neutrophil # 2.12  / Auto Neutrophil % 2.8   / BANDS % x                            5.5    74.22 )-----------( 66       ( 03 Jul 2023 23:40 )             18.5     Auto Eosinophil # 0.03  / Auto Eosinophil % 0.0   / Auto Neutrophil # 1.73  / Auto Neutrophil % 2.3   / BANDS % x        07-04    132<L>  |  99  |  17  ----------------------------<  285<H>  5.0   |  22  |  0.90  07-03    129<L>  |  99  |  15  ----------------------------<  205<H>  5.2   |  19<L>  |  1.04    Ca    8.8      04 Jul 2023 09:51  Mg     1.90     07-04  Phos  2.9     07-04  TPro  6.4  /  Alb  3.0<L>  /  TBili  1.1  /  DBili  x   /  AST  32  /  ALT  13  /  AlkPhos  126<H>  07-04  TPro  5.7<L>  /  Alb  2.7<L>  /  TBili  1.0  /  DBili  x   /  AST  35  /  ALT  13  /  AlkPhos  119  07-03    PT/INR - ( 04 Jul 2023 09:51 )   PT: 16.9 sec;   INR: 1.45 ratio         PTT - ( 04 Jul 2023 09:51 )  PTT:31.3 sec      Urinalysis Basic - ( 04 Jul 2023 09:51 )    Color: x / Appearance: x / SG: x / pH: x  Gluc: 285 mg/dL / Ketone: x  / Bili: x / Urobili: x   Blood: x / Protein: x / Nitrite: x   Leuk Esterase: x / RBC: x / WBC x   Sq Epi: x / Non Sq Epi: x / Bacteria: x            RADIOLOGY & ADDITIONAL TESTS:    Imaging Personally Reviewed:    Consultant(s) Notes Reviewed:      Care Discussed with Consultants/Other Providers:   Sridhar Pastrana MD  PGY 1 Department of Internal Medicine        Patient is a 63y old  Male who presents with a chief complaint of Person with feared complaint in whom no diagnosis is made     (04 Jul 2023 15:27)      SUBJECTIVE / OVERNIGHT EVENTS: Pt seen and examined. No acute overnight events. Fatigue improved from yesterday. Denies fevers, chills, CP, SOB, Abdominal pain, N/V, Constipation, Diarrhea        MEDICATIONS  (STANDING):  allopurinol 300 milliGRAM(s) Oral daily  dextrose 5%. 1000 milliLiter(s) (50 mL/Hr) IV Continuous <Continuous>  dextrose 5%. 1000 milliLiter(s) (100 mL/Hr) IV Continuous <Continuous>  dextrose 50% Injectable 25 Gram(s) IV Push once  dextrose 50% Injectable 12.5 Gram(s) IV Push once  dextrose 50% Injectable 25 Gram(s) IV Push once  glucagon  Injectable 1 milliGRAM(s) IntraMuscular once  heparin   Injectable 5000 Unit(s) SubCutaneous once  hydroxyurea 1500 milliGRAM(s) Oral two times a day  insulin lispro (ADMELOG) corrective regimen sliding scale   SubCutaneous three times a day before meals  insulin lispro (ADMELOG) corrective regimen sliding scale   SubCutaneous at bedtime  levothyroxine 100 MICROGram(s) Oral daily  lidocaine 2% Injectable 20 milliLiter(s) Local Injection once  melatonin 3 milliGRAM(s) Oral at bedtime  sodium chloride 0.9%. 1000 milliLiter(s) (100 mL/Hr) IV Continuous <Continuous>    MEDICATIONS  (PRN):  acetaminophen     Tablet .. 650 milliGRAM(s) Oral every 6 hours PRN Temp greater or equal to 38C (100.4F), Mild Pain (1 - 3)  dextrose Oral Gel 15 Gram(s) Oral once PRN Blood Glucose LESS THAN 70 milliGRAM(s)/deciliter      I&O's Summary      Vital Signs Last 24 Hrs  T(C): 36.8 (05 Jul 2023 05:38), Max: 36.8 (04 Jul 2023 12:35)  T(F): 98.2 (05 Jul 2023 05:38), Max: 98.3 (04 Jul 2023 12:35)  HR: 73 (05 Jul 2023 05:38) (71 - 73)  BP: 146/71 (05 Jul 2023 05:38) (143/66 - 146/71)  BP(mean): --  RR: 16 (05 Jul 2023 05:38) (16 - 19)  SpO2: 99% (05 Jul 2023 05:38) (99% - 100%)    Parameters below as of 05 Jul 2023 05:38  Patient On (Oxygen Delivery Method): room air        CAPILLARY BLOOD GLUCOSE      POCT Blood Glucose.: 221 mg/dL (04 Jul 2023 21:45)  POCT Blood Glucose.: 221 mg/dL (04 Jul 2023 18:04)  POCT Blood Glucose.: 270 mg/dL (04 Jul 2023 12:16)  POCT Blood Glucose.: 236 mg/dL (04 Jul 2023 08:38)      PHYSICAL EXAM:  GENERAL: NAD, resting comfortably  HEAD:  Atraumatic, Normocephalic  EYES: EOMI, PERRL, conjunctiva and sclera clear  NECK: No JVD  CHEST/LUNG: CTAB  HEART: Regular rate and rhythm; systolic murmur LUSB barely audible  ABDOMEN: Soft, Nontender, Nondistended; Bowel sounds present  EXTREMITIES:  2+ Peripheral Pulses, no lower extremity edema. Firm swelling of previous IV site has resolved  PSYCH: AAOx3  NEUROLOGY: 5/5 strength in bilateral upper extremities  SKIN: No rashes or lesions, hyperpigmentation of upper abdomen       LABS:                        7.7    76.52 )-----------( 62       ( 04 Jul 2023 17:50 )             24.9     Auto Eosinophil # x     / Auto Eosinophil % x     / Auto Neutrophil # x     / Auto Neutrophil % x     / BANDS % x                            7.4    76.84 )-----------( 67       ( 04 Jul 2023 09:51 )             23.9     Auto Eosinophil # 0.02  / Auto Eosinophil % 0.0   / Auto Neutrophil # 2.12  / Auto Neutrophil % 2.8   / BANDS % x                            5.5    74.22 )-----------( 66       ( 03 Jul 2023 23:40 )             18.5     Auto Eosinophil # 0.03  / Auto Eosinophil % 0.0   / Auto Neutrophil # 1.73  / Auto Neutrophil % 2.3   / BANDS % x        07-04    132<L>  |  99  |  17  ----------------------------<  285<H>  5.0   |  22  |  0.90  07-03    129<L>  |  99  |  15  ----------------------------<  205<H>  5.2   |  19<L>  |  1.04    Ca    8.8      04 Jul 2023 09:51  Mg     1.90     07-04  Phos  2.9     07-04  TPro  6.4  /  Alb  3.0<L>  /  TBili  1.1  /  DBili  x   /  AST  32  /  ALT  13  /  AlkPhos  126<H>  07-04  TPro  5.7<L>  /  Alb  2.7<L>  /  TBili  1.0  /  DBili  x   /  AST  35  /  ALT  13  /  AlkPhos  119  07-03    PT/INR - ( 04 Jul 2023 09:51 )   PT: 16.9 sec;   INR: 1.45 ratio         PTT - ( 04 Jul 2023 09:51 )  PTT:31.3 sec      Urinalysis Basic - ( 04 Jul 2023 09:51 )    Color: x / Appearance: x / SG: x / pH: x  Gluc: 285 mg/dL / Ketone: x  / Bili: x / Urobili: x   Blood: x / Protein: x / Nitrite: x   Leuk Esterase: x / RBC: x / WBC x   Sq Epi: x / Non Sq Epi: x / Bacteria: x            RADIOLOGY & ADDITIONAL TESTS:    Imaging Personally Reviewed:    Consultant(s) Notes Reviewed:      Care Discussed with Consultants/Other Providers:

## 2023-07-05 NOTE — PROGRESS NOTE ADULT - ASSESSMENT
64yo gentleman with PMH of DM on insulin p/w weight loss of 15-20 lbs, gait instability, fatigue and body aches, found to have leukocytosis, anemia and thrombocytopenia concerning for leukemia.    #Suspected leukemia  On admission: CBC found WBC 75.5, Hgb 5.5, plt 85, MCV 75.7, RDW 19.9- 54% reactive lymphocytes  LDH is 925, uric acid 7.5.    Peripheral smear and cellovision reviewed, found polychromasia, some hypochromic RBCs, few elliptocyes. 1-2 schistocytes per HPF. True thrombocytopenia without platelet clumps or giant platelets, about 9 platelets per HPF. WBCs are mostly lymphocytes of varying maturity, some with visible nucleoli and high N:C ratio, some very minimal visible cytoplasm.    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   The differential diagnosis includes B-prolymphocytic leukemia/lymphoma (based on morphology), marginal zone lineage, lymphoplasmacytic lineage, mantle cell lineage (which may occasionally be CD5 negative; suggest FISH for t(11;14) to rule out), CD10 negative follicular lineage, or other B-cell lymphoma.   - Patient was started on hydrea 1500mg BID and allopurinol 300mg qd on 7/3/23.   - Iron studies found 15% iron sat, ferritin ordered. B12 and folate ordered.   - Retic low at 3.9%   - Labs are still pending:  G6PD level, HIV, hep B SAg, hep B SAb, hep B total core Ab, HCV  - Check TTE   - Start IVNS at 100 cc/hr. Continue IVF.   - Please obtain DAILY LABS: CBC with Diff, CMP, coags, uric acid, LDH, Phos, fibrinogen and d-dimer  - Continue supportive transfusion for hg < 7.0 and platelets < 10k, < 20k if febrile and < 50k if bleeding  - 7/3/23- Abd US found hepatomegaly (19.4cm). Contracted gallbladder filled with stones. Splenomegaly (27.3cm)   Duplex BLE US- No DVT  - Bone marrow biopsy and aspirate performed on 7/3/23. Clonoseq studies sent.   We discussed with the patient that his labs are concerning for possible lymphocytic lymphoma vs leukemia.    #Thrombophlebitis  Duplex BUE US- No evidence of deep vein thrombosis in the left upper extremity.  Superficial vein thrombosis noted within the the left basilic vein at the distal upper arm.  Patient reports thrombophlebitis was related to peripheral IV use. Please apply hot compresses.     #Coagulopathy:   -PT elevated at 16.8, fibrinogen low at 161, D-dimer 4565, likely low level DIC  Give 10 units cryoprecipitate for fibrinogen under 150    #Elevated TSH: 12.35  - Check free T4    ***Note is incomplete. Will discuss plan with attending.  Note is not finalized until signed by attending.  64yo gentleman with PMH of DM on insulin p/w weight loss of 15-20 lbs, gait instability, fatigue and body aches, found to have leukocytosis, anemia and thrombocytopenia concerning for leukemia.    #Suspected leukemia  On admission: CBC found WBC 75.5, Hgb 5.5, plt 85, MCV 75.7, RDW 19.9- 54% reactive lymphocytes  LDH is 925, uric acid 7.5.    Peripheral smear and cellovision reviewed, found polychromasia, some hypochromic RBCs, few elliptocyes. 1-2 schistocytes per HPF. True thrombocytopenia without platelet clumps or giant platelets, about 9 platelets per HPF. WBCs are mostly lymphocytes of varying maturity, some with visible nucleoli and high N:C ratio, some very minimal visible cytoplasm.    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   The differential diagnosis includes B-prolymphocytic leukemia/lymphoma (based on morphology), marginal zone lineage, lymphoplasmacytic lineage, mantle cell lineage (which may occasionally be CD5 negative; suggest FISH for t(11;14) to rule out), CD10 negative follicular lineage, or other B-cell lymphoma.   - Patient was started on hydrea 1500mg BID and allopurinol 300mg qd on 7/3/23.   - Iron studies found 15% iron sat, ferritin ordered. B12 and folate ordered.   - Retic low at 3.9%   - Labs are still pending:  G6PD level, HIV, hep B SAg, hep B SAb, hep B total core Ab, HCV  - Check TTE  - c/w IVF NS at 100 cc/hr  - Please obtain DAILY LABS: CBC with Diff, CMP, coags, uric acid, LDH, Phos, fibrinogen and d-dimer  - Continue supportive transfusion for hg < 7.0 and platelets < 10k, < 20k if febrile and < 50k if bleeding  - 7/3/23- Abd US found hepatomegaly (19.4cm). Contracted gallbladder filled with stones. Splenomegaly (27.3cm)   Duplex BLE US- No DVT  - Bone marrow biopsy and aspirate performed on 7/3/23. Clonoseq studies sent.   - We discussed with the patient that his labs are concerning for possible lymphocytic lymphoma vs leukemia.  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 If patient cannot be transferred to [facility], will plan to start rituximab while monitoring closely for TLS.  - Patient to be transferred to [facility] [address]. Accepting physician is Dr. Da Garnett.    #Thrombophlebitis  Duplex BUE US- No evidence of deep vein thrombosis in the left upper extremity.  Superficial vein thrombosis noted within the the left basilic vein at the distal upper arm.  Patient reports thrombophlebitis was related to peripheral IV use. Please apply hot compresses.     #Coagulopathy:   -PT elevated at 16.8, fibrinogen low at 161, D-dimer 4565, likely low level DIC  - Give 10 pooled units cryoprecipitate for fibrinogen under 150    #Elevated TSH: 12.35  - fT4 wnl 1.4    Note is not finalized until signed by attending.

## 2023-07-05 NOTE — PROGRESS NOTE ADULT - PROBLEM SELECTOR PLAN 5
Pt. found to have elevated TSH to 12.35 on admission w/ symptoms of malaise, fatigue. Free T4 1.4. Pt. has not seen a PCP in some time    Plan:  - start levothyroxine 100mcg qd (1.7 mcg/kg); repeat TFTs as an outpatient in 4-6 weeks for dose adjustments

## 2023-07-05 NOTE — DISCHARGE NOTE PROVIDER - NSDCCPCAREPLAN_GEN_ALL_CORE_FT
PRINCIPAL DISCHARGE DIAGNOSIS  Diagnosis: Concern about leukemia without diagnosis  Assessment and Plan of Treatment:      PRINCIPAL DISCHARGE DIAGNOSIS  Diagnosis: Concern about leukemia without diagnosis  Assessment and Plan of Treatment: You came in with a markedly elevated white blood cell count. We tested your blood and results were concerning for lymphoma or leukemia. We started you on some medications to prevent your body from producing too many cells. We also performed a bone marrow biopsy to better understand what we're treating. We transferred you to [facility] to initiate treatment.

## 2023-07-05 NOTE — PROGRESS NOTE ADULT - PROBLEM SELECTOR PLAN 1
Pt. 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  - peripheral smear showing thrombocytopenia, lymphocytes of varying maturity w/ high N:C ratio, visible nucleoli, 1-2 schistocytes per HPF  - s/p 4U PRBC (Hgb 7.4)    Plan:  - f/u HIV, HepBsAg, HepBsAb, HepB total core Ab, HCV, G6PD (ordered by hematology)  - May be transferred to [facility] [address] for treatment per flow cytometry results  - c/w Hydrea 1500mg BID  - c/w allopurinol 300mg qd  - c/w NS 100cc/hr  - monitor daily labs: CBC w/ diff, CMP, coags, uric acid, LDH, phos, fibrinogen, d-dimer  - transfuse for Hgb<7, plt<10k, <20k and febrile, or <50k and actively bleeding/pending procedure   - monitor temperature curve and vital signs Pt. 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  - peripheral smear showing thrombocytopenia, lymphocytes of varying maturity w/ high N:C ratio, visible nucleoli, 1-2 schistocytes per HPF  - s/p 4U PRBC (Hgb 7.4 > 7.7 > 7.3)    Plan:  - f/u HIV, HepBsAg, HepBsAb, HepB total core Ab, HCV, G6PD (ordered by hematology)  - May be transferred to [facility] [address] for treatment per flow cytometry results  - f/u bone marrow biopsy  - c/w Hydrea 1500mg BID  - c/w allopurinol 300mg qd  - c/w NS 100cc/hr  - monitor daily labs: CBC w/ diff, CMP, coags, uric acid, LDH, phos, fibrinogen, d-dimer  - transfuse for Hgb<7, plt<10k, <20k and febrile, or <50k and actively bleeding/pending procedure   - monitor temperature curve and vital signs Pt. 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  - peripheral smear showing thrombocytopenia, lymphocytes of varying maturity w/ high N:C ratio, visible nucleoli, 1-2 schistocytes per HPF  - s/p 4U PRBC (Hgb 7.4 > 7.7 > 7.3 as of 7/5)    Plan:  - f/u HIV, HepBsAg, HepBsAb, HepB total core Ab, HCV, G6PD (ordered by hematology)  - May be transferred to [facility] [address] for treatment per flow cytometry results  - f/u bone marrow biopsy  - c/w Hydrea 1500mg BID  - c/w allopurinol 300mg qd  - monitor daily labs: CBC w/ diff, CMP, coags, uric acid, LDH, phos, fibrinogen, d-dimer  - transfuse for Hgb<7, plt<10k, <20k and febrile, or <50k and actively bleeding/pending procedure   - monitor temperature curve and vital signs

## 2023-07-05 NOTE — PROGRESS NOTE ADULT - PROBLEM SELECTOR PLAN 6
Hx of DM2, on insulin (Novolin N) 35U qAM/20U qPM at home  - BG since admission 140s-170s    Plan:  - lipid panel unremarkable  - f/u A1C  - hold home antidiabetic medications at this time  - start ISS; adjust per patient's insulin requirements  - monitor FS for goal -180 while inpatient Hx of DM2, on insulin (Novolin N) 35U qAM/20U qPM at home  - BG since admission 140s-170s    Plan:  - lipid panel unremarkable  - a1c 5.7%  - hold home antidiabetic medications at this time  - start ISS; adjust per patient's insulin requirements  - monitor FS for goal -180 while inpatient

## 2023-07-05 NOTE — DISCHARGE NOTE PROVIDER - NSDCMRMEDTOKEN_GEN_ALL_CORE_FT
NovoLIN N FlexPen 100 units/mL subcutaneous suspension: 35 unit(s) subcutaneous once a day  NovoLIN N FlexPen 100 units/mL subcutaneous suspension: 20 unit(s) subcutaneous once a day (at bedtime)   acetaminophen 325 mg oral tablet: 2 tab(s) orally every 6 hours As needed Temp greater or equal to 38C (100.4F), Mild Pain (1 - 3)  allopurinol 300 mg oral tablet: 1 tab(s) orally once a day  hydroxyurea 500 mg oral capsule: 3 cap(s) orally 2 times a day  levothyroxine 100 mcg (0.1 mg) oral tablet: 1 tab(s) orally once a day  melatonin 3 mg oral tablet: 1 tab(s) orally once a day (at bedtime)  NovoLIN N FlexPen 100 units/mL subcutaneous suspension: 35 unit(s) subcutaneous once a day  NovoLIN N FlexPen 100 units/mL subcutaneous suspension: 20 unit(s) subcutaneous once a day (at bedtime)

## 2023-07-06 LAB
ALBUMIN SERPL ELPH-MCNC: 2.9 G/DL — LOW (ref 3.3–5)
ALP SERPL-CCNC: 122 U/L — HIGH (ref 40–120)
ALT FLD-CCNC: 12 U/L — SIGNIFICANT CHANGE UP (ref 4–41)
ANION GAP SERPL CALC-SCNC: 13 MMOL/L — SIGNIFICANT CHANGE UP (ref 7–14)
APTT BLD: 32.6 SEC — SIGNIFICANT CHANGE UP (ref 27–36.3)
AST SERPL-CCNC: 23 U/L — SIGNIFICANT CHANGE UP (ref 4–40)
BASOPHILS # BLD AUTO: 0 K/UL — SIGNIFICANT CHANGE UP (ref 0–0.2)
BASOPHILS NFR BLD AUTO: 0 % — SIGNIFICANT CHANGE UP (ref 0–2)
BILIRUB SERPL-MCNC: 1.1 MG/DL — SIGNIFICANT CHANGE UP (ref 0.2–1.2)
BUN SERPL-MCNC: 20 MG/DL — SIGNIFICANT CHANGE UP (ref 7–23)
CALCIUM SERPL-MCNC: 9.2 MG/DL — SIGNIFICANT CHANGE UP (ref 8.4–10.5)
CHLORIDE SERPL-SCNC: 93 MMOL/L — LOW (ref 98–107)
CO2 SERPL-SCNC: 24 MMOL/L — SIGNIFICANT CHANGE UP (ref 22–31)
CREAT SERPL-MCNC: 0.77 MG/DL — SIGNIFICANT CHANGE UP (ref 0.5–1.3)
D DIMER BLD IA.RAPID-MCNC: HIGH NG/ML DDU
EGFR: 102 ML/MIN/1.73M2 — SIGNIFICANT CHANGE UP
EOSINOPHIL # BLD AUTO: 0 K/UL — SIGNIFICANT CHANGE UP (ref 0–0.5)
EOSINOPHIL NFR BLD AUTO: 0 % — SIGNIFICANT CHANGE UP (ref 0–6)
FERRITIN SERPL-MCNC: 428 NG/ML — HIGH (ref 30–400)
FIBRINOGEN PPP-MCNC: 186 MG/DL — LOW (ref 200–465)
FOLATE SERPL-MCNC: 14.6 NG/ML — SIGNIFICANT CHANGE UP (ref 3.1–17.5)
GLUCOSE BLDC GLUCOMTR-MCNC: 168 MG/DL — HIGH (ref 70–99)
GLUCOSE BLDC GLUCOMTR-MCNC: 183 MG/DL — HIGH (ref 70–99)
GLUCOSE BLDC GLUCOMTR-MCNC: 237 MG/DL — HIGH (ref 70–99)
GLUCOSE BLDC GLUCOMTR-MCNC: 249 MG/DL — HIGH (ref 70–99)
GLUCOSE BLDC GLUCOMTR-MCNC: 250 MG/DL — HIGH (ref 70–99)
GLUCOSE SERPL-MCNC: 198 MG/DL — HIGH (ref 70–99)
HBV CORE AB SER-ACNC: SIGNIFICANT CHANGE UP
HBV SURFACE AB SER-ACNC: SIGNIFICANT CHANGE UP
HBV SURFACE AG SER-ACNC: SIGNIFICANT CHANGE UP
HCT VFR BLD CALC: 23.3 % — LOW (ref 39–50)
HCV AB S/CO SERPL IA: 0.42 S/CO — SIGNIFICANT CHANGE UP (ref 0–0.99)
HCV AB SERPL-IMP: SIGNIFICANT CHANGE UP
HGB BLD-MCNC: 7.3 G/DL — LOW (ref 13–17)
HIV 1+2 AB+HIV1 P24 AG SERPL QL IA: SIGNIFICANT CHANGE UP
HYPOCHROMIA BLD QL: SLIGHT — SIGNIFICANT CHANGE UP
IANC: 1.59 K/UL — LOW (ref 1.8–7.4)
IGA FLD-MCNC: 233 MG/DL — SIGNIFICANT CHANGE UP (ref 84–499)
IGG FLD-MCNC: 964 MG/DL — SIGNIFICANT CHANGE UP (ref 610–1660)
IGM SERPL-MCNC: 1366 MG/DL — HIGH (ref 35–242)
INR BLD: 1.33 RATIO — HIGH (ref 0.88–1.16)
KAPPA LC SER QL IFE: 18.37 MG/DL — HIGH (ref 0.33–1.94)
KAPPA LC SER QL IFE: 18.37 MG/DL — HIGH (ref 0.33–1.94)
KAPPA/LAMBDA FREE LIGHT CHAIN RATIO, SERUM: 7.15 RATIO — HIGH (ref 0.26–1.65)
KAPPA/LAMBDA FREE LIGHT CHAIN RATIO, SERUM: 7.15 RATIO — HIGH (ref 0.26–1.65)
LAMBDA LC SER QL IFE: 2.57 MG/DL — SIGNIFICANT CHANGE UP (ref 0.57–2.63)
LAMBDA LC SER QL IFE: 2.57 MG/DL — SIGNIFICANT CHANGE UP (ref 0.57–2.63)
LDH SERPL L TO P-CCNC: 763 U/L — HIGH (ref 135–225)
LYMPHOCYTES # BLD AUTO: 25.06 K/UL — HIGH (ref 1–3.3)
LYMPHOCYTES # BLD AUTO: 36.6 % — SIGNIFICANT CHANGE UP (ref 13–44)
LYMPHOCYTES # SPEC AUTO: 44.2 % — HIGH (ref 0–0)
MACROCYTES BLD QL: SLIGHT — SIGNIFICANT CHANGE UP
MAGNESIUM SERPL-MCNC: 1.8 MG/DL — SIGNIFICANT CHANGE UP (ref 1.6–2.6)
MANUAL SMEAR VERIFICATION: SIGNIFICANT CHANGE UP
MCHC RBC-ENTMCNC: 24 PG — LOW (ref 27–34)
MCHC RBC-ENTMCNC: 31.3 GM/DL — LOW (ref 32–36)
MCV RBC AUTO: 76.6 FL — LOW (ref 80–100)
MICROCYTES BLD QL: SIGNIFICANT CHANGE UP
MONOCYTES # BLD AUTO: 1.3 K/UL — HIGH (ref 0–0.9)
MONOCYTES NFR BLD AUTO: 1.9 % — LOW (ref 2–14)
NEUTROPHILS # BLD AUTO: 11.84 K/UL — HIGH (ref 1.8–7.4)
NEUTROPHILS NFR BLD AUTO: 17.3 % — LOW (ref 43–77)
NRBC # BLD: 2 /100 — HIGH (ref 0–0)
PHOSPHATE SERPL-MCNC: 3.6 MG/DL — SIGNIFICANT CHANGE UP (ref 2.5–4.5)
PLAT MORPH BLD: NORMAL — SIGNIFICANT CHANGE UP
PLATELET # BLD AUTO: 62 K/UL — LOW (ref 150–400)
PLATELET COUNT - ESTIMATE: ABNORMAL
POTASSIUM SERPL-MCNC: 4.7 MMOL/L — SIGNIFICANT CHANGE UP (ref 3.5–5.3)
POTASSIUM SERPL-SCNC: 4.7 MMOL/L — SIGNIFICANT CHANGE UP (ref 3.5–5.3)
PROT SERPL-MCNC: 6.5 G/DL — SIGNIFICANT CHANGE UP (ref 6–8.3)
PROTHROM AB SERPL-ACNC: 15.5 SEC — HIGH (ref 10.5–13.4)
RBC # BLD: 3.04 M/UL — LOW (ref 4.2–5.8)
RBC # FLD: 22.1 % — HIGH (ref 10.3–14.5)
RBC BLD AUTO: NORMAL — SIGNIFICANT CHANGE UP
SMUDGE CELLS # BLD: PRESENT — SIGNIFICANT CHANGE UP
SODIUM SERPL-SCNC: 130 MMOL/L — LOW (ref 135–145)
URATE SERPL-MCNC: 4.2 MG/DL — SIGNIFICANT CHANGE UP (ref 3.4–8.8)
VIT B12 SERPL-MCNC: 522 PG/ML — SIGNIFICANT CHANGE UP (ref 200–900)
WBC # BLD: 68.46 K/UL — CRITICAL HIGH (ref 3.8–10.5)
WBC # FLD AUTO: 68.46 K/UL — CRITICAL HIGH (ref 3.8–10.5)

## 2023-07-06 PROCEDURE — 74177 CT ABD & PELVIS W/CONTRAST: CPT | Mod: 26

## 2023-07-06 PROCEDURE — 99233 SBSQ HOSP IP/OBS HIGH 50: CPT

## 2023-07-06 RX ADMIN — Medication 100 MICROGRAM(S): at 05:36

## 2023-07-06 RX ADMIN — Medication 300 MILLIGRAM(S): at 12:16

## 2023-07-06 RX ADMIN — HYDROXYUREA 1500 MILLIGRAM(S): 500 CAPSULE ORAL at 17:51

## 2023-07-06 RX ADMIN — Medication 4: at 12:16

## 2023-07-06 RX ADMIN — Medication 4: at 08:50

## 2023-07-06 RX ADMIN — Medication 2: at 17:50

## 2023-07-06 RX ADMIN — Medication 3 MILLIGRAM(S): at 21:59

## 2023-07-06 RX ADMIN — Medication 650 MILLIGRAM(S): at 23:30

## 2023-07-06 RX ADMIN — HYDROXYUREA 1500 MILLIGRAM(S): 500 CAPSULE ORAL at 05:36

## 2023-07-06 RX ADMIN — Medication 650 MILLIGRAM(S): at 21:59

## 2023-07-06 NOTE — PROGRESS NOTE ADULT - PROBLEM SELECTOR PLAN 4
patient has mild hyponatremia trending 132 > 129 > 132. > 128 Patient mildly fatigued (likely from anemia) otherwise asymptomatic. Serum osmolality 291, UCr 50, Daniela 108, UOsm 468. Hyponatremia likely in setting of SIADH 2/2 hypothyroidism  - Fluid restrict 1.5L   - Trend Na patient has mild hyponatremia trending 132 > 129 > 132. > 128 >130 Patient mildly fatigued (likely from anemia) otherwise asymptomatic. Serum osmolality 291, UCr 50, Daniela 108, UOsm 468. Hyponatremia likely in setting of SIADH 2/2 hypothyroidism  - Fluid restrict 1.5L   - Trend Na

## 2023-07-06 NOTE — PROGRESS NOTE ADULT - PROBLEM SELECTOR PLAN 8
DVT PPx: SCD, holding VTE ppx given severe anemia and thrombocytopenia  Diet: Consistent carbs  Dispo: pending clinical improvement  Code Status: full code

## 2023-07-06 NOTE — PROGRESS NOTE ADULT - SUBJECTIVE AND OBJECTIVE BOX
Sridhar Pastrana MD  PGY 1 Department of Internal Medicine        Patient is a 63y old  Male who presents with a chief complaint of anemia, general malaise (05 Jul 2023 15:47)      SUBJECTIVE / OVERNIGHT EVENTS: Pt seen and examined. No acute overnight events. Denies fevers, chills, CP, SOB, Abdominal pain, N/V, Constipation, Diarrhea        MEDICATIONS  (STANDING):  allopurinol 300 milliGRAM(s) Oral daily  dextrose 5%. 1000 milliLiter(s) (50 mL/Hr) IV Continuous <Continuous>  dextrose 5%. 1000 milliLiter(s) (100 mL/Hr) IV Continuous <Continuous>  dextrose 50% Injectable 12.5 Gram(s) IV Push once  dextrose 50% Injectable 25 Gram(s) IV Push once  dextrose 50% Injectable 25 Gram(s) IV Push once  glucagon  Injectable 1 milliGRAM(s) IntraMuscular once  hydroxyurea 1500 milliGRAM(s) Oral two times a day  insulin lispro (ADMELOG) corrective regimen sliding scale   SubCutaneous at bedtime  insulin lispro (ADMELOG) corrective regimen sliding scale   SubCutaneous three times a day before meals  levothyroxine 100 MICROGram(s) Oral daily  lidocaine 2% Injectable 20 milliLiter(s) Local Injection once  melatonin 3 milliGRAM(s) Oral at bedtime    MEDICATIONS  (PRN):  acetaminophen     Tablet .. 650 milliGRAM(s) Oral every 6 hours PRN Temp greater or equal to 38C (100.4F), Mild Pain (1 - 3)  dextrose Oral Gel 15 Gram(s) Oral once PRN Blood Glucose LESS THAN 70 milliGRAM(s)/deciliter      I&O's Summary      Vital Signs Last 24 Hrs  T(C): 37.1 (06 Jul 2023 05:20), Max: 37.1 (06 Jul 2023 05:20)  T(F): 98.8 (06 Jul 2023 05:20), Max: 98.8 (06 Jul 2023 05:20)  HR: 74 (06 Jul 2023 05:20) (67 - 83)  BP: 131/69 (06 Jul 2023 05:20) (128/68 - 155/72)  BP(mean): --  RR: 17 (06 Jul 2023 05:20) (17 - 18)  SpO2: 99% (06 Jul 2023 05:20) (98% - 100%)    Parameters below as of 06 Jul 2023 05:20  Patient On (Oxygen Delivery Method): room air        CAPILLARY BLOOD GLUCOSE      POCT Blood Glucose.: 237 mg/dL (06 Jul 2023 08:34)  POCT Blood Glucose.: 249 mg/dL (06 Jul 2023 00:27)  POCT Blood Glucose.: 254 mg/dL (05 Jul 2023 17:35)  POCT Blood Glucose.: 297 mg/dL (05 Jul 2023 12:04)      PHYSICAL EXAM:  GENERAL: NAD,   HEAD:  Atraumatic, Normocephalic  EYES: EOMI, PERRL, conjunctiva and sclera clear  NECK: No JVD  CHEST/LUNG: Clear to auscultation bilaterally; No wheeze  HEART: Regular rate and rhythm; No murmurs, rubs, or gallops  ABDOMEN: Soft, Nontender, Nondistended; Bowel sounds present  EXTREMITIES:  2+ Peripheral Pulses, No clubbing, cyanosis, or edema  PSYCH: AAOx3  NEUROLOGY: non-focal  SKIN: No rashes or lesions       LABS:                        7.3    68.46 )-----------( 62       ( 06 Jul 2023 06:05 )             23.3     Auto Eosinophil # 0.00  / Auto Eosinophil % 0.0   / Auto Neutrophil # 11.84 / Auto Neutrophil % 17.3  / BANDS % x                            7.3    77.69 )-----------( 60       ( 05 Jul 2023 06:15 )             23.2     Auto Eosinophil # 0.06  / Auto Eosinophil % 0.1   / Auto Neutrophil # 1.99  / Auto Neutrophil % 2.5   / BANDS % x                            7.7    76.52 )-----------( 62       ( 04 Jul 2023 17:50 )             24.9     Auto Eosinophil # x     / Auto Eosinophil % x     / Auto Neutrophil # x     / Auto Neutrophil % x     / BANDS % x        07-06    130<L>  |  93<L>  |  20  ----------------------------<  198<H>  4.7   |  24  |  0.77  07-05    128<L>  |  95<L>  |  18  ----------------------------<  224<H>  4.8   |  22  |  0.80    Ca    9.2      06 Jul 2023 06:05  Mg     1.80     07-06  Phos  3.6     07-06  TPro  6.5  /  Alb  2.9<L>  /  TBili  1.1  /  DBili  x   /  AST  23  /  ALT  12  /  AlkPhos  122<H>  07-06  TPro  6.2  /  Alb  2.9<L>  /  TBili  1.2  /  DBili  x   /  AST  29  /  ALT  11  /  AlkPhos  120  07-05    PT/INR - ( 06 Jul 2023 06:05 )   PT: 15.5 sec;   INR: 1.33 ratio         PTT - ( 06 Jul 2023 06:05 )  PTT:32.6 sec      Urinalysis Basic - ( 06 Jul 2023 06:05 )    Color: x / Appearance: x / SG: x / pH: x  Gluc: 198 mg/dL / Ketone: x  / Bili: x / Urobili: x   Blood: x / Protein: x / Nitrite: x   Leuk Esterase: x / RBC: x / WBC x   Sq Epi: x / Non Sq Epi: x / Bacteria: x            RADIOLOGY & ADDITIONAL TESTS:    Imaging Personally Reviewed:    Consultant(s) Notes Reviewed:      Care Discussed with Consultants/Other Providers:   Sridhar Pastrana MD  PGY 1 Department of Internal Medicine        Patient is a 63y old  Male who presents with a chief complaint of anemia, general malaise (05 Jul 2023 15:47)      SUBJECTIVE / OVERNIGHT EVENTS: Pt seen and examined. No acute overnight events. Continues to have fatigue. Denies fevers, chills, CP, SOB, Abdominal pain, N/V, Constipation, Diarrhea      MEDICATIONS  (STANDING):  allopurinol 300 milliGRAM(s) Oral daily  dextrose 5%. 1000 milliLiter(s) (50 mL/Hr) IV Continuous <Continuous>  dextrose 5%. 1000 milliLiter(s) (100 mL/Hr) IV Continuous <Continuous>  dextrose 50% Injectable 12.5 Gram(s) IV Push once  dextrose 50% Injectable 25 Gram(s) IV Push once  dextrose 50% Injectable 25 Gram(s) IV Push once  glucagon  Injectable 1 milliGRAM(s) IntraMuscular once  hydroxyurea 1500 milliGRAM(s) Oral two times a day  insulin lispro (ADMELOG) corrective regimen sliding scale   SubCutaneous at bedtime  insulin lispro (ADMELOG) corrective regimen sliding scale   SubCutaneous three times a day before meals  levothyroxine 100 MICROGram(s) Oral daily  lidocaine 2% Injectable 20 milliLiter(s) Local Injection once  melatonin 3 milliGRAM(s) Oral at bedtime    MEDICATIONS  (PRN):  acetaminophen     Tablet .. 650 milliGRAM(s) Oral every 6 hours PRN Temp greater or equal to 38C (100.4F), Mild Pain (1 - 3)  dextrose Oral Gel 15 Gram(s) Oral once PRN Blood Glucose LESS THAN 70 milliGRAM(s)/deciliter      I&O's Summary      Vital Signs Last 24 Hrs  T(C): 37.1 (06 Jul 2023 05:20), Max: 37.1 (06 Jul 2023 05:20)  T(F): 98.8 (06 Jul 2023 05:20), Max: 98.8 (06 Jul 2023 05:20)  HR: 74 (06 Jul 2023 05:20) (67 - 83)  BP: 131/69 (06 Jul 2023 05:20) (128/68 - 155/72)  BP(mean): --  RR: 17 (06 Jul 2023 05:20) (17 - 18)  SpO2: 99% (06 Jul 2023 05:20) (98% - 100%)    Parameters below as of 06 Jul 2023 05:20  Patient On (Oxygen Delivery Method): room air        CAPILLARY BLOOD GLUCOSE      POCT Blood Glucose.: 237 mg/dL (06 Jul 2023 08:34)  POCT Blood Glucose.: 249 mg/dL (06 Jul 2023 00:27)  POCT Blood Glucose.: 254 mg/dL (05 Jul 2023 17:35)  POCT Blood Glucose.: 297 mg/dL (05 Jul 2023 12:04)      PHYSICAL EXAM:  GENERAL: NAD, resting comfortably  HEAD:  Atraumatic, Normocephalic  EYES: EOMI, PERRL, conjunctiva and sclera clear  NECK: No JVD  CHEST/LUNG: CTAB  HEART: Regular rate and rhythm; no murmur appreciated  ABDOMEN: Soft, Nontender, Nondistended; Bowel sounds present  EXTREMITIES:  2+ Peripheral Pulses, no lower extremity edema. Firm swelling of previous IV site has resolved  PSYCH: AAOx3  NEUROLOGY: 5/5 strength in all extremities  SKIN: No rashes or lesions, hyperpigmentation of upper abdomen       LABS:                        7.3    68.46 )-----------( 62       ( 06 Jul 2023 06:05 )             23.3     Auto Eosinophil # 0.00  / Auto Eosinophil % 0.0   / Auto Neutrophil # 11.84 / Auto Neutrophil % 17.3  / BANDS % x                            7.3    77.69 )-----------( 60       ( 05 Jul 2023 06:15 )             23.2     Auto Eosinophil # 0.06  / Auto Eosinophil % 0.1   / Auto Neutrophil # 1.99  / Auto Neutrophil % 2.5   / BANDS % x                            7.7    76.52 )-----------( 62       ( 04 Jul 2023 17:50 )             24.9     Auto Eosinophil # x     / Auto Eosinophil % x     / Auto Neutrophil # x     / Auto Neutrophil % x     / BANDS % x        07-06    130<L>  |  93<L>  |  20  ----------------------------<  198<H>  4.7   |  24  |  0.77  07-05    128<L>  |  95<L>  |  18  ----------------------------<  224<H>  4.8   |  22  |  0.80    Ca    9.2      06 Jul 2023 06:05  Mg     1.80     07-06  Phos  3.6     07-06  TPro  6.5  /  Alb  2.9<L>  /  TBili  1.1  /  DBili  x   /  AST  23  /  ALT  12  /  AlkPhos  122<H>  07-06  TPro  6.2  /  Alb  2.9<L>  /  TBili  1.2  /  DBili  x   /  AST  29  /  ALT  11  /  AlkPhos  120  07-05    PT/INR - ( 06 Jul 2023 06:05 )   PT: 15.5 sec;   INR: 1.33 ratio         PTT - ( 06 Jul 2023 06:05 )  PTT:32.6 sec      Urinalysis Basic - ( 06 Jul 2023 06:05 )    Color: x / Appearance: x / SG: x / pH: x  Gluc: 198 mg/dL / Ketone: x  / Bili: x / Urobili: x   Blood: x / Protein: x / Nitrite: x   Leuk Esterase: x / RBC: x / WBC x   Sq Epi: x / Non Sq Epi: x / Bacteria: x            RADIOLOGY & ADDITIONAL TESTS:    Imaging Personally Reviewed:    Consultant(s) Notes Reviewed:      Care Discussed with Consultants/Other Providers:

## 2023-07-06 NOTE — PROVIDER CONTACT NOTE (OTHER) - BACKGROUND
63 year old male admitted with concern for leukemia diagnosis. History of diabetes mellitus.
Patient was admitted for work up with elevated wbcs.

## 2023-07-06 NOTE — PROGRESS NOTE ADULT - PROBLEM SELECTOR PLAN 2
Patient with anemia and thrombocytopenia with some schistocytes on smear, labs with elevated DDimer, low fibrinogen, prolonged PT, elevated LDH with low haptoglobin -> concerning for acute DIC    Plan:  - Trend coags, DDimer, fibrinogen, TBili/LFTs  - cryoprecipitate 10 units for fibrinogen < 150  - Noted to have trace b/l LE edema and new LUE edema (now resolved) -> VA Duplex LUE and b/l LE ordered for DVT r/o  - VA duplex b/l LE negative  - VA duplex LUE found superficial vein thrombosis but otherwise negative, will continue to monitor Patient with anemia and thrombocytopenia with some schistocytes on smear, labs with elevated DDimer, low fibrinogen, prolonged PT, elevated LDH with low haptoglobin -> concerning for acute DIC  As of 7/6 fibrinogen improving 150 > 160 > 186, D-dimer 70864    Plan:  - Trend coags, DDimer, fibrinogen, TBili/LFTs  - cryoprecipitate 10 units for fibrinogen < 150  - Noted to have trace b/l LE edema and new LUE edema (now resolved) ->  VA duplex b/l LE ordered and negative  - VA duplex LUE found superficial vein thrombosis but otherwise negative, will continue to monitor

## 2023-07-06 NOTE — PROGRESS NOTE ADULT - PROBLEM SELECTOR PLAN 1
Pt. 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  - peripheral smear showing thrombocytopenia, lymphocytes of varying maturity w/ high N:C ratio, visible nucleoli, 1-2 schistocytes per HPF  - s/p 4U PRBC (Hgb 7.4 > 7.7 > 7.3 as of 7/5)    Plan:  - f/u HIV, HepBsAg, HepBsAb, HepB total core Ab, HCV, G6PD (ordered by hematology)  - May be transferred to [facility] for treatment per flow cytometry results  - f/u bone marrow biopsy  - c/w Hydrea 1500mg BID  - c/w allopurinol 300mg qd  - monitor daily labs: CBC w/ diff, CMP, coags, uric acid, LDH, phos, fibrinogen, d-dimer  - transfuse for Hgb<7, plt<10k, <20k and febrile, or <50k and actively bleeding/pending procedure   - monitor temperature curve and vital signs Pt. 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 3.8 cm hypervascular lesion in the right hepatic lobe, possibly hemangioma. Chronic pancreatitis with pancreatic ductal dilatation and possible intraductal calcification in the pancreatic head. Abdominal lymphadenopathy.    Plan:  - [facility]  - Chronicity of CT findings unclear, appreciate hematology recs for management of CT abd/pelvis findings  - f/u bone marrow biopsy  - c/w Hydrea 1500mg BID  - c/w allopurinol 300mg qd  - monitor daily labs: CBC w/ diff, CMP, coags, uric acid, LDH, phos, fibrinogen, d-dimer  - transfuse for Hgb<7, plt<10k, <20k and febrile, or <50k and actively bleeding/pending procedure   - monitor temperature curve and vital signs

## 2023-07-06 NOTE — PROGRESS NOTE ADULT - PROBLEM SELECTOR PLAN 6
Hx of DM2, on insulin (Novolin N) 35U qAM/20U qPM at home  - BG since admission 140s-170s    Plan:  - lipid panel unremarkable  - a1c 5.7%  - hold home antidiabetic medications at this time  - start ISS; adjust per patient's insulin requirements  - monitor FS for goal -180 while inpatient

## 2023-07-06 NOTE — PROGRESS NOTE ADULT - PROBLEM SELECTOR PLAN 3
Noted to have systolic murmur of LUSB on exam, less appreciated today. Echo findings unremarkable so likely flow murmur 2/2 severe anemia rather than undiagnosed aortic valve pathology.     Plan:  - Manage anemia as above Noted to have systolic murmur of LUSB on exam, no longer appreciated. Echo findings unremarkable so likely flow murmur 2/2 severe anemia rather than undiagnosed aortic valve pathology.     Plan:  - Manage anemia as above

## 2023-07-07 ENCOUNTER — TRANSCRIPTION ENCOUNTER (OUTPATIENT)
Age: 63
End: 2023-07-07

## 2023-07-07 ENCOUNTER — INPATIENT (INPATIENT)
Facility: HOSPITAL | Age: 63
LOS: 7 days | Discharge: HOME | DRG: 840 | End: 2023-07-15
Attending: INTERNAL MEDICINE | Admitting: INTERNAL MEDICINE
Payer: COMMERCIAL

## 2023-07-07 VITALS
TEMPERATURE: 100 F | OXYGEN SATURATION: 98 % | WEIGHT: 177.25 LBS | DIASTOLIC BLOOD PRESSURE: 74 MMHG | SYSTOLIC BLOOD PRESSURE: 145 MMHG | HEIGHT: 70 IN | HEART RATE: 82 BPM | RESPIRATION RATE: 18 BRPM

## 2023-07-07 VITALS — WEIGHT: 178.57 LBS

## 2023-07-07 DIAGNOSIS — R50.9 FEVER, UNSPECIFIED: ICD-10-CM

## 2023-07-07 DIAGNOSIS — R01.1 CARDIAC MURMUR, UNSPECIFIED: ICD-10-CM

## 2023-07-07 DIAGNOSIS — D65 DISSEMINATED INTRAVASCULAR COAGULATION [DEFIBRINATION SYNDROME]: ICD-10-CM

## 2023-07-07 DIAGNOSIS — E87.1 HYPO-OSMOLALITY AND HYPONATREMIA: ICD-10-CM

## 2023-07-07 DIAGNOSIS — I80.8 PHLEBITIS AND THROMBOPHLEBITIS OF OTHER SITES: ICD-10-CM

## 2023-07-07 DIAGNOSIS — R79.89 OTHER SPECIFIED ABNORMAL FINDINGS OF BLOOD CHEMISTRY: ICD-10-CM

## 2023-07-07 DIAGNOSIS — Z29.9 ENCOUNTER FOR PROPHYLACTIC MEASURES, UNSPECIFIED: ICD-10-CM

## 2023-07-07 DIAGNOSIS — K85.90 ACUTE PANCREATITIS WITHOUT NECROSIS OR INFECTION, UNSPECIFIED: ICD-10-CM

## 2023-07-07 DIAGNOSIS — C91.00 ACUTE LYMPHOBLASTIC LEUKEMIA NOT HAVING ACHIEVED REMISSION: ICD-10-CM

## 2023-07-07 DIAGNOSIS — E11.9 TYPE 2 DIABETES MELLITUS WITHOUT COMPLICATIONS: ICD-10-CM

## 2023-07-07 LAB
ALBUMIN SERPL ELPH-MCNC: 2.9 G/DL — LOW (ref 3.3–5)
ALP SERPL-CCNC: 125 U/L — HIGH (ref 40–120)
ALT FLD-CCNC: 10 U/L — SIGNIFICANT CHANGE UP (ref 4–41)
ANION GAP SERPL CALC-SCNC: 13 MMOL/L — SIGNIFICANT CHANGE UP (ref 7–14)
APTT BLD: 33.8 SEC — SIGNIFICANT CHANGE UP (ref 27–36.3)
AST SERPL-CCNC: 29 U/L — SIGNIFICANT CHANGE UP (ref 4–40)
BILIRUB SERPL-MCNC: 1 MG/DL — SIGNIFICANT CHANGE UP (ref 0.2–1.2)
BUN SERPL-MCNC: 19 MG/DL — SIGNIFICANT CHANGE UP (ref 7–23)
CALCIUM SERPL-MCNC: 9.2 MG/DL — SIGNIFICANT CHANGE UP (ref 8.4–10.5)
CHLORIDE SERPL-SCNC: 91 MMOL/L — LOW (ref 98–107)
CO2 SERPL-SCNC: 24 MMOL/L — SIGNIFICANT CHANGE UP (ref 22–31)
CREAT SERPL-MCNC: 0.85 MG/DL — SIGNIFICANT CHANGE UP (ref 0.5–1.3)
D DIMER BLD IA.RAPID-MCNC: HIGH NG/ML DDU
EGFR: 99 ML/MIN/1.73M2 — SIGNIFICANT CHANGE UP
FIBRINOGEN PPP-MCNC: 187 MG/DL — LOW (ref 200–465)
GLUCOSE BLDC GLUCOMTR-MCNC: 239 MG/DL — HIGH (ref 70–99)
GLUCOSE BLDC GLUCOMTR-MCNC: 249 MG/DL — HIGH (ref 70–99)
GLUCOSE BLDC GLUCOMTR-MCNC: 298 MG/DL — HIGH (ref 70–99)
GLUCOSE BLDC GLUCOMTR-MCNC: 299 MG/DL — HIGH (ref 70–99)
GLUCOSE SERPL-MCNC: 183 MG/DL — HIGH (ref 70–99)
HCT VFR BLD CALC: 23.4 % — LOW (ref 39–50)
HGB BLD-MCNC: 7.3 G/DL — LOW (ref 13–17)
INR BLD: 1.44 RATIO — HIGH (ref 0.88–1.16)
LDH SERPL L TO P-CCNC: 771 U/L — HIGH (ref 135–225)
MCHC RBC-ENTMCNC: 24.9 PG — LOW (ref 27–34)
MCHC RBC-ENTMCNC: 31.2 GM/DL — LOW (ref 32–36)
MCV RBC AUTO: 79.9 FL — LOW (ref 80–100)
NRBC # BLD: 0 /100 WBCS — SIGNIFICANT CHANGE UP (ref 0–0)
NRBC # FLD: 0.15 K/UL — HIGH (ref 0–0)
PHOSPHATE SERPL-MCNC: 4.3 MG/DL — SIGNIFICANT CHANGE UP (ref 2.5–4.5)
PLATELET # BLD AUTO: 56 K/UL — LOW (ref 150–400)
POTASSIUM SERPL-MCNC: 5.2 MMOL/L — SIGNIFICANT CHANGE UP (ref 3.5–5.3)
POTASSIUM SERPL-SCNC: 5.2 MMOL/L — SIGNIFICANT CHANGE UP (ref 3.5–5.3)
PROT SERPL-MCNC: 6.6 G/DL — SIGNIFICANT CHANGE UP (ref 6–8.3)
PROTHROM AB SERPL-ACNC: 16.8 SEC — HIGH (ref 10.5–13.4)
RBC # BLD: 2.93 M/UL — LOW (ref 4.2–5.8)
RBC # FLD: 22.7 % — HIGH (ref 10.3–14.5)
SODIUM SERPL-SCNC: 128 MMOL/L — LOW (ref 135–145)
TM INTERPRETATION: SIGNIFICANT CHANGE UP
URATE SERPL-MCNC: 4.1 MG/DL — SIGNIFICANT CHANGE UP (ref 3.4–8.8)
WBC # BLD: 58.88 K/UL — CRITICAL HIGH (ref 3.8–10.5)
WBC # FLD AUTO: 58.88 K/UL — CRITICAL HIGH (ref 3.8–10.5)

## 2023-07-07 PROCEDURE — 99232 SBSQ HOSP IP/OBS MODERATE 35: CPT | Mod: GC

## 2023-07-07 PROCEDURE — 99239 HOSP IP/OBS DSCHRG MGMT >30: CPT

## 2023-07-07 PROCEDURE — 71045 X-RAY EXAM CHEST 1 VIEW: CPT | Mod: 26

## 2023-07-07 RX ORDER — SODIUM CHLORIDE 9 MG/ML
1000 INJECTION, SOLUTION INTRAVENOUS
Refills: 0 | Status: DISCONTINUED | OUTPATIENT
Start: 2023-07-07 | End: 2023-07-15

## 2023-07-07 RX ORDER — INSULIN GLARGINE 100 [IU]/ML
10 INJECTION, SOLUTION SUBCUTANEOUS AT BEDTIME
Refills: 0 | Status: DISCONTINUED | OUTPATIENT
Start: 2023-07-07 | End: 2023-07-08

## 2023-07-07 RX ORDER — INSULIN GLARGINE 100 [IU]/ML
20 INJECTION, SOLUTION SUBCUTANEOUS AT BEDTIME
Refills: 0 | Status: DISCONTINUED | OUTPATIENT
Start: 2023-07-07 | End: 2023-07-07

## 2023-07-07 RX ORDER — DEXTROSE 50 % IN WATER 50 %
12.5 SYRINGE (ML) INTRAVENOUS ONCE
Refills: 0 | Status: DISCONTINUED | OUTPATIENT
Start: 2023-07-07 | End: 2023-07-15

## 2023-07-07 RX ORDER — PIPERACILLIN AND TAZOBACTAM 4; .5 G/20ML; G/20ML
3.38 INJECTION, POWDER, LYOPHILIZED, FOR SOLUTION INTRAVENOUS ONCE
Refills: 0 | Status: COMPLETED | OUTPATIENT
Start: 2023-07-07 | End: 2023-07-07

## 2023-07-07 RX ORDER — DEXTROSE 50 % IN WATER 50 %
15 SYRINGE (ML) INTRAVENOUS ONCE
Refills: 0 | Status: DISCONTINUED | OUTPATIENT
Start: 2023-07-07 | End: 2023-07-15

## 2023-07-07 RX ORDER — LANOLIN ALCOHOL/MO/W.PET/CERES
3 CREAM (GRAM) TOPICAL AT BEDTIME
Refills: 0 | Status: DISCONTINUED | OUTPATIENT
Start: 2023-07-07 | End: 2023-07-15

## 2023-07-07 RX ORDER — PIPERACILLIN AND TAZOBACTAM 4; .5 G/20ML; G/20ML
3.38 INJECTION, POWDER, LYOPHILIZED, FOR SOLUTION INTRAVENOUS EVERY 8 HOURS
Refills: 0 | Status: DISCONTINUED | OUTPATIENT
Start: 2023-07-08 | End: 2023-07-10

## 2023-07-07 RX ORDER — INSULIN LISPRO 100/ML
VIAL (ML) SUBCUTANEOUS AT BEDTIME
Refills: 0 | Status: DISCONTINUED | OUTPATIENT
Start: 2023-07-07 | End: 2023-07-13

## 2023-07-07 RX ORDER — DEXAMETHASONE 0.5 MG/5ML
12 ELIXIR ORAL ONCE
Refills: 0 | Status: COMPLETED | OUTPATIENT
Start: 2023-07-07 | End: 2023-07-07

## 2023-07-07 RX ORDER — LEVOTHYROXINE SODIUM 125 MCG
100 TABLET ORAL EVERY 24 HOURS
Refills: 0 | Status: DISCONTINUED | OUTPATIENT
Start: 2023-07-08 | End: 2023-07-11

## 2023-07-07 RX ORDER — SENNA PLUS 8.6 MG/1
2 TABLET ORAL AT BEDTIME
Refills: 0 | Status: DISCONTINUED | OUTPATIENT
Start: 2023-07-07 | End: 2023-07-15

## 2023-07-07 RX ORDER — HYDROXYUREA 500 MG/1
1500 CAPSULE ORAL ONCE
Refills: 0 | Status: COMPLETED | OUTPATIENT
Start: 2023-07-07 | End: 2023-07-07

## 2023-07-07 RX ORDER — INSULIN LISPRO 100/ML
VIAL (ML) SUBCUTANEOUS
Refills: 0 | Status: DISCONTINUED | OUTPATIENT
Start: 2023-07-07 | End: 2023-07-15

## 2023-07-07 RX ORDER — DEXTROSE 50 % IN WATER 50 %
25 SYRINGE (ML) INTRAVENOUS ONCE
Refills: 0 | Status: DISCONTINUED | OUTPATIENT
Start: 2023-07-07 | End: 2023-07-15

## 2023-07-07 RX ORDER — SODIUM CHLORIDE 9 MG/ML
1000 INJECTION INTRAMUSCULAR; INTRAVENOUS; SUBCUTANEOUS
Refills: 0 | Status: DISCONTINUED | OUTPATIENT
Start: 2023-07-07 | End: 2023-07-08

## 2023-07-07 RX ORDER — ALLOPURINOL 300 MG
300 TABLET ORAL EVERY 24 HOURS
Refills: 0 | Status: DISCONTINUED | OUTPATIENT
Start: 2023-07-08 | End: 2023-07-14

## 2023-07-07 RX ORDER — GLUCAGON INJECTION, SOLUTION 0.5 MG/.1ML
1 INJECTION, SOLUTION SUBCUTANEOUS ONCE
Refills: 0 | Status: DISCONTINUED | OUTPATIENT
Start: 2023-07-07 | End: 2023-07-15

## 2023-07-07 RX ORDER — ACETAMINOPHEN 500 MG
650 TABLET ORAL EVERY 6 HOURS
Refills: 0 | Status: DISCONTINUED | OUTPATIENT
Start: 2023-07-07 | End: 2023-07-14

## 2023-07-07 RX ADMIN — Medication 12 MILLIGRAM(S): at 22:08

## 2023-07-07 RX ADMIN — Medication 2: at 21:48

## 2023-07-07 RX ADMIN — Medication 650 MILLIGRAM(S): at 17:17

## 2023-07-07 RX ADMIN — Medication 4: at 19:33

## 2023-07-07 RX ADMIN — PIPERACILLIN AND TAZOBACTAM 200 GRAM(S): 4; .5 INJECTION, POWDER, LYOPHILIZED, FOR SOLUTION INTRAVENOUS at 21:59

## 2023-07-07 RX ADMIN — HYDROXYUREA 1500 MILLIGRAM(S): 500 CAPSULE ORAL at 06:26

## 2023-07-07 RX ADMIN — Medication 4: at 08:50

## 2023-07-07 RX ADMIN — HYDROXYUREA 1500 MILLIGRAM(S): 500 CAPSULE ORAL at 18:12

## 2023-07-07 RX ADMIN — Medication 6: at 13:06

## 2023-07-07 RX ADMIN — INSULIN GLARGINE 10 UNIT(S): 100 INJECTION, SOLUTION SUBCUTANEOUS at 21:49

## 2023-07-07 RX ADMIN — Medication 100 MICROGRAM(S): at 06:26

## 2023-07-07 RX ADMIN — Medication 650 MILLIGRAM(S): at 18:12

## 2023-07-07 RX ADMIN — Medication 300 MILLIGRAM(S): at 13:07

## 2023-07-07 RX ADMIN — PIPERACILLIN AND TAZOBACTAM 25 GRAM(S): 4; .5 INJECTION, POWDER, LYOPHILIZED, FOR SOLUTION INTRAVENOUS at 23:54

## 2023-07-07 RX ADMIN — Medication 3 MILLIGRAM(S): at 22:07

## 2023-07-07 NOTE — PROVIDER CONTACT NOTE (CRITICAL VALUE NOTIFICATION) - ACTION/TREATMENT ORDERED:
MD Renee Avery notified. no further actions needed at this time. continue to monitor
awaiting MD's order. monitoring is ongoing.
MD Sridhar Pastrana notified. no further actions needed at this time. continue to monitor
No new orders, continue to monitor
MD made aware. No new interventions at this time.
MD made aware. No new interventions at this time.

## 2023-07-07 NOTE — H&P ADULT - PROBLEM SELECTOR PLAN 6
·  Problem: DM (diabetes mellitus).   ·  Plan: Hx of DM2, on insulin (Novolin N) 35U qAM/20U qPM at home  - BG since admission 140s-170s    Plan:  - lipid panel unremarkable  - a1c 5.7%  - hold home antidiabetic medications at this time  - start ISS; adjust per patient's insulin requirements  - monitor FS for goal -180 while inpatient.  - Monitor sugars while patient on dexamethasone. ·  Problem: DM (diabetes mellitus).   ·  Plan: Hx of DM2, on insulin (Novolin N) 35U qAM/15-20U qPM at home  - BG since admission 140s-170s    Plan:  - lipid panel unremarkable  - a1c 5.7%  - restart insulin at 10u given elevated glucoses recently  - start ISS; adjust per patient's insulin requirements  - monitor FS for goal -180 while inpatient.  - Monitor sugars while patient on dexamethasone.  - f/u fructosamine for elevated glucoses but normal a1c

## 2023-07-07 NOTE — H&P ADULT - PROBLEM SELECTOR PLAN 10
F: maintenance IVF   E: prn   N:  Diet: Consistent carbs w/ fluid restriction  DVT ppx: SCDs, holding VTE ppx given severe anemia and thrombocytopenia  GI ppx: none  GI motility: senna prn  dispo: [facility]  Code Status: full code.

## 2023-07-07 NOTE — PROGRESS NOTE ADULT - ASSESSMENT
64yo gentleman with PMH of DM on insulin p/w weight loss of 15-20 lbs, gait instability, fatigue and body aches, found to have leukocytosis, anemia and thrombocytopenia concerning for leukemia.    #B-prolymphocytic Leukemia  On admission: CBC found WBC 75.5, Hgb 5.5, plt 85, MCV 75.7, RDW 19.9- 54% reactive lymphocytes. LDH is 925, uric acid 7.5.  - Peripheral smear and cellovision reviewed, found polychromasia, some hypochromic RBCs, few elliptocyes. 1-2 schistocytes per HPF. True thrombocytopenia without platelet clumps or giant platelets, about 9 platelets per HPF. WBCs are mostly lymphocytes of varying maturity, some with visible nucleoli and high N:C ratio, some very minimal visible cytoplasm.  - Peripheral Blood Flow cytometry: - Monotypic B-cells (61.47% of cells), positive for kappa, CD19, CD20, FMC-7, CD79b; negative   CD5, CD10, , CD38, CD11c, CD23 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   The differential diagnosis includes B-prolymphocytic leukemia/lymphoma (based on morphology), marginal zone lineage, lymphoplasmacytic lineage, mantle cell lineage (which may occasionally be CD5 negative; suggest FISH for t(11;14) to rule out), CD10 negative follicular lineage, or other B-cell lymphoma.   - Patient was started on hydrea 1500mg BID and allopurinol 300mg qd on 7/3/23.   - Iron studies found 15% iron sat, ferritin ordered. B12 and folate ordered.   - Retic low at 3.9%   - Labs are still pending:  G6PD level, HIV, hep B SAg, hep B SAb, hep B total core Ab, HCV  - Check TTE  - c/w IVF NS at 100 cc/hr  - Please obtain DAILY LABS: CBC with Diff, CMP, coags, uric acid, LDH, Phos, fibrinogen and d-dimer  - Continue supportive transfusion for hg < 7.0 and platelets < 10k, < 20k if febrile and < 50k if bleeding  - 7/3/23- Abd US found hepatomegaly (19.4cm). Contracted gallbladder filled with stones. Splenomegaly (27.3cm)   Duplex BLE US- No DVT  - Bone marrow biopsy and aspirate performed on 7/3/23. Clonoseq studies sent.   - We discussed with the patient that his labs are concerning for possible lymphocytic lymphoma vs leukemia.  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  - Patient to be transferred to [address] today 7/7/23. Accepting physician is Dr. Da Garnett.    #Thrombophlebitis  Duplex BUE US- No evidence of deep vein thrombosis in the left upper extremity.  Superficial vein thrombosis noted within the the left basilic vein at the distal upper arm.  Patient reports thrombophlebitis was related to peripheral IV use. Please apply hot compresses.     #Coagulopathy:   -PT elevated at 16.8, fibrinogen low at 161, D-dimer 4565, likely low level DIC  - Give 10 pooled units cryoprecipitate for fibrinogen under 150    #Elevated TSH: 12.35  - fT4 wnl 1.4    Note is not finalized until signed by attending.     Derrek Cordero MD  Hematology/Oncology Fellow PGY-5  Pager: [facility] [phone] / [place]IG[place] [phone]  After 5pm and on weekends please page on-call fellow

## 2023-07-07 NOTE — PROGRESS NOTE ADULT - PROBLEM SELECTOR PLAN 2
Patient with anemia and thrombocytopenia with some schistocytes on smear, labs with elevated DDimer, low fibrinogen, prolonged PT, elevated LDH with low haptoglobin -> concerning for acute DIC  As of 7/6 fibrinogen improving 150 > 160 > 186, D-dimer [id number]    Plan:  - Trend coags, DDimer, fibrinogen, TBili/LFTs  - cryoprecipitate 10 units for fibrinogen < 150  - Noted to have trace b/l LE edema and new LUE edema (now resolved) ->  VA duplex b/l LE ordered and negative  - VA duplex LUE found superficial vein thrombosis but otherwise negative, will continue to monitor Patient with anemia and thrombocytopenia with some schistocytes on smear, labs with elevated DDimer, low fibrinogen, prolonged PT, elevated LDH with low haptoglobin -> concerning for acute DIC  -fibrinogen improving 150 > 160 > 186 > 187 , d-dimer 12k    Plan:  - Trend coags, DDimer, fibrinogen, TBili/LFTs  - cryoprecipitate 10 units for fibrinogen < 150  - Noted to have trace b/l LE edema and new LUE edema (now resolved) ->  VA duplex b/l LE ordered and negative  - VA duplex LUE found superficial vein thrombosis but otherwise negative, will continue to monitor

## 2023-07-07 NOTE — PROGRESS NOTE ADULT - PROBLEM SELECTOR PLAN 4
patient has mild hyponatremia trending 132 > 129 > 132. > 128 >130 Patient mildly fatigued (likely from anemia) otherwise asymptomatic. Serum osmolality 291, UCr 50, Dnaiela 108, UOsm 468. Hyponatremia likely in setting of SIADH 2/2 hypothyroidism  - Fluid restrict 1.5L   - Trend Na patient has mild hyponatremia trending 132 > 129 > 132. > 128 >130 > 128 Patient mildly fatigued (likely from anemia) otherwise asymptomatic. Serum osmolality 291, UCr 50, Daniela 108, UOsm 468. Hyponatremia likely in setting of SIADH 2/2 hypothyroidism  - Will increase fluid restriction to 1.2L, can consider salt tablets if Na does not improve  - Trend Na

## 2023-07-07 NOTE — PROVIDER CONTACT NOTE (CRITICAL VALUE NOTIFICATION) - RECOMMENDATIONS
MD Renee Avery  notified.
Notify MD.
Notify MD.
continue to monitor
notify MD
MD Sridhar Pastrana notified.

## 2023-07-07 NOTE — PROGRESS NOTE ADULT - PROBLEM SELECTOR PROBLEM 7
Superficial thrombophlebitis of upper extremity

## 2023-07-07 NOTE — H&P ADULT - PROBLEM SELECTOR PLAN 5
·  Problem: Elevated TSH.   ·  Plan: Pt. found to have elevated TSH to 12.35 on admission w/ symptoms of malaise, fatigue. Free T4 1.4. Pt. has not seen a PCP in some time    Plan:  - start levothyroxine 100mcg qd (1.7 mcg/kg); repeat TFTs as an outpatient in 4-6 weeks for dose adjustments. ·  Problem: Elevated TSH.   ·  Plan: Pt. found to have elevated TSH to 12.35 on admission w/ symptoms of malaise, fatigue. Free T4 1.4. Pt. has not seen a PCP in some time    Plan:  - started levothyroxine 100mcg qd (1.7 mcg/kg); repeat TFTs as an outpatient in 4-6 weeks for dose adjustments.

## 2023-07-07 NOTE — H&P ADULT - PROBLEM SELECTOR PLAN 9
·  Problem: Systolic murmur.   ·  Plan: Noted to have systolic murmur of LUSB vs. apex on exam,. Echo findings unremarkable so likely flow murmur 2/2 severe anemia rather than undiagnosed aortic valve pathology.     Plan:  - Manage anemia as above.  - Monitor respiratory status on standing fluids.

## 2023-07-07 NOTE — PROGRESS NOTE ADULT - REASON FOR ADMISSION
anemia, general malaise

## 2023-07-07 NOTE — PROVIDER CONTACT NOTE (CRITICAL VALUE NOTIFICATION) - BACKGROUND
Patient admitted with person with feared complaint in whom no diagnosis made
63 year old male. admit diagnosis person with feared complaint in whom no diagnosis is made. PMH DM.
pt is admitted with fever, chills and malaise
Patient admitted with person with feared complaint in whom no diagnosis made
63 year old male. admit diagnosis person with feared complaint in whom no diagnosis is made. PMH DM.
62 y/o male admitted with malaise, newly diagnosed leukemia

## 2023-07-07 NOTE — DISCHARGE NOTE NURSING/CASE MANAGEMENT/SOCIAL WORK - NSDCPEFALRISK_GEN_ALL_CORE
For information on Fall & Injury Prevention, visit: [email or web address] OR  [email or web address] OR  https://www.cdc.gov/steadi/patient.html

## 2023-07-07 NOTE — DISCHARGE NOTE NURSING/CASE MANAGEMENT/SOCIAL WORK - PATIENT PORTAL LINK FT
You can access the FollowMyHealth Patient Portal offered by [facility] by registering at the following website: [email or web address]. By joining [employer]’s FollowMyHealth portal, you will also be able to view your health information using other applications (apps) compatible with our system.

## 2023-07-07 NOTE — PROGRESS NOTE ADULT - PROBLEM SELECTOR PROBLEM 1
Acute lymphoid leukemia

## 2023-07-07 NOTE — PATIENT PROFILE ADULT - FALL HARM RISK - HARM RISK INTERVENTIONS

## 2023-07-07 NOTE — PROGRESS NOTE ADULT - SUBJECTIVE AND OBJECTIVE BOX
Sridhar Pastrana MD  PGY 1 Department of Internal Medicine        Patient is a 63y old  Male who presents with a chief complaint of anemia, general malaise (07 Jul 2023 09:53)      SUBJECTIVE / OVERNIGHT EVENTS: Pt seen and examined. No acute overnight events. Denies fevers, chills, CP, SOB, Abdominal pain, N/V, Constipation, Diarrhea        MEDICATIONS  (STANDING):  allopurinol 300 milliGRAM(s) Oral daily  dextrose 5%. 1000 milliLiter(s) (50 mL/Hr) IV Continuous <Continuous>  dextrose 5%. 1000 milliLiter(s) (100 mL/Hr) IV Continuous <Continuous>  dextrose 50% Injectable 25 Gram(s) IV Push once  dextrose 50% Injectable 12.5 Gram(s) IV Push once  dextrose 50% Injectable 25 Gram(s) IV Push once  glucagon  Injectable 1 milliGRAM(s) IntraMuscular once  hydroxyurea 1500 milliGRAM(s) Oral two times a day  insulin lispro (ADMELOG) corrective regimen sliding scale   SubCutaneous three times a day before meals  insulin lispro (ADMELOG) corrective regimen sliding scale   SubCutaneous at bedtime  levothyroxine 100 MICROGram(s) Oral daily  lidocaine 2% Injectable 20 milliLiter(s) Local Injection once  melatonin 3 milliGRAM(s) Oral at bedtime    MEDICATIONS  (PRN):  acetaminophen     Tablet .. 650 milliGRAM(s) Oral every 6 hours PRN Temp greater or equal to 38C (100.4F), Mild Pain (1 - 3)  dextrose Oral Gel 15 Gram(s) Oral once PRN Blood Glucose LESS THAN 70 milliGRAM(s)/deciliter      I&O's Summary      Vital Signs Last 24 Hrs  T(C): 37 (07 Jul 2023 05:24), Max: 38.2 (06 Jul 2023 21:29)  T(F): 98.6 (07 Jul 2023 05:24), Max: 100.8 (06 Jul 2023 21:29)  HR: 78 (07 Jul 2023 05:24) (70 - 87)  BP: 126/65 (07 Jul 2023 05:24) (126/65 - 132/77)  BP(mean): --  RR: 17 (07 Jul 2023 05:24) (17 - 18)  SpO2: 99% (07 Jul 2023 05:24) (99% - 100%)    Parameters below as of 07 Jul 2023 05:24  Patient On (Oxygen Delivery Method): room air        CAPILLARY BLOOD GLUCOSE      POCT Blood Glucose.: 239 mg/dL (07 Jul 2023 08:36)  POCT Blood Glucose.: 168 mg/dL (06 Jul 2023 21:48)  POCT Blood Glucose.: 183 mg/dL (06 Jul 2023 17:46)  POCT Blood Glucose.: 250 mg/dL (06 Jul 2023 12:14)      PHYSICAL EXAM:  GENERAL: NAD,   HEAD:  Atraumatic, Normocephalic  EYES: EOMI, PERRL, conjunctiva and sclera clear  NECK: No JVD  CHEST/LUNG: Clear to auscultation bilaterally; No wheeze  HEART: Regular rate and rhythm; No murmurs, rubs, or gallops  ABDOMEN: Soft, Nontender, Nondistended; Bowel sounds present  EXTREMITIES:  2+ Peripheral Pulses, No clubbing, cyanosis, or edema  PSYCH: AAOx3  NEUROLOGY: non-focal  SKIN: No rashes or lesions       LABS:                        7.3    58.88 )-----------( 56       ( 07 Jul 2023 06:40 )             23.4     Auto Eosinophil # x     / Auto Eosinophil % x     / Auto Neutrophil # x     / Auto Neutrophil % x     / BANDS % x                            7.3    68.46 )-----------( 62       ( 06 Jul 2023 06:05 )             23.3     Auto Eosinophil # 0.00  / Auto Eosinophil % 0.0   / Auto Neutrophil # 11.84 / Auto Neutrophil % 17.3  / BANDS % x        07-07    128<L>  |  91<L>  |  19  ----------------------------<  183<H>  5.2   |  24  |  0.85  07-06    130<L>  |  93<L>  |  20  ----------------------------<  198<H>  4.7   |  24  |  0.77    Ca    9.2      07 Jul 2023 06:40  Mg     1.80     07-06  Phos  4.3     07-07  TPro  6.6  /  Alb  2.9<L>  /  TBili  1.0  /  DBili  x   /  AST  29  /  ALT  10  /  AlkPhos  125<H>  07-07  TPro  6.5  /  Alb  2.9<L>  /  TBili  1.1  /  DBili  x   /  AST  23  /  ALT  12  /  AlkPhos  122<H>  07-06    PT/INR - ( 07 Jul 2023 06:40 )   PT: 16.8 sec;   INR: 1.44 ratio         PTT - ( 07 Jul 2023 06:40 )  PTT:33.8 sec      Urinalysis Basic - ( 07 Jul 2023 06:40 )    Color: x / Appearance: x / SG: x / pH: x  Gluc: 183 mg/dL / Ketone: x  / Bili: x / Urobili: x   Blood: x / Protein: x / Nitrite: x   Leuk Esterase: x / RBC: x / WBC x   Sq Epi: x / Non Sq Epi: x / Bacteria: x            RADIOLOGY & ADDITIONAL TESTS:    Imaging Personally Reviewed:    Consultant(s) Notes Reviewed:      Care Discussed with Consultants/Other Providers:   Sridhar Pastrana MD  PGY 1 Department of Internal Medicine        Patient is a 63y old  Male who presents with a chief complaint of anemia, general malaise (07 Jul 2023 09:53)      SUBJECTIVE / OVERNIGHT EVENTS: Pt seen and examined. No acute overnight events. Continues to have fatigue. Denies fevers, chills, CP, SOB, Abdominal pain, N/V, Constipation, Diarrhea      MEDICATIONS  (STANDING):  allopurinol 300 milliGRAM(s) Oral daily  dextrose 5%. 1000 milliLiter(s) (50 mL/Hr) IV Continuous <Continuous>  dextrose 5%. 1000 milliLiter(s) (100 mL/Hr) IV Continuous <Continuous>  dextrose 50% Injectable 25 Gram(s) IV Push once  dextrose 50% Injectable 12.5 Gram(s) IV Push once  dextrose 50% Injectable 25 Gram(s) IV Push once  glucagon  Injectable 1 milliGRAM(s) IntraMuscular once  hydroxyurea 1500 milliGRAM(s) Oral two times a day  insulin lispro (ADMELOG) corrective regimen sliding scale   SubCutaneous three times a day before meals  insulin lispro (ADMELOG) corrective regimen sliding scale   SubCutaneous at bedtime  levothyroxine 100 MICROGram(s) Oral daily  lidocaine 2% Injectable 20 milliLiter(s) Local Injection once  melatonin 3 milliGRAM(s) Oral at bedtime    MEDICATIONS  (PRN):  acetaminophen     Tablet .. 650 milliGRAM(s) Oral every 6 hours PRN Temp greater or equal to 38C (100.4F), Mild Pain (1 - 3)  dextrose Oral Gel 15 Gram(s) Oral once PRN Blood Glucose LESS THAN 70 milliGRAM(s)/deciliter      I&O's Summary      Vital Signs Last 24 Hrs  T(C): 37 (07 Jul 2023 05:24), Max: 38.2 (06 Jul 2023 21:29)  T(F): 98.6 (07 Jul 2023 05:24), Max: 100.8 (06 Jul 2023 21:29)  HR: 78 (07 Jul 2023 05:24) (70 - 87)  BP: 126/65 (07 Jul 2023 05:24) (126/65 - 132/77)  BP(mean): --  RR: 17 (07 Jul 2023 05:24) (17 - 18)  SpO2: 99% (07 Jul 2023 05:24) (99% - 100%)    Parameters below as of 07 Jul 2023 05:24  Patient On (Oxygen Delivery Method): room air        CAPILLARY BLOOD GLUCOSE      POCT Blood Glucose.: 239 mg/dL (07 Jul 2023 08:36)  POCT Blood Glucose.: 168 mg/dL (06 Jul 2023 21:48)  POCT Blood Glucose.: 183 mg/dL (06 Jul 2023 17:46)  POCT Blood Glucose.: 250 mg/dL (06 Jul 2023 12:14)      PHYSICAL EXAM:  GENERAL: NAD, resting comfortably  HEAD:  Atraumatic, Normocephalic  EYES: EOMI, PERRL, conjunctiva and sclera clear  NECK: No JVD  CHEST/LUNG: Faint crackles at right lung base, otherwise no wheezes, rhonchi  HEART: Regular rate and rhythm; no murmur appreciated  ABDOMEN: Soft, Nontender, Nondistended; Bowel sounds present  EXTREMITIES:  2+ Peripheral Pulses, trace ankle edema bilaterally  PSYCH: AAOx3  NEUROLOGY: 5/5 strength in all extremities  SKIN: No rashes or lesions, hyperpigmentation of upper abdomen       LABS:                        7.3    58.88 )-----------( 56       ( 07 Jul 2023 06:40 )             23.4     Auto Eosinophil # x     / Auto Eosinophil % x     / Auto Neutrophil # x     / Auto Neutrophil % x     / BANDS % x                            7.3    68.46 )-----------( 62       ( 06 Jul 2023 06:05 )             23.3     Auto Eosinophil # 0.00  / Auto Eosinophil % 0.0   / Auto Neutrophil # 11.84 / Auto Neutrophil % 17.3  / BANDS % x        07-07    128<L>  |  91<L>  |  19  ----------------------------<  183<H>  5.2   |  24  |  0.85  07-06    130<L>  |  93<L>  |  20  ----------------------------<  198<H>  4.7   |  24  |  0.77    Ca    9.2      07 Jul 2023 06:40  Mg     1.80     07-06  Phos  4.3     07-07  TPro  6.6  /  Alb  2.9<L>  /  TBili  1.0  /  DBili  x   /  AST  29  /  ALT  10  /  AlkPhos  125<H>  07-07  TPro  6.5  /  Alb  2.9<L>  /  TBili  1.1  /  DBili  x   /  AST  23  /  ALT  12  /  AlkPhos  122<H>  07-06    PT/INR - ( 07 Jul 2023 06:40 )   PT: 16.8 sec;   INR: 1.44 ratio         PTT - ( 07 Jul 2023 06:40 )  PTT:33.8 sec      Urinalysis Basic - ( 07 Jul 2023 06:40 )    Color: x / Appearance: x / SG: x / pH: x  Gluc: 183 mg/dL / Ketone: x  / Bili: x / Urobili: x   Blood: x / Protein: x / Nitrite: x   Leuk Esterase: x / RBC: x / WBC x   Sq Epi: x / Non Sq Epi: x / Bacteria: x      RADIOLOGY & ADDITIONAL TESTS:    CT abd/pelvis with IV contrast (7/6/23)    IMPRESSION:  Massive splenomegaly with splenic infarcts.    Hepatomegaly. 3.8 cm hypervascular lesion in the right hepatic lobe,   possibly hemangioma. MRI can be performed for further evaluation.    Chronic pancreatitis with pancreatic ductal dilatation and possible   intraductal calcification in the pancreatic head.    Abdominal lymphadenopathy.

## 2023-07-07 NOTE — H&P ADULT - NSHPREVIEWOFSYSTEMS_GEN_ALL_CORE
General: no fevers, chills or sweats; no dizziness  HEENT: no vision or hearing changes   CV: no chest pain or palpitations  Pulm: no SOB or cough  GI: no abd pain, no n/v/constipation; (+) intermittent loose stools  : no dysuria or hematuria  Derm: no rash  Heme: no bleeding or bruising, no wounds

## 2023-07-07 NOTE — H&P ADULT - PROBLEM SELECTOR PLAN 2
·  Problem: DIC (disseminated intravascular coagulation).   ·  Plan: Patient with anemia and thrombocytopenia with some schistocytes on smear, labs with elevated DDimer, low fibrinogen, prolonged PT, elevated LDH with low haptoglobin -> concerning for acute DIC  -fibrinogen improving 150 > 160 > 186 > 187 , d-dimer 12k    Plan:  - Trend coags, DDimer, fibrinogen, TBili/LFTs  - cryoprecipitate 10 units for fibrinogen < 150  - Noted to have trace b/l LE edema and new LUE edema (now resolved) ->  VA duplex b/l LE ordered and negative  - VA duplex LUE found superficial vein thrombosis but otherwise negative, will continue to monitor.

## 2023-07-07 NOTE — PROVIDER CONTACT NOTE (CRITICAL VALUE NOTIFICATION) - PERSON GIVING RESULT:
Abisai Osman in the lab
[place] C/lab
SAMANTHA Snowden
hematology / DEEDEE Murrell
hematology / COREEN Medeiros
ROYCE Snowden

## 2023-07-07 NOTE — PROGRESS NOTE ADULT - PROBLEM SELECTOR PLAN 7
Patient previously had swelling of left forearm at previous IV site, now resolved

## 2023-07-07 NOTE — PROGRESS NOTE ADULT - PROBLEM SELECTOR PLAN 3
Noted to have systolic murmur of LUSB on exam, no longer appreciated. Echo findings unremarkable so likely flow murmur 2/2 severe anemia rather than undiagnosed aortic valve pathology.     Plan:  - Manage anemia as above

## 2023-07-07 NOTE — H&P ADULT - PROBLEM SELECTOR PLAN 4
·  Problem: Hyponatremia.   ·  Plan: patient has mild hyponatremia trending 132 > 129 > 132. > 128 >130 > 128 Patient mildly fatigued (likely from anemia) otherwise asymptomatic. Serum osmolality 291, UCr 50, Daniela 108, UOsm 468. Hyponatremia likely in setting of SIADH 2/2 hypothyroidism  - Will increase fluid restriction to 1.2L, can consider salt tablets if Na does not improve  - Trend Na. ·  Problem: Hyponatremia.   ·  Plan: patient has mild hyponatremia trending 132 > 129 > 132. > 128 >130 > 128 Patient mildly fatigued (likely from anemia) otherwise asymptomatic. Serum osmolality 291, UCr 50, Daniela 108, UOsm 468. Hyponatremia likely in setting of SIADH 2/2 hypothyroidism  - Will increase fluid restriction to 1.2L, can consider salt tablets if Na does not improve  - Trend Na especially on standing IVF

## 2023-07-07 NOTE — H&P ADULT - PROBLEM SELECTOR PLAN 3
·  Problem: Systolic murmur.   ·  Plan: Noted to have systolic murmur of LUSB on exam, no longer appreciated. Echo findings unremarkable so likely flow murmur 2/2 severe anemia rather than undiagnosed aortic valve pathology.     Plan:  - Manage anemia as above.  - Monitor respiratory status on standing fluids. febrile to 100.5 F on admission to [facility] 7/7 PM, improved to 99.2 after tylenol.  exam with RLL crackles but breathing comfortably on room air. No cough or SOB. No dysuria.  HIV negative and RVP negative on admission to [facility] this past week.  not currently neutropenic  Plan:  - start zosyn for now, monitor and reassess need for Abx pending clincal course and labs  - f/u CXR  - blood cultures

## 2023-07-07 NOTE — PROVIDER CONTACT NOTE (CRITICAL VALUE NOTIFICATION) - ASSESSMENT
Patient remained afebrile throughout the shift.
afebrile
Patient has remained afebrile throughout the shift.
patient A&O4. no acute distress noted
patient A&O4. no acute distress noted
pt is alert and oriented. denies pain or any discomfort.

## 2023-07-07 NOTE — PROGRESS NOTE ADULT - PROBLEM SELECTOR PROBLEM 2
DIC (disseminated intravascular coagulation)

## 2023-07-07 NOTE — H&P ADULT - PROBLEM SELECTOR PLAN 8
·  Problem: Prophylactic measure.   ·  Plan: DVT PPx: SCD, holding VTE ppx given severe anemia and thrombocytopenia  Diet: Consistent carbs  Dispo: pending clinical improvement  Code Status: full code. F: maintenance IVF   E: prn   N:  Diet: Consistent carbs  DVT ppx: SCDs, holding VTE ppx given severe anemia and thrombocytopenia  GI ppx: none  GI motility: senna prn  dispo: [facility]  Code Status: full code. CT scan showed Chronic pancreatitis with pancreatic ductal dilatation and possible intraductal calcification in the pancreatic head. No heavy alcohol use. U/S 7/2 showed Contracted gallbladder filled with stones. No secondary findings of acute cholecystitis. Bili 1.0, no transaminitis. No abdominal pain.  - monitoer for now

## 2023-07-07 NOTE — H&P ADULT - PROBLEM SELECTOR PLAN 1
·  Problem: Acute lymphoid leukemia.   ·  Flow cytometry findings consistent with CD5 negative, CD10 negative B-cell lymphoproliferative disorder/lymphoma  - peripheral smear showing thrombocytopenia, lymphocytes of varying maturity w/ high N:C ratio, visible nucleoli, 1-2 schistocytes per HPF  -WBC elevated to 68.5 in setting of ALL (7/6)  - s/p 4U PRBC at [facility]    - HIV and Hep B/C negative, folate and B12 unremarkable, ferritin elevated 428  - Plan to treat with rituximab on 7/8 will give pre-treatment dexamethasone 12mg on 7/7  - F/u BMBx FISH, cytogenetics, and biopsy results   - F/u G6PD results  - Will give hydroxurea PM dose on 7/7 1500mg and d/c once patient starts getting rituximab on 7/8  - C/w IVFs NS 100cc/hr, allopurinol 300mg daily.   - Monitor daily coags (PT, aPTT, INR, fibrinogen, d-dimer) along with TLS labs (uric acid, LDH, Ca, K, phos).   - prbcs for hgb<7.0, platelets <10K or <20K if febrile   - monitor temperature curve and vital signs. 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   ·  Flow cytometry findings consistent with CD5 negative, CD10 negative B-cell lymphoproliferative disorder/lymphoma  - peripheral smear showing thrombocytopenia, lymphocytes of varying maturity w/ high N:C ratio, visible nucleoli, 1-2 schistocytes per HPF  -WBC elevated to 68.5 in setting of suspected ALL (7/6)  - s/p 4U PRBC at [facility]    - HIV and Hep B/C negative, folate and B12 unremarkable, ferritin elevated 428  - Plan to treat with rituximab on 7/8 will give pre-treatment dexamethasone 12mg on 7/7 (further steroids with rituximab)  - F/u BMBx FISH, cytogenetics, and biopsy results   - F/u G6PD results  - Will give hydroxurea PM dose on 7/7 1500mg and d/c once patient starts getting rituximab on 7/8  - reassess ongoing hydrea pending CBC 7/8 AM  - C/w IVFs NS 75cc/hr, allopurinol 300mg daily.   - Monitor daily coags (PT, aPTT, INR, fibrinogen, d-dimer) along with TLS labs (uric acid, LDH, Ca, K, phos).   - prbcs for hgb<7.0, platelets <10K or <20K if febrile   - monitor temperature curve and vital signs  - hepatosplenomegaly noted on CT incliuding splenic infarcts, may need repeat imaging in future to further evaluate

## 2023-07-07 NOTE — H&P ADULT - ASSESSMENT
63M PMH DM2 (a1c 5.7) but no other known hx (pt. has not seen PCP in some time), admitted to [facility] 7/2 with malaise, B-symptoms (fevers/chills, weight loss, night sweats), hepatosplenomegaly and labs c/f B-PLL, started on hydroxyurea 1500 BID and allopurinol, transferred to [facility] for further management

## 2023-07-07 NOTE — PATIENT PROFILE ADULT - BILL PAYMENT
VITAL SIGNS    Telemetry:  nsr 80    Vital Signs Last 24 Hrs  T(C): 36.7 (11-28-22 @ 06:58), Max: 37.3 (11-27-22 @ 22:50)  T(F): 98 (11-28-22 @ 06:58), Max: 99.2 (11-27-22 @ 22:50)  HR: 87 (11-28-22 @ 06:58) (81 - 88)  BP: 125/66 (11-28-22 @ 06:58) (114/66 - 137/74)  RR: 18 (11-28-22 @ 06:58) (18 - 18)  SpO2: 95% (11-28-22 @ 06:58) (94% - 96%)                   11-27 @ 07:01  -  11-28 @ 07:00  --------------------------------------------------------  IN: 760 mL / OUT: 200 mL / NET: 560 mL    11-28 @ 07:01  -  11-28 @ 09:31  --------------------------------------------------------  IN: 240 mL / OUT: 0 mL / NET: 240 mL          Daily     Daily             CAPILLARY BLOOD GLUCOSE                      Coumadin    [ ] YES          [ x ]      NO   Eliquis                              PHYSICAL EXAM        Neurology: alert and oriented x 3, nonfocal, no gross deficits  CV : s1 s2 RRR  Sternal Wound :  CDI , Stable  Lungs: cta  Abdomen: soft, nontender, nondistended, positive bowel sounds, last bowel movement +                      :    voiding         Extremities:     - edema   /  -   calve tenderness ,                             Physical Therapy Rec:   Home  [  x]   Home w/ PT  [  ]  Rehab  [  ]  Discussed with Cardiothoracic Team at AM rounds.
no

## 2023-07-07 NOTE — PROGRESS NOTE ADULT - ATTENDING COMMENTS
Mr Rod was seen in follow up by the hematology team.  B cell prolymphocytic leukemia. Was awaiting transfer to leukemia service, but no bed was available. I spoke with Dr Garnett in regards to starting him on rituximab here. This was the plan this afternoon, until we heard that a bed became available at [facility]. He continues on allopurinol, G6PD results are pending. He is at high risk for TLS.   I agree with Dr Cordero's assessment and plan.     Tony Booth MD  Hematology Attending
Patient seen and examined. Smear reviewed.   On admission: CBC found WBC 75.5, Hgb 5.5, plt 85, MCV 75.7, RDW 19.9- 54% reactive lymphocytes, LDH is 925, uric acid 7.5.  Smear and flow c/w prolymphocytic leukemia.   started on hydroxyurea and allopurinol, treatment plan pending additional studies.   I agree with the assessment and plan as stated above by Dr Castillo.    Tony Booth MD  Hematology Attending
Mr. Louie was seen and examined at the bedside with the hematology team.   Labs reviewed. Images reviewed.   B cell prolymphocytic  leukemia, presented at tumor board earlier today. Patient will be treated with Rituxan and other agents, to be transferred to [address] leukemia service.     I agree with the fellow's assessment and plan    Tony Booth MD  Hematology Attending.
Briefly, this is a 62 y/o M w/ DM2 but no other known hx (pt. has not seen PCP in some time), who presents with malaise, B-symptoms (fevers/chills, weight loss, night sweats), hepatosplenomegaly and labs c/f hematologic malignancy (marked leukocytosis, severe anemia, thrombocytopenia), admitted for further workup and management.    Patient seen and examined at bedside. Continues to endorse fatigue but does report it feels somewhat improved. Denies any other acute complaints. VSS. Exam largely unremarkable.    #Acute Hemolytic Anemia  - Patient with new onset of anemia with markers suggesting acute hemolysis  - now s/p 2U PRBC  - hematology following, c/f ALL/CLL  - c/w hydrea/allupurinol per heme  - f/u flow cytometry  - c/w IV hydration and supportive measures    Remainder of plan as outlined above  Case and plan of care discussed with HS2
Briefly, this is a 62 y/o M w/ DM2 but no other known hx (pt. has not seen PCP in some time), who presents with malaise, B-symptoms (fevers/chills, weight loss, night sweats), hepatosplenomegaly and labs c/f hematologic malignancy (marked leukocytosis, severe anemia, thrombocytopenia), admitted for further workup and management.    Patient seen and examined at bedside. Denies any other acute complaints. VSS. Exam largely unremarkable. Patient planned for transfer to [facility] today.      #Acute Hemolytic Anemia  - Patient with new onset of anemia with markers suggesting acute hemolysis  - now s/p 2U PRBC, with adequate response  - hematology following  - c/w hydrea/allupurinol per heme  - bone marrow biopsy performed 7/4, flow cyto noted  - c/f lymphocytic lymphoma vs leukemia  - c/w IV hydration and supportive measures  - plan for transfer to [facility] for further management today  - transfer initiated to [facility], patient accepted by Dr. IRINA Garnett    Remainder of plan as outlined above  Case and plan of care discussed with HS2 .
Briefly, this is a 64 y/o M w/ DM2 but no other known hx (pt. has not seen PCP in some time), who presents with malaise, B-symptoms (fevers/chills, weight loss, night sweats), hepatosplenomegaly and labs c/f hematologic malignancy (marked leukocytosis, severe anemia, thrombocytopenia), admitted for further workup and management.    Patient seen and examined at bedside. Reports feeling well today. Denies any other acute complaints. VSS. Exam largely unremarkable. Patient awaiting transfer to [facility].    #Acute Hemolytic Anemia  - Patient with new onset of anemia with markers suggesting acute hemolysis  - now s/p 2U PRBC, with adequate response  - hematology following  - c/w hydrea/allupurinol per heme  - bone marrow biopsy performed 7/4, flow cyto noted  - c/f lymphocytic lymphoma vs leukemia  - c/w IV hydration and supportive measures  - plan for transfer to [facility] [address] [facility] for further management  - transfer initiated to [facility], patient accepted by Dr. IRINA Garnett    Remainder of plan as outlined above  Case and plan of care discussed with HS2 .
Briefly, this is a 62 y/o M w/ DM2 but no other known hx (pt. has not seen PCP in some time), who presents with malaise, B-symptoms (fevers/chills, weight loss, night sweats), hepatosplenomegaly and labs c/f hematologic malignancy (marked leukocytosis, severe anemia, thrombocytopenia), admitted for further workup and management.    Patient seen and examined at bedside. Reports improved fatigue. Denies any other acute complaints. VSS. Exam largely unremarkable. Patient aware that he is planned for transfer to [facility].    #Acute Hemolytic Anemia  - Patient with new onset of anemia with markers suggesting acute hemolysis  - now s/p 2U PRBC, with adequate response  - hematology following  - c/w hydrea/allupurinol per heme  - bone marrow biopsy performed 7/4, flow cyto noted  - c/f lymphocytic lymphoma vs leukemia  - c/w IV hydration and supportive measures  - plan for transfer to [facility] [address] [facility] for further management  - transfer initiated to [facility], patient accepted by Dr. IRINA Garnett    Remainder of plan as outlined above  Case and plan of care discussed with HS2 .
63M w/ DM2 but no other known hx (pt. has not seen PCP in some time), who presents with malaise, B-symptoms (fevers/chills, weight loss, night sweats), hepatosplenomegaly and labs c/f hematologic malignancy (marked leukocytosis, severe anemia, thrombocytopenia), admitted for further workup and management.  The differential diagnosis includes B-prolymphocytic leukemia/lymphoma (based on morphology), marginal zone lineage, lymphoplasmacytic lineage, mantle cell lineage (which may occasionally be CD5 negative; suggest FISH for t(11;14) to rule out), CD10 negative follicular lineage, or other B-cell lymphoma.   Patient s/p BMB, f/w Hem/Onc recs , DVT PX

## 2023-07-07 NOTE — PROGRESS NOTE ADULT - PROBLEM SELECTOR PLAN 1
Pt. 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 3.8 cm hypervascular lesion in the right hepatic lobe, possibly hemangioma. Chronic pancreatitis with pancreatic ductal dilatation and possible intraductal calcification in the pancreatic head. Abdominal lymphadenopathy.    Plan:  - [facility]  - Chronicity of CT findings unclear, appreciate hematology recs for management of CT abd/pelvis findings  - f/u bone marrow biopsy  - c/w Hydrea 1500mg BID  - c/w allopurinol 300mg qd  - monitor daily labs: CBC w/ diff, CMP, coags, uric acid, LDH, phos, fibrinogen, d-dimer  - transfuse for Hgb<7, plt<10k, <20k and febrile, or <50k and actively bleeding/pending procedure   - monitor temperature curve and vital signs Pt. 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  - peripheral smear showing thrombocytopenia, lymphocytes of varying maturity w/ high N:C ratio, visible nucleoli, 1-2 schistocytes per HPF  -WBC elevated to 68.5 in setting of ALL (7/6)  - s/p 4U PRBC   - HIV and Hep B/C negative, folate and B12 unremarkable, ferritin elevated 428    Plan:  - Patient has bed on [facility] at [facility], will be transferred today for chemotherapy and further work-up  - Chronicity of CT findings unclear, appreciate hematology recs for management of CT abd/pelvis findings  - f/u bone marrow biopsy  - c/w Hydrea 1500mg BID  - c/w allopurinol 300mg qd  - monitor daily labs: CBC w/ diff, CMP, coags, uric acid, LDH, phos, fibrinogen, d-dimer  - transfuse for Hgb<7, plt<10k, <20k and febrile, or <50k and actively bleeding/pending procedure   - monitor temperature curve and vital signs

## 2023-07-07 NOTE — H&P ADULT - NSHPLABSRESULTS_GEN_ALL_CORE
7.3    58.88 )-----------( 56       ( 07 Jul 2023 06:40 )             23.4       07-07    128<L>  |  91<L>  |  19  ----------------------------<  183<H>  5.2   |  24  |  0.85    Ca    9.2      07 Jul 2023 06:40  Phos  4.3     07-07  Mg     1.80     07-06    TPro  6.6  /  Alb  2.9<L>  /  TBili  1.0  /  DBili  x   /  AST  29  /  ALT  10  /  AlkPhos  125<H>  07-07              Urinalysis Basic - ( 07 Jul 2023 06:40 )    Color: x / Appearance: x / SG: x / pH: x  Gluc: 183 mg/dL / Ketone: x  / Bili: x / Urobili: x   Blood: x / Protein: x / Nitrite: x   Leuk Esterase: x / RBC: x / WBC x   Sq Epi: x / Non Sq Epi: x / Bacteria: x        PT/INR - ( 07 Jul 2023 06:40 )   PT: 16.8 sec;   INR: 1.44 ratio         PTT - ( 07 Jul 2023 06:40 )  PTT:33.8 sec    Lactate Trend            CAPILLARY BLOOD GLUCOSE      POCT Blood Glucose.: 299 mg/dL (07 Jul 2023 12:31)

## 2023-07-07 NOTE — H&P ADULT - NSICDXFAMILYHX_GEN_ALL_CORE_FT
FAMILY HISTORY:  Father  Still living? Unknown  FH: CVA (cerebrovascular accident), Age at diagnosis: Age Unknown  FH: heart disease, Age at diagnosis: Age Unknown    Mother  Still living? Unknown  FH: breast cancer, Age at diagnosis: Age Unknown     FAMILY HISTORY:  Father  Still living? Unknown  FH: CVA (cerebrovascular accident), Age at diagnosis: Age Unknown  FH: heart disease, Age at diagnosis: Age Unknown    Mother  Still living? Unknown  FH: breast cancer, Age at diagnosis: Age Unknown    Aunt  Still living? Unknown  Family history of pancreatic cancer, Age at diagnosis: Age Unknown  Family history of stomach cancer, Age at diagnosis: Age Unknown

## 2023-07-07 NOTE — H&P ADULT - HISTORY OF PRESENT ILLNESS
HPI:     63M w/ PMH DM2 (a1c 5.7 on 7/5) but no other known hx (pt. has not seen PCP in some time) who presented to [facility] on 07/2/2023 for generalized fatigue and malaise. On admission patient had marked leukocytsosis to 95K, anemic and thrombocytopenic. 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 Patient was started on hydroxyurea 1500mg BID and allopurinol 300mg qdaily on 7/3/23 and bone marrow biopsy and aspirate from 7/3/23 with pathology pending however per prelim discussion lymphocytes have notable blebbing on membranes and nucleoli noted with pro-lymphocytic like features. Imaging at [facility] significant for hepatomegaly of 23cm w/ 3.8 cm hypervascular lesion in R hepatic lobe, possibly hemangioma with MRI for further eval pending, chronic pancreatitis with pancreatic ductal dilatation, and possible intraductal calcification of the pancreatic head. The patient is now being transferred to [facility] for further treatment of B-PLL w/ rituximab.     Recent course:   Labs (7/7 AM): WBC 58.88, Hgb 7.3, Plt 56, d-dimer 37001, fibrinogen 187, PT 16.8, INR 1.44, PTT 33.8, Na 128, K 5.2, Cl 91, bicarb 24, AG 13, BUN 19, Cr 0.85, Glucose 183, Ca 9.2, albumin 2.9, bili 1.0, alk phos 125, AST/ALT 29/10, , uric acid 4.1, phos 4.3  Imaging:  CXR: pending  EKG: NSR, type I AV block (), QTc 405, no ST changes, no TWI, (+) Q wave in III.   On admission to Mon:Initial vital signs: T: 100.4 F, HR: 82, BP: 145/74, RR 18, SpO2: 98% on RA      #Prophylactic Measure  F: s/p 1L NS   E: prn   N: normal diet   DVT ppx: lovenox   GI ppx: PPI   GI motility: senna   dispo: [facility]    HPI:     63M w/ PMH DM2 (a1c 5.7 on 7/5) but no other known hx (pt. has not seen PCP in some time) who presented to [facility] on 07/2/2023 for generalized fatigue and malaise. On admission patient had marked leukocytsosis to 95K, anemic and thrombocytopenic. 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 Patient was started on hydroxyurea 1500mg BID and allopurinol 300mg qdaily on 7/3/23 and bone marrow biopsy and aspirate from 7/3/23 with pathology pending however per prelim discussion lymphocytes have notable blebbing on membranes and nucleoli noted with pro-lymphocytic like features. Imaging at [facility] significant for hepatomegaly of 23cm w/ 3.8 cm hypervascular lesion in R hepatic lobe, possibly hemangioma with MRI for further eval pending, chronic pancreatitis with pancreatic ductal dilatation, and possible intraductal calcification of the pancreatic head. The patient is now being transferred to [facility] for further treatment of B-PLL w/ rituximab.     Recent course:   Labs (7/7 AM): WBC 58.88, Hgb 7.3, Plt 56, d-dimer 73945, fibrinogen 187, PT 16.8, INR 1.44, PTT 33.8, Na 128, K 5.2, Cl 91, bicarb 24, AG 13, BUN 19, Cr 0.85, Glucose 183, Ca 9.2, albumin 2.9, bili 1.0, alk phos 125, AST/ALT 29/10, , uric acid 4.1, phos 4.3  Imaging:  CXR: pending  EKG: NSR, type I AV block (), QTc 405, no ST changes, no TWI, (+) Q wave in III.   On admission to Mon 7/7 ~430pm: Initial vital signs: T: 100.4 F, HR: 82, BP: 145/74, RR 18, SpO2: 98% on RA HPI:     63M w/ PMH DM2 (a1c 5.7 on 7/5) but no other known hx (pt. has not seen PCP in some time) who presented to [facility] on 07/2/2023 for generalized fatigue, malaise, unsteadiness on feet since Friday 6/30. On admission patient had marked leukocytosis to 95K, anemic and thrombocytopenic. 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 Patient was started on hydroxyurea 1500mg BID and allopurinol 300mg qdaily on 7/3/23 and bone marrow biopsy and aspirate from 7/3/23 with pathology pending however per prelim discussion lymphocytes have notable blebbing on membranes and nucleoli noted with pro-lymphocytic like features. Imaging at [facility] significant for hepatomegaly of 23cm w/ 3.8 cm hypervascular lesion in R hepatic lobe, possibly hemangioma with MRI for further eval pending, chronic pancreatitis with pancreatic ductal dilatation, and possible intraductal calcification of the pancreatic head. The patient is now being transferred to [facility] for further treatment of suspected B-PLL w/ rituximab.     Of note, no family history of bleeding disorders; mother had breast cancer metastatic to liver, and has 2 aunts that had cancer, one with pancreas cancer the other with stomach cancer.    Recent course:   Labs (7/7 AM): WBC 58.88, Hgb 7.3, Plt 56, d-dimer 65900, fibrinogen 187, PT 16.8, INR 1.44, PTT 33.8, Na 128, K 5.2, Cl 91, bicarb 24, AG 13, BUN 19, Cr 0.85, Glucose 183, Ca 9.2, albumin 2.9, bili 1.0, alk phos 125, AST/ALT 29/10, , uric acid 4.1, phos 4.3  Imaging:  CXR: pending  EKG (7/7): NSR, type I AV block (), QTc 405, no ST changes, no TWI, (+) Q wave in III.   On admission to [facility] 7/7 ~430pm: Initial vital signs: T: 100.4 F, HR: 82, BP: 145/74, RR 18, SpO2: 98% on RA

## 2023-07-07 NOTE — H&P ADULT - NSHPPHYSICALEXAM_GEN_ALL_CORE
VITAL SIGNS:  T(C): 38 (07-07-23 @ 16:34), Max: 38.2 (07-06-23 @ 21:29)  T(F): 100.4 (07-07-23 @ 16:34), Max: 100.8 (07-06-23 @ 21:29)  HR: 82 (07-07-23 @ 16:34) (74 - 87)  BP: 145/74 (07-07-23 @ 16:34) (118/69 - 145/74)  BP(mean): --  RR: 18 (07-07-23 @ 16:34) (17 - 18)  SpO2: 98% (07-07-23 @ 16:34) (96% - 100%)  Wt(kg): --    PHYSICAL EXAM:    INCOMPLETE  Constitutional: resting comfortably in bed; NAD  HEENT: NC/AT, PERRL, EOMI, anicteric sclera, no nasal discharge; uvula midline, no oropharyngeal erythema or exudates, MMM; no thyromegaly or anterior/posterior or submental AMAURY; neck supple  Respiratory: CTA B/L; no W/R/R, no retractions  Cardiac: +S1/S2; RRR; no M/R/G appreciated  Gastrointestinal: abdomen soft, NT/ND, +BS, no rebound tenderness or guarding  Back: no CVAT B/L  Extremities: legs WWP, no clubbing or cyanosis; no peripheral edema  Vascular: 2+ distal pedal pulses B/L  Dermatologic: skin warm, dry and intact; no rashes, wounds, or scars  Neurologic: AAOx3; CNII-XII grossly intact; strength 5/5 and sensation intact in all 4 extremities; no focal deficits  Psychiatric: affect and characteristics of appearance, verbalizations, behaviors are appropriate VITAL SIGNS:  T(C): 38 (07-07-23 @ 16:34), Max: 38.2 (07-06-23 @ 21:29)  T(F): 100.4 (07-07-23 @ 16:34), Max: 100.8 (07-06-23 @ 21:29)  HR: 82 (07-07-23 @ 16:34) (74 - 87)  BP: 145/74 (07-07-23 @ 16:34) (118/69 - 145/74)  BP(mean): --  RR: 18 (07-07-23 @ 16:34) (17 - 18)  SpO2: 98% (07-07-23 @ 16:34) (96% - 100%)  Wt(kg): --    PHYSICAL EXAM:    Constitutional: resting comfortably in bed; NAD  HEENT: NC/AT, EOMI, anicteric sclera, no nasal discharge, no anterior/posterior or submental AMAURY; neck supple  Respiratory: (+) RLL crackles  Cardiac: (+) systolic murmur best heard at apex  Gastrointestinal: (+) abd firmness at L side consistent with splenomegaly, nontender to palpation, (+) bowel sounds  Extremities: legs WWP,  no peripheral edema  Dermatologic: (+) L medial arm ecchymoses, skin otherwise warm, dry and intact; no other rashes, wounds, or scars noted; (+) scattered nodules vs. lymphadenopathy in bilateral axillae and groins (per pt chronic and intermittent); (+) R clavicular small circular soft tissue swelling (chronic for yrs per pt)  Neurologic: AAOx3; CNII-XII grossly intact; strength 5/5 and sensation intact in all 4 extremities; no focal deficits noted, FNF intact  Psychiatric: affect and characteristics of appearance, verbalizations, behaviors are appropriate  Lines: 2 peripheral IVs in R arm

## 2023-07-07 NOTE — H&P ADULT - PROBLEM SELECTOR PLAN 7
·  Problem: Superficial thrombophlebitis of upper extremity.   ·  Plan: Patient previously had swelling of left forearm at previous IV site, now resolved. ·  Problem: Superficial thrombophlebitis of upper extremity.   ·  Plan: Patient previously had swelling of left forearm at previous IV site, now resolved.  - continue to monitor, warm packs to arm as needed

## 2023-07-07 NOTE — PROGRESS NOTE ADULT - SUBJECTIVE AND OBJECTIVE BOX
Hematology Follow-up    INTERVAL HPI/OVERNIGHT EVENTS:  Patient S&E at bedside. No o/n events, patient resting comfortably. No complaints at this time. Patient denies fever, chills, dizziness, weakness, CP, palpitations, SOB, cough, N/V/D/C, dysuria, changes in bowel movements, LE edema.    VITAL SIGNS:  T(F): 100.5 (07-07-23 @ 17:40)  HR: 82 (07-07-23 @ 16:34)  BP: 145/74 (07-07-23 @ 16:34)  RR: 18 (07-07-23 @ 16:34)  SpO2: 98% (07-07-23 @ 16:34)  Wt(kg): --    PHYSICAL EXAM:  Constitutional: AAOx3, NAD,   Eyes: PERRL, EOMI, sclera non-icteric  Neck: supple, no masses, no JVD  Respiratory: CTA b/l, good air entry b/l, no wheezing, rhonchi, rales, with normal respiratory effort and no intercostal retractions  Cardiovascular: RRR, normal S1S2, no M/R/G  Gastrointestinal: soft, NTND, no masses palpable, BS normal in all four quadrants, no HSM  Extremities:  no c/c/e  Neurological: Grossly intact  Skin: Normal temperature    MEDICATIONS  (STANDING):  allopurinol 300 milliGRAM(s) Oral daily  dextrose 5%. 1000 milliLiter(s) (50 mL/Hr) IV Continuous <Continuous>  dextrose 5%. 1000 milliLiter(s) (100 mL/Hr) IV Continuous <Continuous>  dextrose 50% Injectable 25 Gram(s) IV Push once  dextrose 50% Injectable 25 Gram(s) IV Push once  dextrose 50% Injectable 12.5 Gram(s) IV Push once  glucagon  Injectable 1 milliGRAM(s) IntraMuscular once  hydroxyurea 1500 milliGRAM(s) Oral two times a day  insulin lispro (ADMELOG) corrective regimen sliding scale   SubCutaneous at bedtime  insulin lispro (ADMELOG) corrective regimen sliding scale   SubCutaneous three times a day before meals  levothyroxine 100 MICROGram(s) Oral daily  lidocaine 2% Injectable 20 milliLiter(s) Local Injection once  melatonin 3 milliGRAM(s) Oral at bedtime    MEDICATIONS  (PRN):  acetaminophen     Tablet .. 650 milliGRAM(s) Oral every 6 hours PRN Temp greater or equal to 38C (100.4F), Mild Pain (1 - 3)  dextrose Oral Gel 15 Gram(s) Oral once PRN Blood Glucose LESS THAN 70 milliGRAM(s)/deciliter      No Known Allergies      LABS:                        7.3    58.88 )-----------( 56       ( 07 Jul 2023 06:40 )             23.4     07-07    128<L>  |  91<L>  |  19  ----------------------------<  183<H>  5.2   |  24  |  0.85    Ca    9.2      07 Jul 2023 06:40  Phos  4.3     07-07  Mg     1.80     07-06    TPro  6.6  /  Alb  2.9<L>  /  TBili  1.0  /  DBili  x   /  AST  29  /  ALT  10  /  AlkPhos  125<H>  07-07    PT/INR - ( 07 Jul 2023 06:40 )   PT: 16.8 sec;   INR: 1.44 ratio         PTT - ( 07 Jul 2023 06:40 )  PTT:33.8 sec Lactate Dehydrogenase, Serum: 771 U/L (07-07 @ 06:40)    Urinalysis Basic - ( 07 Jul 2023 06:40 )    Color: x / Appearance: x / SG: x / pH: x  Gluc: 183 mg/dL / Ketone: x  / Bili: x / Urobili: x   Blood: x / Protein: x / Nitrite: x   Leuk Esterase: x / RBC: x / WBC x   Sq Epi: x / Non Sq Epi: x / Bacteria: x        RADIOLOGY & ADDITIONAL TESTS:  Studies reviewed.

## 2023-07-08 DIAGNOSIS — C91.30 PROLYMPHOCYTIC LEUKEMIA OF B-CELL TYPE NOT HAVING ACHIEVED REMISSION: ICD-10-CM

## 2023-07-08 DIAGNOSIS — B99.9 UNSPECIFIED INFECTIOUS DISEASE: ICD-10-CM

## 2023-07-08 DIAGNOSIS — E87.5 HYPERKALEMIA: ICD-10-CM

## 2023-07-08 LAB
ALBUMIN SERPL ELPH-MCNC: 3 G/DL — LOW (ref 3.3–5)
ALP SERPL-CCNC: 126 U/L — HIGH (ref 40–120)
ALP SERPL-CCNC: 126 U/L — HIGH (ref 40–120)
ALP SERPL-CCNC: 129 U/L — HIGH (ref 40–120)
ALT FLD-CCNC: 8 U/L — LOW (ref 10–45)
ANION GAP SERPL CALC-SCNC: 11 MMOL/L — SIGNIFICANT CHANGE UP (ref 5–17)
ANION GAP SERPL CALC-SCNC: 11 MMOL/L — SIGNIFICANT CHANGE UP (ref 5–17)
ANION GAP SERPL CALC-SCNC: 15 MMOL/L — SIGNIFICANT CHANGE UP (ref 5–17)
APTT BLD: 28.5 SEC — SIGNIFICANT CHANGE UP (ref 27.5–35.5)
AST SERPL-CCNC: 19 U/L — SIGNIFICANT CHANGE UP (ref 10–40)
AST SERPL-CCNC: 22 U/L — SIGNIFICANT CHANGE UP (ref 10–40)
AST SERPL-CCNC: 24 U/L — SIGNIFICANT CHANGE UP (ref 10–40)
B-OH-BUTYR SERPL-SCNC: 0.1 MMOL/L — SIGNIFICANT CHANGE UP
BASE EXCESS BLDV CALC-SCNC: 1.3 MMOL/L — SIGNIFICANT CHANGE UP (ref -2–3)
BASOPHILS # BLD AUTO: 0 K/UL — SIGNIFICANT CHANGE UP (ref 0–0.2)
BASOPHILS NFR BLD AUTO: 0 % — SIGNIFICANT CHANGE UP (ref 0–2)
BILIRUB SERPL-MCNC: 0.8 MG/DL — SIGNIFICANT CHANGE UP (ref 0.2–1.2)
BILIRUB SERPL-MCNC: 0.8 MG/DL — SIGNIFICANT CHANGE UP (ref 0.2–1.2)
BILIRUB SERPL-MCNC: 0.9 MG/DL — SIGNIFICANT CHANGE UP (ref 0.2–1.2)
BLD GP AB SCN SERPL QL: NEGATIVE — SIGNIFICANT CHANGE UP
BUN SERPL-MCNC: 27 MG/DL — HIGH (ref 7–23)
BUN SERPL-MCNC: 29 MG/DL — HIGH (ref 7–23)
BUN SERPL-MCNC: 35 MG/DL — HIGH (ref 7–23)
CA-I SERPL-SCNC: 1.29 MMOL/L — SIGNIFICANT CHANGE UP (ref 1.15–1.33)
CALCIUM SERPL-MCNC: 8.9 MG/DL — SIGNIFICANT CHANGE UP (ref 8.4–10.5)
CALCIUM SERPL-MCNC: 9 MG/DL — SIGNIFICANT CHANGE UP (ref 8.4–10.5)
CALCIUM SERPL-MCNC: 9.3 MG/DL — SIGNIFICANT CHANGE UP (ref 8.4–10.5)
CHLORIDE BLDV-SCNC: 94 MMOL/L — LOW (ref 96–108)
CHLORIDE SERPL-SCNC: 92 MMOL/L — LOW (ref 96–108)
CO2 BLDV-SCNC: 27 MMOL/L — HIGH (ref 22–26)
CO2 SERPL-SCNC: 23 MMOL/L — SIGNIFICANT CHANGE UP (ref 22–31)
CO2 SERPL-SCNC: 24 MMOL/L — SIGNIFICANT CHANGE UP (ref 22–31)
CO2 SERPL-SCNC: 24 MMOL/L — SIGNIFICANT CHANGE UP (ref 22–31)
CREAT ?TM UR-MCNC: 15 MG/DL — SIGNIFICANT CHANGE UP
CREAT SERPL-MCNC: 0.89 MG/DL — SIGNIFICANT CHANGE UP (ref 0.5–1.3)
CREAT SERPL-MCNC: 0.93 MG/DL — SIGNIFICANT CHANGE UP (ref 0.5–1.3)
CREAT SERPL-MCNC: 1.25 MG/DL — SIGNIFICANT CHANGE UP (ref 0.5–1.3)
D DIMER BLD IA.RAPID-MCNC: HIGH NG/ML DDU
EGFR: 66 ML/MIN/1.73M2 — SIGNIFICANT CHANGE UP
EGFR: 93 ML/MIN/1.73M2 — SIGNIFICANT CHANGE UP
EGFR: 98 ML/MIN/1.73M2 — SIGNIFICANT CHANGE UP
EOSINOPHIL # BLD AUTO: 0 K/UL — SIGNIFICANT CHANGE UP (ref 0–0.5)
EOSINOPHIL NFR BLD AUTO: 0 % — SIGNIFICANT CHANGE UP (ref 0–6)
FIBRINOGEN PPP-MCNC: 209 MG/DL — SIGNIFICANT CHANGE UP (ref 200–445)
FRUCTOSAMINE SERPL-MCNC: 352 UMOL/L — HIGH (ref 205–285)
GAS PNL BLDV: 128 MMOL/L — LOW (ref 136–145)
GAS PNL BLDV: SIGNIFICANT CHANGE UP
GLUCOSE BLDC GLUCOMTR-MCNC: 391 MG/DL — HIGH (ref 70–99)
GLUCOSE BLDC GLUCOMTR-MCNC: 467 MG/DL — CRITICAL HIGH (ref 70–99)
GLUCOSE BLDC GLUCOMTR-MCNC: 468 MG/DL — CRITICAL HIGH (ref 70–99)
GLUCOSE BLDC GLUCOMTR-MCNC: 477 MG/DL — CRITICAL HIGH (ref 70–99)
GLUCOSE BLDC GLUCOMTR-MCNC: 479 MG/DL — CRITICAL HIGH (ref 70–99)
GLUCOSE BLDC GLUCOMTR-MCNC: 486 MG/DL — CRITICAL HIGH (ref 70–99)
GLUCOSE BLDC GLUCOMTR-MCNC: 489 MG/DL — CRITICAL HIGH (ref 70–99)
GLUCOSE BLDC GLUCOMTR-MCNC: 516 MG/DL — CRITICAL HIGH (ref 70–99)
GLUCOSE BLDC GLUCOMTR-MCNC: 522 MG/DL — CRITICAL HIGH (ref 70–99)
GLUCOSE BLDV-MCNC: 531 MG/DL — CRITICAL HIGH (ref 70–99)
GLUCOSE SERPL-MCNC: 441 MG/DL — HIGH (ref 70–99)
GLUCOSE SERPL-MCNC: 475 MG/DL — CRITICAL HIGH (ref 70–99)
GLUCOSE SERPL-MCNC: 508 MG/DL — CRITICAL HIGH (ref 70–99)
HCO3 BLDV-SCNC: 26 MMOL/L — SIGNIFICANT CHANGE UP (ref 22–29)
HCT VFR BLD CALC: 25.2 % — LOW (ref 39–50)
HCT VFR BLD CALC: 26.8 % — LOW (ref 39–50)
HCT VFR BLDA CALC: 26 % — LOW (ref 39–51)
HGB BLD CALC-MCNC: 8.5 G/DL — LOW (ref 12.6–17.4)
HGB BLD-MCNC: 7.9 G/DL — LOW (ref 13–17)
HGB BLD-MCNC: 8.4 G/DL — LOW (ref 13–17)
INR BLD: 1.36 RATIO — HIGH (ref 0.88–1.16)
LACTATE BLDV-MCNC: 2.2 MMOL/L — HIGH (ref 0.5–2)
LACTATE SERPL-SCNC: 2.2 MMOL/L — HIGH (ref 0.5–2)
LDH SERPL L TO P-CCNC: 637 U/L — HIGH (ref 50–242)
LDH SERPL L TO P-CCNC: 719 U/L — HIGH (ref 50–242)
LYMPHOCYTES # BLD AUTO: 45.97 K/UL — HIGH (ref 1–3.3)
LYMPHOCYTES # BLD AUTO: 93.9 % — HIGH (ref 13–44)
MAGNESIUM SERPL-MCNC: 2.3 MG/DL — SIGNIFICANT CHANGE UP (ref 1.6–2.6)
MAGNESIUM SERPL-MCNC: 2.3 MG/DL — SIGNIFICANT CHANGE UP (ref 1.6–2.6)
MAGNESIUM SERPL-MCNC: 2.4 MG/DL — SIGNIFICANT CHANGE UP (ref 1.6–2.6)
MCHC RBC-ENTMCNC: 24.6 PG — LOW (ref 27–34)
MCHC RBC-ENTMCNC: 25 PG — LOW (ref 27–34)
MCHC RBC-ENTMCNC: 31.3 GM/DL — LOW (ref 32–36)
MCHC RBC-ENTMCNC: 31.3 GM/DL — LOW (ref 32–36)
MCV RBC AUTO: 78.4 FL — LOW (ref 80–100)
MCV RBC AUTO: 79.7 FL — LOW (ref 80–100)
MONOCYTES # BLD AUTO: 0 K/UL — SIGNIFICANT CHANGE UP (ref 0–0.9)
MONOCYTES NFR BLD AUTO: 0 % — LOW (ref 2–14)
NEUTROPHILS # BLD AUTO: 0.83 K/UL — LOW (ref 1.8–7.4)
NEUTROPHILS NFR BLD AUTO: 1.7 % — LOW (ref 43–77)
NRBC # BLD: 0 /100 WBCS — SIGNIFICANT CHANGE UP (ref 0–0)
OSMOLALITY UR: 347 MOS/KG — SIGNIFICANT CHANGE UP (ref 300–900)
PCO2 BLDV: 40 MMHG — LOW (ref 42–55)
PH BLDV: 7.42 — SIGNIFICANT CHANGE UP (ref 7.32–7.43)
PHOSPHATE SERPL-MCNC: 4.9 MG/DL — HIGH (ref 2.5–4.5)
PHOSPHATE SERPL-MCNC: 5.3 MG/DL — HIGH (ref 2.5–4.5)
PHOSPHATE SERPL-MCNC: 5.7 MG/DL — HIGH (ref 2.5–4.5)
PLATELET # BLD AUTO: 64 K/UL — LOW (ref 150–400)
PLATELET # BLD AUTO: 71 K/UL — LOW (ref 150–400)
PO2 BLDV: 65 MMHG — HIGH (ref 25–45)
POTASSIUM BLDV-SCNC: 4.8 MMOL/L — SIGNIFICANT CHANGE UP (ref 3.5–5.1)
POTASSIUM SERPL-MCNC: 5.1 MMOL/L — SIGNIFICANT CHANGE UP (ref 3.5–5.3)
POTASSIUM SERPL-MCNC: 5.7 MMOL/L — HIGH (ref 3.5–5.3)
POTASSIUM SERPL-MCNC: 6 MMOL/L — HIGH (ref 3.5–5.3)
POTASSIUM SERPL-SCNC: 5.1 MMOL/L — SIGNIFICANT CHANGE UP (ref 3.5–5.3)
POTASSIUM SERPL-SCNC: 5.7 MMOL/L — HIGH (ref 3.5–5.3)
POTASSIUM SERPL-SCNC: 6 MMOL/L — HIGH (ref 3.5–5.3)
POTASSIUM UR-SCNC: 24 MMOL/L — SIGNIFICANT CHANGE UP
PROT SERPL-MCNC: 6.6 G/DL — SIGNIFICANT CHANGE UP (ref 6–8.3)
PROT SERPL-MCNC: 6.7 G/DL — SIGNIFICANT CHANGE UP (ref 6–8.3)
PROT SERPL-MCNC: 6.7 G/DL — SIGNIFICANT CHANGE UP (ref 6–8.3)
PROTHROM AB SERPL-ACNC: 15.7 SEC — HIGH (ref 10.5–13.4)
RBC # BLD: 3.16 M/UL — LOW (ref 4.2–5.8)
RBC # BLD: 3.42 M/UL — LOW (ref 4.2–5.8)
RBC # FLD: 23.1 % — HIGH (ref 10.3–14.5)
RBC # FLD: 23.3 % — HIGH (ref 10.3–14.5)
RH IG SCN BLD-IMP: POSITIVE — SIGNIFICANT CHANGE UP
SAO2 % BLDV: 93 % — HIGH (ref 67–88)
SODIUM SERPL-SCNC: 127 MMOL/L — LOW (ref 135–145)
SODIUM SERPL-SCNC: 127 MMOL/L — LOW (ref 135–145)
SODIUM SERPL-SCNC: 130 MMOL/L — LOW (ref 135–145)
SODIUM UR-SCNC: 94 MMOL/L — SIGNIFICANT CHANGE UP
URATE SERPL-MCNC: 3.2 MG/DL — LOW (ref 3.4–8.8)
WBC # BLD: 36.4 K/UL — HIGH (ref 3.8–10.5)
WBC # BLD: 48.96 K/UL — CRITICAL HIGH (ref 3.8–10.5)
WBC # FLD AUTO: 36.4 K/UL — HIGH (ref 3.8–10.5)
WBC # FLD AUTO: 48.96 K/UL — CRITICAL HIGH (ref 3.8–10.5)

## 2023-07-08 PROCEDURE — 99255 IP/OBS CONSLTJ NEW/EST HI 80: CPT

## 2023-07-08 PROCEDURE — 93010 ELECTROCARDIOGRAM REPORT: CPT

## 2023-07-08 PROCEDURE — 99233 SBSQ HOSP IP/OBS HIGH 50: CPT

## 2023-07-08 RX ORDER — RASBURICASE 7.5 MG
3 KIT INTRAVENOUS ONCE
Refills: 0 | Status: DISCONTINUED | OUTPATIENT
Start: 2023-07-08 | End: 2023-07-14

## 2023-07-08 RX ORDER — INSULIN GLARGINE 100 [IU]/ML
15 INJECTION, SOLUTION SUBCUTANEOUS ONCE
Refills: 0 | Status: COMPLETED | OUTPATIENT
Start: 2023-07-08 | End: 2023-07-08

## 2023-07-08 RX ORDER — CALCIUM ACETATE 667 MG
667 TABLET ORAL
Refills: 0 | Status: DISCONTINUED | OUTPATIENT
Start: 2023-07-08 | End: 2023-07-09

## 2023-07-08 RX ORDER — SODIUM CHLORIDE 9 MG/ML
1000 INJECTION INTRAMUSCULAR; INTRAVENOUS; SUBCUTANEOUS
Refills: 0 | Status: DISCONTINUED | OUTPATIENT
Start: 2023-07-08 | End: 2023-07-11

## 2023-07-08 RX ORDER — INSULIN LISPRO 100/ML
10 VIAL (ML) SUBCUTANEOUS ONCE
Refills: 0 | Status: COMPLETED | OUTPATIENT
Start: 2023-07-08 | End: 2023-07-08

## 2023-07-08 RX ORDER — INSULIN LISPRO 100/ML
8 VIAL (ML) SUBCUTANEOUS ONCE
Refills: 0 | Status: COMPLETED | OUTPATIENT
Start: 2023-07-08 | End: 2023-07-08

## 2023-07-08 RX ORDER — SODIUM ZIRCONIUM CYCLOSILICATE 10 G/10G
10 POWDER, FOR SUSPENSION ORAL ONCE
Refills: 0 | Status: COMPLETED | OUTPATIENT
Start: 2023-07-08 | End: 2023-07-08

## 2023-07-08 RX ORDER — INSULIN GLARGINE 100 [IU]/ML
20 INJECTION, SOLUTION SUBCUTANEOUS AT BEDTIME
Refills: 0 | Status: DISCONTINUED | OUTPATIENT
Start: 2023-07-08 | End: 2023-07-09

## 2023-07-08 RX ORDER — PHYTONADIONE (VIT K1) 5 MG
5 TABLET ORAL DAILY
Refills: 0 | Status: COMPLETED | OUTPATIENT
Start: 2023-07-08 | End: 2023-07-11

## 2023-07-08 RX ORDER — FUROSEMIDE 40 MG
20 TABLET ORAL ONCE
Refills: 0 | Status: COMPLETED | OUTPATIENT
Start: 2023-07-08 | End: 2023-07-08

## 2023-07-08 RX ORDER — INSULIN LISPRO 100/ML
VIAL (ML) SUBCUTANEOUS
Refills: 0 | Status: DISCONTINUED | OUTPATIENT
Start: 2023-07-08 | End: 2023-07-08

## 2023-07-08 RX ORDER — INSULIN LISPRO 100/ML
10 VIAL (ML) SUBCUTANEOUS
Refills: 0 | Status: DISCONTINUED | OUTPATIENT
Start: 2023-07-08 | End: 2023-07-09

## 2023-07-08 RX ADMIN — PIPERACILLIN AND TAZOBACTAM 25 GRAM(S): 4; .5 INJECTION, POWDER, LYOPHILIZED, FOR SOLUTION INTRAVENOUS at 13:34

## 2023-07-08 RX ADMIN — PIPERACILLIN AND TAZOBACTAM 25 GRAM(S): 4; .5 INJECTION, POWDER, LYOPHILIZED, FOR SOLUTION INTRAVENOUS at 06:08

## 2023-07-08 RX ADMIN — Medication 300 MILLIGRAM(S): at 11:22

## 2023-07-08 RX ADMIN — Medication 6: at 21:54

## 2023-07-08 RX ADMIN — INSULIN GLARGINE 20 UNIT(S): 100 INJECTION, SOLUTION SUBCUTANEOUS at 21:53

## 2023-07-08 RX ADMIN — INSULIN GLARGINE 15 UNIT(S): 100 INJECTION, SOLUTION SUBCUTANEOUS at 13:15

## 2023-07-08 RX ADMIN — Medication 8 UNIT(S): at 17:28

## 2023-07-08 RX ADMIN — Medication 20 MILLIGRAM(S): at 08:39

## 2023-07-08 RX ADMIN — Medication 12: at 17:24

## 2023-07-08 RX ADMIN — Medication 667 MILLIGRAM(S): at 11:22

## 2023-07-08 RX ADMIN — Medication 667 MILLIGRAM(S): at 17:45

## 2023-07-08 RX ADMIN — Medication 12: at 11:21

## 2023-07-08 RX ADMIN — Medication 100 MICROGRAM(S): at 06:10

## 2023-07-08 RX ADMIN — Medication 3 MILLIGRAM(S): at 23:23

## 2023-07-08 RX ADMIN — Medication 10 UNIT(S): at 11:30

## 2023-07-08 RX ADMIN — Medication 12: at 08:39

## 2023-07-08 RX ADMIN — Medication 10 UNIT(S): at 17:24

## 2023-07-08 RX ADMIN — Medication 20 MILLIGRAM(S): at 13:34

## 2023-07-08 RX ADMIN — SODIUM CHLORIDE 75 MILLILITER(S): 9 INJECTION INTRAMUSCULAR; INTRAVENOUS; SUBCUTANEOUS at 06:10

## 2023-07-08 RX ADMIN — PIPERACILLIN AND TAZOBACTAM 25 GRAM(S): 4; .5 INJECTION, POWDER, LYOPHILIZED, FOR SOLUTION INTRAVENOUS at 23:24

## 2023-07-08 RX ADMIN — SODIUM ZIRCONIUM CYCLOSILICATE 10 GRAM(S): 10 POWDER, FOR SUSPENSION ORAL at 11:22

## 2023-07-08 NOTE — PROGRESS NOTE ADULT - PROBLEM SELECTOR PLAN 6
CT scan showed Chronic pancreatitis with pancreatic ductal dilatation and possible intraductal calcification in the pancreatic head. No heavy alcohol use. U/S 7/2 showed Contracted gallbladder filled with stones. No secondary findings of acute cholecystitis. Bili 1.0, no transaminitis. No abdominal pain.  - monitor for now patient has mild hyponatremia trending 132 > 129 > 132. > 128 >130 > 128 Patient mildly fatigued (likely from anemia) otherwise asymptomatic. Serum osmolality 291, UCr 50, Daniela 108, UOsm 468. Hyponatremia likely in setting of SIADH 2/2 hypothyroidism  - Will increase fluid restriction to 1.2L, can consider salt tablets if Na does not improve  - Trend Na especially on standing IVF.  7/8 Renal consulted  7/8  Urine lytes ordered  7/8 Na corrected for hyperglycemia 133 today. Per renal, okay to monitor for now.

## 2023-07-08 NOTE — PROVIDER CONTACT NOTE (OTHER) - ASSESSMENT
NAD-pt denies SOB,Chest pain and has no signs of active bleeding
NAD-pt denies SOB,Chest pain and has no signs of active bleeding
WNL, NAD. IV site remains C/D/I. No redness or swelling noted

## 2023-07-08 NOTE — DIETITIAN INITIAL EVALUATION ADULT - PROBLEM SELECTOR PLAN 3
febrile to 100.5 F on admission to [facility] 7/7 PM, improved to 99.2 after tylenol.  exam with RLL crackles but breathing comfortably on room air. No cough or SOB. No dysuria.  HIV negative and RVP negative on admission to [facility] this past week.  not currently neutropenic  Plan:  - start zosyn for now, monitor and reassess need for Abx pending clincal course and labs  - f/u CXR  - blood cultures

## 2023-07-08 NOTE — PROGRESS NOTE ADULT - PROBLEM SELECTOR PLAN 9
Patient previously had swelling of left forearm at previous IV site, now resolved.  - continue to monitor, warm packs to arm as needed Pt. found to have elevated TSH to 12.35 on admission w/ symptoms of malaise, fatigue. Free T4 1.4. Pt. has not seen a PCP in some time  Started levothyroxine 100mcg qd (1.7 mcg/kg); repeat TFTs as an outpatient in 4-6 weeks for dose adjustments.

## 2023-07-08 NOTE — CONSULT NOTE ADULT - ASSESSMENT
Pt with hyperkalemia
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 Well controlled outpatient on Novolin. Received high dose Dexamethasone yesterday.       DM Type 2   -Received 10 units Lantus last night, given hyperglycemia of 400s ( 15 units of Lantus given during the day) and also will receive 20 units at bedtime  -Starting 07/09, Should be given 40 units of Glargine at bedtime   -Start Lispro 10 units TID   -Moderate correctional scale  -IF changes in steroid dosing occurs, please inform endocrine team  -Inpatient BG goal of 100-180  -FS q AC + HS     D/c recs:   -Will likely be basal bolus   ( Glargine and Novolog)  -Prefer to not send out on Novolin now, since patient has new insurance  -Will need endocrinology follow up as well       Hypothyroidism   -Continue Levothyroxine 100 mcg daily   -Check TFts in am   -Potentially will need higher dose of Levothyroxine     ( High level decision making)

## 2023-07-08 NOTE — DIETITIAN INITIAL EVALUATION ADULT - PERSON TAUGHT/METHOD
-Emphasized importance of adequate protein-energy intake via smaller more frequent meals/supplements.   -Recommended pairing proteins with CHO to stabilize BG levels.   -Reviewed importance of food safety/verbal instruction/patient instructed

## 2023-07-08 NOTE — CONSULT NOTE ADULT - SUBJECTIVE AND OBJECTIVE BOX
HPI:     63 year old M with history of DM T2( A1c of 5.7%), presented to [facility] with increased fatigue, gait instability on 06/20. Noted to have marked leukocytosis, anemia, and thrombocytopenia. Peripheral flow cytometry was consistent with lymphoma. Patient was started on Hydroxyurea 1500 mg BID and Allopurinol 300 mg daily. Also noted to have hepatomegaly of 23 cm, and hemangioma on MRI and possible intraductal calcification of the pancreatic head. 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     Of note, no family history of bleeding disorders; mother had breast cancer metastatic to liver, and has 2 aunts that had cancer, one with pancreas cancer the other with stomach cancer.    Recent course:   Labs (7/7 AM): WBC 58.88, Hgb 7.3, Plt 56, d-dimer 30639, fibrinogen 187, PT 16.8, INR 1.44, PTT 33.8, Na 128, K 5.2, Cl 91, bicarb 24, AG 13, BUN 19, Cr 0.85, Glucose 183, Ca 9.2, albumin 2.9, bili 1.0, alk phos 125, AST/ALT 29/10, , uric acid 4.1, phos 4.3    DM History:           PAST MEDICAL & SURGICAL HISTORY:  DM (diabetes mellitus)      No significant past surgical history          FAMILY HISTORY:  FH: breast cancer (Mother)    FH: CVA (cerebrovascular accident) (Father)    FH: heart disease (Father)    Family history of pancreatic cancer (Aunt)    Family history of stomach cancer (Aunt)        Social History:    Outpatient Medications:    MEDICATIONS  (STANDING):  allopurinol 300 milliGRAM(s) Oral every 24 hours  calcium acetate 667 milliGRAM(s) Oral three times a day with meals  dextrose 5%. 1000 milliLiter(s) (50 mL/Hr) IV Continuous <Continuous>  dextrose 5%. 1000 milliLiter(s) (100 mL/Hr) IV Continuous <Continuous>  dextrose 50% Injectable 25 Gram(s) IV Push once  dextrose 50% Injectable 25 Gram(s) IV Push once  dextrose 50% Injectable 12.5 Gram(s) IV Push once  furosemide   Injectable 20 milliGRAM(s) IV Push once  glucagon  Injectable 1 milliGRAM(s) IntraMuscular once  insulin glargine Injectable (LANTUS) 15 Unit(s) SubCutaneous once  insulin glargine Injectable (LANTUS) 20 Unit(s) SubCutaneous at bedtime  insulin lispro (ADMELOG) corrective regimen sliding scale   SubCutaneous at bedtime  insulin lispro (ADMELOG) corrective regimen sliding scale   SubCutaneous three times a day before meals  insulin lispro Injectable (ADMELOG) 10 Unit(s) SubCutaneous three times a day before meals  levothyroxine 100 MICROGram(s) Oral every 24 hours  melatonin 3 milliGRAM(s) Oral at bedtime  piperacillin/tazobactam IVPB.. 3.375 Gram(s) IV Intermittent every 8 hours  rasburicase IVPB 3 milliGRAM(s) IV Intermittent once  sodium chloride 0.9%. 1000 milliLiter(s) (125 mL/Hr) IV Continuous <Continuous>    MEDICATIONS  (PRN):  acetaminophen     Tablet .. 650 milliGRAM(s) Oral every 6 hours PRN Temp greater or equal to 38C (100.4F), Mild Pain (1 - 3)  dextrose Oral Gel 15 Gram(s) Oral once PRN Blood Glucose LESS THAN 70 milliGRAM(s)/deciliter  senna 2 Tablet(s) Oral at bedtime PRN Constipation      Allergies    No Known Allergies    Intolerances      Review of Systems:  Constitutional: No fever  Eyes: No blurry vision  Neuro: No tremors  HEENT: No pain  Cardiovascular: No chest pain, palpitations  Respiratory: No SOB, no cough  GI: No nausea, vomiting, abdominal pain  : No dysuria  Skin: no rash  Psych: no depression  Endocrine: no polyuria, polydipsia  Hem/lymph: no swelling  Osteoporosis: no fractures    ALL OTHER SYSTEMS REVIEWED AND NEGATIVE    UNABLE TO OBTAIN    PHYSICAL EXAM:  VITALS: T(C): 36.7 (07-08-23 @ 09:00)  T(F): 98 (07-08-23 @ 09:00), Max: 100.5 (07-07-23 @ 17:40)  HR: 77 (07-08-23 @ 09:00) (69 - 82)  BP: 163/75 (07-08-23 @ 09:00) (118/69 - 163/75)  RR:  (18 - 18)  SpO2:  (96% - 98%)  Wt(kg): --  GENERAL: NAD, well-groomed, well-developed  EYES: No proptosis, no lid lag, anicteric  HEENT:  Atraumatic, Normocephalic, moist mucous membranes  THYROID: Normal size, no palpable nodules  RESPIRATORY: Clear to auscultation bilaterally; No rales, rhonchi, wheezing, or rubs  CARDIOVASCULAR: Regular rate and rhythm; No murmurs; no peripheral edema  GI: Soft, nontender, non distended, normal bowel sounds  SKIN: Dry, intact, No rashes or lesions  MUSCULOSKELETAL: Full range of motion, normal strength  NEURO: sensation intact, extraocular movements intact, no tremor, normal reflexes  PSYCH: Alert and oriented x 3, normal affect, normal mood  CUSHING'S SIGNS: no striae    POCT Blood Glucose.: 467 mg/dL (07-08-23 @ 11:13)  POCT Blood Glucose.: 468 mg/dL (07-08-23 @ 09:20)  POCT Blood Glucose.: 489 mg/dL (07-08-23 @ 08:36)  POCT Blood Glucose.: 486 mg/dL (07-08-23 @ 08:33)  POCT Blood Glucose.: 298 mg/dL (07-07-23 @ 21:29)  POCT Blood Glucose.: 249 mg/dL (07-07-23 @ 18:51)  POCT Blood Glucose.: 299 mg/dL (07-07-23 @ 12:31)  POCT Blood Glucose.: 239 mg/dL (07-07-23 @ 08:36)  POCT Blood Glucose.: 168 mg/dL (07-06-23 @ 21:48)  POCT Blood Glucose.: 183 mg/dL (07-06-23 @ 17:46)  POCT Blood Glucose.: 250 mg/dL (07-06-23 @ 12:14)  POCT Blood Glucose.: 237 mg/dL (07-06-23 @ 08:34)  POCT Blood Glucose.: 249 mg/dL (07-06-23 @ 00:27)  POCT Blood Glucose.: 254 mg/dL (07-05-23 @ 17:35)  POCT Blood Glucose.: 297 mg/dL (07-05-23 @ 12:04)                            7.9    48.96 )-----------( 64       ( 08 Jul 2023 07:17 )             25.2       07-08    127<L>  |  92<L>  |  29<H>  ----------------------------<  475<HH>  5.7<H>   |  24  |  0.89    eGFR: 98    Ca    9.0      07-08  Mg     2.4     07-08  Phos  5.3     07-08    TPro  6.6  /  Alb  3.0<L>  /  TBili  0.8  /  DBili  x   /  AST  22  /  ALT  8<L>  /  AlkPhos  126<H>  07-08      Thyroid Function Tests:  07-04 @ 09:51 TSH -- FreeT4 1.4 T3 -- Anti TPO -- Anti Thyroglobulin Ab -- TSI --  07-02 @ 16:51 TSH 12.35 FreeT4 -- T3 -- Anti TPO -- Anti Thyroglobulin Ab -- TSI --          07-04 Chol 69 Direct LDL -- LDL calculated 31 HDL 12<L> Trig 128    Radiology:              HPI:     63 year old M with history of DM T2( A1c of 5.7%), presented to [facility] with increased fatigue, gait instability on 06/20. Noted to have marked leukocytosis, anemia, and thrombocytopenia. Peripheral flow cytometry was consistent with lymphoma. Patient was started on Hydroxyurea 1500 mg BID and Allopurinol 300 mg daily. Also noted to have hepatomegaly of 23 cm, and hemangioma on MRI and possible intraductal calcification of the pancreatic head. 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     Of note, no family history of bleeding disorders; mother had breast cancer metastatic to liver, and has 2 aunts that had cancer, one with pancreas cancer the other with stomach cancer.    Recent course:   Labs (7/7 AM): WBC 58.88, Hgb 7.3, Plt 56, d-dimer 35742, fibrinogen 187, PT 16.8, INR 1.44, PTT 33.8, Na 128, K 5.2, Cl 91, bicarb 24, AG 13, BUN 19, Cr 0.85, Glucose 183, Ca 9.2, albumin 2.9, bili 1.0, alk phos 125, AST/ALT 29/10, , uric acid 4.1, phos 4.3    DM History:   -History of DM Type 2 for the past 25-30 years   -History of being on Novolin 35 units in am and 15-20 units in PM   -BG levels in 140s   -Reported hypoglycemia twice a week between breakfast and dinner   -Eye exam: no retinopathy   -Neuro: Denied neuropathic symptoms  -Overall balanced with carbs           PAST MEDICAL & SURGICAL HISTORY:  DM (diabetes mellitus)      No significant past surgical history          FAMILY HISTORY:  FH: breast cancer (Mother)    FH: CVA (cerebrovascular accident) (Father)    FH: heart disease (Father)    Family history of pancreatic cancer (Aunt)    Family history of stomach cancer (Aunt)        Social History:    Outpatient Medications:    MEDICATIONS  (STANDING):  allopurinol 300 milliGRAM(s) Oral every 24 hours  calcium acetate 667 milliGRAM(s) Oral three times a day with meals  dextrose 5%. 1000 milliLiter(s) (50 mL/Hr) IV Continuous <Continuous>  dextrose 5%. 1000 milliLiter(s) (100 mL/Hr) IV Continuous <Continuous>  dextrose 50% Injectable 25 Gram(s) IV Push once  dextrose 50% Injectable 25 Gram(s) IV Push once  dextrose 50% Injectable 12.5 Gram(s) IV Push once  furosemide   Injectable 20 milliGRAM(s) IV Push once  glucagon  Injectable 1 milliGRAM(s) IntraMuscular once  insulin glargine Injectable (LANTUS) 15 Unit(s) SubCutaneous once  insulin glargine Injectable (LANTUS) 20 Unit(s) SubCutaneous at bedtime  insulin lispro (ADMELOG) corrective regimen sliding scale   SubCutaneous at bedtime  insulin lispro (ADMELOG) corrective regimen sliding scale   SubCutaneous three times a day before meals  insulin lispro Injectable (ADMELOG) 10 Unit(s) SubCutaneous three times a day before meals  levothyroxine 100 MICROGram(s) Oral every 24 hours  melatonin 3 milliGRAM(s) Oral at bedtime  piperacillin/tazobactam IVPB.. 3.375 Gram(s) IV Intermittent every 8 hours  rasburicase IVPB 3 milliGRAM(s) IV Intermittent once  sodium chloride 0.9%. 1000 milliLiter(s) (125 mL/Hr) IV Continuous <Continuous>    MEDICATIONS  (PRN):  acetaminophen     Tablet .. 650 milliGRAM(s) Oral every 6 hours PRN Temp greater or equal to 38C (100.4F), Mild Pain (1 - 3)  dextrose Oral Gel 15 Gram(s) Oral once PRN Blood Glucose LESS THAN 70 milliGRAM(s)/deciliter  senna 2 Tablet(s) Oral at bedtime PRN Constipation      Allergies    No Known Allergies    Intolerances      Review of Systems:  Constitutional: No fever  Eyes: No blurry vision  Neuro: No tremors  HEENT: No pain  Cardiovascular: No chest pain, palpitations  Respiratory: No SOB, no cough  GI: No nausea, vomiting, abdominal pain  : No dysuria  Skin: no rash  Psych: no depression  Endocrine: no polyuria, polydipsia  Hem/lymph: no swelling  Osteoporosis: no fractures    PHYSICAL EXAM:  VITALS: T(C): 36.7 (07-08-23 @ 09:00)  T(F): 98 (07-08-23 @ 09:00), Max: 100.5 (07-07-23 @ 17:40)  HR: 77 (07-08-23 @ 09:00) (69 - 82)  BP: 163/75 (07-08-23 @ 09:00) (118/69 - 163/75)  RR:  (18 - 18)  SpO2:  (96% - 98%)    GENERAL: NAD, well-groomed, well-developed  EYES: No proptosis, no lid lag, anicteric  HEENT:  Atraumatic, Normocephalic, moist mucous membranes  THYROID: Normal size, no palpable nodules  RESPIRATORY: Clear to auscultation bilaterally; No rales, rhonchi, wheezing, or rubs  CARDIOVASCULAR: Regular rate and rhythm; No murmurs; no peripheral edema  GI: Soft, nontender, non distended, normal bowel sounds  SKIN: Dry, intact, No rashes or lesions  MUSCULOSKELETAL: Full range of motion, normal strength  NEURO: sensation intact, extraocular movements intact, no tremor, normal reflexes  PSYCH: Alert and oriented x 3, normal affect, normal mood      POCT Blood Glucose.: 467 mg/dL (07-08-23 @ 11:13)  POCT Blood Glucose.: 468 mg/dL (07-08-23 @ 09:20)  POCT Blood Glucose.: 489 mg/dL (07-08-23 @ 08:36)  POCT Blood Glucose.: 486 mg/dL (07-08-23 @ 08:33)  POCT Blood Glucose.: 298 mg/dL (07-07-23 @ 21:29)  POCT Blood Glucose.: 249 mg/dL (07-07-23 @ 18:51)  POCT Blood Glucose.: 299 mg/dL (07-07-23 @ 12:31)  POCT Blood Glucose.: 239 mg/dL (07-07-23 @ 08:36)  POCT Blood Glucose.: 168 mg/dL (07-06-23 @ 21:48)  POCT Blood Glucose.: 183 mg/dL (07-06-23 @ 17:46)  POCT Blood Glucose.: 250 mg/dL (07-06-23 @ 12:14)  POCT Blood Glucose.: 237 mg/dL (07-06-23 @ 08:34)  POCT Blood Glucose.: 249 mg/dL (07-06-23 @ 00:27)  POCT Blood Glucose.: 254 mg/dL (07-05-23 @ 17:35)  POCT Blood Glucose.: 297 mg/dL (07-05-23 @ 12:04)                            7.9    48.96 )-----------( 64       ( 08 Jul 2023 07:17 )             25.2       07-08    127<L>  |  92<L>  |  29<H>  ----------------------------<  475<HH>  5.7<H>   |  24  |  0.89    eGFR: 98    Ca    9.0      07-08  Mg     2.4     07-08  Phos  5.3     07-08    TPro  6.6  /  Alb  3.0<L>  /  TBili  0.8  /  DBili  x   /  AST  22  /  ALT  8<L>  /  AlkPhos  126<H>  07-08      Thyroid Function Tests:  07-04 @ 09:51 TSH -- FreeT4 1.4 T3 -- Anti TPO -- Anti Thyroglobulin Ab -- TSI --  07-02 @ 16:51 TSH 12.35 FreeT4 -- T3 -- Anti TPO -- Anti Thyroglobulin Ab -- TSI --          07-04 Chol 69 Direct LDL -- LDL calculated 31 HDL 12<L> Trig 128

## 2023-07-08 NOTE — DIETITIAN INITIAL EVALUATION ADULT - ETIOLOGY
related to inadequate protein-energy intake in the setting of newly dx ALL  related to increased physiological demand

## 2023-07-08 NOTE — DIETITIAN INITIAL EVALUATION ADULT - ORAL INTAKE PTA/DIET HISTORY
PTA per pt  -Intake: poor PO intake x1.5 weeks in setting of fatigue and altered taste   -Chewing/Swallowing: denies difficulty   -Allergies/Intolerances: NKFA  -Vitamins/Supplements: multivitamin

## 2023-07-08 NOTE — PROGRESS NOTE ADULT - PROBLEM SELECTOR PLAN 7
Pt. found to have elevated TSH to 12.35 on admission w/ symptoms of malaise, fatigue. Free T4 1.4. Pt. has not seen a PCP in some time  Started levothyroxine 100mcg qd (1.7 mcg/kg); repeat TFTs as an outpatient in 4-6 weeks for dose adjustments. 7/8 K 6.0 and then K 5.7 on repeat CMP. Lasix 20 IV x 2. Insulin given for hyperglycemia should help with hyperkalemia as well.  7/8 Renal consulted. Lokelma 10 x 1 per renal recs.  7/8 Likely pseudohypokalemia per renal, can hold off on additional doses of lokelma for now.

## 2023-07-08 NOTE — PROGRESS NOTE ADULT - SUBJECTIVE AND OBJECTIVE BOX
Diagnosis    Protocol/Chemo Regimen: Rituximab  Day:      Pt endorsed:    Review of Systems:      Pain scale:                                        Location:    Diet:     Allergies    No Known Allergies    Intolerances        ANTIMICROBIALS  piperacillin/tazobactam IVPB.. 3.375 Gram(s) IV Intermittent every 8 hours      HEME/ONC MEDICATIONS      STANDING MEDICATIONS  allopurinol 300 milliGRAM(s) Oral every 24 hours  calcium acetate 667 milliGRAM(s) Oral three times a day with meals  dextrose 5%. 1000 milliLiter(s) IV Continuous <Continuous>  dextrose 5%. 1000 milliLiter(s) IV Continuous <Continuous>  dextrose 50% Injectable 25 Gram(s) IV Push once  dextrose 50% Injectable 25 Gram(s) IV Push once  dextrose 50% Injectable 12.5 Gram(s) IV Push once  furosemide   Injectable 20 milliGRAM(s) IV Push once  glucagon  Injectable 1 milliGRAM(s) IntraMuscular once  insulin glargine Injectable (LANTUS) 20 Unit(s) SubCutaneous at bedtime  insulin lispro (ADMELOG) corrective regimen sliding scale   SubCutaneous three times a day before meals  insulin lispro (ADMELOG) corrective regimen sliding scale   SubCutaneous at bedtime  insulin lispro Injectable (ADMELOG). 10 Unit(s) SubCutaneous once  levothyroxine 100 MICROGram(s) Oral every 24 hours  melatonin 3 milliGRAM(s) Oral at bedtime  sodium chloride 0.9%. 1000 milliLiter(s) IV Continuous <Continuous>      PRN MEDICATIONS  acetaminophen     Tablet .. 650 milliGRAM(s) Oral every 6 hours PRN  dextrose Oral Gel 15 Gram(s) Oral once PRN  senna 2 Tablet(s) Oral at bedtime PRN        Vital Signs Last 24 Hrs  T(C): 36.7 (08 Jul 2023 09:00), Max: 38.1 (07 Jul 2023 17:40)  T(F): 98 (08 Jul 2023 09:00), Max: 100.5 (07 Jul 2023 17:40)  HR: 77 (08 Jul 2023 09:00) (69 - 82)  BP: 163/75 (08 Jul 2023 09:00) (118/69 - 163/75)  BP(mean): --  RR: 18 (08 Jul 2023 09:00) (18 - 18)  SpO2: 97% (08 Jul 2023 09:00) (96% - 98%)    Parameters below as of 08 Jul 2023 09:00  Patient On (Oxygen Delivery Method): room air        PHYSICAL EXAM  General: adult in NAD  HEENT: clear oropharynx, anicteric sclera, pink conjunctiva  Neck: supple  CV: normal S1/S2 RRR  Lungs: positive air movement b/l ant lungs,clear to auscultation, no wheezes, no rales  Abdomen: soft non-tender non-distended, no hepatosplenomegaly  Ext: no clubbing cyanosis or edema  Skin: no rashes and no petechiae  Neuro: alert and oriented X 3, no focal deficits  Central Line: normal    LABS:    Blood Cultures:                           7.9    48.96 )-----------( 64       ( 08 Jul 2023 07:17 )             25.2         Mean Cell Volume : 79.7 fl  Mean Cell Hemoglobin : 25.0 pg  Mean Cell Hemoglobin Concentration : 31.3 gm/dL  Auto Neutrophil # : x  Auto Lymphocyte # : x  Auto Monocyte # : x  Auto Eosinophil # : x  Auto Basophil # : x  Auto Neutrophil % : x  Auto Lymphocyte % : x  Auto Monocyte % : x  Auto Eosinophil % : x  Auto Basophil % : x      07-08    127<L>  |  92<L>  |  29<H>  ----------------------------<  475<HH>  5.7<H>   |  24  |  0.89    Ca    9.0      08 Jul 2023 08:52  Phos  5.3     07-08  Mg     2.4     07-08    TPro  6.6  /  Alb  3.0<L>  /  TBili  0.8  /  DBili  x   /  AST  22  /  ALT  8<L>  /  AlkPhos  126<H>  07-08      Mg 2.4  Phos 5.3  Mg 2.3  Phos 4.9      PT/INR - ( 07 Jul 2023 06:40 )   PT: 16.8 sec;   INR: 1.44 ratio         PTT - ( 07 Jul 2023 06:40 )  PTT:33.8 sec      Uric Acid 3.2        RADIOLOGY & ADDITIONAL STUDIES:         Diagnosis: B-PLL    Protocol/Chemo Regimen: Rituximab  Day: X     Pt endorsed: No complaints. 1 formed BM yesterday.    Review of Systems: denies chest pain, dyspnea, headache, dizziness, abdominal pain, diarrhea, constipation      Pain scale:  denies    Diet: Diet, Consistent Carbohydrate    Allergies: No Known Allergies      ANTIMICROBIALS  piperacillin/tazobactam IVPB.. 3.375 Gram(s) IV Intermittent every 8 hours      HEME/ONC MEDICATIONS      STANDING MEDICATIONS  allopurinol 300 milliGRAM(s) Oral every 24 hours  calcium acetate 667 milliGRAM(s) Oral three times a day with meals  dextrose 5%. 1000 milliLiter(s) IV Continuous <Continuous>  dextrose 5%. 1000 milliLiter(s) IV Continuous <Continuous>  dextrose 50% Injectable 25 Gram(s) IV Push once  dextrose 50% Injectable 25 Gram(s) IV Push once  dextrose 50% Injectable 12.5 Gram(s) IV Push once  furosemide   Injectable 20 milliGRAM(s) IV Push once  glucagon  Injectable 1 milliGRAM(s) IntraMuscular once  insulin glargine Injectable (LANTUS) 20 Unit(s) SubCutaneous at bedtime  insulin lispro (ADMELOG) corrective regimen sliding scale   SubCutaneous three times a day before meals  insulin lispro (ADMELOG) corrective regimen sliding scale   SubCutaneous at bedtime  insulin lispro Injectable (ADMELOG). 10 Unit(s) SubCutaneous once  levothyroxine 100 MICROGram(s) Oral every 24 hours  melatonin 3 milliGRAM(s) Oral at bedtime  sodium chloride 0.9%. 1000 milliLiter(s) IV Continuous <Continuous>      PRN MEDICATIONS  acetaminophen     Tablet .. 650 milliGRAM(s) Oral every 6 hours PRN  dextrose Oral Gel 15 Gram(s) Oral once PRN  senna 2 Tablet(s) Oral at bedtime PRN        Vital Signs Last 24 Hrs  T(C): 36.7 (08 Jul 2023 09:00), Max: 38.1 (07 Jul 2023 17:40)  T(F): 98 (08 Jul 2023 09:00), Max: 100.5 (07 Jul 2023 17:40)  HR: 77 (08 Jul 2023 09:00) (69 - 82)  BP: 163/75 (08 Jul 2023 09:00) (118/69 - 163/75)  BP(mean): --  RR: 18 (08 Jul 2023 09:00) (18 - 18)  SpO2: 97% (08 Jul 2023 09:00) (96% - 98%)    Parameters below as of 08 Jul 2023 09:00  Patient On (Oxygen Delivery Method): room air        PHYSICAL EXAM  General: adult in NAD  HEENT: clear oropharynx, anicteric sclera, pink conjunctiva  CV: normal S1/S2 RRR  Lungs: CTAB, no wheezes  Abdomen: LUQ and LLQ soft non tender non distended; RUQ and RLQ distended d/t marked splenomegaly. Normoactive bowel sounds.   Ext: no edema  Skin: no rashes   Neuro: alert and oriented X 3, no focal deficits  PIV CDI    LABS:               7.9    48.96 )-----------( 64       ( 08 Jul 2023 07:17 )             25.2         Mean Cell Volume : 79.7 fl  Mean Cell Hemoglobin : 25.0 pg  Mean Cell Hemoglobin Concentration : 31.3 gm/dL  Auto Neutrophil # : x  Auto Lymphocyte # : x  Auto Monocyte # : x  Auto Eosinophil # : x  Auto Basophil # : x  Auto Neutrophil % : x  Auto Lymphocyte % : x  Auto Monocyte % : x  Auto Eosinophil % : x  Auto Basophil % : x      07-08    127<L>  |  92<L>  |  29<H>  ----------------------------<  475<HH>  5.7<H>   |  24  |  0.89    Ca    9.0      08 Jul 2023 08:52  Phos  5.3     07-08  Mg     2.4     07-08    TPro  6.6  /  Alb  3.0<L>  /  TBili  0.8  /  DBili  x   /  AST  22  /  ALT  8<L>  /  AlkPhos  126<H>  07-08    Mg 2.4  Phos 5.3  Mg 2.3  Phos 4.9    PT/INR - ( 07 Jul 2023 06:40 )   PT: 16.8 sec;   INR: 1.44 ratio       PTT - ( 07 Jul 2023 06:40 )  PTT:33.8 sec      Uric Acid 3.2      CULTURES:  NA      RADIOLOGY & ADDITIONAL STUDIES:  Xray Chest 1 View- PORTABLE-Urgent (07.07.23 @ 17:24)   IMPRESSION:  No acute pulmonary disease           Diagnosis: B-PLL    Protocol/Chemo Regimen: Rituximab 375 mg/m2 = 735 mg split into two doses. 100 mg on Day 1, 635 mg on Day 2. Dexamethasone 12 mg on 7/7, Dexamethsone 20 mg x 1 on Day 1 and Day 2.  Day: X     Pt endorsed: No complaints. 1 formed BM yesterday.    Review of Systems: denies chest pain, dyspnea, headache, dizziness, abdominal pain, diarrhea, constipation    Pain scale:  denies    Diet: Diet, Consistent Carbohydrate    Allergies: No Known Allergies      ANTIMICROBIALS  piperacillin/tazobactam IVPB.. 3.375 Gram(s) IV Intermittent every 8 hours      HEME/ONC MEDICATIONS      STANDING MEDICATIONS  allopurinol 300 milliGRAM(s) Oral every 24 hours  calcium acetate 667 milliGRAM(s) Oral three times a day with meals  dextrose 5%. 1000 milliLiter(s) IV Continuous <Continuous>  dextrose 5%. 1000 milliLiter(s) IV Continuous <Continuous>  dextrose 50% Injectable 25 Gram(s) IV Push once  dextrose 50% Injectable 25 Gram(s) IV Push once  dextrose 50% Injectable 12.5 Gram(s) IV Push once  furosemide   Injectable 20 milliGRAM(s) IV Push once  glucagon  Injectable 1 milliGRAM(s) IntraMuscular once  insulin glargine Injectable (LANTUS) 20 Unit(s) SubCutaneous at bedtime  insulin lispro (ADMELOG) corrective regimen sliding scale   SubCutaneous three times a day before meals  insulin lispro (ADMELOG) corrective regimen sliding scale   SubCutaneous at bedtime  insulin lispro Injectable (ADMELOG). 10 Unit(s) SubCutaneous once  levothyroxine 100 MICROGram(s) Oral every 24 hours  melatonin 3 milliGRAM(s) Oral at bedtime  sodium chloride 0.9%. 1000 milliLiter(s) IV Continuous <Continuous>      PRN MEDICATIONS  acetaminophen     Tablet .. 650 milliGRAM(s) Oral every 6 hours PRN  dextrose Oral Gel 15 Gram(s) Oral once PRN  senna 2 Tablet(s) Oral at bedtime PRN        Vital Signs Last 24 Hrs  T(C): 36.7 (08 Jul 2023 09:00), Max: 38.1 (07 Jul 2023 17:40)  T(F): 98 (08 Jul 2023 09:00), Max: 100.5 (07 Jul 2023 17:40)  HR: 77 (08 Jul 2023 09:00) (69 - 82)  BP: 163/75 (08 Jul 2023 09:00) (118/69 - 163/75)  BP(mean): --  RR: 18 (08 Jul 2023 09:00) (18 - 18)  SpO2: 97% (08 Jul 2023 09:00) (96% - 98%)    Parameters below as of 08 Jul 2023 09:00  Patient On (Oxygen Delivery Method): room air        PHYSICAL EXAM  General: adult in NAD  HEENT: clear oropharynx, anicteric sclera, pink conjunctiva  CV: normal S1/S2 RRR  Lungs: CTAB, no wheezes  Abdomen: LUQ and LLQ soft non tender non distended; RUQ and RLQ distended d/t marked splenomegaly. Normoactive bowel sounds.   Ext: no edema  Skin: no rashes   Neuro: alert and oriented X 3, no focal deficits  PIV CDI    LABS:               7.9    48.96 )-----------( 64       ( 08 Jul 2023 07:17 )             25.2         Mean Cell Volume : 79.7 fl  Mean Cell Hemoglobin : 25.0 pg  Mean Cell Hemoglobin Concentration : 31.3 gm/dL  Auto Neutrophil # : x  Auto Lymphocyte # : x  Auto Monocyte # : x  Auto Eosinophil # : x  Auto Basophil # : x  Auto Neutrophil % : x  Auto Lymphocyte % : x  Auto Monocyte % : x  Auto Eosinophil % : x  Auto Basophil % : x      07-08    127<L>  |  92<L>  |  29<H>  ----------------------------<  475<HH>  5.7<H>   |  24  |  0.89    Ca    9.0      08 Jul 2023 08:52  Phos  5.3     07-08  Mg     2.4     07-08    TPro  6.6  /  Alb  3.0<L>  /  TBili  0.8  /  DBili  x   /  AST  22  /  ALT  8<L>  /  AlkPhos  126<H>  07-08    Mg 2.4  Phos 5.3  Mg 2.3  Phos 4.9    PT/INR - ( 07 Jul 2023 06:40 )   PT: 16.8 sec;   INR: 1.44 ratio       PTT - ( 07 Jul 2023 06:40 )  PTT:33.8 sec      Uric Acid 3.2      CULTURES:  NA      RADIOLOGY & ADDITIONAL STUDIES:  Xray Chest 1 View- PORTABLE-Urgent (07.07.23 @ 17:24)   IMPRESSION:  No acute pulmonary disease           Diagnosis: B-PLL    Protocol/Chemo Regimen: Rituximab 375 mg/m2 = 735 mg split into two doses. 100 mg on Day 1, 635 mg on Day 2. Dexamethasone 12 mg on 7/7, Dexamethsone 20 mg x 1 on Day 1 and Day 2.  Day: X     Pt endorsed: No complaints. 1 formed BM yesterday.    Review of Systems: denies chest pain, dyspnea, headache, dizziness, abdominal pain, diarrhea, constipation    Pain scale: denies    Diet: Diet, Consistent Carbohydrate    Allergies: No Known Allergies      ANTIMICROBIALS  piperacillin/tazobactam IVPB.. 3.375 Gram(s) IV Intermittent every 8 hours      HEME/ONC MEDICATIONS      STANDING MEDICATIONS  allopurinol 300 milliGRAM(s) Oral every 24 hours  calcium acetate 667 milliGRAM(s) Oral three times a day with meals  dextrose 5%. 1000 milliLiter(s) IV Continuous <Continuous>  dextrose 5%. 1000 milliLiter(s) IV Continuous <Continuous>  dextrose 50% Injectable 25 Gram(s) IV Push once  dextrose 50% Injectable 25 Gram(s) IV Push once  dextrose 50% Injectable 12.5 Gram(s) IV Push once  furosemide   Injectable 20 milliGRAM(s) IV Push once  glucagon  Injectable 1 milliGRAM(s) IntraMuscular once  insulin glargine Injectable (LANTUS) 20 Unit(s) SubCutaneous at bedtime  insulin lispro (ADMELOG) corrective regimen sliding scale   SubCutaneous three times a day before meals  insulin lispro (ADMELOG) corrective regimen sliding scale   SubCutaneous at bedtime  insulin lispro Injectable (ADMELOG). 10 Unit(s) SubCutaneous once  levothyroxine 100 MICROGram(s) Oral every 24 hours  melatonin 3 milliGRAM(s) Oral at bedtime  sodium chloride 0.9%. 1000 milliLiter(s) IV Continuous <Continuous>      PRN MEDICATIONS  acetaminophen     Tablet .. 650 milliGRAM(s) Oral every 6 hours PRN  dextrose Oral Gel 15 Gram(s) Oral once PRN  senna 2 Tablet(s) Oral at bedtime PRN        Vital Signs Last 24 Hrs  T(C): 36.7 (08 Jul 2023 09:00), Max: 38.1 (07 Jul 2023 17:40)  T(F): 98 (08 Jul 2023 09:00), Max: 100.5 (07 Jul 2023 17:40)  HR: 77 (08 Jul 2023 09:00) (69 - 82)  BP: 163/75 (08 Jul 2023 09:00) (118/69 - 163/75)  BP(mean): --  RR: 18 (08 Jul 2023 09:00) (18 - 18)  SpO2: 97% (08 Jul 2023 09:00) (96% - 98%)    Parameters below as of 08 Jul 2023 09:00  Patient On (Oxygen Delivery Method): room air        PHYSICAL EXAM  General: adult in NAD  HEENT: clear oropharynx, poor dentition of posterior top molars   CV: normal S1/S2 RRR  Lungs: CTAB, no wheezes  Abdomen: LUQ and LLQ soft non tender non distended; RUQ and RLQ distended d/t marked splenomegaly. Normoactive bowel sounds.   Ext: no edema  Skin: superficial thrombophlebitis of LUE  Neuro: alert and oriented X 3, no focal deficits  PIV CDI        LABS:               7.9    48.96 )-----------( 64       ( 08 Jul 2023 07:17 )             25.2     Mean Cell Volume : 79.7 fl  Mean Cell Hemoglobin : 25.0 pg  Mean Cell Hemoglobin Concentration : 31.3 gm/dL  Auto Neutrophil # : x  Auto Lymphocyte # : x  Auto Monocyte # : x  Auto Eosinophil # : x  Auto Basophil # : x  Auto Neutrophil % : x  Auto Lymphocyte % : x  Auto Monocyte % : x  Auto Eosinophil % : x  Auto Basophil % : x      07-08    127<L>  |  92<L>  |  29<H>  ----------------------------<  475<HH>  5.7<H>   |  24  |  0.89    Ca    9.0      08 Jul 2023 08:52  Phos  5.3     07-08  Mg     2.4     07-08    TPro  6.6  /  Alb  3.0<L>  /  TBili  0.8  /  DBili  x   /  AST  22  /  ALT  8<L>  /  AlkPhos  126<H>  07-08    Mg 2.4  Phos 5.3  Mg 2.3  Phos 4.9    PT/INR - ( 07 Jul 2023 06:40 )   PT: 16.8 sec;   INR: 1.44 ratio       PTT - ( 07 Jul 2023 06:40 )  PTT:33.8 sec      Uric Acid 3.2      CULTURES:  BCx 7/6 - pending      RADIOLOGY & ADDITIONAL STUDIES:  Xray Chest 1 View- PORTABLE-Urgent (07.07.23 @ 17:24)   IMPRESSION:  No acute pulmonary disease           Diagnosis: B-PLL    Protocol/Chemo Regimen: Rituximab 375 mg/m2 = 735 mg split into two doses. 100 mg on Day 1, 635 mg on Day 2. Dexamethasone 12 mg on 7/7, Dexamethsone 20 mg x 1 on Day 1 and Day 2.  Day: X     Pt endorsed: No complaints. 1 formed BM yesterday.    Review of Systems: denies chest pain, dyspnea, headache, dizziness, abdominal pain, diarrhea, constipation    Pain scale: denies    Diet: Diet, Consistent Carbohydrate    Allergies: No Known Allergies      ANTIMICROBIALS  piperacillin/tazobactam IVPB.. 3.375 Gram(s) IV Intermittent every 8 hours      HEME/ONC MEDICATIONS      STANDING MEDICATIONS  allopurinol 300 milliGRAM(s) Oral every 24 hours  calcium acetate 667 milliGRAM(s) Oral three times a day with meals  dextrose 5%. 1000 milliLiter(s) IV Continuous <Continuous>  dextrose 5%. 1000 milliLiter(s) IV Continuous <Continuous>  dextrose 50% Injectable 25 Gram(s) IV Push once  dextrose 50% Injectable 25 Gram(s) IV Push once  dextrose 50% Injectable 12.5 Gram(s) IV Push once  furosemide   Injectable 20 milliGRAM(s) IV Push once  glucagon  Injectable 1 milliGRAM(s) IntraMuscular once  insulin glargine Injectable (LANTUS) 20 Unit(s) SubCutaneous at bedtime  insulin lispro (ADMELOG) corrective regimen sliding scale   SubCutaneous three times a day before meals  insulin lispro (ADMELOG) corrective regimen sliding scale   SubCutaneous at bedtime  insulin lispro Injectable (ADMELOG). 10 Unit(s) SubCutaneous once  levothyroxine 100 MICROGram(s) Oral every 24 hours  melatonin 3 milliGRAM(s) Oral at bedtime  sodium chloride 0.9%. 1000 milliLiter(s) IV Continuous <Continuous>      PRN MEDICATIONS  acetaminophen     Tablet .. 650 milliGRAM(s) Oral every 6 hours PRN  dextrose Oral Gel 15 Gram(s) Oral once PRN  senna 2 Tablet(s) Oral at bedtime PRN        Vital Signs Last 24 Hrs  T(C): 36.7 (08 Jul 2023 09:00), Max: 38.1 (07 Jul 2023 17:40)  T(F): 98 (08 Jul 2023 09:00), Max: 100.5 (07 Jul 2023 17:40)  HR: 77 (08 Jul 2023 09:00) (69 - 82)  BP: 163/75 (08 Jul 2023 09:00) (118/69 - 163/75)  BP(mean): --  RR: 18 (08 Jul 2023 09:00) (18 - 18)  SpO2: 97% (08 Jul 2023 09:00) (96% - 98%)    Parameters below as of 08 Jul 2023 09:00  Patient On (Oxygen Delivery Method): room air        PHYSICAL EXAM  General: adult in NAD  HEENT: clear oropharynx, poor dentition of posterior top molars   CV: normal S1/S2 RRR  Lungs: CTAB, no wheezes  Abdomen: soft, normoactive bowel sounds, massive splenomegaly with abdominal distension.   Ext: no edema  Skin: superficial thrombophlebitis of LUE  Neuro: alert and oriented X 3  PIV CDI        LABS:               7.9    48.96 )-----------( 64       ( 08 Jul 2023 07:17 )             25.2     Mean Cell Volume : 79.7 fl  Mean Cell Hemoglobin : 25.0 pg  Mean Cell Hemoglobin Concentration : 31.3 gm/dL  Auto Neutrophil # : x  Auto Lymphocyte # : x  Auto Monocyte # : x  Auto Eosinophil # : x  Auto Basophil # : x  Auto Neutrophil % : x  Auto Lymphocyte % : x  Auto Monocyte % : x  Auto Eosinophil % : x  Auto Basophil % : x      07-08    127<L>  |  92<L>  |  29<H>  ----------------------------<  475<HH>  5.7<H>   |  24  |  0.89    Ca    9.0      08 Jul 2023 08:52  Phos  5.3     07-08  Mg     2.4     07-08    TPro  6.6  /  Alb  3.0<L>  /  TBili  0.8  /  DBili  x   /  AST  22  /  ALT  8<L>  /  AlkPhos  126<H>  07-08    Mg 2.4  Phos 5.3  Mg 2.3  Phos 4.9    PT/INR - ( 07 Jul 2023 06:40 )   PT: 16.8 sec;   INR: 1.44 ratio       PTT - ( 07 Jul 2023 06:40 )  PTT:33.8 sec      Uric Acid 3.2      CULTURES:  BCx 7/6 - pending      RADIOLOGY & ADDITIONAL STUDIES:  Xray Chest 1 View- PORTABLE-Urgent (07.07.23 @ 17:24)   IMPRESSION:  No acute pulmonary disease

## 2023-07-08 NOTE — PROGRESS NOTE ADULT - PROBLEM SELECTOR PLAN 11
F: maintenance IVF   E: prn   N:  Diet: Consistent carbs w/ fluid restriction  DVT ppx: SCDs, holding VTE ppx given severe anemia and thrombocytopenia  GI ppx: none  GI motility: senna prn  dispo: [facility]  Code Status: full code. F: maintenance IVF   E: prn   N:  Diet: Consistent carbs w/ fluid restriction  DVT ppx: SCDs, holding VTE ppx given severe anemia and thrombocytopenia  GI ppx: none  GI motility: senna prn  dispo: [facility]  Code Status: full code.  Vitamin K PO 5 mg daily x 3 days (7/8-7/10) F: maintenance IVF   E: prn   N:  Diet: Consistent carbs w/ fluid restriction  DVT ppx: SCDs, holding VTE ppx given severe anemia and thrombocytopenia  GI ppx: none  GI motility: senna prn  dispo: [facility]  Code Status: full code.  Vitamin K PO 5 mg daily x 3 days (7/8-7/10) for prolonged PT

## 2023-07-08 NOTE — CONSULT NOTE ADULT - PROBLEM SELECTOR RECOMMENDATION 9
Pt. with hyperkalemia in setting of normal renal function with marked hyperglycemia due to recent steroid use. Serum potassium of 6.0 on admission. Endocrinology had been consulted due to markedly elevated blood sugars. He received Lokelma this am, serum potassium is currently 5.7. No overt EKG changes have been noted per primary team. Pt would likely benefit from insulin therapy to reduce blood sugar and promote shifting of potassium intracellularly. At the same time, patient has recently diagnosed Lymphoma and anticipates initiation of treatment during this admission thus will need to monitor for possible TLS. Serum phos slightly elevated at 4.1, Calcium WNLs and Uric Acid of 3.2 (low). SCr within normal limits and he states he has been urinating without difficulty.    Recommend VBG with lytes  Recommend serial EKGs  Recommend treatment of elevated blood sugar as above  Recommend to continue with Lokelma 10mg (daily for now, can increase accordingly)  Low potassium diet advised  Monitor serum potassium    Of note, we discussed possibility of need renal replacement therapy if he develops anuric renal failure or persistent electrolyte abnormalities that are not responsive to medical treatment. At this time, he wishes to "think about it" and discuss it with his family. He did mention that if it were a "life threatening" situation, then he would be agreeable to HD/RRT but again, he deferred signing consents at this time. Pt. with hyperkalemia (v ?pseudohyperkalemia) in setting of normal renal function with marked hyperglycemia due to recent steroid use. Serum potassium of 6.0 on admission. Endocrinology had been consulted due to markedly elevated blood sugars. He received Lokelma this am, serum potassium is currently 5.7. No overt EKG changes have been noted per primary team. Pt would likely benefit from insulin therapy to reduce blood sugar and promote shifting of potassium intracellularly. At the same time, patient has recently diagnosed Lymphoma and anticipates initiation of treatment during this admission thus will need to monitor for possible TLS. Serum phos slightly elevated at 4.1, Calcium WNLs and Uric Acid of 3.2 (low). SCr within normal limits and he states he has been urinating without difficulty.    Recommend VBG with lytes (to confirm hyperkalemia)  Recommend serial EKGs  Recommend treatment of elevated blood sugar as above  Can hold further Lokelma until VBG returns  Low potassium diet advised  Monitor serum potassium    Of note, we discussed possibility of need renal replacement therapy if he develops anuric renal failure or persistent electrolyte abnormalities that are not responsive to medical treatment. At this time, he wishes to "think about it" and discuss it with his family. He did mention that if it were a "life threatening" situation, then he would be agreeable to HD/RRT but again, he deferred signing consents at this time.

## 2023-07-08 NOTE — PROGRESS NOTE ADULT - PROBLEM SELECTOR PLAN 10
F: maintenance IVF   E: prn   N:  Diet: Consistent carbs w/ fluid restriction  DVT ppx: SCDs, holding VTE ppx given severe anemia and thrombocytopenia  GI ppx: none  GI motility: senna prn  dispo: [facility]  Code Status: full code. Patient previously had swelling of left forearm at previous IV site, now resolved.  - continue to monitor, warm packs to arm as needed

## 2023-07-08 NOTE — DIETITIAN INITIAL EVALUATION ADULT - ADD RECOMMEND
1) Continue Consistent carbohydrate diet; defer fluid to Team  2) Add Ensure Max BID   3) Monitor BG levels closely   4) Honor food preferences to promote adequate PO intake   5) Monitor PO intake, diet tolerance, weight trends, labs, GI function, and skin integrity  6) Malnutrition sticker placed

## 2023-07-08 NOTE — DIETITIAN INITIAL EVALUATION ADULT - PROBLEM SELECTOR PLAN 7
·  Problem: Superficial thrombophlebitis of upper extremity.   ·  Plan: Patient previously had swelling of left forearm at previous IV site, now resolved.  - continue to monitor, warm packs to arm as needed

## 2023-07-08 NOTE — PROGRESS NOTE ADULT - PROBLEM SELECTOR PLAN 4
Noted to have systolic murmur of LUSB vs. apex on exam,. Echo findings unremarkable so likely flow murmur 2/2 severe anemia rather than undiagnosed aortic valve pathology.     Plan:  - Manage anemia as above.  - Monitor respiratory status on standing fluids. Hx of DM2, on insulin (Novolin N) 35U qAM/15-20U qPM at home  - lipid panel unremarkable  - a1c 5.7%  - restart insulin at 10u given elevated glucoses recently  - start ISS; adjust per patient's insulin requirements  - monitor FS for goal -180 while inpatient.  - Monitor sugars while patient on dexamethasone.  - f/u fructosamine for elevated glucoses but normal a1c.  7/8 BGs consistently in the 400s. 12 U sliding scale this AM without resolution. 12 U + 10 additional U around lunch time also without resolution.   7/8 bedtime lantus switched to 20 U from 10 U  7/8 Endocrine consulted, will alter regimen according to recs (added 15 lantus during the day and lispro premeals)  Anticipating dexamethasone 20 mg tomorrow. Will assess hyperglycemia status prior to administering.

## 2023-07-08 NOTE — PROGRESS NOTE ADULT - PROBLEM SELECTOR PLAN 3
Patient with anemia and thrombocytopenia with some schistocytes on smear, labs with elevated DDimer, low fibrinogen, prolonged PT, elevated LDH with low haptoglobin -> concerning for acute DIC  -fibrinogen improving 150 > 160 > 186 > 187 , d-dimer 12k    Plan:  - Trend coags, DDimer, fibrinogen, TBili/LFTs  - cryoprecipitate 10 units for fibrinogen < 150  - Noted to have trace b/l LE edema and new LUE edema (now resolved) ->  VA duplex b/l LE ordered and negative  - VA duplex LUE found superficial vein thrombosis but otherwise negative, will continue to monitor. Patient with anemia and thrombocytopenia with some schistocytes on smear, labs with elevated DDimer, low fibrinogen, prolonged PT, elevated LDH with low haptoglobin -> concerning for acute DIC  -fibrinogen improving 150 > 160 > 186 > 187 , d-dimer 12k  - Trend coags, DDimer, fibrinogen, TBili/LFTs  - cryoprecipitate 10 units for fibrinogen < 150  - Noted to have trace b/l LE edema and new LUE edema (now resolved) ->  VA duplex b/l LE ordered and negative  - VA duplex LUE found superficial vein thrombosis but otherwise negative, will continue to monitor.

## 2023-07-08 NOTE — PROGRESS NOTE ADULT - NS ATTEND AMEND GEN_ALL_CORE FT
63 year old male with  PMH DM2 (a1c 5.7) but no other known hx (pt. has not seen PCP in some time), admitted to [facility]  on 7/2 with malaise, B-symptoms (fevers/chills, weight loss, night sweats), hepatosplenomegaly and labs c/f B-PLL, started on hydroxyurea 1500 BID and allopurinol, transferred to [facility] for further management. Leukocytosis, anemia and thrombocytopenia secondary to disease.     B-cell prolymphocytic leukemia.   Flow cytometry findings consistent with CD5 negative, CD10 negative B-cell lymphoproliferative disorder/lymphoma  Plan to initiate treatment with Rituximab 375 mg/m2 = 735 mg split into two doses. 100 mg IV on Day 1, 635 mg IV on Day 2. Dexamethasone 12 mg IV given on 7/7, will give Dexamethsone 20 mg x 1 on Day 1 and Day 2 with Rituxan and Elitek 3 mg on day 1  On 7/8, I discussed potential benefit of initial treatment with Rituxan as well as potential side effects with patient, and after all questions addressed, patient agreed to proceed with treatment. Chemotherapy/immunotherapy consent form signed.   - HIV and Hep B/C negative  - F/u BMBx FISH, cytogenetics, and biopsy results. F/u G6PD results.  - s/p hydroxyurea on 7/7  - C/w IVF and allopurinol  - Monitor TLS labs and Coags BID  - receiving prbcs for hgb<7.0, platelets <10K or <20K if febrile   - hepatosplenomegaly noted on CT including splenic infarcts, may need repeat imaging in future to further evaluate  On 7/8, patient with hyperkalemia 6.0, then 5.7 and hyperphosphatemia- Renal consult called as high risk for TLS. Lokelma x 1 dose given.   7/8 Phoslo 667 mg x3 doses PO for hyperphosphatemia  Endocrine consult for persistent hyperglycemia on Insulin coverage.   Plan to start Rituxan on 7/9 once hyperglycemia and hyperkalemia are well controlled.    ID- patient on Zosyn IV at [facility], will continue

## 2023-07-08 NOTE — PROVIDER CONTACT NOTE (OTHER) - ACTION/TREATMENT ORDERED:
Sliding scale insulin 12u, lantus to be increased due to pt being on steroids
awaiting new pa orders
Pending IV extension order. Discussed with SHAILESH Echeverria

## 2023-07-08 NOTE — DIETITIAN INITIAL EVALUATION ADULT - OTHER INFO
GI/Intake:  -Per RN, good PO intake of breakfast   -Agreeable to PO nutrition supplements  -Last BM documented ; bowel regimen ordered (Senna)     Endo:   -Hx of DM; latest A1c: 5.7% - controlled  -Endorses taking Novolin daily   -Pt received steroids ; BG >400mg/dL   -20 units Lantus, sliding scale insulin ordered for coverage; Endo consulted     Renal:  -Hyponatremic; NaCl ordered, noted on fluid restriction  -Hypokalemic - replete PRN   -Hyperphosphatemic     Onc:   -Newly dx ALL   -Onc Team following     Weight Hx:   -Current dosin pounds   -Per flowsheet: 172 pounds ()   -Per Pt, UBW: unknown   -Pt reports that he's noticed unintentional weight loss x past 2 weeks

## 2023-07-08 NOTE — DIETITIAN INITIAL EVALUATION ADULT - PROBLEM SELECTOR PLAN 8
CT scan showed Chronic pancreatitis with pancreatic ductal dilatation and possible intraductal calcification in the pancreatic head. No heavy alcohol use. U/S 7/2 showed Contracted gallbladder filled with stones. No secondary findings of acute cholecystitis. Bili 1.0, no transaminitis. No abdominal pain.  - monitoer for now

## 2023-07-08 NOTE — CONSULT NOTE ADULT - SUBJECTIVE AND OBJECTIVE BOX
[facility] DIVISION OF KIDNEY DISEASES AND HYPERTENSION -- [phone]  -- INITIAL CONSULT NOTE  --------------------------------------------------------------------------------  HPI: 62 yo M with a PMH of DM and recently diagnosed with Lymphoma. Plan to initiate inpatient treatment with Rituxan. Serum potassium elevated at 6.0. Nephrology consulted for hyperkalemia and monitoring of TLS.     Pt seen and examined at bedside. He is feeling well. Feels as though his blood sugars are elevated because he recently started steroids. Denied shortness of breath, fevers, chills, nausea, vomiting, leg swelling, difficulty with urination.     PAST HISTORY  --------------------------------------------------------------------------------  PAST MEDICAL & SURGICAL HISTORY:  DM (diabetes mellitus)  No significant past surgical history    FAMILY HISTORY:  FH: breast cancer (Mother)  FH: CVA (cerebrovascular accident) (Father)  FH: heart disease (Father)  Family history of pancreatic cancer (Aunt)  Family history of stomach cancer (Aunt)    PAST SOCIAL HISTORY: Denied illicit drugs     ALLERGIES & MEDICATIONS  --------------------------------------------------------------------------------  Allergies  No Known Allergies  Intolerances    Standing Inpatient Medications  allopurinol 300 milliGRAM(s) Oral every 24 hours  calcium acetate 667 milliGRAM(s) Oral three times a day with meals  dextrose 5%. 1000 milliLiter(s) IV Continuous <Continuous>  dextrose 5%. 1000 milliLiter(s) IV Continuous <Continuous>  dextrose 50% Injectable 25 Gram(s) IV Push once  dextrose 50% Injectable 12.5 Gram(s) IV Push once  dextrose 50% Injectable 25 Gram(s) IV Push once  glucagon  Injectable 1 milliGRAM(s) IntraMuscular once  insulin glargine Injectable (LANTUS) 20 Unit(s) SubCutaneous at bedtime  insulin lispro (ADMELOG) corrective regimen sliding scale   SubCutaneous three times a day before meals  insulin lispro (ADMELOG) corrective regimen sliding scale   SubCutaneous at bedtime  insulin lispro Injectable (ADMELOG) 10 Unit(s) SubCutaneous three times a day before meals  levothyroxine 100 MICROGram(s) Oral every 24 hours  melatonin 3 milliGRAM(s) Oral at bedtime  piperacillin/tazobactam IVPB.. 3.375 Gram(s) IV Intermittent every 8 hours  rasburicase IVPB 3 milliGRAM(s) IV Intermittent once  sodium chloride 0.9%. 1000 milliLiter(s) IV Continuous <Continuous>    PRN Inpatient Medications  acetaminophen     Tablet .. 650 milliGRAM(s) Oral every 6 hours PRN  dextrose Oral Gel 15 Gram(s) Oral once PRN  senna 2 Tablet(s) Oral at bedtime PRN    REVIEW OF SYSTEMS  --------------------------------------------------------------------------------  Gen: No fevers/chills  Head/Eyes/Ears: No HA  Respiratory: No dyspnea, cough  CV: No chest pain  GI: No abdominal pain, diarrhea  : No dysuria, hematuria  MSK: No edema  Skin: No rashes  Heme: No easy bruising or bleeding    All other systems were reviewed and are negative, except as noted.    VITALS/PHYSICAL EXAM  --------------------------------------------------------------------------------  T(C): 37.1 (07-08-23 @ 13:02), Max: 38.1 (07-07-23 @ 17:40)  HR: 84 (07-08-23 @ 13:02) (69 - 84)  BP: 118/60 (07-08-23 @ 13:02) (118/60 - 163/75)  RR: 18 (07-08-23 @ 13:02) (18 - 18)  SpO2: 97% (07-08-23 @ 13:02) (96% - 98%)  Wt(kg): --  Height (cm): 177.8 (07-08-23 @ 08:52)  Weight (kg): 78 (07-08-23 @ 08:52)  BMI (kg/m2): 24.7 (07-08-23 @ 08:52)  BSA (m2): 1.96 (07-08-23 @ 08:52)    07-07-23 @ 07:01  -  07-08-23 @ 07:00  --------------------------------------------------------  IN: 915 mL / OUT: 775 mL / NET: 140 mL    07-08-23 @ 07:01  -  07-08-23 @ 14:55  --------------------------------------------------------  IN: 120 mL / OUT: 300 mL / NET: -180 mL    Physical Exam:  	Gen: Laying in bed abd in NAD  	HEENT: Anicteric  	Pulm: CTA B/L  	CV: S1S2+  	Abd: Soft, +BS    	Ext: No LE edema B/L  	Neuro: Awake  	Skin: Warm and dry    LABS/STUDIES  --------------------------------------------------------------------------------              7.9    48.96 >-----------<  64       [07-08-23 @ 07:17]              25.2     127  |  92  |  29  ----------------------------<  475      [07-08-23 @ 08:52]  5.7   |  24  |  0.89        Ca     9.0     [07-08-23 @ 08:52]      Mg     2.4     [07-08-23 @ 08:52]      Phos  5.3     [07-08-23 @ 08:52]    TPro  6.6  /  Alb  3.0  /  TBili  0.8  /  DBili  x   /  AST  22  /  ALT  8   /  AlkPhos  126  [07-08-23 @ 08:52]    PT/INR: PT 16.8 , INR 1.44       [07-07-23 @ 06:40]  PTT: 33.8       [07-07-23 @ 06:40]    Uric acid 3.2      [07-08-23 @ 07:11]        [07-08-23 @ 07:11]    Creatinine Trend:  SCr 0.89 [07-08 @ 08:52]  SCr 0.93 [07-08 @ 07:11]  SCr 0.85 [07-07 @ 06:40]  SCr 0.77 [07-06 @ 06:05]  SCr 0.80 [07-05 @ 06:15]    Urinalysis - [07-08-23 @ 08:52]      Color  / Appearance  / SG  / pH       Gluc 475 / Ketone   / Bili  / Urobili        Blood  / Protein  / Leuk Est  / Nitrite       RBC  / WBC  / Hyaline  / Gran  / Sq Epi  / Non Sq Epi  / Bacteria     Urine Creatinine 15      [07-08-23 @ 12:49]  Urine Sodium 94      [07-08-23 @ 12:49]  Urine Potassium 24      [07-08-23 @ 12:49]  Urine Osmolality 347      [07-08-23 @ 12:49]    HBsAb Nonreact      [07-06-23 @ 06:05]  HBsAg Nonreact      [07-06-23 @ 06:05]  HBcAb Nonreact      [07-06-23 @ 06:05]  HCV 0.42, Nonreact      [07-06-23 @ 06:05]  HIV Nonreact      [07-06-23 @ 06:05]    Free Light Chains: kappa 18.37, lambda 2.57, ratio = 7.15      [07-06 @ 06:05]

## 2023-07-08 NOTE — DIETITIAN INITIAL EVALUATION ADULT - PROBLEM SELECTOR PLAN 6
·  Problem: DM (diabetes mellitus).   ·  Plan: Hx of DM2, on insulin (Novolin N) 35U qAM/15-20U qPM at home  - BG since admission 140s-170s    Plan:  - lipid panel unremarkable  - a1c 5.7%  - restart insulin at 10u given elevated glucoses recently  - start ISS; adjust per patient's insulin requirements  - monitor FS for goal -180 while inpatient.  - Monitor sugars while patient on dexamethasone.  - f/u fructosamine for elevated glucoses but normal a1c

## 2023-07-08 NOTE — PROVIDER CONTACT NOTE (OTHER) - SITUATION
IV extension order
Pt Fs 486, retaken 487 -- pt asymptomatic
pt had elevated sugars in 480s- rechecked and verified. 12 u insulin given, sugars rechecked after approx 30 min- now 468

## 2023-07-08 NOTE — DIETITIAN NUTRITION RISK NOTIFICATION - TREATMENT: THE FOLLOWING DIET HAS BEEN RECOMMENDED
Diet, Consistent Carbohydrate/No Snacks:   1200mL Fluid Restriction ([id number])  Supplement Feeding Modality:  Oral  Ensure Max Cans or Servings Per Day:  2       Frequency:  Daily (07-08-23 @ 10:08) [Active]

## 2023-07-08 NOTE — PROGRESS NOTE ADULT - NS ATTEST RISK PROBLEM GEN_ALL_CORE FT
B- PLL with massive splenomegaly for Rituxan therapy at high risk for tumor lysis syndrome, also risk on infection and bleeding.

## 2023-07-08 NOTE — DIETITIAN INITIAL EVALUATION ADULT - PROBLEM SELECTOR PLAN 5
·  Problem: Elevated TSH.   ·  Plan: Pt. found to have elevated TSH to 12.35 on admission w/ symptoms of malaise, fatigue. Free T4 1.4. Pt. has not seen a PCP in some time    Plan:  - started levothyroxine 100mcg qd (1.7 mcg/kg); repeat TFTs as an outpatient in 4-6 weeks for dose adjustments.

## 2023-07-08 NOTE — PROGRESS NOTE ADULT - ASSESSMENT
63M PMH DM2 (a1c 5.7) but no other known hx (pt. has not seen PCP in some time), admitted to [facility] 7/2 with malaise, B-symptoms (fevers/chills, weight loss, night sweats), hepatosplenomegaly and labs c/f B-PLL, started on hydroxyurea 1500 BID and allopurinol, transferred to [facility] for further management   63M PMH DM2 (a1c 5.7) but no other known hx (pt. has not seen PCP in some time), admitted to [facility] 7/2 with malaise, B-symptoms (fevers/chills, weight loss, night sweats), hepatosplenomegaly and labs c/f B-PLL, started on hydroxyurea 1500 BID and allopurinol, transferred to [facility] for further management. Leukocytosis, anemia and thrombocytopenia secondary to disease.

## 2023-07-08 NOTE — DIETITIAN INITIAL EVALUATION ADULT - PERTINENT LABORATORY DATA
07-08    127<L>  |  92<L>  |  29<H>  ----------------------------<  475<HH>  5.7<H>   |  24  |  0.89    Ca    9.0      08 Jul 2023 08:52  Phos  5.3     07-08  Mg     2.4     07-08    TPro  6.6  /  Alb  3.0<L>  /  TBili  0.8  /  DBili  x   /  AST  22  /  ALT  8<L>  /  AlkPhos  126<H>  07-08  POCT Blood Glucose.: 468 mg/dL (07-08-23 @ 09:20)  A1C with Estimated Average Glucose Result: 5.7 % (07-05-23 @ 06:15)

## 2023-07-08 NOTE — DIETITIAN INITIAL EVALUATION ADULT - PROBLEM SELECTOR PLAN 4
·  Problem: Hyponatremia.   ·  Plan: patient has mild hyponatremia trending 132 > 129 > 132. > 128 >130 > 128 Patient mildly fatigued (likely from anemia) otherwise asymptomatic. Serum osmolality 291, UCr 50, Daniela 108, UOsm 468. Hyponatremia likely in setting of SIADH 2/2 hypothyroidism  - Will increase fluid restriction to 1.2L, can consider salt tablets if Na does not improve  - Trend Na especially on standing IVF

## 2023-07-08 NOTE — PROGRESS NOTE ADULT - PROBLEM SELECTOR PLAN 5
patient has mild hyponatremia trending 132 > 129 > 132. > 128 >130 > 128 Patient mildly fatigued (likely from anemia) otherwise asymptomatic. Serum osmolality 291, UCr 50, Daniela 108, UOsm 468. Hyponatremia likely in setting of SIADH 2/2 hypothyroidism  - Will increase fluid restriction to 1.2L, can consider salt tablets if Na does not improve  - Trend Na especially on standing IVF. Noted to have systolic murmur of LUSB vs. apex on exam,. Echo findings unremarkable so likely flow murmur 2/2 severe anemia rather than undiagnosed aortic valve pathology.   - Manage anemia as above.  - Monitor respiratory status on standing fluids.

## 2023-07-08 NOTE — DIETITIAN INITIAL EVALUATION ADULT - PROBLEM SELECTOR PLAN 1
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   ·  Flow cytometry findings consistent with CD5 negative, CD10 negative B-cell lymphoproliferative disorder/lymphoma  - peripheral smear showing thrombocytopenia, lymphocytes of varying maturity w/ high N:C ratio, visible nucleoli, 1-2 schistocytes per HPF  -WBC elevated to 68.5 in setting of suspected ALL (7/6)  - s/p 4U PRBC at [facility]    - HIV and Hep B/C negative, folate and B12 unremarkable, ferritin elevated 428  - Plan to treat with rituximab on 7/8 will give pre-treatment dexamethasone 12mg on 7/7 (further steroids with rituximab)  - F/u BMBx FISH, cytogenetics, and biopsy results   - F/u G6PD results  - Will give hydroxurea PM dose on 7/7 1500mg and d/c once patient starts getting rituximab on 7/8  - reassess ongoing hydrea pending CBC 7/8 AM  - C/w IVFs NS 75cc/hr, allopurinol 300mg daily.   - Monitor daily coags (PT, aPTT, INR, fibrinogen, d-dimer) along with TLS labs (uric acid, LDH, Ca, K, phos).   - prbcs for hgb<7.0, platelets <10K or <20K if febrile   - monitor temperature curve and vital signs  - hepatosplenomegaly noted on CT incliuding splenic infarcts, may need repeat imaging in future to further evaluate

## 2023-07-08 NOTE — PROGRESS NOTE ADULT - PROBLEM SELECTOR PLAN 2
febrile to 100.5 F on admission to [facility] 7/7 PM, improved to 99.2 after tylenol.  exam with RLL crackles but breathing comfortably on room air. No cough or SOB. No dysuria.  HIV negative and RVP negative on admission to [facility] this past week.  not currently neutropenic  Plan:  - start zosyn for now, monitor and reassess need for Abx pending clincal course and labs  - f/u CXR  - blood cultures. Febrile to 100.5 F on admission to [facility] 7/7 PM, improved to 99.2 after tylenol.  exam with RLL crackles but breathing comfortably on room air. No cough or SOB. No dysuria.  HIV negative and RVP negative on admission to [facility] this past week.  not currently neutropenic  On empeiric zosyn  CXR with no acute pulmonary disease. Febrile to 100.5 F on admission to [facility] 7/7 PM, improved to 99.2 after tylenol.  exam with RLL crackles but breathing comfortably on room air. No cough or SOB. No dysuria.  HIV negative and RVP negative on admission to [facility] this past week.  not currently neutropenic  On empiric zosyn  CXR with no acute pulmonary disease.  Follow up 7/6 blood cultures.

## 2023-07-08 NOTE — DIETITIAN INITIAL EVALUATION ADULT - PERTINENT MEDS FT
MEDICATIONS  (STANDING):  allopurinol 300 milliGRAM(s) Oral every 24 hours  dextrose 5%. 1000 milliLiter(s) (100 mL/Hr) IV Continuous <Continuous>  dextrose 5%. 1000 milliLiter(s) (50 mL/Hr) IV Continuous <Continuous>  dextrose 50% Injectable 25 Gram(s) IV Push once  dextrose 50% Injectable 25 Gram(s) IV Push once  dextrose 50% Injectable 12.5 Gram(s) IV Push once  glucagon  Injectable 1 milliGRAM(s) IntraMuscular once  insulin glargine Injectable (LANTUS) 20 Unit(s) SubCutaneous at bedtime  insulin lispro (ADMELOG) corrective regimen sliding scale   SubCutaneous at bedtime  insulin lispro (ADMELOG) corrective regimen sliding scale   SubCutaneous three times a day before meals  levothyroxine 100 MICROGram(s) Oral every 24 hours  melatonin 3 milliGRAM(s) Oral at bedtime  piperacillin/tazobactam IVPB.. 3.375 Gram(s) IV Intermittent every 8 hours  sodium chloride 0.9%. 1000 milliLiter(s) (75 mL/Hr) IV Continuous <Continuous>    MEDICATIONS  (PRN):  acetaminophen     Tablet .. 650 milliGRAM(s) Oral every 6 hours PRN Temp greater or equal to 38C (100.4F), Mild Pain (1 - 3)  dextrose Oral Gel 15 Gram(s) Oral once PRN Blood Glucose LESS THAN 70 milliGRAM(s)/deciliter  senna 2 Tablet(s) Oral at bedtime PRN Constipation

## 2023-07-08 NOTE — PROGRESS NOTE ADULT - TIME BILLING
review of medical records and lab testing from [facility], performing review of systems, physical exam, lab review and discussion of plan of care with patient including discussion of treatment with consent obtained for treatment.

## 2023-07-08 NOTE — PROGRESS NOTE ADULT - PROBLEM SELECTOR PLAN 8
Hx of DM2, on insulin (Novolin N) 35U qAM/15-20U qPM at home  - BG since admission 140s-170s    Plan:  - lipid panel unremarkable  - a1c 5.7%  - restart insulin at 10u given elevated glucoses recently  - start ISS; adjust per patient's insulin requirements  - monitor FS for goal -180 while inpatient.  - Monitor sugars while patient on dexamethasone.  - f/u fructosamine for elevated glucoses but normal a1c. CT scan showed Chronic pancreatitis with pancreatic ductal dilatation and possible intraductal calcification in the pancreatic head. No heavy alcohol use. U/S 7/2 showed Contracted gallbladder filled with stones. No secondary findings of acute cholecystitis. Bili 1.0, no transaminitis. No abdominal pain.  - monitor for now

## 2023-07-08 NOTE — CONSULT NOTE ADULT - ATTENDING COMMENTS
64 yo M with a PMH of DM and recently diagnosed with Lymphoma. Plan to initiate inpatient treatment with Rituxan. Serum potassium elevated at 6.0. Nephrology consulted for hyperkalemia and monitoring of TLS.     Hyperkalemia- pseudo due to high WBC count. plasma K okay   Hyponatremia- due to hyperglycemia. corrected sodium okay   Creatinine 0.8   monitor TLS Labs. we will follow    rhonda lyon  nephrology attending   please contact me on TEAMS   Office- [phone]

## 2023-07-08 NOTE — DIETITIAN INITIAL EVALUATION ADULT - NS FNS DIET ORDER
Diet, Regular:   Consistent Carbohydrate {No Snacks} (CSTCHO)  1200mL Fluid Restriction ([id number]) (07-07-23 @ 19:02)

## 2023-07-08 NOTE — PROGRESS NOTE ADULT - PROBLEM SELECTOR PLAN 1
Concern for Acute lymphoid leukemia.   ·  Flow cytometry findings consistent with CD5 negative, CD10 negative B-cell lymphoproliferative disorder/lymphoma  - peripheral smear showing thrombocytopenia, lymphocytes of varying maturity w/ high N:C ratio, visible nucleoli, 1-2 schistocytes per HPF  -WBC elevated to 68.5 in setting of suspected ALL (7/6)  - s/p 4U PRBC at [facility]    - HIV and Hep B/C negative, folate and B12 unremarkable, ferritin elevated 428  - Plan to treat with rituximab on 7/8 will give pre-treatment dexamethasone 12mg on 7/7 (further steroids with rituximab)  - F/u BMBx FISH, cytogenetics, and biopsy results   - F/u G6PD results  - Will give hydroxurea PM dose on 7/7 1500mg and d/c once patient starts getting rituximab on 7/8  - reassess ongoing hydrea pending CBC 7/8 AM  - C/w IVFs NS 75cc/hr, allopurinol 300mg daily.   - Monitor daily coags (PT, aPTT, INR, fibrinogen, d-dimer) along with TLS labs (uric acid, LDH, Ca, K, phos).   - prbcs for hgb<7.0, platelets <10K or <20K if febrile   - monitor temperature curve and vital signs  - hepatosplenomegaly noted on CT incliuding splenic infarcts, may need repeat imaging in future to further evaluate. Flow cytometry findings consistent with CD5 negative, CD10 negative B-cell lymphoproliferative disorder/lymphoma  - peripheral smear showing thrombocytopenia, lymphocytes of varying maturity w/ high N:C ratio, visible nucleoli, 1-2 schistocytes per HPF  -WBC elevated to 68.5 in setting of suspected ALL (7/6)  - s/p 4U PRBC at [facility]    - HIV and Hep B/C negative, folate and B12 unremarkable, ferritin elevated 428  - Plan to treat with rituximab on 7/8 will give pre-treatment dexamethasone 12mg on 7/7 (further steroids with rituximab)  - F/u BMBx FISH, cytogenetics, and biopsy results. F/u G6PD results.  - s/p hydroxyurea on 7/7  - reassess ongoing hydrea pending CBC 7/8 AM  - C/w IVF and allopurinol  - Monitor daily coags (PT, aPTT, INR, fibrinogen, d-dimer) along with TLS labs (uric acid, LDH, Ca, K, phos).   - prbcs for hgb<7.0, platelets <10K or <20K if febrile   - hepatosplenomegaly noted on CT incliuding splenic infarcts, may need repeat imaging in future to further evaluate. 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 100 mg on Day 1, 635 mg on Day 2. Dexamethasone 12 mg on 7/7, Dexamethsone 20 mg x 1 on Day 1 and Day 2.  - HIV and Hep B/C negative, folate and B12 unremarkable, ferritin elevated 428  - F/u BMBx FISH, cytogenetics, and biopsy results. F/u G6PD results.  - s/p hydroxyurea on 7/7  - C/w IVF and allopurinol  - Monitor daily coags (PT, aPTT, INR, fibrinogen, d-dimer) along with TLS labs (uric acid, LDH, Ca, K, phos).   - prbcs for hgb<7.0, platelets <10K or <20K if febrile   - hepatosplenomegaly noted on CT incliuding splenic infarcts, may need repeat imaging in future to further evaluate  Plan to start rituxan on 7/9 once hyperglycemia and hyperkalemia are well controlled. 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 100 mg on Day 1, 635 mg on Day 2. Dexamethasone 12 mg on 7/7, Dexamethsone 20 mg x 1 on Day 1 and Day 2.  - HIV and Hep B/C negative, folate and B12 unremarkable, ferritin elevated 428  - F/u BMBx FISH, cytogenetics, and biopsy results. F/u G6PD results.  - s/p hydroxyurea on 7/7  - C/w IVF and allopurinol  - Monitor TLS labs and Coags BID  - prbcs for hgb<7.0, platelets <10K or <20K if febrile   - hepatosplenomegaly noted on CT incliuding splenic infarcts, may need repeat imaging in future to further evaluate  7/8 Phoslo 667 mg x3 doses PO for hyperphosphatemia  Plan to start rituxan on 7/9 once hyperglycemia and hyperkalemia are well controlled.

## 2023-07-08 NOTE — DIETITIAN INITIAL EVALUATION ADULT - REASON FOR ADMISSION
"63M PMH DM2 (a1c 5.7) but no other known hx (pt. has not seen PCP in some time), admitted to [facility] 7/2 with malaise, B-symptoms (fevers/chills, weight loss, night sweats), hepatosplenomegaly and labs c/f B-PLL, started on hydroxyurea 1500 BID and allopurinol, transferred to [facility] for further management"

## 2023-07-09 LAB
ALBUMIN SERPL ELPH-MCNC: 2.9 G/DL — LOW (ref 3.3–5)
ALBUMIN SERPL ELPH-MCNC: 3 G/DL — LOW (ref 3.3–5)
ALP SERPL-CCNC: 112 U/L — SIGNIFICANT CHANGE UP (ref 40–120)
ALP SERPL-CCNC: 122 U/L — HIGH (ref 40–120)
ALT FLD-CCNC: 7 U/L — LOW (ref 10–45)
ALT FLD-CCNC: 7 U/L — LOW (ref 10–45)
ANION GAP SERPL CALC-SCNC: 12 MMOL/L — SIGNIFICANT CHANGE UP (ref 5–17)
ANION GAP SERPL CALC-SCNC: 13 MMOL/L — SIGNIFICANT CHANGE UP (ref 5–17)
ANISOCYTOSIS BLD QL: SLIGHT — SIGNIFICANT CHANGE UP
APTT BLD: 22.6 SEC — LOW (ref 27.5–35.5)
APTT BLD: 26.7 SEC — LOW (ref 27.5–35.5)
APTT BLD: 29.5 SEC — SIGNIFICANT CHANGE UP (ref 27.5–35.5)
AST SERPL-CCNC: 15 U/L — SIGNIFICANT CHANGE UP (ref 10–40)
AST SERPL-CCNC: 27 U/L — SIGNIFICANT CHANGE UP (ref 10–40)
BASOPHILS # BLD AUTO: 0 K/UL — SIGNIFICANT CHANGE UP (ref 0–0.2)
BASOPHILS # BLD AUTO: 0 K/UL — SIGNIFICANT CHANGE UP (ref 0–0.2)
BASOPHILS NFR BLD AUTO: 0 % — SIGNIFICANT CHANGE UP (ref 0–2)
BASOPHILS NFR BLD AUTO: 0 % — SIGNIFICANT CHANGE UP (ref 0–2)
BILIRUB SERPL-MCNC: 0.7 MG/DL — SIGNIFICANT CHANGE UP (ref 0.2–1.2)
BILIRUB SERPL-MCNC: 0.8 MG/DL — SIGNIFICANT CHANGE UP (ref 0.2–1.2)
BUN SERPL-MCNC: 35 MG/DL — HIGH (ref 7–23)
BUN SERPL-MCNC: 36 MG/DL — HIGH (ref 7–23)
CALCIUM SERPL-MCNC: 9.2 MG/DL — SIGNIFICANT CHANGE UP (ref 8.4–10.5)
CALCIUM SERPL-MCNC: 9.4 MG/DL — SIGNIFICANT CHANGE UP (ref 8.4–10.5)
CALCIUM UR-MCNC: 4.3 MG/DL — SIGNIFICANT CHANGE UP
CHLORIDE SERPL-SCNC: 95 MMOL/L — LOW (ref 96–108)
CHLORIDE SERPL-SCNC: 97 MMOL/L — SIGNIFICANT CHANGE UP (ref 96–108)
CO2 SERPL-SCNC: 24 MMOL/L — SIGNIFICANT CHANGE UP (ref 22–31)
CO2 SERPL-SCNC: 26 MMOL/L — SIGNIFICANT CHANGE UP (ref 22–31)
CREAT SERPL-MCNC: 1.07 MG/DL — SIGNIFICANT CHANGE UP (ref 0.5–1.3)
CREAT SERPL-MCNC: 1.09 MG/DL — SIGNIFICANT CHANGE UP (ref 0.5–1.3)
D DIMER BLD IA.RAPID-MCNC: HIGH NG/ML DDU
EGFR: 77 ML/MIN/1.73M2 — SIGNIFICANT CHANGE UP
EGFR: 79 ML/MIN/1.73M2 — SIGNIFICANT CHANGE UP
EOSINOPHIL # BLD AUTO: 0 K/UL — SIGNIFICANT CHANGE UP (ref 0–0.5)
EOSINOPHIL # BLD AUTO: 0 K/UL — SIGNIFICANT CHANGE UP (ref 0–0.5)
EOSINOPHIL NFR BLD AUTO: 0 % — SIGNIFICANT CHANGE UP (ref 0–6)
EOSINOPHIL NFR BLD AUTO: 0 % — SIGNIFICANT CHANGE UP (ref 0–6)
FIBRINOGEN PPP-MCNC: 207 MG/DL — SIGNIFICANT CHANGE UP (ref 200–445)
G6PD RBC-CCNC: 26 U/G HGB — HIGH (ref 7–20.5)
GLUCOSE BLDC GLUCOMTR-MCNC: 256 MG/DL — HIGH (ref 70–99)
GLUCOSE BLDC GLUCOMTR-MCNC: 259 MG/DL — HIGH (ref 70–99)
GLUCOSE BLDC GLUCOMTR-MCNC: 312 MG/DL — HIGH (ref 70–99)
GLUCOSE BLDC GLUCOMTR-MCNC: 352 MG/DL — HIGH (ref 70–99)
GLUCOSE SERPL-MCNC: 222 MG/DL — HIGH (ref 70–99)
GLUCOSE SERPL-MCNC: 328 MG/DL — HIGH (ref 70–99)
HCT VFR BLD CALC: 23 % — LOW (ref 39–50)
HCT VFR BLD CALC: 24.4 % — LOW (ref 39–50)
HGB BLD-MCNC: 7.4 G/DL — LOW (ref 13–17)
HGB BLD-MCNC: 7.6 G/DL — LOW (ref 13–17)
INR BLD: 1.28 RATIO — HIGH (ref 0.88–1.16)
INR BLD: 1.28 RATIO — HIGH (ref 0.88–1.16)
INR BLD: 1.35 RATIO — HIGH (ref 0.88–1.16)
LDH SERPL L TO P-CCNC: 535 U/L — HIGH (ref 50–242)
LDH SERPL L TO P-CCNC: 683 U/L — HIGH (ref 50–242)
LYMPHOCYTES # BLD AUTO: 31.56 K/UL — HIGH (ref 1–3.3)
LYMPHOCYTES # BLD AUTO: 7.75 K/UL — HIGH (ref 1–3.3)
LYMPHOCYTES # BLD AUTO: 72.4 % — HIGH (ref 13–44)
LYMPHOCYTES # BLD AUTO: 98 % — HIGH (ref 13–44)
MAGNESIUM SERPL-MCNC: 2.1 MG/DL — SIGNIFICANT CHANGE UP (ref 1.6–2.6)
MAGNESIUM SERPL-MCNC: 2.2 MG/DL — SIGNIFICANT CHANGE UP (ref 1.6–2.6)
MANUAL SMEAR VERIFICATION: SIGNIFICANT CHANGE UP
MCHC RBC-ENTMCNC: 24.9 PG — LOW (ref 27–34)
MCHC RBC-ENTMCNC: 25.7 PG — LOW (ref 27–34)
MCHC RBC-ENTMCNC: 31.1 GM/DL — LOW (ref 32–36)
MCHC RBC-ENTMCNC: 32.2 GM/DL — SIGNIFICANT CHANGE UP (ref 32–36)
MCV RBC AUTO: 79.9 FL — LOW (ref 80–100)
MCV RBC AUTO: 80 FL — SIGNIFICANT CHANGE UP (ref 80–100)
MONOCYTES # BLD AUTO: 0 K/UL — SIGNIFICANT CHANGE UP (ref 0–0.9)
MONOCYTES # BLD AUTO: 0 K/UL — SIGNIFICANT CHANGE UP (ref 0–0.9)
MONOCYTES NFR BLD AUTO: 0 % — LOW (ref 2–14)
MONOCYTES NFR BLD AUTO: 0 % — LOW (ref 2–14)
NEUTROPHILS # BLD AUTO: 0.64 K/UL — LOW (ref 1.8–7.4)
NEUTROPHILS # BLD AUTO: 2.86 K/UL — SIGNIFICANT CHANGE UP (ref 1.8–7.4)
NEUTROPHILS NFR BLD AUTO: 2 % — LOW (ref 43–77)
NEUTROPHILS NFR BLD AUTO: 26.7 % — LOW (ref 43–77)
PHOSPHATE 24H UR-MCNC: 34.5 MG/DL — SIGNIFICANT CHANGE UP
PHOSPHATE SERPL-MCNC: 4.2 MG/DL — SIGNIFICANT CHANGE UP (ref 2.5–4.5)
PHOSPHATE SERPL-MCNC: 4.2 MG/DL — SIGNIFICANT CHANGE UP (ref 2.5–4.5)
PLAT MORPH BLD: NORMAL — SIGNIFICANT CHANGE UP
PLATELET # BLD AUTO: 49 K/UL — LOW (ref 150–400)
PLATELET # BLD AUTO: 62 K/UL — LOW (ref 150–400)
POTASSIUM SERPL-MCNC: 4.6 MMOL/L — SIGNIFICANT CHANGE UP (ref 3.5–5.3)
POTASSIUM SERPL-MCNC: 5.2 MMOL/L — SIGNIFICANT CHANGE UP (ref 3.5–5.3)
POTASSIUM SERPL-SCNC: 4.6 MMOL/L — SIGNIFICANT CHANGE UP (ref 3.5–5.3)
POTASSIUM SERPL-SCNC: 5.2 MMOL/L — SIGNIFICANT CHANGE UP (ref 3.5–5.3)
PROT ?TM UR-MCNC: 4 MG/DL — SIGNIFICANT CHANGE UP (ref 0–12)
PROT SERPL-MCNC: 6.6 G/DL — SIGNIFICANT CHANGE UP (ref 6–8.3)
PROT SERPL-MCNC: 6.6 G/DL — SIGNIFICANT CHANGE UP (ref 6–8.3)
PROTHROM AB SERPL-ACNC: 14.9 SEC — HIGH (ref 10.5–13.4)
PROTHROM AB SERPL-ACNC: 14.9 SEC — HIGH (ref 10.5–13.4)
PROTHROM AB SERPL-ACNC: 15.7 SEC — HIGH (ref 10.5–13.4)
RBC # BLD: 2.88 M/UL — LOW (ref 4.2–5.8)
RBC # BLD: 3.05 M/UL — LOW (ref 4.2–5.8)
RBC # FLD: 23 % — HIGH (ref 10.3–14.5)
RBC # FLD: 23.2 % — HIGH (ref 10.3–14.5)
RBC BLD AUTO: ABNORMAL
SMUDGE CELLS # BLD: PRESENT — SIGNIFICANT CHANGE UP
SODIUM SERPL-SCNC: 133 MMOL/L — LOW (ref 135–145)
SODIUM SERPL-SCNC: 134 MMOL/L — LOW (ref 135–145)
URATE SERPL-MCNC: 1.7 MG/DL — LOW (ref 3.4–8.8)
URATE SERPL-MCNC: 3.9 MG/DL — SIGNIFICANT CHANGE UP (ref 3.4–8.8)
URATE UR-MCNC: 14.8 MG/DL — SIGNIFICANT CHANGE UP
UUN UR-MCNC: 269 MG/DL — SIGNIFICANT CHANGE UP
VARIANT LYMPHS # BLD: 0.9 % — SIGNIFICANT CHANGE UP (ref 0–6)
WBC # BLD: 10.71 K/UL — HIGH (ref 3.8–10.5)
WBC # BLD: 32.2 K/UL — HIGH (ref 3.8–10.5)
WBC # FLD AUTO: 10.71 K/UL — HIGH (ref 3.8–10.5)
WBC # FLD AUTO: 32.2 K/UL — HIGH (ref 3.8–10.5)

## 2023-07-09 PROCEDURE — 99233 SBSQ HOSP IP/OBS HIGH 50: CPT

## 2023-07-09 RX ORDER — DEXAMETHASONE 0.5 MG/5ML
20 ELIXIR ORAL EVERY 24 HOURS
Refills: 0 | Status: COMPLETED | OUTPATIENT
Start: 2023-07-09 | End: 2023-07-10

## 2023-07-09 RX ORDER — ACETAMINOPHEN 500 MG
650 TABLET ORAL ONCE
Refills: 0 | Status: COMPLETED | OUTPATIENT
Start: 2023-07-09 | End: 2023-07-09

## 2023-07-09 RX ORDER — INSULIN LISPRO 100/ML
14 VIAL (ML) SUBCUTANEOUS
Refills: 0 | Status: DISCONTINUED | OUTPATIENT
Start: 2023-07-09 | End: 2023-07-10

## 2023-07-09 RX ORDER — INSULIN GLARGINE 100 [IU]/ML
40 INJECTION, SOLUTION SUBCUTANEOUS AT BEDTIME
Refills: 0 | Status: DISCONTINUED | OUTPATIENT
Start: 2023-07-09 | End: 2023-07-09

## 2023-07-09 RX ORDER — RITUXIMAB 10 MG/ML
635 INJECTION, SOLUTION INTRAVENOUS ONCE
Refills: 0 | Status: COMPLETED | OUTPATIENT
Start: 2023-07-10 | End: 2023-07-10

## 2023-07-09 RX ORDER — INSULIN GLARGINE 100 [IU]/ML
44 INJECTION, SOLUTION SUBCUTANEOUS AT BEDTIME
Refills: 0 | Status: DISCONTINUED | OUTPATIENT
Start: 2023-07-09 | End: 2023-07-10

## 2023-07-09 RX ORDER — DIPHENHYDRAMINE HCL 50 MG
50 CAPSULE ORAL ONCE
Refills: 0 | Status: COMPLETED | OUTPATIENT
Start: 2023-07-09 | End: 2023-07-09

## 2023-07-09 RX ORDER — RITUXIMAB 10 MG/ML
100 INJECTION, SOLUTION INTRAVENOUS ONCE
Refills: 0 | Status: COMPLETED | OUTPATIENT
Start: 2023-07-09 | End: 2023-07-09

## 2023-07-09 RX ORDER — RASBURICASE 7.5 MG
3 KIT INTRAVENOUS ONCE
Refills: 0 | Status: COMPLETED | OUTPATIENT
Start: 2023-07-09 | End: 2023-07-09

## 2023-07-09 RX ADMIN — Medication 6: at 17:46

## 2023-07-09 RX ADMIN — Medication 6: at 12:03

## 2023-07-09 RX ADMIN — SODIUM CHLORIDE 125 MILLILITER(S): 9 INJECTION INTRAMUSCULAR; INTRAVENOUS; SUBCUTANEOUS at 05:14

## 2023-07-09 RX ADMIN — Medication 650 MILLIGRAM(S): at 11:27

## 2023-07-09 RX ADMIN — Medication 14 UNIT(S): at 17:46

## 2023-07-09 RX ADMIN — RITUXIMAB 100 MILLIGRAM(S): 10 INJECTION, SOLUTION INTRAVENOUS at 12:43

## 2023-07-09 RX ADMIN — RASBURICASE 104 MILLIGRAM(S): KIT at 11:35

## 2023-07-09 RX ADMIN — Medication 667 MILLIGRAM(S): at 12:02

## 2023-07-09 RX ADMIN — PIPERACILLIN AND TAZOBACTAM 25 GRAM(S): 4; .5 INJECTION, POWDER, LYOPHILIZED, FOR SOLUTION INTRAVENOUS at 14:29

## 2023-07-09 RX ADMIN — Medication 300 MILLIGRAM(S): at 12:02

## 2023-07-09 RX ADMIN — Medication 8: at 08:21

## 2023-07-09 RX ADMIN — Medication 6: at 22:05

## 2023-07-09 RX ADMIN — Medication 50 MILLIGRAM(S): at 11:27

## 2023-07-09 RX ADMIN — PIPERACILLIN AND TAZOBACTAM 25 GRAM(S): 4; .5 INJECTION, POWDER, LYOPHILIZED, FOR SOLUTION INTRAVENOUS at 05:10

## 2023-07-09 RX ADMIN — Medication 14 UNIT(S): at 12:02

## 2023-07-09 RX ADMIN — Medication 5 MILLIGRAM(S): at 12:31

## 2023-07-09 RX ADMIN — Medication 667 MILLIGRAM(S): at 08:21

## 2023-07-09 RX ADMIN — Medication 100 MICROGRAM(S): at 05:10

## 2023-07-09 RX ADMIN — INSULIN GLARGINE 44 UNIT(S): 100 INJECTION, SOLUTION SUBCUTANEOUS at 22:05

## 2023-07-09 RX ADMIN — PIPERACILLIN AND TAZOBACTAM 25 GRAM(S): 4; .5 INJECTION, POWDER, LYOPHILIZED, FOR SOLUTION INTRAVENOUS at 22:07

## 2023-07-09 RX ADMIN — Medication 110 MILLIGRAM(S): at 11:26

## 2023-07-09 RX ADMIN — Medication 667 MILLIGRAM(S): at 17:50

## 2023-07-09 RX ADMIN — Medication 3 MILLIGRAM(S): at 22:53

## 2023-07-09 RX ADMIN — Medication 10 UNIT(S): at 08:21

## 2023-07-09 NOTE — PROGRESS NOTE ADULT - PROBLEM SELECTOR PLAN 5
Noted to have systolic murmur of LUSB vs. apex on exam,. Echo findings unremarkable so likely flow murmur 2/2 severe anemia rather than undiagnosed aortic valve pathology.   - Manage anemia as above.  - Monitor respiratory status on standing fluids.

## 2023-07-09 NOTE — PROGRESS NOTE ADULT - PROBLEM SELECTOR PLAN 9
Pt. found to have elevated TSH to 12.35 on admission w/ symptoms of malaise, fatigue. Free T4 1.4. Pt. has not seen a PCP in some time  Started levothyroxine 100mcg qd (1.7 mcg/kg); repeat TFTs as an outpatient in 4-6 weeks for dose adjustments. Pt. found to have elevated TSH to 12.35 on admission w/ symptoms of malaise, fatigue. Free T4 1.4. Pt. has not seen a PCP in some time  Started levothyroxine 100mcg qd (1.7 mcg/kg); repeat TFTs as an outpatient in 4-6 weeks for dose adjustments.  Follow up TFTs 7/10 AM

## 2023-07-09 NOTE — PROGRESS NOTE ADULT - PROBLEM SELECTOR PLAN 7
7/8 K 6.0 and then K 5.7 on repeat CMP. Lasix 20 IV x 2. Insulin given for hyperglycemia should help with hyperkalemia as well.  7/8 Renal consulted. Lokelma 10 x 1 per renal recs.  7/8 Likely pseudohypokalemia per renal, can hold off on additional doses of lokelma for now.

## 2023-07-09 NOTE — PROGRESS NOTE ADULT - SUBJECTIVE AND OBJECTIVE BOX
Chief Complaint:     History:    MEDICATIONS  (STANDING):  allopurinol 300 milliGRAM(s) Oral every 24 hours  calcium acetate 667 milliGRAM(s) Oral three times a day with meals  dextrose 5%. 1000 milliLiter(s) (100 mL/Hr) IV Continuous <Continuous>  dextrose 5%. 1000 milliLiter(s) (50 mL/Hr) IV Continuous <Continuous>  dextrose 50% Injectable 25 Gram(s) IV Push once  dextrose 50% Injectable 25 Gram(s) IV Push once  dextrose 50% Injectable 12.5 Gram(s) IV Push once  glucagon  Injectable 1 milliGRAM(s) IntraMuscular once  insulin glargine Injectable (LANTUS) 40 Unit(s) SubCutaneous at bedtime  insulin lispro (ADMELOG) corrective regimen sliding scale   SubCutaneous at bedtime  insulin lispro (ADMELOG) corrective regimen sliding scale   SubCutaneous three times a day before meals  insulin lispro Injectable (ADMELOG) 14 Unit(s) SubCutaneous three times a day before meals  levothyroxine 100 MICROGram(s) Oral every 24 hours  melatonin 3 milliGRAM(s) Oral at bedtime  phytonadione   Solution 5 milliGRAM(s) Oral daily  piperacillin/tazobactam IVPB.. 3.375 Gram(s) IV Intermittent every 8 hours  rasburicase IVPB 3 milliGRAM(s) IV Intermittent once  rasburicase IVPB 3 milliGRAM(s) IV Intermittent once  sodium chloride 0.9%. 1000 milliLiter(s) (125 mL/Hr) IV Continuous <Continuous>    MEDICATIONS  (PRN):  acetaminophen     Tablet .. 650 milliGRAM(s) Oral every 6 hours PRN Temp greater or equal to 38C (100.4F), Mild Pain (1 - 3)  dextrose Oral Gel 15 Gram(s) Oral once PRN Blood Glucose LESS THAN 70 milliGRAM(s)/deciliter  senna 2 Tablet(s) Oral at bedtime PRN Constipation      Allergies    No Known Allergies    Intolerances      Review of Systems:  Constitutional: No fever  Eyes: No blurry vision  Neuro: No tremors  HEENT: No pain  Cardiovascular: No chest pain, palpitations  Respiratory: No SOB, no cough  GI: No nausea, vomiting, abdominal pain  : No dysuria  Skin: no rash  Psych: no depression  Endocrine: no polyuria, polydipsia  Hem/lymph: no swelling  Osteoporosis: no fractures    ALL OTHER SYSTEMS REVIEWED AND NEGATIVE    UNABLE TO OBTAIN    PHYSICAL EXAM:  VITALS: T(C): 36.7 (07-09-23 @ 09:15)  T(F): 98.1 (07-09-23 @ 09:15), Max: 99.1 (07-08-23 @ 21:13)  HR: 70 (07-09-23 @ 09:15) (63 - 84)  BP: 128/65 (07-09-23 @ 09:15) (116/68 - 146/73)  RR:  (16 - 18)  SpO2:  (97% - 100%)  Wt(kg): --  GENERAL: NAD, well-groomed, well-developed  EYES: No proptosis, no lid lag, anicteric  HEENT:  Atraumatic, Normocephalic, moist mucous membranes  THYROID: Normal size, no palpable nodules  RESPIRATORY: Clear to auscultation bilaterally; No rales, rhonchi, wheezing, or rubs  CARDIOVASCULAR: Regular rate and rhythm; No murmurs; no peripheral edema  GI: Soft, nontender, non distended, normal bowel sounds  SKIN: Dry, intact, No rashes or lesions  MUSCULOSKELETAL: Full range of motion, normal strength  NEURO: sensation intact, extraocular movements intact, no tremor, normal reflexes  PSYCH: Alert and oriented x 3, normal affect, normal mood  CUSHING'S SIGNS: no striae    POCT Blood Glucose.: 312 mg/dL (07-09-23 @ 08:16)  POCT Blood Glucose.: 391 mg/dL (07-08-23 @ 21:12)  POCT Blood Glucose.: 516 mg/dL (07-08-23 @ 17:23)  POCT Blood Glucose.: 522 mg/dL (07-08-23 @ 17:22)  POCT Blood Glucose.: 477 mg/dL (07-08-23 @ 12:54)  POCT Blood Glucose.: 479 mg/dL (07-08-23 @ 11:52)  POCT Blood Glucose.: 467 mg/dL (07-08-23 @ 11:13)  POCT Blood Glucose.: 468 mg/dL (07-08-23 @ 09:20)  POCT Blood Glucose.: 489 mg/dL (07-08-23 @ 08:36)  POCT Blood Glucose.: 486 mg/dL (07-08-23 @ 08:33)  POCT Blood Glucose.: 298 mg/dL (07-07-23 @ 21:29)  POCT Blood Glucose.: 249 mg/dL (07-07-23 @ 18:51)  POCT Blood Glucose.: 299 mg/dL (07-07-23 @ 12:31)  POCT Blood Glucose.: 239 mg/dL (07-07-23 @ 08:36)  POCT Blood Glucose.: 168 mg/dL (07-06-23 @ 21:48)  POCT Blood Glucose.: 183 mg/dL (07-06-23 @ 17:46)  POCT Blood Glucose.: 250 mg/dL (07-06-23 @ 12:14)      07-09    134<L>  |  95<L>  |  35<H>  ----------------------------<  328<H>  4.6   |  26  |  1.07    eGFR: 79    Ca    9.2      07-09  Mg     2.2     07-09  Phos  4.2     07-09    TPro  6.6  /  Alb  2.9<L>  /  TBili  0.8  /  DBili  x   /  AST  15  /  ALT  7<L>  /  AlkPhos  122<H>  07-09          Thyroid Function Tests:  07-04 @ 09:51 TSH -- FreeT4 1.4 T3 -- Anti TPO -- Anti Thyroglobulin Ab -- TSI --  07-02 @ 16:51 TSH 12.35 FreeT4 -- T3 -- Anti TPO -- Anti Thyroglobulin Ab -- TSI --                           History:  No acute overnight events.         MEDICATIONS  (STANDING):  allopurinol 300 milliGRAM(s) Oral every 24 hours  calcium acetate 667 milliGRAM(s) Oral three times a day with meals  dextrose 5%. 1000 milliLiter(s) (100 mL/Hr) IV Continuous <Continuous>  dextrose 5%. 1000 milliLiter(s) (50 mL/Hr) IV Continuous <Continuous>  dextrose 50% Injectable 25 Gram(s) IV Push once  dextrose 50% Injectable 25 Gram(s) IV Push once  dextrose 50% Injectable 12.5 Gram(s) IV Push once  glucagon  Injectable 1 milliGRAM(s) IntraMuscular once  insulin glargine Injectable (LANTUS) 40 Unit(s) SubCutaneous at bedtime  insulin lispro (ADMELOG) corrective regimen sliding scale   SubCutaneous at bedtime  insulin lispro (ADMELOG) corrective regimen sliding scale   SubCutaneous three times a day before meals  insulin lispro Injectable (ADMELOG) 14 Unit(s) SubCutaneous three times a day before meals  levothyroxine 100 MICROGram(s) Oral every 24 hours  melatonin 3 milliGRAM(s) Oral at bedtime  phytonadione   Solution 5 milliGRAM(s) Oral daily  piperacillin/tazobactam IVPB.. 3.375 Gram(s) IV Intermittent every 8 hours  rasburicase IVPB 3 milliGRAM(s) IV Intermittent once  rasburicase IVPB 3 milliGRAM(s) IV Intermittent once  sodium chloride 0.9%. 1000 milliLiter(s) (125 mL/Hr) IV Continuous <Continuous>    MEDICATIONS  (PRN):  acetaminophen     Tablet .. 650 milliGRAM(s) Oral every 6 hours PRN Temp greater or equal to 38C (100.4F), Mild Pain (1 - 3)  dextrose Oral Gel 15 Gram(s) Oral once PRN Blood Glucose LESS THAN 70 milliGRAM(s)/deciliter  senna 2 Tablet(s) Oral at bedtime PRN Constipation      Allergies    No Known Allergies    Intolerances      Review of Systems:  Constitutional: No fever  Eyes: No blurry vision  Neuro: No tremors  HEENT: No pain  Cardiovascular: No chest pain, palpitations  Respiratory: No SOB, no cough  GI: No nausea, vomiting, abdominal pain  : No dysuria  Skin: no rash  Psych: no depression  Endocrine: no polyuria, polydipsia  Hem/lymph: no swelling  Osteoporosis: no fractures    ALL OTHER SYSTEMS REVIEWED AND NEGATIVE    UNABLE TO OBTAIN    PHYSICAL EXAM:  VITALS: T(C): 36.7 (07-09-23 @ 09:15)  T(F): 98.1 (07-09-23 @ 09:15), Max: 99.1 (07-08-23 @ 21:13)  HR: 70 (07-09-23 @ 09:15) (63 - 84)  BP: 128/65 (07-09-23 @ 09:15) (116/68 - 146/73)  RR:  (16 - 18)  SpO2:  (97% - 100%)  Wt(kg): --  GENERAL: NAD, well-groomed, well-developed  EYES: No proptosis, no lid lag, anicteric  HEENT:  Atraumatic, Normocephalic, moist mucous membranes  THYROID: Normal size, no palpable nodules  RESPIRATORY: Clear to auscultation bilaterally; No rales, rhonchi, wheezing, or rubs  CARDIOVASCULAR: Regular rate and rhythm; No murmurs; no peripheral edema  GI: Soft, nontender, non distended, normal bowel sounds  SKIN: Dry, intact, No rashes or lesions  MUSCULOSKELETAL: Full range of motion, normal strength  NEURO: sensation intact, extraocular movements intact, no tremor, normal reflexes  PSYCH: Alert and oriented x 3, normal affect, normal mood  CUSHING'S SIGNS: no striae    POCT Blood Glucose.: 312 mg/dL (07-09-23 @ 08:16)  POCT Blood Glucose.: 391 mg/dL (07-08-23 @ 21:12)  POCT Blood Glucose.: 516 mg/dL (07-08-23 @ 17:23)  POCT Blood Glucose.: 522 mg/dL (07-08-23 @ 17:22)  POCT Blood Glucose.: 477 mg/dL (07-08-23 @ 12:54)  POCT Blood Glucose.: 479 mg/dL (07-08-23 @ 11:52)  POCT Blood Glucose.: 467 mg/dL (07-08-23 @ 11:13)  POCT Blood Glucose.: 468 mg/dL (07-08-23 @ 09:20)  POCT Blood Glucose.: 489 mg/dL (07-08-23 @ 08:36)  POCT Blood Glucose.: 486 mg/dL (07-08-23 @ 08:33)  POCT Blood Glucose.: 298 mg/dL (07-07-23 @ 21:29)  POCT Blood Glucose.: 249 mg/dL (07-07-23 @ 18:51)  POCT Blood Glucose.: 299 mg/dL (07-07-23 @ 12:31)  POCT Blood Glucose.: 239 mg/dL (07-07-23 @ 08:36)  POCT Blood Glucose.: 168 mg/dL (07-06-23 @ 21:48)  POCT Blood Glucose.: 183 mg/dL (07-06-23 @ 17:46)  POCT Blood Glucose.: 250 mg/dL (07-06-23 @ 12:14)      07-09    134<L>  |  95<L>  |  35<H>  ----------------------------<  328<H>  4.6   |  26  |  1.07    eGFR: 79    Ca    9.2      07-09  Mg     2.2     07-09  Phos  4.2     07-09    TPro  6.6  /  Alb  2.9<L>  /  TBili  0.8  /  DBili  x   /  AST  15  /  ALT  7<L>  /  AlkPhos  122<H>  07-09          Thyroid Function Tests:  07-04 @ 09:51 TSH -- FreeT4 1.4 T3 -- Anti TPO -- Anti Thyroglobulin Ab -- TSI --  07-02 @ 16:51 TSH 12.35 FreeT4 -- T3 -- Anti TPO -- Anti Thyroglobulin Ab -- TSI --

## 2023-07-09 NOTE — PROGRESS NOTE ADULT - ASSESSMENT
63M PMH DM2 (a1c 5.7) but no other known hx (pt. has not seen PCP in some time), admitted to [facility] 7/2 with malaise, B-symptoms (fevers/chills, weight loss, night sweats), hepatosplenomegaly and labs c/f B-PLL, started on hydroxyurea 1500 BID and allopurinol, transferred to [facility] for further management. Leukocytosis, anemia and thrombocytopenia secondary to disease.

## 2023-07-09 NOTE — PROGRESS NOTE ADULT - NS ATTEND AMEND GEN_ALL_CORE FT
I have made amendments to the documentation where necessary. Additional comments: 63 year old male with  PMH DM2 (a1c 5.7) but no other known hx (pt. has not seen PCP in some time), admitted to [facility]  on 7/2 with malaise, B-symptoms (fevers/chills, weight loss, night sweats), hepatosplenomegaly and labs c/f B-PLL, started on hydroxyurea 1500 BID and allopurinol, transferred to [facility] for further management. Leukocytosis, anemia and thrombocytopenia secondary to disease.     B-cell prolymphocytic leukemia.   Flow cytometry findings consistent with CD5 negative, CD10 negative B-cell lymphoproliferative disorder/lymphoma  Plan to initiate treatment with Rituximab 375 mg/m2 = 735 mg split into two doses. 100 mg IV on Day 1, 635 mg IV on Day 2. Dexamethasone 12 mg IV given on 7/7, will give Dexamethsone 20 mg x 1 on Day 1 and Day 2 with Rituxan and Elitek 3 mg on day 1  On 7/8, I discussed potential benefit of initial treatment with Rituxan as well as potential side effects with patient, and after all questions addressed, patient agreed to proceed with treatment. Chemotherapy/immunotherapy consent form signed.   - HIV and Hep B/C negative  - F/u BMBx FISH, cytogenetics, and biopsy results. F/u G6PD results.  - s/p hydroxyurea on 7/7  - C/w IVF and allopurinol  - Monitor TLS labs and Coags BID  - receiving prbcs for hgb<7.0, platelets <10K or <20K if febrile   - hepatosplenomegaly noted on CT including splenic infarcts, may need repeat imaging in future to further evaluate  On 7/8, patient with hyperkalemia 6.0, then 5.7 and hyperphosphatemia- Renal consult called as high risk for TLS. Lokelma x 1 dose given.   7/8 Phoslo 667 mg x3 doses PO for hyperphosphatemia  Endocrine consult for persistent hyperglycemia on Insulin coverage.   Plan to start Rituxan on 7/9 once hyperglycemia and hyperkalemia are well controlled.  7/9/23: Patient feeling better today, potassium in normal range. Glucose better controlled on Insulin coverage per Endo. He will begin Rituxan therapy as outlined above.     ID- patient on Zosyn IV at [facility], will continue.       Risk Statement (NON-critical care).     On this date of service, level of risk to patient is considered: High.     Due to: B- PLL with massive splenomegaly for Rituxan therapy at high risk for tumor lysis syndrome, also risk on infection and bleeding.     Time-based billing (NON-critical care).     40 minutes spent on total encounter. The necessity of the time spent during the encounter on this date of service was due to:     performing review of systems, physical exam, lab review and discussion of plan of care with patient.

## 2023-07-09 NOTE — PROGRESS NOTE ADULT - PROBLEM SELECTOR PLAN 3
Patient with anemia and thrombocytopenia with some schistocytes on smear, labs with elevated DDimer, low fibrinogen, prolonged PT, elevated LDH with low haptoglobin -> concerning for acute DIC  -fibrinogen improving 150 > 160 > 186 > 187 , d-dimer 12k  - Trend coags, DDimer, fibrinogen, TBili/LFTs  - cryoprecipitate 10 units for fibrinogen < 150  - Noted to have trace b/l LE edema and new LUE edema (now resolved) ->  VA duplex b/l LE ordered and negative  - VA duplex LUE found superficial vein thrombosis but otherwise negative, will continue to monitor.

## 2023-07-09 NOTE — PROGRESS NOTE ADULT - PROBLEM SELECTOR PLAN 6
Detail Level: Detailed Patient reports 100% clear in office, she reports using topical creams sparingly. She does not need refill on topical at this time. Informed her to call office when she does. Denies side effects with medication. Patient sent out for labs, hard stick. patient has mild hyponatremia trending 132 > 129 > 132. > 128 >130 > 128 Patient mildly fatigued (likely from anemia) otherwise asymptomatic. Serum osmolality 291, UCr 50, Daniela 108, UOsm 468. Hyponatremia likely in setting of SIADH 2/2 hypothyroidism  - Will increase fluid restriction to 1.2L, can consider salt tablets if Na does not improve  - Trend Na especially on standing IVF.  7/8 Renal consulted  7/8  Urine lytes ordered  7/8 Na corrected for hyperglycemia 133 today. Per renal, okay to monitor for now.

## 2023-07-09 NOTE — PROGRESS NOTE ADULT - PROBLEM SELECTOR PLAN 4
Hx of DM2, on insulin (Novolin N) 35U qAM/15-20U qPM at home  - lipid panel unremarkable  - a1c 5.7%  - restart insulin at 10u given elevated glucoses recently  - start ISS; adjust per patient's insulin requirements  - monitor FS for goal -180 while inpatient.  - Monitor sugars while patient on dexamethasone.  - f/u fructosamine for elevated glucoses but normal a1c.  7/8 BGs consistently in the 400s. 12 U sliding scale this AM without resolution. 12 U + 10 additional U around lunch time also without resolution.   7/8 bedtime lantus switched to 20 U from 10 U  7/8 Endocrine consulted, will alter regimen according to recs (added 15 lantus during the day and lispro premeals)  Anticipating dexamethasone 20 mg tomorrow. Will assess hyperglycemia status prior to administering. Hx of DM2, on insulin (Novolin N) 35U qAM/15-20U qPM at home  - lipid panel unremarkable  - a1c 5.7%  - restart insulin at 10u given elevated glucoses recently  - start ISS; adjust per patient's insulin requirements  - monitor FS for goal -180 while inpatient.  - Monitor sugars while patient on dexamethasone.  - f/u fructosamine for elevated glucoses but normal a1c.  7/8 BGs consistently in the 400s. 12 U sliding scale this AM without resolution. 12 U + 10 additional U around lunch time also without resolution.   7/8 bedtime lantus switched to 20 U from 10 U  7/8 Endocrine consulted, will alter regimen according to recs.  7/9 BGs improving.   7/9 switched lantus to 44 at bedtime and premeals 14 units lispro  Inform endocrine of any changes in steroid status/dose.

## 2023-07-09 NOTE — PROGRESS NOTE ADULT - ASSESSMENT
63 year old M with history of DM T2 transferred to [facility] for further treatment of suspected B-PLL w/ rituximab. Well controlled outpatient on Novolin. Received high dose Dexamethasone yesterday.       DM Type 2   -BG in the 300s   -Increase to 44 units of Glargine at bedtime   -Increase Lispro to 14 units TID   -Moderate correctional scale  -IF changes in steroid dosing occurs, please inform endocrine team  -Inpatient BG goal of 100-180  -FS q AC + HS     D/c recs:   -Will likely be basal bolus   ( Glargine and Novolog)  -Prefer to not send out on Novolin now, since patient has new insurance  -Will need endocrinology follow up as well       Hypothyroidism   -Continue Levothyroxine 100 mcg daily   -Check TFts in am   -Potentially will need higher dose of Levothyroxine     ( High level decision making)

## 2023-07-09 NOTE — ADVANCED PRACTICE NURSE CONSULT - ASSESSMENT
Pt. seen in bed a/ox4,denies any discomfort at this time. Chemotherapy teachings done.Pt. verbalized understanding. Inserted g# 22 to right upper arm. .Dsg dry and intact .Site with no s/s of redness ,swelling or pain. With positive blood return noted and flushing easily with 10 ML NS. Drug verification done by 2 RN.'s.  Lab. values reviewed by Dr. Cuellar  prior to signing orders. Pt. received  tylenol 650 mg po,benadryl 50 mg IVSS and decadron 20 mg  IVSS as premedications. At  1243 , pt. started on        riTUXimab-pvvr (RUXIENCE) IVPB (eMAR) [Ordered as RUXIENCE IVPB (eMAR)] - Start Date: 09-Jul-2023  100 milliGRAM(s) in sodium chloride 0.9% 90 milliLiter(s), IV Intermittent, once, infuse over 4 Hour(s), infuse at 25 mL/Hr, Stop After 1 Doses  Special Instructions: see chemo order: day 1 Concentration 1 mg/ml  Administration Instructions: This is a High Alert Medication. , given as a secondary line connected to saline to PIV G #22 of right upper arm ,infusing well at 25 ml /hr with the use of alaris pump.Infusing well for 4 hours . Vital signs done every 30 minutes until done Primary RN aware.Safety maintained

## 2023-07-09 NOTE — PROGRESS NOTE ADULT - PROBLEM SELECTOR PLAN 1
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 100 mg on Day 1, 635 mg on Day 2. Dexamethasone 12 mg on 7/7, Dexamethsone 20 mg x 1 on Day 1 and Day 2.  - HIV and Hep B/C negative, folate and B12 unremarkable, ferritin elevated 428  - F/u BMBx FISH, cytogenetics, and biopsy results. F/u G6PD results.  - s/p hydroxyurea on 7/7  - C/w IVF and allopurinol  - Monitor TLS labs and Coags BID  - prbcs for hgb<7.0, platelets <10K or <20K if febrile   - hepatosplenomegaly noted on CT incliuding splenic infarcts, may need repeat imaging in future to further evaluate  7/8 Phoslo 667 mg x3 doses PO for hyperphosphatemia  Plan to start rituxan on 7/9 once hyperglycemia and hyperkalemia are well controlled. 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 100 mg on Day 1, 635 mg on Day 2. Dexamethasone 12 mg on 7/7, Dexamethsone 20 mg x 1 on Day 1 and Day 2.  - HIV and Hep B/C negative, folate and B12 unremarkable, ferritin elevated 428  - F/u BMBx FISH, cytogenetics, and biopsy results. F/u G6PD results.  - s/p hydroxyurea on 7/7  - C/w IVF and allopurinol  - Monitor TLS labs and Coags BID  - prbcs for hgb<7.0, platelets <10K or <20K if febrile   - hepatosplenomegaly noted on CT including splenic infarcts, may need repeat imaging in future to further evaluate  7/9 PICC consent signed and placed in chart. Plan for PICC on 7/10.  7/9 Started rituximab. Monitor closely.

## 2023-07-09 NOTE — PROGRESS NOTE ADULT - PROBLEM SELECTOR PLAN 8
CT scan showed Chronic pancreatitis with pancreatic ductal dilatation and possible intraductal calcification in the pancreatic head. No heavy alcohol use. U/S 7/2 showed Contracted gallbladder filled with stones. No secondary findings of acute cholecystitis. Bili 1.0, no transaminitis. No abdominal pain.  - monitor for now

## 2023-07-09 NOTE — PROGRESS NOTE ADULT - PROBLEM SELECTOR PLAN 11
F: maintenance IVF   E: prn   N:  Diet: Consistent carbs w/ fluid restriction  DVT ppx: SCDs, holding VTE ppx given severe anemia and thrombocytopenia  GI ppx: none  GI motility: senna prn  dispo: [facility]  Code Status: full code.  Vitamin K PO 5 mg daily x 3 days (7/8-7/10) for prolonged PT

## 2023-07-09 NOTE — PROGRESS NOTE ADULT - SUBJECTIVE AND OBJECTIVE BOX
Diagnosis: B-PLL    Protocol/Chemo Regimen: Rituximab 375 mg/m2 = 735 mg split into two doses. 100 mg on Day 1, 635 mg on Day 2. Dexamethasone 12 mg on 7/7, Dexamethsone 20 mg x 1 on Day 1 and Day 2.  Day: 1     Pt endorsed: Mild fatigue.    Review of Systems: denies chest pain, dyspnea, headache, dizziness, abdominal pain, diarrhea, constipation    Pain scale: denies    Diet: Diet, Consistent Carbohydrate    Allergies: No Known Allergies        ANTIMICROBIALS  piperacillin/tazobactam IVPB.. 3.375 Gram(s) IV Intermittent every 8 hours      HEME/ONC MEDICATIONS      STANDING MEDICATIONS  allopurinol 300 milliGRAM(s) Oral every 24 hours  calcium acetate 667 milliGRAM(s) Oral three times a day with meals  dexAMETHasone  IVPB 20 milliGRAM(s) IV Intermittent every 24 hours  dextrose 5%. 1000 milliLiter(s) IV Continuous <Continuous>  dextrose 5%. 1000 milliLiter(s) IV Continuous <Continuous>  dextrose 50% Injectable 25 Gram(s) IV Push once  dextrose 50% Injectable 12.5 Gram(s) IV Push once  dextrose 50% Injectable 25 Gram(s) IV Push once  glucagon  Injectable 1 milliGRAM(s) IntraMuscular once  insulin glargine Injectable (LANTUS) 44 Unit(s) SubCutaneous at bedtime  insulin lispro (ADMELOG) corrective regimen sliding scale   SubCutaneous at bedtime  insulin lispro (ADMELOG) corrective regimen sliding scale   SubCutaneous three times a day before meals  insulin lispro Injectable (ADMELOG) 14 Unit(s) SubCutaneous three times a day before meals  levothyroxine 100 MICROGram(s) Oral every 24 hours  melatonin 3 milliGRAM(s) Oral at bedtime  phytonadione   Solution 5 milliGRAM(s) Oral daily  rasburicase IVPB 3 milliGRAM(s) IV Intermittent once  sodium chloride 0.9%. 1000 milliLiter(s) IV Continuous <Continuous>      PRN MEDICATIONS  acetaminophen     Tablet .. 650 milliGRAM(s) Oral every 6 hours PRN  dextrose Oral Gel 15 Gram(s) Oral once PRN  senna 2 Tablet(s) Oral at bedtime PRN        Vital Signs Last 24 Hrs  T(C): 36.3 (09 Jul 2023 15:50), Max: 37.3 (08 Jul 2023 21:13)  T(F): 97.3 (09 Jul 2023 15:50), Max: 99.1 (08 Jul 2023 21:13)  HR: 80 (09 Jul 2023 16:50) (63 - 105)  BP: 135/72 (09 Jul 2023 16:50) (112/60 - 156/79)  BP(mean): --  RR: 18 (09 Jul 2023 16:50) (16 - 18)  SpO2: 100% (09 Jul 2023 09:15) (99% - 100%)    Parameters below as of 09 Jul 2023 09:15  Patient On (Oxygen Delivery Method): room air      PHYSICAL EXAM  General: adult in NAD  HEENT: clear oropharynx, poor dentition of posterior top molars   CV: normal S1/S2 RRR  Lungs: CTAB, no wheezes  Abdomen: LUQ and LLQ soft non tender non distended; RUQ and RLQ distended d/t marked splenomegaly. Normoactive bowel sounds.   Ext: no edema  Skin: superficial thrombophlebitis of LUE  Neuro: alert and oriented X 3, no focal deficits  PIV CDI      LABS:                 7.6    32.20 )-----------( 62       ( 09 Jul 2023 07:10 )             24.4     Mean Cell Volume : 80.0 fl  Mean Cell Hemoglobin : 24.9 pg  Mean Cell Hemoglobin Concentration : 31.1 gm/dL  Auto Neutrophil # : 0.64 K/uL  Auto Lymphocyte # : 31.56 K/uL  Auto Monocyte # : 0.00 K/uL  Auto Eosinophil # : 0.00 K/uL  Auto Basophil # : 0.00 K/uL  Auto Neutrophil % : 2.0 %  Auto Lymphocyte % : 98.0 %  Auto Monocyte % : 0.0 %  Auto Eosinophil % : 0.0 %  Auto Basophil % : 0.0 %    07-09    134<L>  |  95<L>  |  35<H>  ----------------------------<  328<H>  4.6   |  26  |  1.07    Ca    9.2      09 Jul 2023 07:06  Phos  4.2     07-09  Mg     2.2     07-09    TPro  6.6  /  Alb  2.9<L>  /  TBili  0.8  /  DBili  x   /  AST  15  /  ALT  7<L>  /  AlkPhos  122<H>  07-09    Mg 2.2  Phos 4.2    PT/INR - ( 09 Jul 2023 10:46 )   PT: 15.7 sec;   INR: 1.35 ratio       PTT - ( 09 Jul 2023 10:46 )  PTT:22.6 sec      Uric Acid 3.9      CULTURES:  Culture - Blood (07.07.23 @ 21:06)    Specimen Source: .Blood Blood   Culture Results:   No growth at 24 hours    Culture - Blood (07.07.23 @ 21:06)    Specimen Source: .Blood Blood   Culture Results:   No growth at 24 hours      RADIOLOGY & ADDITIONAL STUDIES:  Xray Chest 1 View- PORTABLE-Urgent (07.07.23 @ 17:24)   IMPRESSION:  No acute pulmonary disease.       Diagnosis: B-PLL    Protocol/Chemo Regimen: Rituximab 375 mg/m2 = 735 mg split into two doses. 100 mg on Day 1, 635 mg on Day 2. Dexamethasone 12 mg on 7/7, Dexamethsone 20 mg x 1 on Day 1 and Day 2.  Day: 1     Pt endorsed: Mild fatigue.    Review of Systems: denies chest pain, dyspnea, headache, dizziness, abdominal pain, diarrhea, constipation    Pain scale: denies    Diet: Diet, Consistent Carbohydrate    Allergies: No Known Allergies        ANTIMICROBIALS  piperacillin/tazobactam IVPB.. 3.375 Gram(s) IV Intermittent every 8 hours      HEME/ONC MEDICATIONS      STANDING MEDICATIONS  allopurinol 300 milliGRAM(s) Oral every 24 hours  calcium acetate 667 milliGRAM(s) Oral three times a day with meals  dexAMETHasone  IVPB 20 milliGRAM(s) IV Intermittent every 24 hours  dextrose 5%. 1000 milliLiter(s) IV Continuous <Continuous>  dextrose 5%. 1000 milliLiter(s) IV Continuous <Continuous>  dextrose 50% Injectable 25 Gram(s) IV Push once  dextrose 50% Injectable 12.5 Gram(s) IV Push once  dextrose 50% Injectable 25 Gram(s) IV Push once  glucagon  Injectable 1 milliGRAM(s) IntraMuscular once  insulin glargine Injectable (LANTUS) 44 Unit(s) SubCutaneous at bedtime  insulin lispro (ADMELOG) corrective regimen sliding scale   SubCutaneous at bedtime  insulin lispro (ADMELOG) corrective regimen sliding scale   SubCutaneous three times a day before meals  insulin lispro Injectable (ADMELOG) 14 Unit(s) SubCutaneous three times a day before meals  levothyroxine 100 MICROGram(s) Oral every 24 hours  melatonin 3 milliGRAM(s) Oral at bedtime  phytonadione   Solution 5 milliGRAM(s) Oral daily  rasburicase IVPB 3 milliGRAM(s) IV Intermittent once  sodium chloride 0.9%. 1000 milliLiter(s) IV Continuous <Continuous>      PRN MEDICATIONS  acetaminophen     Tablet .. 650 milliGRAM(s) Oral every 6 hours PRN  dextrose Oral Gel 15 Gram(s) Oral once PRN  senna 2 Tablet(s) Oral at bedtime PRN        Vital Signs Last 24 Hrs  T(C): 36.3 (09 Jul 2023 15:50), Max: 37.3 (08 Jul 2023 21:13)  T(F): 97.3 (09 Jul 2023 15:50), Max: 99.1 (08 Jul 2023 21:13)  HR: 80 (09 Jul 2023 16:50) (63 - 105)  BP: 135/72 (09 Jul 2023 16:50) (112/60 - 156/79)  BP(mean): --  RR: 18 (09 Jul 2023 16:50) (16 - 18)  SpO2: 100% (09 Jul 2023 09:15) (99% - 100%)    Parameters below as of 09 Jul 2023 09:15  Patient On (Oxygen Delivery Method): room air      PHYSICAL EXAM  General: adult in NAD  HEENT: clear oropharynx, poor dentition of posterior top molars   CV: normal S1/S2 RRR  Lungs: CTAB, no wheezes  Abdomen: distended with massive splenomegaly, normoactive bowel sounds.   Ext: no edema  Skin: superficial thrombophlebitis of LUE  Neuro: alert and oriented X 3  PIV CDI      LABS:                 7.6    32.20 )-----------( 62       ( 09 Jul 2023 07:10 )             24.4     Mean Cell Volume : 80.0 fl  Mean Cell Hemoglobin : 24.9 pg  Mean Cell Hemoglobin Concentration : 31.1 gm/dL  Auto Neutrophil # : 0.64 K/uL  Auto Lymphocyte # : 31.56 K/uL  Auto Monocyte # : 0.00 K/uL  Auto Eosinophil # : 0.00 K/uL  Auto Basophil # : 0.00 K/uL  Auto Neutrophil % : 2.0 %  Auto Lymphocyte % : 98.0 %  Auto Monocyte % : 0.0 %  Auto Eosinophil % : 0.0 %  Auto Basophil % : 0.0 %    07-09    134<L>  |  95<L>  |  35<H>  ----------------------------<  328<H>  4.6   |  26  |  1.07    Ca    9.2      09 Jul 2023 07:06  Phos  4.2     07-09  Mg     2.2     07-09    TPro  6.6  /  Alb  2.9<L>  /  TBili  0.8  /  DBili  x   /  AST  15  /  ALT  7<L>  /  AlkPhos  122<H>  07-09    Mg 2.2  Phos 4.2    PT/INR - ( 09 Jul 2023 10:46 )   PT: 15.7 sec;   INR: 1.35 ratio       PTT - ( 09 Jul 2023 10:46 )  PTT:22.6 sec      Uric Acid 3.9      CULTURES:  Culture - Blood (07.07.23 @ 21:06)    Specimen Source: .Blood Blood   Culture Results:   No growth at 24 hours    Culture - Blood (07.07.23 @ 21:06)    Specimen Source: .Blood Blood   Culture Results:   No growth at 24 hours      RADIOLOGY & ADDITIONAL STUDIES:  Xray Chest 1 View- PORTABLE-Urgent (07.07.23 @ 17:24)   IMPRESSION:  No acute pulmonary disease.

## 2023-07-09 NOTE — PROGRESS NOTE ADULT - PROBLEM SELECTOR PLAN 2
Febrile to 100.5 F on admission to [facility] 7/7 PM, improved to 99.2 after tylenol.  exam with RLL crackles but breathing comfortably on room air. No cough or SOB. No dysuria.  HIV negative and RVP negative on admission to [facility] this past week.  not currently neutropenic  On empiric zosyn  CXR with no acute pulmonary disease.  Follow up 7/6 blood cultures.

## 2023-07-09 NOTE — PROGRESS NOTE ADULT - PROBLEM SELECTOR PLAN 10
Patient previously had swelling of left forearm at previous IV site, now resolved.  - continue to monitor, warm packs to arm as needed

## 2023-07-10 DIAGNOSIS — E03.9 HYPOTHYROIDISM, UNSPECIFIED: ICD-10-CM

## 2023-07-10 DIAGNOSIS — E11.65 TYPE 2 DIABETES MELLITUS WITH HYPERGLYCEMIA: ICD-10-CM

## 2023-07-10 LAB
ALBUMIN SERPL ELPH-MCNC: 3 G/DL — LOW (ref 3.3–5)
ALBUMIN SERPL ELPH-MCNC: 3.2 G/DL — LOW (ref 3.3–5)
ALP SERPL-CCNC: 109 U/L — SIGNIFICANT CHANGE UP (ref 40–120)
ALP SERPL-CCNC: 116 U/L — SIGNIFICANT CHANGE UP (ref 40–120)
ALT FLD-CCNC: 6 U/L — LOW (ref 10–45)
ALT FLD-CCNC: 8 U/L — LOW (ref 10–45)
ANION GAP SERPL CALC-SCNC: 11 MMOL/L — SIGNIFICANT CHANGE UP (ref 5–17)
ANION GAP SERPL CALC-SCNC: 11 MMOL/L — SIGNIFICANT CHANGE UP (ref 5–17)
APTT BLD: 21.2 SEC — LOW (ref 27.5–35.5)
APTT BLD: 26.6 SEC — LOW (ref 27.5–35.5)
AST SERPL-CCNC: 16 U/L — SIGNIFICANT CHANGE UP (ref 10–40)
AST SERPL-CCNC: 26 U/L — SIGNIFICANT CHANGE UP (ref 10–40)
BASOPHILS # BLD AUTO: 0 K/UL — SIGNIFICANT CHANGE UP (ref 0–0.2)
BASOPHILS # BLD AUTO: 0.01 K/UL — SIGNIFICANT CHANGE UP (ref 0–0.2)
BASOPHILS NFR BLD AUTO: 0 % — SIGNIFICANT CHANGE UP (ref 0–2)
BASOPHILS NFR BLD AUTO: 0.1 % — SIGNIFICANT CHANGE UP (ref 0–2)
BILIRUB SERPL-MCNC: 0.5 MG/DL — SIGNIFICANT CHANGE UP (ref 0.2–1.2)
BILIRUB SERPL-MCNC: 0.7 MG/DL — SIGNIFICANT CHANGE UP (ref 0.2–1.2)
BUN SERPL-MCNC: 32 MG/DL — HIGH (ref 7–23)
BUN SERPL-MCNC: 34 MG/DL — HIGH (ref 7–23)
CALCIUM SERPL-MCNC: 8.9 MG/DL — SIGNIFICANT CHANGE UP (ref 8.4–10.5)
CALCIUM SERPL-MCNC: 9 MG/DL — SIGNIFICANT CHANGE UP (ref 8.4–10.5)
CHLORIDE SERPL-SCNC: 96 MMOL/L — SIGNIFICANT CHANGE UP (ref 96–108)
CHLORIDE SERPL-SCNC: 97 MMOL/L — SIGNIFICANT CHANGE UP (ref 96–108)
CO2 SERPL-SCNC: 24 MMOL/L — SIGNIFICANT CHANGE UP (ref 22–31)
CO2 SERPL-SCNC: 25 MMOL/L — SIGNIFICANT CHANGE UP (ref 22–31)
CREAT SERPL-MCNC: 0.98 MG/DL — SIGNIFICANT CHANGE UP (ref 0.5–1.3)
CREAT SERPL-MCNC: 1.04 MG/DL — SIGNIFICANT CHANGE UP (ref 0.5–1.3)
D DIMER BLD IA.RAPID-MCNC: HIGH NG/ML DDU
D DIMER BLD IA.RAPID-MCNC: HIGH NG/ML DDU
EGFR: 82 ML/MIN/1.73M2 — SIGNIFICANT CHANGE UP
EGFR: 88 ML/MIN/1.73M2 — SIGNIFICANT CHANGE UP
EOSINOPHIL # BLD AUTO: 0 K/UL — SIGNIFICANT CHANGE UP (ref 0–0.5)
EOSINOPHIL # BLD AUTO: 0.24 K/UL — SIGNIFICANT CHANGE UP (ref 0–0.5)
EOSINOPHIL NFR BLD AUTO: 0 % — SIGNIFICANT CHANGE UP (ref 0–6)
EOSINOPHIL NFR BLD AUTO: 0.9 % — SIGNIFICANT CHANGE UP (ref 0–6)
FIBRINOGEN PPP-MCNC: 185 MG/DL — LOW (ref 200–445)
FIBRINOGEN PPP-MCNC: 194 MG/DL — LOW (ref 200–445)
GLUCOSE BLDC GLUCOMTR-MCNC: 246 MG/DL — HIGH (ref 70–99)
GLUCOSE BLDC GLUCOMTR-MCNC: 266 MG/DL — HIGH (ref 70–99)
GLUCOSE BLDC GLUCOMTR-MCNC: 282 MG/DL — HIGH (ref 70–99)
GLUCOSE BLDC GLUCOMTR-MCNC: 323 MG/DL — HIGH (ref 70–99)
GLUCOSE SERPL-MCNC: 265 MG/DL — HIGH (ref 70–99)
GLUCOSE SERPL-MCNC: 326 MG/DL — HIGH (ref 70–99)
HCT VFR BLD CALC: 24.5 % — LOW (ref 39–50)
HCT VFR BLD CALC: 26.7 % — LOW (ref 39–50)
HGB BLD-MCNC: 7.6 G/DL — LOW (ref 13–17)
HGB BLD-MCNC: 8.4 G/DL — LOW (ref 13–17)
IMM GRANULOCYTES NFR BLD AUTO: 0.5 % — SIGNIFICANT CHANGE UP (ref 0–0.9)
INR BLD: 1.19 RATIO — HIGH (ref 0.88–1.16)
INR BLD: 1.21 RATIO — HIGH (ref 0.88–1.16)
LDH SERPL L TO P-CCNC: 524 U/L — HIGH (ref 50–242)
LDH SERPL L TO P-CCNC: 667 U/L — HIGH (ref 50–242)
LYMPHOCYTES # BLD AUTO: 18.98 K/UL — HIGH (ref 1–3.3)
LYMPHOCYTES # BLD AUTO: 6.28 K/UL — HIGH (ref 1–3.3)
LYMPHOCYTES # BLD AUTO: 71.1 % — HIGH (ref 13–44)
LYMPHOCYTES # BLD AUTO: 74.9 % — HIGH (ref 13–44)
MAGNESIUM SERPL-MCNC: 2 MG/DL — SIGNIFICANT CHANGE UP (ref 1.6–2.6)
MAGNESIUM SERPL-MCNC: 2.2 MG/DL — SIGNIFICANT CHANGE UP (ref 1.6–2.6)
MCHC RBC-ENTMCNC: 24.9 PG — LOW (ref 27–34)
MCHC RBC-ENTMCNC: 25.1 PG — LOW (ref 27–34)
MCHC RBC-ENTMCNC: 31 GM/DL — LOW (ref 32–36)
MCHC RBC-ENTMCNC: 31.5 GM/DL — LOW (ref 32–36)
MCV RBC AUTO: 79.7 FL — LOW (ref 80–100)
MCV RBC AUTO: 80.3 FL — SIGNIFICANT CHANGE UP (ref 80–100)
MONOCYTES # BLD AUTO: 0.27 K/UL — SIGNIFICANT CHANGE UP (ref 0–0.9)
MONOCYTES # BLD AUTO: 0.69 K/UL — SIGNIFICANT CHANGE UP (ref 0–0.9)
MONOCYTES NFR BLD AUTO: 2.6 % — SIGNIFICANT CHANGE UP (ref 2–14)
MONOCYTES NFR BLD AUTO: 3.2 % — SIGNIFICANT CHANGE UP (ref 2–14)
NEUTROPHILS # BLD AUTO: 1.78 K/UL — LOW (ref 1.8–7.4)
NEUTROPHILS # BLD AUTO: 6.78 K/UL — SIGNIFICANT CHANGE UP (ref 1.8–7.4)
NEUTROPHILS NFR BLD AUTO: 21.3 % — LOW (ref 43–77)
NEUTROPHILS NFR BLD AUTO: 25.4 % — LOW (ref 43–77)
NRBC # BLD: 0 /100 WBCS — SIGNIFICANT CHANGE UP (ref 0–0)
PHOSPHATE SERPL-MCNC: 3.5 MG/DL — SIGNIFICANT CHANGE UP (ref 2.5–4.5)
PHOSPHATE SERPL-MCNC: 3.9 MG/DL — SIGNIFICANT CHANGE UP (ref 2.5–4.5)
PLATELET # BLD AUTO: 40 K/UL — LOW (ref 150–400)
PLATELET # BLD AUTO: 62 K/UL — LOW (ref 150–400)
POTASSIUM SERPL-MCNC: 4.6 MMOL/L — SIGNIFICANT CHANGE UP (ref 3.5–5.3)
POTASSIUM SERPL-MCNC: 5 MMOL/L — SIGNIFICANT CHANGE UP (ref 3.5–5.3)
POTASSIUM SERPL-SCNC: 4.6 MMOL/L — SIGNIFICANT CHANGE UP (ref 3.5–5.3)
POTASSIUM SERPL-SCNC: 5 MMOL/L — SIGNIFICANT CHANGE UP (ref 3.5–5.3)
PROT SERPL-MCNC: 6.6 G/DL — SIGNIFICANT CHANGE UP (ref 6–8.3)
PROT SERPL-MCNC: 6.8 G/DL — SIGNIFICANT CHANGE UP (ref 6–8.3)
PROTHROM AB SERPL-ACNC: 13.7 SEC — HIGH (ref 10.5–13.4)
PROTHROM AB SERPL-ACNC: 14 SEC — HIGH (ref 10.5–13.4)
RBC # BLD: 3.05 M/UL — LOW (ref 4.2–5.8)
RBC # BLD: 3.35 M/UL — LOW (ref 4.2–5.8)
RBC # FLD: 23.3 % — HIGH (ref 10.3–14.5)
RBC # FLD: 23.5 % — HIGH (ref 10.3–14.5)
SODIUM SERPL-SCNC: 131 MMOL/L — LOW (ref 135–145)
SODIUM SERPL-SCNC: 133 MMOL/L — LOW (ref 135–145)
URATE SERPL-MCNC: 1.9 MG/DL — LOW (ref 3.4–8.8)
URATE SERPL-MCNC: 2.3 MG/DL — LOW (ref 3.4–8.8)
WBC # BLD: 26.69 K/UL — HIGH (ref 3.8–10.5)
WBC # BLD: 8.38 K/UL — SIGNIFICANT CHANGE UP (ref 3.8–10.5)
WBC # FLD AUTO: 26.69 K/UL — HIGH (ref 3.8–10.5)
WBC # FLD AUTO: 8.38 K/UL — SIGNIFICANT CHANGE UP (ref 3.8–10.5)

## 2023-07-10 PROCEDURE — 99232 SBSQ HOSP IP/OBS MODERATE 35: CPT | Mod: GC

## 2023-07-10 PROCEDURE — 86077 PHYS BLOOD BANK SERV XMATCH: CPT

## 2023-07-10 PROCEDURE — 99232 SBSQ HOSP IP/OBS MODERATE 35: CPT

## 2023-07-10 RX ORDER — DIPHENHYDRAMINE HCL 50 MG
50 CAPSULE ORAL ONCE
Refills: 0 | Status: COMPLETED | OUTPATIENT
Start: 2023-07-10 | End: 2023-07-10

## 2023-07-10 RX ORDER — INSULIN GLARGINE 100 [IU]/ML
22 INJECTION, SOLUTION SUBCUTANEOUS AT BEDTIME
Refills: 0 | Status: DISCONTINUED | OUTPATIENT
Start: 2023-07-10 | End: 2023-07-11

## 2023-07-10 RX ORDER — INSULIN LISPRO 100/ML
20 VIAL (ML) SUBCUTANEOUS ONCE
Refills: 0 | Status: COMPLETED | OUTPATIENT
Start: 2023-07-10 | End: 2023-07-10

## 2023-07-10 RX ORDER — INSULIN LISPRO 100/ML
6 VIAL (ML) SUBCUTANEOUS
Refills: 0 | Status: DISCONTINUED | OUTPATIENT
Start: 2023-07-11 | End: 2023-07-12

## 2023-07-10 RX ORDER — ACETAMINOPHEN 500 MG
650 TABLET ORAL ONCE
Refills: 0 | Status: COMPLETED | OUTPATIENT
Start: 2023-07-10 | End: 2023-07-10

## 2023-07-10 RX ADMIN — Medication 300 MILLIGRAM(S): at 13:14

## 2023-07-10 RX ADMIN — Medication 14 UNIT(S): at 13:14

## 2023-07-10 RX ADMIN — Medication 3 MILLIGRAM(S): at 22:15

## 2023-07-10 RX ADMIN — Medication 8: at 17:35

## 2023-07-10 RX ADMIN — Medication 100 MICROGRAM(S): at 05:42

## 2023-07-10 RX ADMIN — Medication 5 MILLIGRAM(S): at 13:15

## 2023-07-10 RX ADMIN — PIPERACILLIN AND TAZOBACTAM 25 GRAM(S): 4; .5 INJECTION, POWDER, LYOPHILIZED, FOR SOLUTION INTRAVENOUS at 05:42

## 2023-07-10 RX ADMIN — Medication 650 MILLIGRAM(S): at 09:47

## 2023-07-10 RX ADMIN — Medication 6: at 08:43

## 2023-07-10 RX ADMIN — RITUXIMAB 635 MILLIGRAM(S): 10 INJECTION, SOLUTION INTRAVENOUS at 10:08

## 2023-07-10 RX ADMIN — Medication 110 MILLIGRAM(S): at 09:59

## 2023-07-10 RX ADMIN — INSULIN GLARGINE 22 UNIT(S): 100 INJECTION, SOLUTION SUBCUTANEOUS at 22:15

## 2023-07-10 RX ADMIN — Medication 14 UNIT(S): at 08:44

## 2023-07-10 RX ADMIN — SODIUM CHLORIDE 125 MILLILITER(S): 9 INJECTION INTRAMUSCULAR; INTRAVENOUS; SUBCUTANEOUS at 05:42

## 2023-07-10 RX ADMIN — Medication 6: at 13:13

## 2023-07-10 RX ADMIN — Medication 20 UNIT(S): at 17:36

## 2023-07-10 RX ADMIN — Medication 50 MILLIGRAM(S): at 09:46

## 2023-07-10 NOTE — PROGRESS NOTE ADULT - ASSESSMENT
63M PMH DM2 (a1c 5.7) but no other known hx (pt. has not seen PCP in some time), admitted to [facility] 7/2 with malaise, B-symptoms (fevers/chills, weight loss, night sweats), hepatosplenomegaly and labs c/f B-PLL, started on hydroxyurea 1500 BID and allopurinol, transferred to [facility] for further management. Leukocytosis, anemia and thrombocytopenia secondary to disease.   63M PMH DM2 (a1c 5.7) but no other known hx (pt. has not seen PCP in some time), admitted to [facility] 7/2 with malaise, B-symptoms (fevers/chills, weight loss, night sweats), hepatosplenomegaly and labs c/f B-PLL, s/p Hydrea, and Rituxan patient has leucocytosis, anemia and thrombocytopenia secondary to disease condition.

## 2023-07-10 NOTE — PROGRESS NOTE ADULT - PROBLEM SELECTOR PLAN 4
Hx of DM2, on insulin (Novolin N) 35U qAM/15-20U qPM at home  - lipid panel unremarkable  - a1c 5.7%  - restart insulin at 10u given elevated glucoses recently  - start ISS; adjust per patient's insulin requirements  - monitor FS for goal -180 while inpatient.  - Monitor sugars while patient on dexamethasone.  - f/u fructosamine for elevated glucoses but normal a1c.  7/8 BGs consistently in the 400s. 12 U sliding scale this AM without resolution. 12 U + 10 additional U around lunch time also without resolution.   7/8 bedtime lantus switched to 20 U from 10 U  7/8 Endocrine consulted, will alter regimen according to recs.  7/9 BGs improving.   7/9 switched lantus to 44 at bedtime and premeals 14 units lispro  Inform endocrine of any changes in steroid status/dose.

## 2023-07-10 NOTE — PROGRESS NOTE ADULT - SUBJECTIVE AND OBJECTIVE BOX
Diagnosis:    Protocol/Chemo Regimen:    Day:     Pt endorsed:    Review of Systems:     Pain scale:     Diet:     Allergies    No Known Allergies    Intolerances        ANTIMICROBIALS  piperacillin/tazobactam IVPB.. 3.375 Gram(s) IV Intermittent every 8 hours      HEME/ONC MEDICATIONS  riTUXimab-pvvr (RUXIENCE) IVPB (eMAR) 635 milliGRAM(s) IV Intermittent once      STANDING MEDICATIONS  allopurinol 300 milliGRAM(s) Oral every 24 hours  dexAMETHasone  IVPB 20 milliGRAM(s) IV Intermittent every 24 hours  dextrose 5%. 1000 milliLiter(s) IV Continuous <Continuous>  dextrose 5%. 1000 milliLiter(s) IV Continuous <Continuous>  dextrose 50% Injectable 25 Gram(s) IV Push once  dextrose 50% Injectable 25 Gram(s) IV Push once  dextrose 50% Injectable 12.5 Gram(s) IV Push once  glucagon  Injectable 1 milliGRAM(s) IntraMuscular once  insulin glargine Injectable (LANTUS) 44 Unit(s) SubCutaneous at bedtime  insulin lispro (ADMELOG) corrective regimen sliding scale   SubCutaneous three times a day before meals  insulin lispro (ADMELOG) corrective regimen sliding scale   SubCutaneous at bedtime  insulin lispro Injectable (ADMELOG) 14 Unit(s) SubCutaneous three times a day before meals  levothyroxine 100 MICROGram(s) Oral every 24 hours  melatonin 3 milliGRAM(s) Oral at bedtime  phytonadione   Solution 5 milliGRAM(s) Oral daily  rasburicase IVPB 3 milliGRAM(s) IV Intermittent once  sodium chloride 0.9%. 1000 milliLiter(s) IV Continuous <Continuous>      PRN MEDICATIONS  acetaminophen     Tablet .. 650 milliGRAM(s) Oral every 6 hours PRN  dextrose Oral Gel 15 Gram(s) Oral once PRN  senna 2 Tablet(s) Oral at bedtime PRN        Vital Signs Last 24 Hrs  T(C): 36.3 (10 Jul 2023 05:34), Max: 37.5 (09 Jul 2023 21:41)  T(F): 97.4 (10 Jul 2023 05:34), Max: 99.5 (09 Jul 2023 21:41)  HR: 69 (10 Jul 2023 05:34) (68 - 105)  BP: 159/74 (10 Jul 2023 05:34) (112/60 - 159/74)  BP(mean): --  RR: 16 (10 Jul 2023 05:34) (16 - 18)  SpO2: 98% (10 Jul 2023 05:34) (97% - 100%)    Parameters below as of 10 Jul 2023 05:34  Patient On (Oxygen Delivery Method): room air        PHYSICAL EXAM  General: NAD  HEENT: PERRLA, EOMOI, clear oropharynx, anicteric sclera, pink conjunctiva  Neck: supple  CV: (+) S1/S2 RRR  Lungs: clear to auscultation, no wheezes or rales  Abdomen: soft, non-tender, non-distended (+) BS  Ext: no clubbing, cyanosis or edema  Skin: no rashes and no petechiae  Neuro: alert and oriented X 3, no focal deficits  Central Line:     RECENT CULTURES:  07-07 @ 21:06  .Blood Blood  --  --  --    No growth at 48 Hours  --        LABS:                        7.4    10.71 )-----------( 49       ( 09 Jul 2023 18:10 )             23.0         Mean Cell Volume : 79.9 fl  Mean Cell Hemoglobin : 25.7 pg  Mean Cell Hemoglobin Concentration : 32.2 gm/dL  Auto Neutrophil # : 2.86 K/uL  Auto Lymphocyte # : 7.75 K/uL  Auto Monocyte # : 0.00 K/uL  Auto Eosinophil # : 0.00 K/uL  Auto Basophil # : 0.00 K/uL  Auto Neutrophil % : 26.7 %  Auto Lymphocyte % : 72.4 %  Auto Monocyte % : 0.0 %  Auto Eosinophil % : 0.0 %  Auto Basophil % : 0.0 %      07-09    133<L>  |  97  |  36<H>  ----------------------------<  222<H>  5.2   |  24  |  1.09    Ca    9.4      09 Jul 2023 18:10  Phos  4.2     07-09  Mg     2.1     07-09    TPro  6.6  /  Alb  3.0<L>  /  TBili  0.7  /  DBili  x   /  AST  27  /  ALT  7<L>  /  AlkPhos  112  07-09      Mg 2.1  Phos 4.2  Mg 2.2  Phos 4.2      PT/INR - ( 09 Jul 2023 20:00 )   PT: 14.9 sec;   INR: 1.28 ratio         PTT - ( 09 Jul 2023 20:00 )  PTT:26.7 sec      Uric Acid 1.7      Uric Acid 3.9        RADIOLOGY & ADDITIONAL STUDIES:         Diagnosis: B-PLL    Protocol/Chemo Regimen: Rituximab 375 mg/m2 = 735 mg split into two doses. 100 mg on Day 1, 635 mg on Day 2. Dexamethasone 12 mg on 7/7, Dexamethsone 20 mg x 1 on Day 1 and Day 2.  Day: 2     Pt endorsed:   +generalized weakness, fatigue    Review of Systems: denies nausea, vomiting, diarrhea, chest pain, palpitation, SOB, abdominal pain, diarrhea, constipation    Pain scale: denies    Diet: Diet, Consistent Carbohydrate    Allergies: No Known Allergies    ANTIMICROBIALS  piperacillin/tazobactam IVPB.. 3.375 Gram(s) IV Intermittent every 8 hours    HEME/ONC MEDICATIONS  riTUXimab-pvvr (RUXIENCE) IVPB (eMAR) 635 milliGRAM(s) IV Intermittent once    STANDING MEDICATIONS  allopurinol 300 milliGRAM(s) Oral every 24 hours  dexAMETHasone  IVPB 20 milliGRAM(s) IV Intermittent every 24 hours  dextrose 5%. 1000 milliLiter(s) IV Continuous <Continuous>  dextrose 5%. 1000 milliLiter(s) IV Continuous <Continuous>  dextrose 50% Injectable 25 Gram(s) IV Push once  dextrose 50% Injectable 25 Gram(s) IV Push once  dextrose 50% Injectable 12.5 Gram(s) IV Push once  glucagon  Injectable 1 milliGRAM(s) IntraMuscular once  insulin glargine Injectable (LANTUS) 44 Unit(s) SubCutaneous at bedtime  insulin lispro (ADMELOG) corrective regimen sliding scale   SubCutaneous three times a day before meals  insulin lispro (ADMELOG) corrective regimen sliding scale   SubCutaneous at bedtime  insulin lispro Injectable (ADMELOG) 14 Unit(s) SubCutaneous three times a day before meals  levothyroxine 100 MICROGram(s) Oral every 24 hours  melatonin 3 milliGRAM(s) Oral at bedtime  phytonadione   Solution 5 milliGRAM(s) Oral daily  rasburicase IVPB 3 milliGRAM(s) IV Intermittent once  sodium chloride 0.9%. 1000 milliLiter(s) IV Continuous <Continuous>    PRN MEDICATIONS  acetaminophen     Tablet .. 650 milliGRAM(s) Oral every 6 hours PRN  dextrose Oral Gel 15 Gram(s) Oral once PRN  senna 2 Tablet(s) Oral at bedtime PRN    Vital Signs Last 24 Hrs  T(C): 36.3 (10 Jul 2023 05:34), Max: 37.5 (09 Jul 2023 21:41)  T(F): 97.4 (10 Jul 2023 05:34), Max: 99.5 (09 Jul 2023 21:41)  HR: 69 (10 Jul 2023 05:34) (68 - 105)  BP: 159/74 (10 Jul 2023 05:34) (112/60 - 159/74)  BP(mean): --  RR: 16 (10 Jul 2023 05:34) (16 - 18)  SpO2: 98% (10 Jul 2023 05:34) (97% - 100%)    Parameters below as of 10 Jul 2023 05:34  Patient On (Oxygen Delivery Method): room air    PHYSICAL EXAM  General: adult in NAD  HEENT: clear oropharynx, poor dentition of posterior top molars   CV: normal S1/S2 RRR  Lungs: CTAB, no wheezes  Abdomen: distended with massive splenomegaly, normoactive bowel sounds.   Ext: no edema  Skin: superficial thrombophlebitis of LUE  Neuro: alert and oriented X 3  PIV CDI    RECENT CULTURES:  07-07 @ 21:06  .Blood Blood  No growth at 48 Hours    LABS:                        7.6    26.69 )-----------( 62       ( 10 Jul 2023 06:37 )             24.5     Mean Cell Volume : 80.3 fl  Mean Cell Hemoglobin : 24.9 pg  Mean Cell Hemoglobin Concentration : 31.0 gm/dL  Auto Neutrophil # : 6.78 K/uL  Auto Lymphocyte # : 18.98 K/uL  Auto Monocyte # : 0.69 K/uL  Auto Eosinophil # : 0.24 K/uL  Auto Basophil # : 0.00 K/uL  Auto Neutrophil % : 25.4 %  Auto Lymphocyte % : 71.1 %  Auto Monocyte % : 2.6 %  Auto Eosinophil % : 0.9 %  Auto Basophil % : 0.0 %    07-10    133<L>  |  97  |  34<H>  ----------------------------<  265<H>  4.6   |  25  |  0.98    Ca    9.0      10 Jul 2023 06:37  Phos  3.9     07-10  Mg     2.2     07-10    TPro  6.6  /  Alb  3.0<L>  /  TBili  0.7  /  DBili  x   /  AST  16  /  ALT  6<L>  /  AlkPhos  109  07-10  Mg 2.2  Phos 3.9  Mg 2.1  Phos 4.2  PT/INR - ( 10 Jul 2023 06:38 )   PT: 13.7 sec;   INR: 1.19 ratio    PTT - ( 10 Jul 2023 06:38 )  PTT:26.6 sec    Uric Acid 1.9      Uric Acid 1.7    RADIOLOGY & ADDITIONAL STUDIES:  Xray Chest 1 View- PORTABLE-Urgent (07.07.23 @ 17:24) >  No acute pulmonary disease

## 2023-07-10 NOTE — PROGRESS NOTE ADULT - PROBLEM SELECTOR PLAN 2
-TSH 12.35 with Free T4 1.4  -On Levothyroxine 100 mcg daily   -Check levels as out pt   -Potentially will need higher dose of Levothyroxine

## 2023-07-10 NOTE — PROGRESS NOTE ADULT - PROBLEM SELECTOR PLAN 6
patient has mild hyponatremia trending 132 > 129 > 132. > 128 >130 > 128 Patient mildly fatigued (likely from anemia) otherwise asymptomatic. Serum osmolality 291, UCr 50, Daniela 108, UOsm 468. Hyponatremia likely in setting of SIADH 2/2 hypothyroidism  - Will increase fluid restriction to 1.2L, can consider salt tablets if Na does not improve  - Trend Na especially on standing IVF.  7/8 Renal consulted  7/8  Urine lytes ordered  7/8 Na corrected for hyperglycemia 133 today. Per renal, okay to monitor for now.

## 2023-07-10 NOTE — PROGRESS NOTE ADULT - NS ATTEND AMEND GEN_ALL_CORE FT
63M PMH DM2 (a1c 5.7) but no other known hx (pt. has not seen PCP in some time), admitted to [facility] 7/2 with malaise, B-symptoms (fevers/chills, weight loss, night sweats), hepatosplenomegaly and labs c/f B-PLL, started on hydroxyurea 1500 BID and allopurinol, transferred to [facility] for further management. Leukocytosis, anemia and thrombocytopenia secondary to disease. I have made amendments to the documentation where necessary. Additional comments: 63 year old male with  PMH DM2 (a1c 5.7) but no other known hx (pt. has not seen PCP in some time), admitted to [facility]  on 7/2 with malaise, B-symptoms (fevers/chills, weight loss, night sweats), hepatosplenomegaly and labs c/f B-PLL, started on hydroxyurea 1500 BID and allopurinol, transferred to [facility] for further management. Leukocytosis, anemia and thrombocytopenia secondary to disease.     B-cell prolymphocytic leukemia.   Flow cytometry findings consistent with CD5 negative, CD10 negative B-cell lymphoproliferative disorder/lymphoma  Plan to initiate treatment with Rituximab 375 mg/m2 = 735 mg split into two doses. 100 mg IV on Day 1, 635 mg IV on Day 2. Dexamethasone 12 mg IV given on 7/7, will give Dexamethsone 20 mg x 1 on Day 1 and Day 2 with Rituxan and Elitek 3 mg on day 1  On 7/8, I discussed potential benefit of initial treatment with Rituxan as well as potential side effects with patient, and after all questions addressed, patient agreed to proceed with treatment. Chemotherapy/immunotherapy consent form signed.   - HIV and Hep B/C negative  - F/u BMBx FISH, cytogenetics, and biopsy results. F/u G6PD results.  - s/p hydroxyurea on 7/7  - C/w IVF and allopurinol  - Monitor TLS labs and Coags BID  - receiving prbcs for hgb<7.0, platelets <10K or <20K if febrile   - hepatosplenomegaly noted on CT including splenic infarcts, may need repeat imaging in future to further evaluate  On 7/8, patient with hyperkalemia 6.0, then 5.7 and hyperphosphatemia- Renal consult called as high risk for TLS. Lokelma x 1 dose given.   7/8 Phoslo 667 mg x3 doses PO for hyperphosphatemia  Endocrine consult for persistent hyperglycemia on Insulin coverage.   Plan to start Rituxan on 7/9 once hyperglycemia and hyperkalemia are well controlled.  7/9/23: Patient feeling better today, potassium in normal range. Glucose better controlled on Insulin coverage per Endo. He will begin Rituxan therapy as outlined above.     ID- patient on Zosyn IV at [facility], will continue.       Risk Statement (NON-critical care).     On this date of service, level of risk to patient is considered: High.     Due to: B- PLL with massive splenomegaly for Rituxan therapy at high risk for tumor lysis syndrome, also risk on infection and bleeding.     Time-based billing (NON-critical care).     40 minutes spent on total encounter. The necessity of the time spent during the encounter on this date of service was due to:     performing review of systems, physical exam, lab review and discussion of plan of care with patient. 63 year old male with  PMH DM2 (a1c 5.7) but no other known hx (pt. has not seen PCP in some time), admitted to [facility]  on 7/2 with malaise, B-symptoms (fevers/chills, weight loss, night sweats), hepatosplenomegaly and labs c/f B-PLL, started on hydroxyurea 1500 BID and allopurinol, transferred to [facility] for further management. Leukocytosis, anemia and thrombocytopenia secondary to disease.    B-cell prolymphocytic leukemia.   Flow cytometry findings consistent with CD5 negative, CD10 negative B-cell lymphoproliferative disorder/lymphoma  Plan to initiate treatment with Rituximab 375 mg/m2 = 735 mg split into two doses. 100 mg IV on Day 1, 635 mg IV on Day 2. Dexamethasone 12 mg IV given on 7/7, will give Dexamethsone 20 mg x 1 on Day 1 and Day 2 with Rituxan and Elitek 3 mg on day 1  On 7/8, I discussed potential benefit of initial treatment with Rituxan as well as potential side effects with patient, and after all questions addressed, patient agreed to proceed with treatment. Chemotherapy/immunotherapy consent form signed.   - HIV and Hep B/C negative  - F/u BMBx FISH, cytogenetics, and biopsy results. F/u G6PD results.  - d/c HU  - C/w IVF and allopurinol  - Monitor TLS labs and Coags BID  - receiving prbcs for hgb<7.0, platelets <10K or <20K if febrile   - hepatosplenomegaly noted on CT including splenic infarcts, may need repeat imaging in future to further evaluate  On 7/8, patient with hyperkalemia 6.0, then 5.7 and hyperphosphatemia- Renal consult called as high risk for TLS. Lokelma x 1 dose given.   7/8 Phoslo 667 mg x3 doses PO for hyperphosphatemia  Endocrine consult for persistent hyperglycemia on Insulin coverage.   Plan to start Rituxan on 7/9 once hyperglycemia and hyperkalemia are well controlled.  7/9/23: Patient feeling better today, potassium in normal range. Glucose better controlled on Insulin coverage per Endo. He will begin Rituxan therapy as outlined above.   7/10-tolerated rituxan very well, no acute reaction    ID- patient on Zosyn IV  - cults neg, not febrile >>> will d/c today      Risk Statement (NON-critical care).     On this date of service, level of risk to patient is considered: High.     Due to: B- PLL with massive splenomegaly for Rituxan therapy at high risk for tumor lysis syndrome, also risk on infection and bleeding.     Time-based billing (NON-critical care).     40 minutes spent on total encounter. The necessity of the time spent during the encounter on this date of service was due to:     performing review of systems, physical exam, lab review and discussion of plan of care with patient.

## 2023-07-10 NOTE — PROGRESS NOTE ADULT - PROBLEM SELECTOR PLAN 1
-Test BG ac and hs  -Decrease Glargine to 22 units at bedtime tonight   -Decrease Lispro to 6 units TID. Starting tomorrow  -Administer Lispro 20 units with dinner today x 1 since pt received Dexa today  -C/w Moderate correctional scale ac and hs and 2am for now  -IF steroid is restarted, please inform endocrine team  D/c recs:   -Will be determined according to BG/insulin needs/PO intake and steroid therapy at time of discharge.  -Would benefit from basal/bolus. Doses TBD  -Pt reports BG levels were well controlled on bid Humulin N but pt agreeable to start basal/bolus. Pt needs to learn use of insulin pens since pt was using insulin syringes at home.   -Please write Rxs for: Solo Star Insulin pen/Humalog Kwik insulin pen/Robyn insulin pen needles/glucose meter/strips/lancets  -Consider Freestyle Sabine CGM if cover by pt's insurance  -Can follow at [facility] practice. [address]. Phone . Call for apt closer to discharge  -Needs optho f/u as out pt

## 2023-07-10 NOTE — PROGRESS NOTE ADULT - PROBLEM SELECTOR PLAN 2
Febrile to 100.5 F on admission to [facility] 7/7 PM, improved to 99.2 after tylenol.  exam with RLL crackles but breathing comfortably on room air. No cough or SOB. No dysuria.  HIV negative and RVP negative on admission to [facility] this past week.  not currently neutropenic  On empiric zosyn  CXR with no acute pulmonary disease.  Follow up 7/6 blood cultures. Febrile to 100.5 F on admission to [facility] 7/7 PM, improved to 99.2 after tylenol.  exam with RLL crackles but breathing comfortably on room air. No cough or SOB. No dysuria.  HIV negative and RVP negative on admission to [facility] this past week.  not currently neutropenic  CXR with no acute pulmonary disease.  Follow up 7/7 blood cultures (-).

## 2023-07-10 NOTE — PROGRESS NOTE ADULT - ASSESSMENT
63 year old M w/h/o T2DM > unknown control (A1C skewed due to anemia) on Novolin insulin bid. No known DM complications. Also h/o hypothyroidism. Presented to OSH with malaise, generalized weakness, and gait instability. Transferred to [facility] for further treatment of suspected B-PLL w/ rituximab.  Also on Dexa> last dose today with rebound hyperglycemia  while on steroids > mostly in 200s. Per team, pt will be off steroids for now. Pt tolerating POs so will adjust insulin doses to keep BG goal 100 to 180s. No hypoglycemia.

## 2023-07-10 NOTE — PROGRESS NOTE ADULT - SUBJECTIVE AND OBJECTIVE BOX
[facility] DIVISION OF KIDNEY DISEASES AND HYPERTENSION   FOLLOW UP NOTE  --------------------------------------------------------------------------------  HPI: 64 yo M with a PMH of DM and recently diagnosed with Lymphoma. Plan to initiate inpatient treatment with Rituxan. Serum potassium elevated at 6.0. Nephrology following for hyperkalemia and monitoring of TLS.     24 hour events/subjective: Pt. was seen and examined today. He is feeling well.     PAST HISTORY  --------------------------------------------------------------------------------  No significant changes to PMH, PSH, FHx, SHx, unless otherwise noted    ALLERGIES & MEDICATIONS  --------------------------------------------------------------------------------  Allergies  No Known Allergies  Intolerances    Standing Inpatient Medications  allopurinol 300 milliGRAM(s) Oral every 24 hours  dexAMETHasone  IVPB 20 milliGRAM(s) IV Intermittent every 24 hours  dextrose 5%. 1000 milliLiter(s) IV Continuous <Continuous>  dextrose 5%. 1000 milliLiter(s) IV Continuous <Continuous>  dextrose 50% Injectable 25 Gram(s) IV Push once  dextrose 50% Injectable 25 Gram(s) IV Push once  dextrose 50% Injectable 12.5 Gram(s) IV Push once  glucagon  Injectable 1 milliGRAM(s) IntraMuscular once  insulin glargine Injectable (LANTUS) 44 Unit(s) SubCutaneous at bedtime  insulin lispro (ADMELOG) corrective regimen sliding scale   SubCutaneous at bedtime  insulin lispro (ADMELOG) corrective regimen sliding scale   SubCutaneous three times a day before meals  insulin lispro Injectable (ADMELOG) 14 Unit(s) SubCutaneous three times a day before meals  levothyroxine 100 MICROGram(s) Oral every 24 hours  melatonin 3 milliGRAM(s) Oral at bedtime  phytonadione   Solution 5 milliGRAM(s) Oral daily  piperacillin/tazobactam IVPB.. 3.375 Gram(s) IV Intermittent every 8 hours  rasburicase IVPB 3 milliGRAM(s) IV Intermittent once  riTUXimab-pvvr (RUXIENCE) IVPB (eMAR) 635 milliGRAM(s) IV Intermittent once  sodium chloride 0.9%. 1000 milliLiter(s) IV Continuous <Continuous>    PRN Inpatient Medications  acetaminophen     Tablet .. 650 milliGRAM(s) Oral every 6 hours PRN  dextrose Oral Gel 15 Gram(s) Oral once PRN  senna 2 Tablet(s) Oral at bedtime PRN    REVIEW OF SYSTEMS  --------------------------------------------------------------------------------  All other systems were reviewed and are negative, except as noted.    VITALS/PHYSICAL EXAM  --------------------------------------------------------------------------------  T(C): 36.3 (07-10-23 @ 05:34), Max: 37.5 (07-09-23 @ 21:41)  HR: 69 (07-10-23 @ 05:34) (68 - 105)  BP: 159/74 (07-10-23 @ 05:34) (112/60 - 159/74)  RR: 16 (07-10-23 @ 05:34) (16 - 18)  SpO2: 98% (07-10-23 @ 05:34) (97% - 100%)  Wt(kg): --  Height (cm): 177.8 (07-08-23 @ 08:52)  Weight (kg): 78 (07-08-23 @ 08:52)  BMI (kg/m2): 24.7 (07-08-23 @ 08:52)  BSA (m2): 1.96 (07-08-23 @ 08:52)    07-09-23 @ 07:01  -  07-10-23 @ 07:00  --------------------------------------------------------  IN: 1118 mL / OUT: 625 mL / NET: 493 mL    Physical Exam:  	Gen: Laying in bed and in NAD  	HEENT: Anicteric  	Pulm: CTA B/L  	CV: S1S2+  	Abd: Soft, +BS    	Ext: No LE edema B/L  	Neuro: Awake  	Skin: Warm and dry    LABS/STUDIES  --------------------------------------------------------------------------------              7.6    26.69 >-----------<  62       [07-10-23 @ 06:37]              24.5     133  |  97  |  34  ----------------------------<  265      [07-10-23 @ 06:37]  4.6   |  25  |  0.98        Ca     9.0     [07-10-23 @ 06:37]      Mg     2.2     [07-10-23 @ 06:37]      Phos  3.9     [07-10-23 @ 06:37]    TPro  6.6  /  Alb  3.0  /  TBili  0.7  /  DBili  x   /  AST  16  /  ALT  6   /  AlkPhos  109  [07-10-23 @ 06:37]    PT/INR: PT 14.9 , INR 1.28       [07-09-23 @ 20:00]  PTT: 26.7       [07-09-23 @ 20:00]    Uric acid 1.9      [07-10-23 @ 06:37]        [07-10-23 @ 06:37]    Creatinine Trend:  SCr 0.98 [07-10 @ 06:37]  SCr 1.09 [07-09 @ 18:10]  SCr 1.07 [07-09 @ 07:06]  SCr 1.25 [07-08 @ 17:44]  SCr 0.89 [07-08 @ 08:52]

## 2023-07-10 NOTE — PROGRESS NOTE ADULT - SUBJECTIVE AND OBJECTIVE BOX
DIABETES FOLLOW UP NOTE: Saw pt earlier today    Chief Complaint: Endocrine consult requested for management of T2DM    INTERVAL HX: Pt stable, reports tolerating POs with BG levels above goal while on Dexa 20mg. Per primary team, pt received last dose of Dexa today and will be off steroids. Pt states feeling well, no pain, SOB.       Review of Systems:  General: As above  Cardiovascular: No chest pain, palpitations  Respiratory: No SOB, no cough  GI: No nausea, vomiting, abdominal pain  Endocrine: No polyuria, polydipsia or S&Sx of hypoglycemia    Allergies    No Known Allergies    Intolerances      MEDICATIONS;  insulin glargine Injectable (LANTUS) 44 Unit(s) SubCutaneous at bedtime  insulin lispro (ADMELOG) corrective regimen sliding scale   SubCutaneous at bedtime  insulin lispro (ADMELOG) corrective regimen sliding scale   SubCutaneous three times a day before meals  insulin lispro Injectable (ADMELOG) 14 Unit(s) SubCutaneous three times a day before meals  levothyroxine 100 MICROGram(s) Oral every 24 hours    PHYSICAL EXAM:  VITALS: T(C): 36.9 (07-10-23 @ 14:20)  T(F): 98.5 (07-10-23 @ 14:20), Max: 99.5 (07-09-23 @ 21:41)  HR: 79 (07-10-23 @ 14:20) (62 - 86)  BP: 130/65 (07-10-23 @ 14:20) (116/83 - 162/76)  RR:  (16 - 18)  SpO2:  (97% - 99%)  Wt(kg): --  GENERAL: Male laying in bed in NAD  Abdomen: Soft, nontender, non distended  Extremities: Warm, no edema in all 4 exts  NEURO: A&O X3    LABS:  POCT Blood Glucose.: 282 mg/dL (07-10-23 @ 12:49)  POCT Blood Glucose.: 266 mg/dL (07-10-23 @ 08:35)  POCT Blood Glucose.: 352 mg/dL (07-09-23 @ 21:30)  POCT Blood Glucose.: 259 mg/dL (07-09-23 @ 17:42)  POCT Blood Glucose.: 256 mg/dL (07-09-23 @ 12:01)  POCT Blood Glucose.: 312 mg/dL (07-09-23 @ 08:16)  POCT Blood Glucose.: 391 mg/dL (07-08-23 @ 21:12)  POCT Blood Glucose.: 516 mg/dL (07-08-23 @ 17:23)  POCT Blood Glucose.: 522 mg/dL (07-08-23 @ 17:22)  POCT Blood Glucose.: 477 mg/dL (07-08-23 @ 12:54)  POCT Blood Glucose.: 479 mg/dL (07-08-23 @ 11:52)  POCT Blood Glucose.: 467 mg/dL (07-08-23 @ 11:13)  POCT Blood Glucose.: 468 mg/dL (07-08-23 @ 09:20)  POCT Blood Glucose.: 489 mg/dL (07-08-23 @ 08:36)  POCT Blood Glucose.: 486 mg/dL (07-08-23 @ 08:33)  POCT Blood Glucose.: 298 mg/dL (07-07-23 @ 21:29)  POCT Blood Glucose.: 249 mg/dL (07-07-23 @ 18:51)                            7.6    26.69 )-----------( 68       ( 10 Jul 2023 06:37 )             24.5       07-10    133<L>  |  97  |  34<H>  ----------------------------<  265<H>  4.6   |  25  |  0.98    eGFR: 88    Ca    9.0      07-10  Mg     2.2     07-10  Phos  3.9     07-10    TPro  6.6  /  Alb  3.0<L>  /  TBili  0.7  /  DBili  x   /  AST  16  /  ALT  6<L>  /  AlkPhos  109  07-10      Thyroid Function Tests:  07-04 @ 09:51 TSH -- FreeT4 1.4 T3 -- Anti TPO -- Anti Thyroglobulin Ab -- TSI --  07-02 @ 16:51 TSH 12.35 FreeT4 -- T3 -- Anti TPO -- Anti Thyroglobulin Ab -- TSI --      A1C with Estimated Average Glucose Result: 5.7 % (07-05-23 @ 06:15)    Estimated Average Glucose: 117 (07-05-23 @ 06:15)    07-04 Chol 69 Direct LDL -- LDL calculated 31 HDL 12<L> Trig 128

## 2023-07-10 NOTE — PROGRESS NOTE ADULT - PROBLEM SELECTOR PLAN 7
7/8 K 6.0 and then K 5.7 on repeat CMP. Lasix 20 IV x 2. Insulin given for hyperglycemia should help with hyperkalemia as well.  7/8 Renal consulted. Lokelma 10 x 1 per renal recs.  7/8 Likely pseudohypokalemia per renal, can hold off on additional doses of lokelma for now. 7/10- Resolved.               7/8 K 6.0 and then K 5.7 on repeat CMP. Lasix 20 IV x 2. Insulin given for hyperglycemia should help with hyperkalemia as well.  7/8 Renal consulted. Lokelma 10 x 1 per renal recs.  7/8 Likely pseudohypokalemia per renal, can hold off on additional doses of lokelma for now.

## 2023-07-10 NOTE — ADVANCED PRACTICE NURSE CONSULT - ASSESSMENT
Pt. seen in bed a/ox4,denies any discomfort at this time. Chemotherapy teachings done.Pt. verbalized understanding. Inserted g# 22 to right upper arm.Dated 7/9/23  .Dsg dry and intact .Site with no s/s of redness ,swelling or pain. With positive blood return noted and flushing easily with 10 ML NS. Drug verification done by 2 RN.'s.  Lab. values reviewed by Dr. Garnett during rounds . Pt. received  tylenol 650 mg po,benadryl 50 mg IVSS and decadron 20 mg  IVSS as premedications. At  1243 , pt. started on        riTUXimab-pvvr (RUXIENCE) IVPB (eMAR) [Ordered as RUXIENCE IVPB (eMAR)] - Start Date: 09-Jul-2023  635 milliGRAM(s) in sodium chloride 0.9% ,IV Intermittent, tip attached to the lowest port of saline to G#22 via alaris pump. Rate started at 50 ml/hr. then      increased to 50 ml /hr every   30 minutes until reaching the goal of 250 ml/hr . Vital signs monitored every 30 minutes during infusion. Pt. observed fro one hour into infusion.Pt. tolerating infusion so far . Primary RN aware of present treatment .Safety maintained.   Special Instructions: see chemo order: day 1 Concentration 1 mg/ml  Administration Instructions: This is a High Alert Medication. , given as a secondary line connected to saline to PIV G #22 of right upper arm ,infusing well at 25 ml /hr with the use of alaris pump.Infusing well for 4 hours . Vital signs done every 30 minutes until done Primary RN aware.Safety maintained

## 2023-07-10 NOTE — PROGRESS NOTE ADULT - PROBLEM SELECTOR PLAN 1
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 100 mg on Day 1, 635 mg on Day 2. Dexamethasone 12 mg on 7/7, Dexamethsone 20 mg x 1 on Day 1 and Day 2.  - HIV and Hep B/C negative, folate and B12 unremarkable, ferritin elevated 428  - F/u BMBx FISH, cytogenetics, and biopsy results. F/u G6PD results.  - s/p hydroxyurea on 7/7  - C/w IVF and allopurinol  - Monitor TLS labs and Coags BID  - prbcs for hgb<7.0, platelets <10K or <20K if febrile   - hepatosplenomegaly noted on CT including splenic infarcts, may need repeat imaging in future to further evaluate  7/9 PICC consent signed and placed in chart. Plan for PICC on 7/10.  7/9 Started rituximab. Monitor closely. 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 100 mg on Day 1, 635 mg on Day 2. Dexamethasone 12 mg on 7/7, Dexamethsone 20 mg x 1 on Day 1 and Day 2.  - HIV and Hep B/C negative, folate and B12 unremarkable, ferritin elevated 428  - F/u BMBx FISH, cytogenetics, and biopsy results. F/u G6PD results.  - s/p hydroxyurea on 7/7  - C/w IVF and allopurinol  - Monitor TLS labs and Coags BID  - prbcs for hgb<7.0, platelets <10K or <20K if febrile   - hepatosplenomegaly noted on CT including splenic infarcts, may need repeat imaging in future to further evaluate  7/10- Rituxan dose 2 today. 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 100 mg on Day 1, 635 mg on Day 2. Dexamethasone 12 mg on 7/7, Dexamethsone 20 mg x 1 on Day 1 and Day 2.  HIV and Hep B/C negative, folate and B12 unremarkable, ferritin elevated 428  F/u BMBx FISH, cytogenetics, and biopsy results. F/u G6PD results.  s/p hydroxyurea on 7/7  C/w IVF and allopurinol  Monitor TLS labs and Coags BID  prbcs for hgb<7.0, platelets <10K or <20K if febrile   - hepatosplenomegaly noted on CT including splenic infarcts, may need repeat imaging in future to further evaluate  7/10- Rituxan dose 2 today.

## 2023-07-10 NOTE — PROGRESS NOTE ADULT - PROBLEM SELECTOR PLAN 1
Pt. with resolving/stable hyperkalemia (v ?pseudohyperkalemia) in setting of normal renal function with marked hyperglycemia due to recent steroid use. Serum potassium of 6.0 on admission. Endocrinology had been consulted due to markedly elevated blood sugars. He received Lokelma, serum potassium improved to 5.7. No overt EKG changes have been noted per primary team.     Potassium on VBG was within normal limits, thus more likely pseudohyperkalemia as noted above.     At the same time, patient has recently diagnosed Lymphoma and anticipates initiation of treatment during this admission thus will need to monitor for possible TLS. Labs reviewed, Scr, phos, K, uric acid stable.     Of note, we discussed possibility of need renal replacement therapy if he develops anuric renal failure or persistent electrolyte abnormalities that are not responsive to medical treatment. At this time, he wishes to "think about it" and discuss it with his family. He did mention that if it were a "life threatening" situation, then he would be agreeable to HD/RRT but again, he deferred signing consents at this time.    Nephrology services to follow peripherally. Feel free to contact us with any questions.  If you have any questions, please feel free to contact me  [place]  Nephrology Fellow  Pager # [phone]  [employer] Teams  (After 4pm or on weekends please page the on-call fellow)

## 2023-07-10 NOTE — PROGRESS NOTE ADULT - PROBLEM SELECTOR PLAN 9
Pt. found to have elevated TSH to 12.35 on admission w/ symptoms of malaise, fatigue. Free T4 1.4. Pt. has not seen a PCP in some time  Started levothyroxine 100mcg qd (1.7 mcg/kg); repeat TFTs as an outpatient in 4-6 weeks for dose adjustments.  Follow up TFTs 7/10 AM

## 2023-07-10 NOTE — PROGRESS NOTE ADULT - PROBLEM SELECTOR PLAN 5
PAST MEDICAL HISTORY:  No pertinent past medical history Noted to have systolic murmur of LUSB vs. apex on exam,. Echo findings unremarkable so likely flow murmur 2/2 severe anemia rather than undiagnosed aortic valve pathology.   - Manage anemia as above.  - Monitor respiratory status on standing fluids.

## 2023-07-10 NOTE — PROGRESS NOTE ADULT - PROBLEM SELECTOR PLAN 11
F: maintenance IVF   E: prn   N:  Diet: Consistent carbs w/ fluid restriction  DVT ppx: SCDs, holding VTE ppx given severe anemia and thrombocytopenia  GI ppx: none  GI motility: senna prn  dispo: [facility]  Code Status: full code.  Vitamin K PO 5 mg daily x 3 days (7/8-7/10) for prolonged PT Encourage ambulation, VTE prophylaxis C/I.

## 2023-07-11 ENCOUNTER — TRANSCRIPTION ENCOUNTER (OUTPATIENT)
Age: 63
End: 2023-07-11

## 2023-07-11 LAB
ALBUMIN SERPL ELPH-MCNC: 2.8 G/DL — LOW (ref 3.3–5)
ALP SERPL-CCNC: 95 U/L — SIGNIFICANT CHANGE UP (ref 40–120)
ALT FLD-CCNC: 8 U/L — LOW (ref 10–45)
ANION GAP SERPL CALC-SCNC: 9 MMOL/L — SIGNIFICANT CHANGE UP (ref 5–17)
APTT BLD: 27.2 SEC — LOW (ref 27.5–35.5)
AST SERPL-CCNC: 16 U/L — SIGNIFICANT CHANGE UP (ref 10–40)
BASOPHILS # BLD AUTO: 0 K/UL — SIGNIFICANT CHANGE UP (ref 0–0.2)
BASOPHILS NFR BLD AUTO: 0 % — SIGNIFICANT CHANGE UP (ref 0–2)
BILIRUB SERPL-MCNC: 0.5 MG/DL — SIGNIFICANT CHANGE UP (ref 0.2–1.2)
BUN SERPL-MCNC: 27 MG/DL — HIGH (ref 7–23)
CALCIUM SERPL-MCNC: 9 MG/DL — SIGNIFICANT CHANGE UP (ref 8.4–10.5)
CHLORIDE SERPL-SCNC: 100 MMOL/L — SIGNIFICANT CHANGE UP (ref 96–108)
CO2 SERPL-SCNC: 27 MMOL/L — SIGNIFICANT CHANGE UP (ref 22–31)
CREAT SERPL-MCNC: 0.78 MG/DL — SIGNIFICANT CHANGE UP (ref 0.5–1.3)
D DIMER BLD IA.RAPID-MCNC: HIGH NG/ML DDU
EGFR: 101 ML/MIN/1.73M2 — SIGNIFICANT CHANGE UP
EOSINOPHIL # BLD AUTO: 0 K/UL — SIGNIFICANT CHANGE UP (ref 0–0.5)
EOSINOPHIL NFR BLD AUTO: 0 % — SIGNIFICANT CHANGE UP (ref 0–6)
FIBRINOGEN PPP-MCNC: 217 MG/DL — SIGNIFICANT CHANGE UP (ref 200–445)
GLUCOSE BLDC GLUCOMTR-MCNC: 125 MG/DL — HIGH (ref 70–99)
GLUCOSE BLDC GLUCOMTR-MCNC: 149 MG/DL — HIGH (ref 70–99)
GLUCOSE BLDC GLUCOMTR-MCNC: 255 MG/DL — HIGH (ref 70–99)
GLUCOSE BLDC GLUCOMTR-MCNC: 333 MG/DL — HIGH (ref 70–99)
GLUCOSE SERPL-MCNC: 122 MG/DL — HIGH (ref 70–99)
HCT VFR BLD CALC: 26.1 % — LOW (ref 39–50)
HEMATOPATHOLOGY REPORT: SIGNIFICANT CHANGE UP
HGB BLD-MCNC: 8.1 G/DL — LOW (ref 13–17)
INR BLD: 1.19 RATIO — HIGH (ref 0.88–1.16)
LDH SERPL L TO P-CCNC: 477 U/L — HIGH (ref 50–242)
LYMPHOCYTES # BLD AUTO: 6.16 K/UL — HIGH (ref 1–3.3)
LYMPHOCYTES # BLD AUTO: 66.7 % — HIGH (ref 13–44)
MAGNESIUM SERPL-MCNC: 2.3 MG/DL — SIGNIFICANT CHANGE UP (ref 1.6–2.6)
MCHC RBC-ENTMCNC: 24.9 PG — LOW (ref 27–34)
MCHC RBC-ENTMCNC: 31 GM/DL — LOW (ref 32–36)
MCV RBC AUTO: 80.3 FL — SIGNIFICANT CHANGE UP (ref 80–100)
MONOCYTES # BLD AUTO: 0.87 K/UL — SIGNIFICANT CHANGE UP (ref 0–0.9)
MONOCYTES NFR BLD AUTO: 9.4 % — SIGNIFICANT CHANGE UP (ref 2–14)
NEUTROPHILS # BLD AUTO: 2.21 K/UL — SIGNIFICANT CHANGE UP (ref 1.8–7.4)
NEUTROPHILS NFR BLD AUTO: 23.9 % — LOW (ref 43–77)
PHOSPHATE SERPL-MCNC: 3.6 MG/DL — SIGNIFICANT CHANGE UP (ref 2.5–4.5)
PLATELET # BLD AUTO: 49 K/UL — LOW (ref 150–400)
POTASSIUM SERPL-MCNC: 4.6 MMOL/L — SIGNIFICANT CHANGE UP (ref 3.5–5.3)
POTASSIUM SERPL-SCNC: 4.6 MMOL/L — SIGNIFICANT CHANGE UP (ref 3.5–5.3)
PROT SERPL-MCNC: 6.2 G/DL — SIGNIFICANT CHANGE UP (ref 6–8.3)
PROTHROM AB SERPL-ACNC: 13.8 SEC — HIGH (ref 10.5–13.4)
RBC # BLD: 3.25 M/UL — LOW (ref 4.2–5.8)
RBC # FLD: 23.6 % — HIGH (ref 10.3–14.5)
SODIUM SERPL-SCNC: 136 MMOL/L — SIGNIFICANT CHANGE UP (ref 135–145)
T4 FREE SERPL-MCNC: 1.2 NG/DL — SIGNIFICANT CHANGE UP (ref 0.9–1.8)
TSH SERPL-MCNC: 15.2 UIU/ML — HIGH (ref 0.27–4.2)
URATE SERPL-MCNC: 3.6 MG/DL — SIGNIFICANT CHANGE UP (ref 3.4–8.8)
WBC # BLD: 9.24 K/UL — SIGNIFICANT CHANGE UP (ref 3.8–10.5)
WBC # FLD AUTO: 9.24 K/UL — SIGNIFICANT CHANGE UP (ref 3.8–10.5)

## 2023-07-11 PROCEDURE — 99232 SBSQ HOSP IP/OBS MODERATE 35: CPT

## 2023-07-11 RX ORDER — HUMAN INSULIN 100 [IU]/ML
35 INJECTION, SUSPENSION SUBCUTANEOUS
Refills: 0 | DISCHARGE

## 2023-07-11 RX ORDER — HUMAN INSULIN 100 [IU]/ML
20 INJECTION, SUSPENSION SUBCUTANEOUS
Refills: 0 | DISCHARGE

## 2023-07-11 RX ORDER — SODIUM CHLORIDE 9 MG/ML
1000 INJECTION INTRAMUSCULAR; INTRAVENOUS; SUBCUTANEOUS
Refills: 0 | Status: DISCONTINUED | OUTPATIENT
Start: 2023-07-11 | End: 2023-07-13

## 2023-07-11 RX ORDER — LEVOTHYROXINE SODIUM 125 MCG
112 TABLET ORAL DAILY
Refills: 0 | Status: DISCONTINUED | OUTPATIENT
Start: 2023-07-12 | End: 2023-07-15

## 2023-07-11 RX ORDER — LEVOTHYROXINE SODIUM 125 MCG
1 TABLET ORAL
Qty: 0 | Refills: 0 | DISCHARGE
Start: 2023-07-11

## 2023-07-11 RX ORDER — INSULIN GLARGINE 100 [IU]/ML
18 INJECTION, SOLUTION SUBCUTANEOUS
Qty: 10 | Refills: 0
Start: 2023-07-11 | End: 2023-08-09

## 2023-07-11 RX ORDER — INSULIN GLARGINE 100 [IU]/ML
18 INJECTION, SOLUTION SUBCUTANEOUS AT BEDTIME
Refills: 0 | Status: DISCONTINUED | OUTPATIENT
Start: 2023-07-11 | End: 2023-07-12

## 2023-07-11 RX ORDER — INSULIN LISPRO 100/ML
1 VIAL (ML) SUBCUTANEOUS
Qty: 5 | Refills: 0
Start: 2023-07-11 | End: 2023-08-09

## 2023-07-11 RX ORDER — FUROSEMIDE 40 MG
40 TABLET ORAL ONCE
Refills: 0 | Status: COMPLETED | OUTPATIENT
Start: 2023-07-11 | End: 2023-07-11

## 2023-07-11 RX ORDER — LEVOTHYROXINE SODIUM 125 MCG
1 TABLET ORAL
Qty: 30 | Refills: 0
Start: 2023-07-11 | End: 2023-08-09

## 2023-07-11 RX ADMIN — Medication 6: at 17:24

## 2023-07-11 RX ADMIN — SODIUM CHLORIDE 125 MILLILITER(S): 9 INJECTION INTRAMUSCULAR; INTRAVENOUS; SUBCUTANEOUS at 06:16

## 2023-07-11 RX ADMIN — Medication 300 MILLIGRAM(S): at 12:52

## 2023-07-11 RX ADMIN — INSULIN GLARGINE 18 UNIT(S): 100 INJECTION, SOLUTION SUBCUTANEOUS at 22:03

## 2023-07-11 RX ADMIN — Medication 3 MILLIGRAM(S): at 22:03

## 2023-07-11 RX ADMIN — Medication 40 MILLIGRAM(S): at 12:52

## 2023-07-11 RX ADMIN — Medication 5 MILLIGRAM(S): at 12:52

## 2023-07-11 RX ADMIN — Medication 6 UNIT(S): at 08:52

## 2023-07-11 RX ADMIN — Medication 4: at 22:04

## 2023-07-11 RX ADMIN — Medication 6 UNIT(S): at 12:53

## 2023-07-11 RX ADMIN — Medication 100 MICROGRAM(S): at 06:16

## 2023-07-11 RX ADMIN — Medication 6 UNIT(S): at 17:24

## 2023-07-11 NOTE — PROGRESS NOTE ADULT - PROBLEM SELECTOR PLAN 6
patient has mild hyponatremia trending 132 > 129 > 132. > 128 >130 > 128 Patient mildly fatigued (likely from anemia) otherwise asymptomatic. Serum osmolality 291, UCr 50, Daniela 108, UOsm 468. Hyponatremia likely in setting of SIADH 2/2 hypothyroidism  - Will increase fluid restriction to 1.2L, can consider salt tablets if Na does not improve  - Trend Na especially on standing IVF.  7/8 Renal consulted  7/8  Urine lytes ordered  7/8 Na corrected for hyperglycemia 133 today. Per renal, okay to monitor for now. patient has mild hyponatremia trending 132 > 129 > 132. > 128 >130 > 128 Patient mildly fatigued (likely from anemia) otherwise asymptomatic. Serum osmolality 291, UCr 50, Daniela 108, UOsm 468. Hyponatremia likely in setting of SIADH 2/2 hypothyroidism  Will increase fluid restriction to 1.2L, can consider salt tablets if Na does not improve  - Trend Na especially on standing IVF.  7/8 Renal consulted, Na corrected for hyperglycemia 133 today. Per renal, okay to monitor for now.  7/11- NA corrected.

## 2023-07-11 NOTE — PROGRESS NOTE ADULT - PROBLEM SELECTOR PLAN 4
Hx of DM2, on insulin (Novolin N) 35U qAM/15-20U qPM at home  - lipid panel unremarkable  - a1c 5.7%  - restart insulin at 10u given elevated glucoses recently  - start ISS; adjust per patient's insulin requirements  - monitor FS for goal -180 while inpatient.  - Monitor sugars while patient on dexamethasone.  - f/u fructosamine for elevated glucoses but normal a1c.  7/8 BGs consistently in the 400s. 12 U sliding scale this AM without resolution. 12 U + 10 additional U around lunch time also without resolution.   7/8 bedtime lantus switched to 20 U from 10 U  7/8 Endocrine consulted, will alter regimen according to recs.  7/9 BGs improving.   7/9 switched lantus to 44 at bedtime and premeals 14 units lispro  Inform endocrine of any changes in steroid status/dose. Hx of DM2, on insulin (Novolin N) 35U qAM/15-20U qPM at home  - lipid panel unremarkable  - a1c 5.7%  - restart insulin at 10u given elevated glucoses recently  - start ISS; adjust per patient's insulin requirements  - monitor FS for goal -180 while inpatient.  - Monitor sugars while patient on dexamethasone.  - f/u fructosamine for elevated glucoses but normal a1c.  7/8 BGs consistently in the 400s. 12 U sliding scale this AM without resolution. 12 U + 10 additional U around lunch time also without resolution.   7/8 bedtime lantus switched to 20 U from 10 U  7/8 Endocrine consulted, will alter regimen according to recs.  7/9 BGs improving.   7/11- Endo aware regarding steroid, will need Insulin at discharge with outpatient follow up.

## 2023-07-11 NOTE — DISCHARGE NOTE PROVIDER - HOSPITAL COURSE
63M w/ PMH DM2 (a1c 5.7 on 7/5) but no other known hx (pt. has not seen PCP in some time) who presented to [facility] on 07/2/2023 for generalized fatigue, malaise, unsteadiness on feet since Friday 6/30. On admission patient had marked leukocytosis to 95K, anemic and thrombocytopenic. 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 Patient was started on hydroxyurea 1500mg BID and allopurinol 300mg qdaily on 7/3/23 and bone marrow biopsy and aspirate from 7/3/23 with pathology pending however per prelim discussion lymphocytes have notable blebbing on membranes and nucleoli noted with pro-lymphocytic like features. Imaging at [facility] significant for hepatomegaly of 23cm w/ 3.8 cm hypervascular lesion in R hepatic lobe, possibly hemangioma with MRI for further eval pending, chronic pancreatitis with pancreatic ductal dilatation, and possible intraductal calcification of the pancreatic head. 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   Upon arrival to the unit, started on Hydrea, IV access obtained, monitored CBC with diff, transfused 64yo M PMH DM2 presented to [facility] 7/2 with malaise, B-symptoms (fevers/chills, weight loss, night sweats) and hepatosplenomegaly. Patient transferred to [facility] for further management. 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 Patient s/p Rituxan (7/9 and 7/10). Now receiving Rituximab/Bendamustine (7/13).  Patient has leukocytosis, anemia and thrombocytopenia secondary to disease condition 62yo M PMH DM2 presented to [facility] 7/2 with malaise, B-symptoms (fevers/chills, weight loss, night sweats) and hepatosplenomegaly. Patient transferred to [facility] for further management. 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 Patient s/p Rituxan (7/9 and 7/10). Pt received Rituximab/Bendamustine (7/13 and 7/14) .    Patient received IVF and antiemetics, strict I/O  and monitoring of CBC and electrolytes. Tolerated chemotherapy without complications. Stable for discharge home and follow up as an outpatient.   62yo M PMH DM2 presented to [facility] 7/2 with malaise, B-symptoms (fevers/chills, weight loss, night sweats) and hepatosplenomegaly. Patient transferred to [facility] for further management. 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 Patient s/p Rituxan (7/9 and 7/10). Pt received Rituximab/Bendamustine (7/13 and 7/14) .  Endocrine was consulted for steroid exacerbated hyperglycemia management.   Patient received IVF and antiemetics, strict I/O  and monitoring of CBC and electrolytes. Tolerated chemotherapy without complications. Stable for discharge home and follow up as an outpatient.   62yo M PMH DM2 presented to [facility] 7/2 with malaise, B-symptoms (fevers/chills, weight loss, night sweats) and hepatosplenomegaly. Patient transferred to [facility] for further management. Originally suspicious for B-PLL. 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 Patient s/p Rituxan (7/9 and 7/10). Pt received Rituximab/Bendamustine (7/13 and 7/14) .  Endocrine was consulted for steroid exacerbated hyperglycemia management.   Patient received IVF and antiemetics, strict I/O  and monitoring of CBC and electrolytes. Tolerated chemotherapy without complications. Stable for discharge home and follow up as an outpatient.

## 2023-07-11 NOTE — PROGRESS NOTE ADULT - ASSESSMENT
63M PMH DM2 (a1c 5.7) but no other known hx (pt. has not seen PCP in some time), admitted to [facility] 7/2 with malaise, B-symptoms (fevers/chills, weight loss, night sweats), hepatosplenomegaly and labs c/f B-PLL, s/p Hydrea, and Rituxan patient has leucocytosis, anemia and thrombocytopenia secondary to disease condition.

## 2023-07-11 NOTE — DISCHARGE NOTE PROVIDER - DETAILS OF MALNUTRITION DIAGNOSIS/DIAGNOSES
This patient has been assessed with a concern for Malnutrition and was treated during this hospitalization for the following Nutrition diagnosis/diagnoses:     -  07/08/2023: Severe protein-calorie malnutrition

## 2023-07-11 NOTE — PROGRESS NOTE ADULT - SUBJECTIVE AND OBJECTIVE BOX
Diagnosis:    Protocol/Chemo Regimen:    Day:     Pt endorsed:    Review of Systems:     Pain scale:     Diet:     Allergies    No Known Allergies    Intolerances        ANTIMICROBIALS      HEME/ONC MEDICATIONS      STANDING MEDICATIONS  allopurinol 300 milliGRAM(s) Oral every 24 hours  dextrose 5%. 1000 milliLiter(s) IV Continuous <Continuous>  dextrose 5%. 1000 milliLiter(s) IV Continuous <Continuous>  dextrose 50% Injectable 25 Gram(s) IV Push once  dextrose 50% Injectable 12.5 Gram(s) IV Push once  dextrose 50% Injectable 25 Gram(s) IV Push once  glucagon  Injectable 1 milliGRAM(s) IntraMuscular once  insulin glargine Injectable (LANTUS) 22 Unit(s) SubCutaneous at bedtime  insulin lispro (ADMELOG) corrective regimen sliding scale   SubCutaneous three times a day before meals  insulin lispro (ADMELOG) corrective regimen sliding scale   SubCutaneous at bedtime  insulin lispro Injectable (ADMELOG) 6 Unit(s) SubCutaneous three times a day before meals  levothyroxine 100 MICROGram(s) Oral every 24 hours  melatonin 3 milliGRAM(s) Oral at bedtime  phytonadione   Solution 5 milliGRAM(s) Oral daily  rasburicase IVPB 3 milliGRAM(s) IV Intermittent once  sodium chloride 0.9%. 1000 milliLiter(s) IV Continuous <Continuous>      PRN MEDICATIONS  acetaminophen     Tablet .. 650 milliGRAM(s) Oral every 6 hours PRN  dextrose Oral Gel 15 Gram(s) Oral once PRN  senna 2 Tablet(s) Oral at bedtime PRN        Vital Signs Last 24 Hrs  T(C): 36.8 (11 Jul 2023 05:37), Max: 37.2 (10 Jul 2023 12:22)  T(F): 98.2 (11 Jul 2023 05:37), Max: 99 (10 Jul 2023 12:22)  HR: 76 (11 Jul 2023 05:37) (61 - 86)  BP: 130/62 (11 Jul 2023 06:14) (120/71 - 166/88)  BP(mean): --  RR: 18 (11 Jul 2023 05:37) (17 - 18)  SpO2: 100% (11 Jul 2023 05:37) (98% - 100%)    Parameters below as of 10 Jul 2023 21:13  Patient On (Oxygen Delivery Method): room air        PHYSICAL EXAM  General: NAD  HEENT: PERRLA, EOMOI, clear oropharynx, anicteric sclera, pink conjunctiva  Neck: supple  CV: (+) S1/S2 RRR  Lungs: clear to auscultation, no wheezes or rales  Abdomen: soft, non-tender, non-distended (+) BS  Ext: no clubbing, cyanosis or edema  Skin: no rashes and no petechiae  Neuro: alert and oriented X 3, no focal deficits  Central Line:     RECENT CULTURES:  07-07 @ 21:06  .Blood Blood  --  --  --    No growth at 72 Hours  --        LABS:                        8.4    8.38  )-----------( 40       ( 10 Jul 2023 19:25 )             26.7         Mean Cell Volume : 79.7 fl  Mean Cell Hemoglobin : 25.1 pg  Mean Cell Hemoglobin Concentration : 31.5 gm/dL  Auto Neutrophil # : 1.78 K/uL  Auto Lymphocyte # : 6.28 K/uL  Auto Monocyte # : 0.27 K/uL  Auto Eosinophil # : 0.00 K/uL  Auto Basophil # : 0.01 K/uL  Auto Neutrophil % : 21.3 %  Auto Lymphocyte % : 74.9 %  Auto Monocyte % : 3.2 %  Auto Eosinophil % : 0.0 %  Auto Basophil % : 0.1 %      07-10    131<L>  |  96  |  32<H>  ----------------------------<  326<H>  5.0   |  24  |  1.04    Ca    8.9      10 Jul 2023 19:25  Phos  3.5     07-10  Mg     2.0     07-10    TPro  6.8  /  Alb  3.2<L>  /  TBili  0.5  /  DBili  x   /  AST  26  /  ALT  8<L>  /  AlkPhos  116  07-10      Mg 2.0  Phos 3.5      PT/INR - ( 10 Jul 2023 19:25 )   PT: 14.0 sec;   INR: 1.21 ratio         PTT - ( 10 Jul 2023 19:25 )  PTT:21.2 sec      Uric Acid 2.3        RADIOLOGY & ADDITIONAL STUDIES:         Diagnosis: B-PLL    Protocol/Chemo Regimen: Rituximab 375 mg/m2 = 735 mg split into two doses. 100 mg on Day 1, 635 mg on Day 2. Dexamethasone 12 mg on 7/7, Dexamethsone 20 mg x 1 on Day 1 and Day 2.  Day: 3     Pt endorsed:   feeling better today.     Review of Systems:  Denies any nausea, vomiting, diarrhea, chest pain, palpitation, SOB or abdominal pain.     Pain scale: denies    Diet: Diet, Consistent Carbohydrate    Allergies: No Known Allergies    STANDING MEDICATIONS  allopurinol 300 milliGRAM(s) Oral every 24 hours  dextrose 5%. 1000 milliLiter(s) IV Continuous <Continuous>  dextrose 5%. 1000 milliLiter(s) IV Continuous <Continuous>  dextrose 50% Injectable 25 Gram(s) IV Push once  dextrose 50% Injectable 12.5 Gram(s) IV Push once  dextrose 50% Injectable 25 Gram(s) IV Push once  glucagon  Injectable 1 milliGRAM(s) IntraMuscular once  insulin glargine Injectable (LANTUS) 22 Unit(s) SubCutaneous at bedtime  insulin lispro (ADMELOG) corrective regimen sliding scale   SubCutaneous three times a day before meals  insulin lispro (ADMELOG) corrective regimen sliding scale   SubCutaneous at bedtime  insulin lispro Injectable (ADMELOG) 6 Unit(s) SubCutaneous three times a day before meals  levothyroxine 100 MICROGram(s) Oral every 24 hours  melatonin 3 milliGRAM(s) Oral at bedtime  phytonadione   Solution 5 milliGRAM(s) Oral daily  rasburicase IVPB 3 milliGRAM(s) IV Intermittent once  sodium chloride 0.9%. 1000 milliLiter(s) IV Continuous <Continuous>    PRN MEDICATIONS  acetaminophen     Tablet .. 650 milliGRAM(s) Oral every 6 hours PRN  dextrose Oral Gel 15 Gram(s) Oral once PRN  senna 2 Tablet(s) Oral at bedtime PRN    Vital Signs Last 24 Hrs  T(C): 36.8 (11 Jul 2023 05:37), Max: 37.2 (10 Jul 2023 12:22)  T(F): 98.2 (11 Jul 2023 05:37), Max: 99 (10 Jul 2023 12:22)  HR: 76 (11 Jul 2023 05:37) (61 - 86)  BP: 130/62 (11 Jul 2023 06:14) (120/71 - 166/88)  BP(mean): --  RR: 18 (11 Jul 2023 05:37) (17 - 18)  SpO2: 100% (11 Jul 2023 05:37) (98% - 100%)    Parameters below as of 10 Jul 2023 21:13  Patient On (Oxygen Delivery Method): room air    PHYSICAL EXAM  General: adult in NAD  HEENT: clear oropharynx, poor dentition of posterior top molars   CV: normal S1/S2 RRR  Lungs: CTAB, no wheezes  Abdomen: distended with massive splenomegaly, normoactive bowel sounds.   Ext: no edema  Skin: superficial thrombophlebitis of LUE  Neuro: alert and oriented X 3  PIV CDI    RECENT CULTURES:  07-07 @ 21:06  .Blood Blood  No growth at 72 Hours    LABS:                       8.1    9.24  )-----------( 49       ( 11 Jul 2023 07:20 )             26.1     Mean Cell Volume : 80.3 fl  Mean Cell Hemoglobin : 24.9 pg  Mean Cell Hemoglobin Concentration : 31.0 gm/dL  Auto Neutrophil # : 2.21 K/uL  Auto Lymphocyte # : 6.16 K/uL  Auto Monocyte # : 0.87 K/uL  Auto Eosinophil # : 0.00 K/uL  Auto Basophil # : 0.00 K/uL  Auto Neutrophil % : 23.9 %  Auto Lymphocyte % : 66.7 %  Auto Monocyte % : 9.4 %  Auto Eosinophil % : 0.0 %  Auto Basophil % : 0.0 %    07-11    136  |  100  |  27<H>  ----------------------------<  122<H>  4.6   |  27  |  0.78    Ca    9.0      11 Jul 2023 07:23  Phos  3.6     07-11  Mg     2.3     07-11    TPro  6.2  /  Alb  2.8<L>  /  TBili  0.5  /  DBili  x   /  AST  16  /  ALT  8<L>  /  AlkPhos  95  07-11  Mg 2.3  Phos 3.6  Mg 2.0  Phos 3.5  PT/INR - ( 11 Jul 2023 10:07 )   PT: 13.8 sec;   INR: 1.19 ratio    PTT - ( 10 Jul 2023 19:25 )  PTT:21.2 sec    Uric Acid 3.6    Uric Acid 2.3    RADIOLOGY & ADDITIONAL STUDIES:  Xray Chest 1 View- PORTABLE-Urgent (07.07.23 @ 17:24) >  No acute pulmonary disease

## 2023-07-11 NOTE — PROGRESS NOTE ADULT - PROBLEM SELECTOR PLAN 1
-Test BG ac and hs  -Decrease Glargine to 18 units at bedtime tonight   -C/w Lispro  6 units TID.   -C/w Moderate correctional scale ac and hs and 2am for now  -IF steroid is restarted, please inform endocrine team  D/c recs:   -Will be determined according to BG/insulin needs/PO intake and steroid therapy at time of discharge.  -Would benefit from basal/bolus. Doses TBD  -Pt reports BG levels were well controlled on bid Humulin N but pt agreeable to start basal/bolus. Pt needs to learn use of insulin pens since pt was using insulin syringes at home.   -Please write Rxs for: Solo Star Insulin pen/Humalog Kwik insulin pen/Robyn insulin pen needles/glucose meter/strips/lancets. Can switch to any equivalent insulin analogs cover by pt's insurance  -Consider Freestyle Sabine CGM if cover by pt's insurance  -Can follow at [facility] practice. [address]. Phone . Call for apt closer to discharge  -Needs optho f/u as out pt

## 2023-07-11 NOTE — PROGRESS NOTE ADULT - NS ATTEND AMEND GEN_ALL_CORE FT
63 year old male with  PMH DM2 (a1c 5.7) but no other known hx (pt. has not seen PCP in some time), admitted to [facility]  on 7/2 with malaise, B-symptoms (fevers/chills, weight loss, night sweats), hepatosplenomegaly and labs c/f B-PLL, started on hydroxyurea 1500 BID and allopurinol, transferred to [facility] for further management. Leukocytosis, anemia and thrombocytopenia secondary to disease.    B-cell prolymphocytic leukemia.   Flow cytometry findings consistent with CD5 negative, CD10 negative B-cell lymphoproliferative disorder/lymphoma  Plan to initiate treatment with Rituximab 375 mg/m2 = 735 mg split into two doses. 100 mg IV on Day 1, 635 mg IV on Day 2. Dexamethasone 12 mg IV given on 7/7, will give Dexamethsone 20 mg x 1 on Day 1 and Day 2 with Rituxan and Elitek 3 mg on day 1  On 7/8, I discussed potential benefit of initial treatment with Rituxan as well as potential side effects with patient, and after all questions addressed, patient agreed to proceed with treatment. Chemotherapy/immunotherapy consent form signed.   - HIV and Hep B/C negative  - F/u BMBx FISH, cytogenetics, and biopsy results. F/u G6PD results.  - d/c HU  - C/w IVF and allopurinol  - Monitor TLS labs and Coags BID  - receiving prbcs for hgb<7.0, platelets <10K or <20K if febrile   - hepatosplenomegaly noted on CT including splenic infarcts, may need repeat imaging in future to further evaluate  On 7/8, patient with hyperkalemia 6.0, then 5.7 and hyperphosphatemia- Renal consult called as high risk for TLS. Lokelma x 1 dose given.   7/8 Phoslo 667 mg x3 doses PO for hyperphosphatemia  Endocrine consult for persistent hyperglycemia on Insulin coverage.   Plan to start Rituxan on 7/9 once hyperglycemia and hyperkalemia are well controlled.  7/9/23: Patient feeling better today, potassium in normal range. Glucose better controlled on Insulin coverage per Endo. He will begin Rituxan therapy as outlined above.   7/10-tolerated rituxan very well, no acute reaction    ID- patient on Zosyn IV  - cults neg, not febrile >>> will d/c today      Risk Statement (NON-critical care).     On this date of service, level of risk to patient is considered: High.     Due to: B- PLL with massive splenomegaly for Rituxan therapy at high risk for tumor lysis syndrome, also risk on infection and bleeding.     Time-based billing (NON-critical care).     40 minutes spent on total encounter. The necessity of the time spent during the encounter on this date of service was due to:     performing review of systems, physical exam, lab review and discussion of plan of care with patient. 63 year old male with  PMH DM2 (a1c 5.7) but no other known hx (pt. has not seen PCP in some time), admitted to [facility]  on 7/2 with malaise, B-symptoms (fevers/chills, weight loss, night sweats), hepatosplenomegaly and labs c/f B-PLL, started on hydroxyurea 1500 BID and allopurinol, transferred to [facility] for further management. Leukocytosis, anemia and thrombocytopenia secondary to disease.    B-cell prolymphocytic leukemia.   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 100 mg IV on Day 1, 635 mg IV on Day 2. Dexamethasone 12 mg IV given pretreatment. Elitek given x 1.  WBC, ALC markedly decreased. Now off HU.    Will consider treating with bendamustine and rituxan pre d/c; path review pointing away from B-PLL  and more towards plasmacytoid lymphoma, marginal zone lymphoma.   - F/u BMBx FISH, cytogenetics, and biopsy results. F/u G6PD results.  - now off HU  - c/w IVF and allopurinol  - can decr TLS labs monitoring to once/d  - receiving PRBC for hgb < 7.0, platelets <10K or <20K if febrile   - hepatosplenomegaly noted on CT including splenic infarcts,    On 7/8, patient had incr phosphate previously had Phoslo, resolved now.     Endocrine seeing for persistent hyperglycemia on Insulin coverage.  Insulin dosage being adjusted.    ID- patient had been on Zosyn IV  - cults neg, not febrile >>> was d/c'd on 7/10

## 2023-07-11 NOTE — DISCHARGE NOTE PROVIDER - NSDCMRMEDTOKEN_GEN_ALL_CORE_FT
HumaLOG 100 units/mL injectable solution: 1 unit(s) injectable once a day  insulin glargine 100 units/mL subcutaneous solution: 18 unit(s) subcutaneous once a day (at bedtime)  levothyroxine 100 mcg (0.1 mg) oral tablet: 1 tab(s) orally once a day  levothyroxine 112 mcg (0.112 mg) oral tablet: 1 tab(s) orally once a day   HumaLOG 100 units/mL injectable solution: 1 unit(s) injectable once a day  insulin glargine 100 units/mL subcutaneous solution: 18 unit(s) subcutaneous once a day (at bedtime)  levothyroxine 100 mcg (0.1 mg) oral tablet: 1 tab(s) orally once a day   HumaLOG 100 units/mL injectable solution: 1 unit(s) injectable once a day  insulin glargine 100 units/mL subcutaneous solution: 18 unit(s) subcutaneous once a day (at bedtime)  levothyroxine 112 mcg (0.112 mg) oral tablet: 1 tab(s) orally once a day  ondansetron 8 mg oral tablet: 1 tab(s) orally every 8 hours as needed for  nausea MDD: 3 tabs   alcohol swabs: Apply topically to affected area 4 times a day  glucometer (per patient&#x27;s insurance): Test blood sugars four times a day. Dispense #1 glucometer.  HumaLOG 100 units/mL injectable solution: 12 unit(s) injectable 3 times a day before meals  insulin glargine 100 units/mL subcutaneous solution: 24 unit(s) subcutaneous once a day (at bedtime)  Insulin Pen Needles, 4mm: 1 application subcutaneously 4 times a day. ** Use with insulin pen **  lancets: 1 application subcutaneously 4 times a day  levothyroxine 112 mcg (0.112 mg) oral tablet: 1 tab(s) orally once a day  ondansetron 8 mg oral tablet: 1 tab(s) orally every 8 hours as needed for  nausea MDD: 3 tabs  test strips (per patient&#x27;s insurance): 1 application subcutaneously 4 times a day. ** Compatible with patient&#x27;s glucometer **

## 2023-07-11 NOTE — DISCHARGE NOTE PROVIDER - NSDCFUADDINST_GEN_ALL_CORE_FT
Please call  to schedule a Fulphila appointment and possible platelet transfusion appointment ON MONDAY  To [facility] on Tuesday 7/18/22 at 2 pm  to see Dr Thompson     To [facility] on Monday 7/17 at 1:30 for Fulphila appointment and possible platelet transfusion  To [facility] on Tuesday 7/18/22 at 2 pm  to see Dr Thompson     To [facility] on Monday 7/17 at 1:30 for Fulphila appointment and possible platelet transfusion  To [facility] on Tuesday 7/18/22 at 2 pm  to see Dr Garnett     Please call  to schedule an endocrinology follow up appointment for diabetes management. The office is located at [address] [place] [address]. To [facility] on Monday 7/17 at 1:30 for Fulphila appointment and possible platelet transfusion  To [facility] on Tuesday 7/18/22 at 2 pm  to see Dr Garnett     Please call  to schedule an endocrinology follow up appointment for thyroid and diabetes management. The office is located at [address] [place] [address]. To [facility] on Monday 7/17 at 1:30 for Fulphila appointment and possible platelet transfusion  To [facility] on Tuesday 7/18/22 at 2 pm  to see Dr Garnett.     Please call  to schedule an endocrinology follow up appointment for thyroid and diabetes management. The office is located at [address] [place] [address].

## 2023-07-11 NOTE — PROGRESS NOTE ADULT - SUBJECTIVE AND OBJECTIVE BOX
DIABETES FOLLOW UP NOTE: Saw pt earlier today    Chief Complaint: Endocrine consult requested for management of T2DM    INTERVAL HX: Pt stable, reports tolerating POs with BG levels at goal while OFF steroids. Noted FBG low 100s from 200s last night. No hypoglycemia. Pt states feeling well, no pain, SOB. Per primary team, possible discharge home this week        Review of Systems:  General: As above  Cardiovascular: No chest pain, palpitations  Respiratory: No SOB, no cough  GI: No nausea, vomiting, abdominal pain  Endocrine: No polyuria, polydipsia or S&Sx of hypoglycemia    Allergies    No Known Allergies    Intolerances      MEDICATIONS:  insulin glargine Injectable (LANTUS) 22 Unit(s) SubCutaneous at bedtime  insulin lispro (ADMELOG) corrective regimen sliding scale   SubCutaneous at bedtime  insulin lispro (ADMELOG) corrective regimen sliding scale   SubCutaneous three times a day before meals  insulin lispro Injectable (ADMELOG) 6 Unit(s) SubCutaneous three times a day before meals  levothyroxine 100 MICROGram(s) Oral every 24 hours          PHYSICAL EXAM:  VITALS: T(C): 37.1 (07-11-23 @ 13:04)  T(F): 98.8 (07-11-23 @ 13:04), Max: 98.8 (07-11-23 @ 13:04)  HR: 70 (07-11-23 @ 13:04) (61 - 78)  BP: 127/72 (07-11-23 @ 13:04) (126/62 - 166/88)  RR:  (18 - 18)  SpO2:  (98% - 100%)  Wt(kg): --  GENERAL: Male laying in bed in NAD  Abdomen: Soft, nontender, non distended  Extremities: Warm, no edema in all 4 exts  NEURO: A&O X3    LABS:  POCT Blood Glucose.: 149 mg/dL (07-11-23 @ 12:29)  POCT Blood Glucose.: 125 mg/dL (07-11-23 @ 08:42)  POCT Blood Glucose.: 246 mg/dL (07-10-23 @ 22:13)  POCT Blood Glucose.: 323 mg/dL (07-10-23 @ 17:26)  POCT Blood Glucose.: 282 mg/dL (07-10-23 @ 12:49)  POCT Blood Glucose.: 266 mg/dL (07-10-23 @ 08:35)  POCT Blood Glucose.: 352 mg/dL (07-09-23 @ 21:30)  POCT Blood Glucose.: 259 mg/dL (07-09-23 @ 17:42)  POCT Blood Glucose.: 256 mg/dL (07-09-23 @ 12:01)  POCT Blood Glucose.: 312 mg/dL (07-09-23 @ 08:16)  POCT Blood Glucose.: 391 mg/dL (07-08-23 @ 21:12)  POCT Blood Glucose.: 516 mg/dL (07-08-23 @ 17:23)  POCT Blood Glucose.: 522 mg/dL (07-08-23 @ 17:22)                            8.1    9.24  )-----------( 49       ( 11 Jul 2023 07:20 )             26.1       07-11    136  |  100  |  27<H>  ----------------------------<  122<H>  4.6   |  27  |  0.78    eGFR: 101    Ca    9.0      07-11  Mg     2.3     07-11  Phos  3.6     07-11    TPro  6.2  /  Alb  2.8<L>  /  TBili  0.5  /  DBili  x   /  AST  16  /  ALT  8<L>  /  AlkPhos  95  07-11    Thyroid Function Tests:  07-11 @ 07:24 TSH 15.20 FreeT4 1.2 T3 -- Anti TPO -- Anti Thyroglobulin Ab -- TSI --  07-04 @ 09:51 TSH -- FreeT4 1.4 T3 -- Anti TPO -- Anti Thyroglobulin Ab -- TSI --      A1C with Estimated Average Glucose Result: 5.7 % (07-05-23 @ 06:15)      Estimated Average Glucose: 117 (07-05-23 @ 06:15)        07-04 Chol 69 Direct LDL -- LDL calculated 31 HDL 12<L> Trig 128

## 2023-07-11 NOTE — DISCHARGE NOTE PROVIDER - NSDCFUSCHEDAPPT_GEN_ALL_CORE_FT
Da Garnett Physician [facility]  [facility]  Scheduled Appointment: 07/17/2023    [facility]  [facility]  Scheduled Appointment: 07/17/2023     aD Garnett Physician [facility]  [facility]  Scheduled Appointment: 07/17/2023    [facility]  [facility]  Scheduled Appointment: 07/17/2023    Da Garnett  [facility]  Scheduled Appointment: 07/18/2023

## 2023-07-11 NOTE — PROGRESS NOTE ADULT - PROBLEM SELECTOR PLAN 2
Febrile to 100.5 F on admission to [facility] 7/7 PM, improved to 99.2 after tylenol.  exam with RLL crackles but breathing comfortably on room air. No cough or SOB. No dysuria.  HIV negative and RVP negative on admission to [facility] this past week.  not currently neutropenic  CXR with no acute pulmonary disease.  Follow up 7/7 blood cultures (-).

## 2023-07-11 NOTE — PROGRESS NOTE ADULT - PROBLEM SELECTOR PLAN 7
7/10- Resolved.               7/8 K 6.0 and then K 5.7 on repeat CMP. Lasix 20 IV x 2. Insulin given for hyperglycemia should help with hyperkalemia as well.  7/8 Renal consulted. Lokelma 10 x 1 per renal recs.  7/8 Likely pseudohypokalemia per renal, can hold off on additional doses of lokelma for now.

## 2023-07-11 NOTE — DISCHARGE NOTE PROVIDER - NSDCCPCAREPLAN_GEN_ALL_CORE_FT
PRINCIPAL DISCHARGE DIAGNOSIS  Diagnosis: B-cell prolymphocytic leukemia  Assessment and Plan of Treatment: Notify your physician if bleeding; swelling; persistent nausea and vomiting; unable to urinate; pain not relieved by medications; fever; numbness, tingling; excessive diarrhea, inability to tolerate liquids or foods; increased irritability or sluggishness, rash        SECONDARY DISCHARGE DIAGNOSES  Diagnosis: Diabetes  Assessment and Plan of Treatment: Follw up your primary care physician and Endocrinologist  Take insulin as directed     PRINCIPAL DISCHARGE DIAGNOSIS  Diagnosis: Malignant lymphoma, plasmacytoid  Assessment and Plan of Treatment: Notify MD and report to ER for any temperature greater than or equal to 100.4 degrees, intractable nausea, vomiting, diarrhea, or uncontrolled bleeding.  Notify MD for numbness, tingling, inability to tolerate foods/liquids, diarrhea.      SECONDARY DISCHARGE DIAGNOSES  Diagnosis: Diabetes  Assessment and Plan of Treatment: Follw up your primary care physician and Endocrinologist  Take insulin as directed

## 2023-07-11 NOTE — PROGRESS NOTE ADULT - PROBLEM SELECTOR PLAN 2
- Thyroid Function Tests:  07-11 @ 07:24 TSH 15.20 FreeT4 1.2 T3 -- Anti TPO -- Anti Thyroglobulin Ab -- TSI --  07-04 @ 09:51 TSH -- FreeT4 1.4 T3 -- Anti TPO -- Anti Thyroglobulin Ab -- TSI --  -On Levothyroxine 100 mcg daily  -Discussed new results with Dr Segovia [facility] who recommends to adjusts Levothyroxine to 112mcg day  -Please make sure LT4 is given on an empty stomach at least one hour apart from other meds and food to ensure med absorption. DO NOT GIVE WITH VITAMINS/ANTACIDS   -Check levels as out pt in 6-8 weeks

## 2023-07-11 NOTE — PROGRESS NOTE ADULT - PROBLEM SELECTOR PLAN 1
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 100 mg on Day 1, 635 mg on Day 2. Dexamethasone 12 mg on 7/7, Dexamethsone 20 mg x 1 on Day 1 and Day 2.  HIV and Hep B/C negative, folate and B12 unremarkable, ferritin elevated 428  F/u BMBx FISH, cytogenetics, and biopsy results. F/u G6PD results.  s/p hydroxyurea on 7/7  C/w IVF and allopurinol  Monitor TLS labs and Coags BID  prbcs for hgb<7.0, platelets <10K or <20K if febrile   - hepatosplenomegaly noted on CT including splenic infarcts, may need repeat imaging in future to further evaluate  7/10- Rituxan dose 2 today. 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 100 mg on Day 1, 635 mg on Day 2. Dexamethasone 12 mg on 7/7, Dexamethsone 20 mg x 1 on Day 1 and Day 2.  F/u BMBx FISH, cytogenetics, and biopsy results. F/u G6PD results.  C/w IVF and allopurinol  Monitor tumor lysis labs daily.   prbcs for hgb<7.0, platelets <10K or <20K if febrile   - hepatosplenomegaly noted on CT including splenic infarcts, may need repeat imaging in future to further evaluate  7/10- Rituxan dose 2 today.  7/11- Plan Bendamustine/Rituxan monthly.

## 2023-07-11 NOTE — PROGRESS NOTE ADULT - PROBLEM SELECTOR PLAN 5
Noted to have systolic murmur of LUSB vs. apex on exam,. Echo findings unremarkable so likely flow murmur 2/2 severe anemia rather than undiagnosed aortic valve pathology.   - Manage anemia as above.  - Monitor respiratory status on standing fluids. Noted to have systolic murmur of LUSB vs. apex on exam,. Echo findings unremarkable so likely flow murmur 2/2 severe anemia rather than undiagnosed aortic valve pathology.   Manage anemia as above.  Monitor respiratory status on standing fluids.

## 2023-07-11 NOTE — PROGRESS NOTE ADULT - ASSESSMENT
63 year old M w/h/o T2DM > unknown control (A1C skewed due to anemia) on Novolin insulin bid. No known DM complications. Also h/o hypothyroidism. Presented to OSH with malaise, generalized weakness, and gait instability. Transferred to [facility] for further treatment of suspected B-PLL w/ rituximab. Now off steroids with BG greatly improved. Pt tolerating POs. Will preventively decrease Lantus insulin due to drop of BG from 200s to low 100s overnight. Per team, possible discharge home this week.  BG goal 100 to 180s. No hypoglycemia.     Also resend TSH/ Free T4 levels this am> see Dr Segovia recs below

## 2023-07-12 LAB
ALBUMIN SERPL ELPH-MCNC: 2.9 G/DL — LOW (ref 3.3–5)
ALP SERPL-CCNC: 96 U/L — SIGNIFICANT CHANGE UP (ref 40–120)
ALT FLD-CCNC: 7 U/L — LOW (ref 10–45)
ANION GAP SERPL CALC-SCNC: 11 MMOL/L — SIGNIFICANT CHANGE UP (ref 5–17)
APTT BLD: 27.8 SEC — SIGNIFICANT CHANGE UP (ref 27.5–35.5)
AST SERPL-CCNC: 12 U/L — SIGNIFICANT CHANGE UP (ref 10–40)
BASOPHILS # BLD AUTO: 0 K/UL — SIGNIFICANT CHANGE UP (ref 0–0.2)
BASOPHILS NFR BLD AUTO: 0 % — SIGNIFICANT CHANGE UP (ref 0–2)
BILIRUB SERPL-MCNC: 0.4 MG/DL — SIGNIFICANT CHANGE UP (ref 0.2–1.2)
BUN SERPL-MCNC: 32 MG/DL — HIGH (ref 7–23)
CALCIUM SERPL-MCNC: 9 MG/DL — SIGNIFICANT CHANGE UP (ref 8.4–10.5)
CHLORIDE SERPL-SCNC: 98 MMOL/L — SIGNIFICANT CHANGE UP (ref 96–108)
CO2 SERPL-SCNC: 26 MMOL/L — SIGNIFICANT CHANGE UP (ref 22–31)
CREAT SERPL-MCNC: 0.77 MG/DL — SIGNIFICANT CHANGE UP (ref 0.5–1.3)
D DIMER BLD IA.RAPID-MCNC: 6687 NG/ML DDU — HIGH
EGFR: 102 ML/MIN/1.73M2 — SIGNIFICANT CHANGE UP
EOSINOPHIL # BLD AUTO: 0 K/UL — SIGNIFICANT CHANGE UP (ref 0–0.5)
EOSINOPHIL NFR BLD AUTO: 0 % — SIGNIFICANT CHANGE UP (ref 0–6)
FIBRINOGEN PPP-MCNC: 227 MG/DL — SIGNIFICANT CHANGE UP (ref 200–445)
GLUCOSE BLDC GLUCOMTR-MCNC: 263 MG/DL — HIGH (ref 70–99)
GLUCOSE BLDC GLUCOMTR-MCNC: 275 MG/DL — HIGH (ref 70–99)
GLUCOSE BLDC GLUCOMTR-MCNC: 278 MG/DL — HIGH (ref 70–99)
GLUCOSE BLDC GLUCOMTR-MCNC: 290 MG/DL — HIGH (ref 70–99)
GLUCOSE SERPL-MCNC: 214 MG/DL — HIGH (ref 70–99)
HCT VFR BLD CALC: 24.2 % — LOW (ref 39–50)
HGB BLD-MCNC: 7.5 G/DL — LOW (ref 13–17)
INR BLD: 1.24 RATIO — HIGH (ref 0.88–1.16)
LDH SERPL L TO P-CCNC: 355 U/L — HIGH (ref 50–242)
LYMPHOCYTES # BLD AUTO: 2.92 K/UL — SIGNIFICANT CHANGE UP (ref 1–3.3)
LYMPHOCYTES # BLD AUTO: 65.2 % — HIGH (ref 13–44)
MAGNESIUM SERPL-MCNC: 2.1 MG/DL — SIGNIFICANT CHANGE UP (ref 1.6–2.6)
MCHC RBC-ENTMCNC: 25.2 PG — LOW (ref 27–34)
MCHC RBC-ENTMCNC: 31 GM/DL — LOW (ref 32–36)
MCV RBC AUTO: 81.2 FL — SIGNIFICANT CHANGE UP (ref 80–100)
MONOCYTES # BLD AUTO: 0.28 K/UL — SIGNIFICANT CHANGE UP (ref 0–0.9)
MONOCYTES NFR BLD AUTO: 6.2 % — SIGNIFICANT CHANGE UP (ref 2–14)
NEUTROPHILS # BLD AUTO: 1.12 K/UL — LOW (ref 1.8–7.4)
NEUTROPHILS NFR BLD AUTO: 25 % — LOW (ref 43–77)
PHOSPHATE SERPL-MCNC: 3.8 MG/DL — SIGNIFICANT CHANGE UP (ref 2.5–4.5)
PLATELET # BLD AUTO: 46 K/UL — LOW (ref 150–400)
POTASSIUM SERPL-MCNC: 4.3 MMOL/L — SIGNIFICANT CHANGE UP (ref 3.5–5.3)
POTASSIUM SERPL-SCNC: 4.3 MMOL/L — SIGNIFICANT CHANGE UP (ref 3.5–5.3)
PROT SERPL-MCNC: 6.1 G/DL — SIGNIFICANT CHANGE UP (ref 6–8.3)
PROTHROM AB SERPL-ACNC: 14.3 SEC — HIGH (ref 10.5–13.4)
RBC # BLD: 2.98 M/UL — LOW (ref 4.2–5.8)
RBC # FLD: 23.3 % — HIGH (ref 10.3–14.5)
SODIUM SERPL-SCNC: 135 MMOL/L — SIGNIFICANT CHANGE UP (ref 135–145)
URATE SERPL-MCNC: 5.2 MG/DL — SIGNIFICANT CHANGE UP (ref 3.4–8.8)
WBC # BLD: 4.48 K/UL — SIGNIFICANT CHANGE UP (ref 3.8–10.5)
WBC # FLD AUTO: 4.48 K/UL — SIGNIFICANT CHANGE UP (ref 3.8–10.5)

## 2023-07-12 PROCEDURE — 99232 SBSQ HOSP IP/OBS MODERATE 35: CPT

## 2023-07-12 RX ORDER — INSULIN LISPRO 100/ML
10 VIAL (ML) SUBCUTANEOUS
Refills: 0 | Status: DISCONTINUED | OUTPATIENT
Start: 2023-07-12 | End: 2023-07-13

## 2023-07-12 RX ORDER — INSULIN GLARGINE 100 [IU]/ML
20 INJECTION, SOLUTION SUBCUTANEOUS AT BEDTIME
Refills: 0 | Status: DISCONTINUED | OUTPATIENT
Start: 2023-07-12 | End: 2023-07-13

## 2023-07-12 RX ADMIN — Medication 300 MILLIGRAM(S): at 11:57

## 2023-07-12 RX ADMIN — Medication 10 UNIT(S): at 17:20

## 2023-07-12 RX ADMIN — Medication 6: at 08:12

## 2023-07-12 RX ADMIN — Medication 6: at 12:38

## 2023-07-12 RX ADMIN — Medication 112 MICROGRAM(S): at 05:13

## 2023-07-12 RX ADMIN — Medication 6 UNIT(S): at 12:39

## 2023-07-12 RX ADMIN — Medication 6: at 17:20

## 2023-07-12 RX ADMIN — SODIUM CHLORIDE 50 MILLILITER(S): 9 INJECTION INTRAMUSCULAR; INTRAVENOUS; SUBCUTANEOUS at 05:14

## 2023-07-12 RX ADMIN — Medication 6 UNIT(S): at 08:12

## 2023-07-12 RX ADMIN — INSULIN GLARGINE 20 UNIT(S): 100 INJECTION, SOLUTION SUBCUTANEOUS at 21:55

## 2023-07-12 RX ADMIN — Medication 3 MILLIGRAM(S): at 22:57

## 2023-07-12 RX ADMIN — Medication 2: at 21:55

## 2023-07-12 NOTE — PROGRESS NOTE ADULT - PROBLEM SELECTOR PLAN 5
Noted to have systolic murmur of LUSB vs. apex on exam,. Echo findings unremarkable so likely flow murmur 2/2 severe anemia rather than undiagnosed aortic valve pathology.   Manage anemia as above.  Monitor respiratory status on standing fluids.

## 2023-07-12 NOTE — PROGRESS NOTE ADULT - SUBJECTIVE AND OBJECTIVE BOX
Diagnosis: B-PLL    Protocol/Chemo Regimen: Rituximab 375 mg/m2 = 735 mg split into two doses. 100 mg on Day 1, 635 mg on Day 2. Dexamethasone 12 mg on 7/7, Dexamethsone 20 mg x 1 on Day 1 and Day 2.  Day: 4     Pt endorsed:   feeling better today.     Review of Systems:  Denies any nausea, vomiting, diarrhea, chest pain, palpitation, SOB or abdominal pain.     Pain scale: denies    Diet: Diet, Consistent Carbohydrate    Allergies: No Known Allergies    ANTIMICROBIALS    HEME/ONC MEDICATIONS      STANDING MEDICATIONS  allopurinol 300 milliGRAM(s) Oral every 24 hours  dextrose 5%. 1000 milliLiter(s) IV Continuous <Continuous>  dextrose 5%. 1000 milliLiter(s) IV Continuous <Continuous>  dextrose 50% Injectable 25 Gram(s) IV Push once  dextrose 50% Injectable 25 Gram(s) IV Push once  dextrose 50% Injectable 12.5 Gram(s) IV Push once  glucagon  Injectable 1 milliGRAM(s) IntraMuscular once  insulin glargine Injectable (LANTUS) 18 Unit(s) SubCutaneous at bedtime  insulin lispro (ADMELOG) corrective regimen sliding scale   SubCutaneous at bedtime  insulin lispro (ADMELOG) corrective regimen sliding scale   SubCutaneous three times a day before meals  insulin lispro Injectable (ADMELOG) 6 Unit(s) SubCutaneous three times a day before meals  levothyroxine 112 MICROGram(s) Oral daily  melatonin 3 milliGRAM(s) Oral at bedtime  rasburicase IVPB 3 milliGRAM(s) IV Intermittent once  sodium chloride 0.9%. 1000 milliLiter(s) IV Continuous <Continuous>      PRN MEDICATIONS  acetaminophen     Tablet .. 650 milliGRAM(s) Oral every 6 hours PRN  dextrose Oral Gel 15 Gram(s) Oral once PRN  senna 2 Tablet(s) Oral at bedtime PRN        Vital Signs Last 24 Hrs  T(C): 37.1 (12 Jul 2023 05:13), Max: 37.5 (11 Jul 2023 21:54)  T(F): 98.7 (12 Jul 2023 05:13), Max: 99.5 (11 Jul 2023 21:54)  HR: 68 (12 Jul 2023 05:13) (62 - 78)  BP: 116/65 (12 Jul 2023 05:13) (116/65 - 155/72)  BP(mean): --  RR: 18 (12 Jul 2023 05:13) (18 - 18)  SpO2: 100% (12 Jul 2023 05:13) (99% - 100%)    Parameters below as of 11 Jul 2023 21:54  Patient On (Oxygen Delivery Method): room air        PHYSICAL EXAM  General: NAD  HEENT: clear oropharynx, anicteric sclera, pink conjunctiva  Neck: supple  CV: (+) S1/S2 RRR  Lungs: positive air movement b/l ant lungs, clear to auscultation, no wheezes, no rales  Abdomen: soft, non-tender, non-distended  Ext: no clubbing cyanosis or edema  Skin: no rashes and no petechiae  Neuro: alert and oriented X 3, no focal deficits  Central Line:     RECENT CULTURES:  07-07 @ 21:06  .Blood Blood  --  --  --    No growth at 4 days  --        LABS:                        7.5    4.48  )-----------( 46       ( 12 Jul 2023 06:53 )             24.2         Mean Cell Volume : 81.2 fl  Mean Cell Hemoglobin : 25.2 pg  Mean Cell Hemoglobin Concentration : 31.0 gm/dL  Auto Neutrophil # : x  Auto Lymphocyte # : x  Auto Monocyte # : x  Auto Eosinophil # : x  Auto Basophil # : x  Auto Neutrophil % : x  Auto Lymphocyte % : x  Auto Monocyte % : x  Auto Eosinophil % : x  Auto Basophil % : x      07-12    135  |  98  |  32<H>  ----------------------------<  214<H>  4.3   |  26  |  0.77    Ca    9.0      12 Jul 2023 06:53  Phos  3.8     07-12  Mg     2.1     07-12    TPro  6.1  /  Alb  2.9<L>  /  TBili  0.4  /  DBili  x   /  AST  12  /  ALT  7<L>  /  AlkPhos  96  07-12      Mg 2.1  Phos 3.8      PT/INR - ( 11 Jul 2023 10:07 )   PT: 13.8 sec;   INR: 1.19 ratio         PTT - ( 11 Jul 2023 18:40 )  PTT:27.2 sec      Uric Acid 5.2        RADIOLOGY & ADDITIONAL STUDIES:         Diagnosis: B-PLL    Protocol/Chemo Regimen: Rituximab 375 mg/m2 = 735 mg split into two doses. 100 mg on Day 1, 635 mg on Day 2. Dexamethasone 12 mg on 7/7, Dexamethsone 20 mg x 1 on Day 1 and Day 2.  Day: 4     Pt endorsed: no complaints today     Review of Systems:  Denies any nausea, vomiting, diarrhea, chest pain, palpitation, SOB or abdominal pain.     Pain scale: denies    Diet: Diet, Consistent Carbohydrate    Allergies: No Known Allergies    ANTIMICROBIALS    HEME/ONC MEDICATIONS      STANDING MEDICATIONS  allopurinol 300 milliGRAM(s) Oral every 24 hours  dextrose 5%. 1000 milliLiter(s) IV Continuous <Continuous>  dextrose 5%. 1000 milliLiter(s) IV Continuous <Continuous>  dextrose 50% Injectable 25 Gram(s) IV Push once  dextrose 50% Injectable 25 Gram(s) IV Push once  dextrose 50% Injectable 12.5 Gram(s) IV Push once  glucagon  Injectable 1 milliGRAM(s) IntraMuscular once  insulin glargine Injectable (LANTUS) 18 Unit(s) SubCutaneous at bedtime  insulin lispro (ADMELOG) corrective regimen sliding scale   SubCutaneous at bedtime  insulin lispro (ADMELOG) corrective regimen sliding scale   SubCutaneous three times a day before meals  insulin lispro Injectable (ADMELOG) 6 Unit(s) SubCutaneous three times a day before meals  levothyroxine 112 MICROGram(s) Oral daily  melatonin 3 milliGRAM(s) Oral at bedtime  rasburicase IVPB 3 milliGRAM(s) IV Intermittent once  sodium chloride 0.9%. 1000 milliLiter(s) IV Continuous <Continuous>      PRN MEDICATIONS  acetaminophen     Tablet .. 650 milliGRAM(s) Oral every 6 hours PRN  dextrose Oral Gel 15 Gram(s) Oral once PRN  senna 2 Tablet(s) Oral at bedtime PRN    Vital Signs Last 24 Hrs  T(C): 37 (12 Jul 2023 09:00), Max: 37.5 (11 Jul 2023 21:54)  T(F): 98.6 (12 Jul 2023 09:00), Max: 99.5 (11 Jul 2023 21:54)  HR: 79 (12 Jul 2023 09:00) (68 - 79)  BP: 134/75 (12 Jul 2023 09:00) (116/65 - 136/72)  BP(mean): --  RR: 18 (12 Jul 2023 09:00) (18 - 18)  SpO2: 99% (12 Jul 2023 09:00) (99% - 100%)    Parameters below as of 12 Jul 2023 09:00  Patient On (Oxygen Delivery Method): room air    PHYSICAL EXAM  General: NAD  HEENT: clear oropharynx, anicteric sclera, pink conjunctiva  CV: (+) S1/S2 RRR  Lungs: positive air movement b/l ant lungs, clear to auscultation, no wheezes, no rales  Abdomen: soft, non-tender, non-distended  Ext: no clubbing cyanosis or edema  Skin: no rashes and no petechiae  Neuro: alert and oriented X 3  Central Line: PICC CDI    RECENT CULTURES:  07-07 @ 21:06  .Blood Blood  No growth at 4 days      LABS:                        7.5    4.48  )-----------( 46       ( 12 Jul 2023 06:53 )             24.2         Mean Cell Volume : 81.2 fl  Mean Cell Hemoglobin : 25.2 pg  Mean Cell Hemoglobin Concentration : 31.0 gm/dL  Auto Neutrophil # : x  Auto Lymphocyte # : x  Auto Monocyte # : x  Auto Eosinophil # : x  Auto Basophil # : x  Auto Neutrophil % : x  Auto Lymphocyte % : x  Auto Monocyte % : x  Auto Eosinophil % : x  Auto Basophil % : x      07-12    135  |  98  |  32<H>  ----------------------------<  214<H>  4.3   |  26  |  0.77    Ca    9.0      12 Jul 2023 06:53  Phos  3.8     07-12  Mg     2.1     07-12    TPro  6.1  /  Alb  2.9<L>  /  TBili  0.4  /  DBili  x   /  AST  12  /  ALT  7<L>  /  AlkPhos  96  07-12    PT/INR - ( 11 Jul 2023 10:07 )   PT: 13.8 sec;   INR: 1.19 ratio       PTT - ( 11 Jul 2023 18:40 )  PTT:27.2 sec      Uric Acid 5.2    RADIOLOGY & ADDITIONAL STUDIES: Diagnosis: B-PLL    Protocol/Chemo Regimen: Rituximab 375 mg/m2 = 735 mg split into two doses. 100 mg on Day 1, 635 mg on Day 2. Dexamethasone 12 mg on 7/7, Dexamethsone 20 mg x 1 on Day 1 and Day 2.  Day: 4     Pt endorsed: no complaints today     Review of Systems:  Denies any nausea, vomiting, diarrhea, chest pain, palpitation, SOB or abdominal pain.     Pain scale: denies    Diet: Diet, Consistent Carbohydrate    Allergies: No Known Allergies    ANTIMICROBIALS    HEME/ONC MEDICATIONS      STANDING MEDICATIONS  allopurinol 300 milliGRAM(s) Oral every 24 hours  dextrose 5%. 1000 milliLiter(s) IV Continuous <Continuous>  dextrose 5%. 1000 milliLiter(s) IV Continuous <Continuous>  dextrose 50% Injectable 25 Gram(s) IV Push once  dextrose 50% Injectable 25 Gram(s) IV Push once  dextrose 50% Injectable 12.5 Gram(s) IV Push once  glucagon  Injectable 1 milliGRAM(s) IntraMuscular once  insulin glargine Injectable (LANTUS) 18 Unit(s) SubCutaneous at bedtime  insulin lispro (ADMELOG) corrective regimen sliding scale   SubCutaneous at bedtime  insulin lispro (ADMELOG) corrective regimen sliding scale   SubCutaneous three times a day before meals  insulin lispro Injectable (ADMELOG) 6 Unit(s) SubCutaneous three times a day before meals  levothyroxine 112 MICROGram(s) Oral daily  melatonin 3 milliGRAM(s) Oral at bedtime  rasburicase IVPB 3 milliGRAM(s) IV Intermittent once  sodium chloride 0.9%. 1000 milliLiter(s) IV Continuous <Continuous>      PRN MEDICATIONS  acetaminophen     Tablet .. 650 milliGRAM(s) Oral every 6 hours PRN  dextrose Oral Gel 15 Gram(s) Oral once PRN  senna 2 Tablet(s) Oral at bedtime PRN    Vital Signs Last 24 Hrs  T(C): 37 (12 Jul 2023 09:00), Max: 37.5 (11 Jul 2023 21:54)  T(F): 98.6 (12 Jul 2023 09:00), Max: 99.5 (11 Jul 2023 21:54)  HR: 79 (12 Jul 2023 09:00) (68 - 79)  BP: 134/75 (12 Jul 2023 09:00) (116/65 - 136/72)  BP(mean): --  RR: 18 (12 Jul 2023 09:00) (18 - 18)  SpO2: 99% (12 Jul 2023 09:00) (99% - 100%)    Parameters below as of 12 Jul 2023 09:00  Patient On (Oxygen Delivery Method): room air    PHYSICAL EXAM  General: NAD  HEENT: clear oropharynx, anicteric sclera, pink conjunctiva  CV: (+) S1/S2 RRR  Lungs: positive air movement b/l ant lungs, clear to auscultation, no wheezes, no rales  Abdomen: soft, non-tender, non-distended  Ext: no clubbing cyanosis or edema  Skin: +LN b/l axilla, no rashes and no petechiae  Neuro: alert and oriented X 3  Central Line: PICC CDI    RECENT CULTURES:  07-07 @ 21:06  .Blood Blood  No growth at 4 days      LABS:                        7.5    4.48  )-----------( 46       ( 12 Jul 2023 06:53 )             24.2         Mean Cell Volume : 81.2 fl  Mean Cell Hemoglobin : 25.2 pg  Mean Cell Hemoglobin Concentration : 31.0 gm/dL  Auto Neutrophil # : x  Auto Lymphocyte # : x  Auto Monocyte # : x  Auto Eosinophil # : x  Auto Basophil # : x  Auto Neutrophil % : x  Auto Lymphocyte % : x  Auto Monocyte % : x  Auto Eosinophil % : x  Auto Basophil % : x      07-12    135  |  98  |  32<H>  ----------------------------<  214<H>  4.3   |  26  |  0.77    Ca    9.0      12 Jul 2023 06:53  Phos  3.8     07-12  Mg     2.1     07-12    TPro  6.1  /  Alb  2.9<L>  /  TBili  0.4  /  DBili  x   /  AST  12  /  ALT  7<L>  /  AlkPhos  96  07-12    PT/INR - ( 11 Jul 2023 10:07 )   PT: 13.8 sec;   INR: 1.19 ratio       PTT - ( 11 Jul 2023 18:40 )  PTT:27.2 sec      Uric Acid 5.2    RADIOLOGY & ADDITIONAL STUDIES: Diagnosis: B-PLL    Protocol/Chemo Regimen: Rituximab 375 mg/m2 = 735 mg split into two doses. 100 mg on Day 1, 635 mg on Day 2. Dexamethasone 12 mg on 7/7, Dexamethsone 20 mg x 1 on Day 1 and Day 2.    Day: 4     Pt endorsed: no complaints today     Review of Systems:  Denies any nausea, vomiting, diarrhea, chest pain, palpitation, SOB or abdominal pain.     Pain scale: denies    Diet: Diet, Consistent Carbohydrate    Allergies: No Known Allergies    ANTIMICROBIALS    HEME/ONC MEDICATIONS      STANDING MEDICATIONS  allopurinol 300 milliGRAM(s) Oral every 24 hours  dextrose 5%. 1000 milliLiter(s) IV Continuous <Continuous>  dextrose 5%. 1000 milliLiter(s) IV Continuous <Continuous>  dextrose 50% Injectable 25 Gram(s) IV Push once  dextrose 50% Injectable 25 Gram(s) IV Push once  dextrose 50% Injectable 12.5 Gram(s) IV Push once  glucagon  Injectable 1 milliGRAM(s) IntraMuscular once  insulin glargine Injectable (LANTUS) 18 Unit(s) SubCutaneous at bedtime  insulin lispro (ADMELOG) corrective regimen sliding scale   SubCutaneous at bedtime  insulin lispro (ADMELOG) corrective regimen sliding scale   SubCutaneous three times a day before meals  insulin lispro Injectable (ADMELOG) 6 Unit(s) SubCutaneous three times a day before meals  levothyroxine 112 MICROGram(s) Oral daily  melatonin 3 milliGRAM(s) Oral at bedtime  rasburicase IVPB 3 milliGRAM(s) IV Intermittent once  sodium chloride 0.9%. 1000 milliLiter(s) IV Continuous <Continuous>      PRN MEDICATIONS  acetaminophen     Tablet .. 650 milliGRAM(s) Oral every 6 hours PRN  dextrose Oral Gel 15 Gram(s) Oral once PRN  senna 2 Tablet(s) Oral at bedtime PRN    Vital Signs Last 24 Hrs  T(C): 37 (12 Jul 2023 09:00), Max: 37.5 (11 Jul 2023 21:54)  T(F): 98.6 (12 Jul 2023 09:00), Max: 99.5 (11 Jul 2023 21:54)  HR: 79 (12 Jul 2023 09:00) (68 - 79)  BP: 134/75 (12 Jul 2023 09:00) (116/65 - 136/72)  BP(mean): --  RR: 18 (12 Jul 2023 09:00) (18 - 18)  SpO2: 99% (12 Jul 2023 09:00) (99% - 100%)    Parameters below as of 12 Jul 2023 09:00  Patient On (Oxygen Delivery Method): room air    PHYSICAL EXAM  General: NAD  HEENT: clear oropharynx, anicteric sclera, pink conjunctiva  CV: (+) S1/S2 RRR  Lungs: positive air movement b/l ant lungs, clear to auscultation, no wheezes, no rales  Abdomen: soft, non-tender, non-distended  Ext: no clubbing cyanosis or edema  Skin: +LN b/l axilla, no rashes and no petechiae  Neuro: alert and oriented X 3  Central Line: PICC CDI    RECENT CULTURES:  07-07 @ 21:06  .Blood Blood  No growth at 4 days      LABS:                        7.5    4.48  )-----------( 46       ( 12 Jul 2023 06:53 )             24.2         Mean Cell Volume : 81.2 fl  Mean Cell Hemoglobin : 25.2 pg  Mean Cell Hemoglobin Concentration : 31.0 gm/dL  Auto Neutrophil # : x  Auto Lymphocyte # : x  Auto Monocyte # : x  Auto Eosinophil # : x  Auto Basophil # : x  Auto Neutrophil % : x  Auto Lymphocyte % : x  Auto Monocyte % : x  Auto Eosinophil % : x  Auto Basophil % : x      07-12    135  |  98  |  32<H>  ----------------------------<  214<H>  4.3   |  26  |  0.77    Ca    9.0      12 Jul 2023 06:53  Phos  3.8     07-12  Mg     2.1     07-12    TPro  6.1  /  Alb  2.9<L>  /  TBili  0.4  /  DBili  x   /  AST  12  /  ALT  7<L>  /  AlkPhos  96  07-12    PT/INR - ( 11 Jul 2023 10:07 )   PT: 13.8 sec;   INR: 1.19 ratio       PTT - ( 11 Jul 2023 18:40 )  PTT:27.2 sec      Uric Acid 5.2    RADIOLOGY & ADDITIONAL STUDIES: Diagnosis: B-PLL    Protocol/Chemo Regimen: s/p Rituximab 375 mg/m2 = 735 mg split into two doses. 100 mg on Day 1, 635 mg on Day 2. Dexamethasone 12 mg on 7/7, Dexamethsone 20 mg x 1 on Day 1 and Day 2.    Day: 4     Pt endorsed: no complaints today     Review of Systems:  Denies any nausea, vomiting, diarrhea, chest pain, palpitation, SOB or abdominal pain.     Pain scale: denies    Diet: Diet, Consistent Carbohydrate    Allergies: No Known Allergies    ANTIMICROBIALS    HEME/ONC MEDICATIONS      STANDING MEDICATIONS  allopurinol 300 milliGRAM(s) Oral every 24 hours  dextrose 5%. 1000 milliLiter(s) IV Continuous <Continuous>  dextrose 5%. 1000 milliLiter(s) IV Continuous <Continuous>  dextrose 50% Injectable 25 Gram(s) IV Push once  dextrose 50% Injectable 25 Gram(s) IV Push once  dextrose 50% Injectable 12.5 Gram(s) IV Push once  glucagon  Injectable 1 milliGRAM(s) IntraMuscular once  insulin glargine Injectable (LANTUS) 18 Unit(s) SubCutaneous at bedtime  insulin lispro (ADMELOG) corrective regimen sliding scale   SubCutaneous at bedtime  insulin lispro (ADMELOG) corrective regimen sliding scale   SubCutaneous three times a day before meals  insulin lispro Injectable (ADMELOG) 6 Unit(s) SubCutaneous three times a day before meals  levothyroxine 112 MICROGram(s) Oral daily  melatonin 3 milliGRAM(s) Oral at bedtime  rasburicase IVPB 3 milliGRAM(s) IV Intermittent once  sodium chloride 0.9%. 1000 milliLiter(s) IV Continuous <Continuous>      PRN MEDICATIONS  acetaminophen     Tablet .. 650 milliGRAM(s) Oral every 6 hours PRN  dextrose Oral Gel 15 Gram(s) Oral once PRN  senna 2 Tablet(s) Oral at bedtime PRN    Vital Signs Last 24 Hrs  T(C): 37 (12 Jul 2023 09:00), Max: 37.5 (11 Jul 2023 21:54)  T(F): 98.6 (12 Jul 2023 09:00), Max: 99.5 (11 Jul 2023 21:54)  HR: 79 (12 Jul 2023 09:00) (68 - 79)  BP: 134/75 (12 Jul 2023 09:00) (116/65 - 136/72)  BP(mean): --  RR: 18 (12 Jul 2023 09:00) (18 - 18)  SpO2: 99% (12 Jul 2023 09:00) (99% - 100%)    Parameters below as of 12 Jul 2023 09:00  Patient On (Oxygen Delivery Method): room air    PHYSICAL EXAM  General: NAD  HEENT: clear oropharynx, anicteric sclera  CV: (+) S1/S2 RRR  Lungs: positive air movement b/l ant lungs, clear to auscultation, no wheezes, no rales  Abdomen: soft, non-tender, non-distended  Ext: no edema  Skin: +LN b/l axilla, +supraclavicular LN. no rashes  Neuro: alert and oriented X 3  Central Line: PICC CDI    RECENT CULTURES:  07-07 @ 21:06  .Blood Blood  No growth at 4 days      LABS:                        7.5    4.48  )-----------( 46       ( 12 Jul 2023 06:53 )             24.2         Mean Cell Volume : 81.2 fl  Mean Cell Hemoglobin : 25.2 pg  Mean Cell Hemoglobin Concentration : 31.0 gm/dL  Auto Neutrophil # : x  Auto Lymphocyte # : x  Auto Monocyte # : x  Auto Eosinophil # : x  Auto Basophil # : x  Auto Neutrophil % : x  Auto Lymphocyte % : x  Auto Monocyte % : x  Auto Eosinophil % : x  Auto Basophil % : x      07-12    135  |  98  |  32<H>  ----------------------------<  214<H>  4.3   |  26  |  0.77    Ca    9.0      12 Jul 2023 06:53  Phos  3.8     07-12  Mg     2.1     07-12    TPro  6.1  /  Alb  2.9<L>  /  TBili  0.4  /  DBili  x   /  AST  12  /  ALT  7<L>  /  AlkPhos  96  07-12    PT/INR - ( 11 Jul 2023 10:07 )   PT: 13.8 sec;   INR: 1.19 ratio       PTT - ( 11 Jul 2023 18:40 )  PTT:27.2 sec      Uric Acid 5.2    RADIOLOGY & ADDITIONAL STUDIES: Diagnosis: B-PLL    Protocol/Chemo Regimen: s/p Rituximab 375 mg/m2 = 735 mg split into two doses. 100 mg on Day 1, 635 mg on Day 2. Dexamethasone 12 mg on 7/7, Dexamethsone 20 mg x 1 on Day 1 and Day 2.    Day: 4     Pt endorsed: no complaints today     Review of Systems:  Denies any nausea, vomiting, diarrhea, chest pain, palpitation, SOB or abdominal pain.     Pain scale: denies    Diet: Diet, Consistent Carbohydrate    Allergies: No Known Allergies    ANTIMICROBIALS    HEME/ONC MEDICATIONS      STANDING MEDICATIONS  allopurinol 300 milliGRAM(s) Oral every 24 hours  dextrose 5%. 1000 milliLiter(s) IV Continuous <Continuous>  dextrose 5%. 1000 milliLiter(s) IV Continuous <Continuous>  dextrose 50% Injectable 25 Gram(s) IV Push once  dextrose 50% Injectable 25 Gram(s) IV Push once  dextrose 50% Injectable 12.5 Gram(s) IV Push once  glucagon  Injectable 1 milliGRAM(s) IntraMuscular once  insulin glargine Injectable (LANTUS) 18 Unit(s) SubCutaneous at bedtime  insulin lispro (ADMELOG) corrective regimen sliding scale   SubCutaneous at bedtime  insulin lispro (ADMELOG) corrective regimen sliding scale   SubCutaneous three times a day before meals  insulin lispro Injectable (ADMELOG) 6 Unit(s) SubCutaneous three times a day before meals  levothyroxine 112 MICROGram(s) Oral daily  melatonin 3 milliGRAM(s) Oral at bedtime  rasburicase IVPB 3 milliGRAM(s) IV Intermittent once  sodium chloride 0.9%. 1000 milliLiter(s) IV Continuous <Continuous>      PRN MEDICATIONS  acetaminophen     Tablet .. 650 milliGRAM(s) Oral every 6 hours PRN  dextrose Oral Gel 15 Gram(s) Oral once PRN  senna 2 Tablet(s) Oral at bedtime PRN    Vital Signs Last 24 Hrs  T(C): 37 (12 Jul 2023 09:00), Max: 37.5 (11 Jul 2023 21:54)  T(F): 98.6 (12 Jul 2023 09:00), Max: 99.5 (11 Jul 2023 21:54)  HR: 79 (12 Jul 2023 09:00) (68 - 79)  BP: 134/75 (12 Jul 2023 09:00) (116/65 - 136/72)  BP(mean): --  RR: 18 (12 Jul 2023 09:00) (18 - 18)  SpO2: 99% (12 Jul 2023 09:00) (99% - 100%)    Parameters below as of 12 Jul 2023 09:00  Patient On (Oxygen Delivery Method): room air    PHYSICAL EXAM  General: NAD  HEENT: clear oropharynx, anicteric sclera  CV: (+) S1/S2 RRR  Lungs: positive air movement b/l ant lungs, clear to auscultation, no wheezes, no rales  Abdomen: soft, non-tender, non-distended  Ext: no edema  Skin: +LN b/l axilla, +supraclavicular LN. no rashes  Neuro: alert and oriented X 3  Central Line: PIV CDI    RECENT CULTURES:  07-07 @ 21:06  .Blood Blood  No growth at 4 days      LABS:                        7.5    4.48  )-----------( 46       ( 12 Jul 2023 06:53 )             24.2         Mean Cell Volume : 81.2 fl  Mean Cell Hemoglobin : 25.2 pg  Mean Cell Hemoglobin Concentration : 31.0 gm/dL  Auto Neutrophil # : x  Auto Lymphocyte # : x  Auto Monocyte # : x  Auto Eosinophil # : x  Auto Basophil # : x  Auto Neutrophil % : x  Auto Lymphocyte % : x  Auto Monocyte % : x  Auto Eosinophil % : x  Auto Basophil % : x      07-12    135  |  98  |  32<H>  ----------------------------<  214<H>  4.3   |  26  |  0.77    Ca    9.0      12 Jul 2023 06:53  Phos  3.8     07-12  Mg     2.1     07-12    TPro  6.1  /  Alb  2.9<L>  /  TBili  0.4  /  DBili  x   /  AST  12  /  ALT  7<L>  /  AlkPhos  96  07-12    PT/INR - ( 11 Jul 2023 10:07 )   PT: 13.8 sec;   INR: 1.19 ratio       PTT - ( 11 Jul 2023 18:40 )  PTT:27.2 sec      Uric Acid 5.2    RADIOLOGY & ADDITIONAL STUDIES:

## 2023-07-12 NOTE — PROGRESS NOTE ADULT - PROBLEM SELECTOR PLAN 2
Febrile to 100.5 F on admission to [facility] 7/7 PM, improved to 99.2 after tylenol.  exam with RLL crackles but breathing comfortably on room air. No cough or SOB. No dysuria.  HIV negative and RVP negative on admission to [facility] this past week.  not currently neutropenic  CXR with no acute pulmonary disease.  Follow up 7/7 blood cultures (-).

## 2023-07-12 NOTE — PROGRESS NOTE ADULT - ASSESSMENT
63M PMH DM2 (a1c 5.7) but no other known hx (pt. has not seen PCP in some time), admitted to [facility] 7/2 with malaise, B-symptoms (fevers/chills, weight loss, night sweats), hepatosplenomegaly and labs c/f B-PLL, s/p Hydrea, and Rituxan patient has leucocytosis, anemia and thrombocytopenia secondary to disease condition.     63M PMH DM2 (a1c 5.7) but no other known hx (pt. has not seen PCP in some time), admitted to [facility] 7/2 with malaise, B-symptoms (fevers/chills, weight loss, night sweats), hepatosplenomegaly and labs c/f B-PLL, s/p Hydrea, and Rituxan patient has lekcocytosis, anemia and thrombocytopenia secondary to disease condition.     62yo M PMH DM2 admitted to [facility] 7/2 with malaise, B-symptoms (fevers/chills, weight loss, night sweats) and hepatosplenomegaly. Patient transferred to [facility] for further management. 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 Patient received Rituxan (7/9 and 7/10). Patient has leukocytosis, anemia and thrombocytopenia secondary to disease condition.

## 2023-07-12 NOTE — PROGRESS NOTE ADULT - ASSESSMENT
63 year old M w/h/o T2DM > unknown control (A1C skewed due to anemia) on Novolin insulin bid. No known DM complications. Also h/o hypothyroidism. Presented to OSH with malaise, generalized weakness, and gait instability. 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 Now off steroids with BG going back up even though BG improved yesterday. Pt tolerating POs. Will continue to adjust insulin to BG goal 100 to 180s.  No hypoglycemia.   Per team, possible discharge home this week.. Needs to learn use of insulin pens> spoke to pt's RN.     Also resend TSH/ Free T4 levels this am> see Dr Segovia recs below

## 2023-07-12 NOTE — PROGRESS NOTE ADULT - PROBLEM SELECTOR PLAN 1
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 100 mg on Day 1, 635 mg on Day 2. Dexamethasone 12 mg on 7/7, Dexamethsone 20 mg x 1 on Day 1 and Day 2.  F/u BMBx FISH, cytogenetics, and biopsy results. F/u G6PD results.  C/w IVF and allopurinol  Monitor tumor lysis labs daily.   prbcs for hgb<7.0, platelets <10K or <20K if febrile   - hepatosplenomegaly noted on CT including splenic infarcts, may need repeat imaging in future to further evaluate  7/10- Rituxan dose 2 today.  7/11- Plan Bendamustine/Rituxan monthly. 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 100 mg on Day 1, 635 mg on Day 2. Dexamethasone 12 mg on 7/7, Dexamethsone 20 mg x 1 on Day 1 and Day 2.  F/u BMBx FISH, cytogenetics, and biopsy results. F/u G6PD results.  C/w IVF and allopurinol  Monitor tumor lysis labs daily.   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 100 mg on Day 1, 635 mg on Day 2. Dexamethasone 12 mg on 7/7, Dexamethsone 20 mg x 1 on Day 1 and Day 2.  F/u BMBx FISH, cytogenetics, and biopsy results. F/u G6PD results.  C/w IVF and allopurinol  Monitor tumor lysis labs daily.   prbcs for hgb<7.0, platelets <10K or <20K if febrile   - hepatosplenomegaly noted on CT including splenic infarcts, may need repeat imaging in future to further evaluate  7/12- plan for bendamustine/rituximab tomorrow 7/13

## 2023-07-12 NOTE — PROGRESS NOTE ADULT - SUBJECTIVE AND OBJECTIVE BOX
DIABETES FOLLOW UP NOTE: Saw pt earlier today    Chief Complaint: Endocrine consult requested for management of T2DM    INTERVAL HX: Pt stable, reports tolerating POs with BG levelsgoing up since yesterday afternoon even though pt is  now OFF steroids for more than 2 days. No hypoglycemia. Pt states feeling well, no pain, SOB. Per primary team, possible discharge home this week      Review of Systems:  General: As above  Cardiovascular: No chest pain, palpitations  Respiratory: No SOB, no cough  GI: No nausea, vomiting, abdominal pain  Endocrine: no polyuria, polydipsia or S&Sx of hypoglycemia    Allergies    No Known Allergies    Intolerances      MEDICATIONS:  insulin glargine Injectable (LANTUS) 20 Unit(s) SubCutaneous at bedtime  insulin lispro (ADMELOG) corrective regimen sliding scale   SubCutaneous at bedtime  insulin lispro (ADMELOG) corrective regimen sliding scale   SubCutaneous three times a day before meals  insulin lispro Injectable (ADMELOG) 6 Unit(s) SubCutaneous three times a day before meals  levothyroxine 112 MICROGram(s) Oral daily      PHYSICAL EXAM:  VITALS: T(C): 37.5 (07-12-23 @ 13:24)  T(F): 99.5 (07-12-23 @ 13:24), Max: 99.5 (07-11-23 @ 21:54)  HR: 75 (07-12-23 @ 13:24) (68 - 79)  BP: 97/52 (07-12-23 @ 13:24) (97/52 - 136/72)  RR:  (18 - 18)  SpO2:  (96% - 100%)  Wt(kg): --  GENERAL: Male laying in bed in NAD  Abdomen: Soft, nontender, non distended  Extremities: Warm, no edema in all 4 exts  NEURO: A&O X3    LABS:  POCT Blood Glucose.: 275 mg/dL (07-12-23 @ 11:58)  POCT Blood Glucose.: 263 mg/dL (07-12-23 @ 08:04)  POCT Blood Glucose.: 333 mg/dL (07-11-23 @ 21:52)  POCT Blood Glucose.: 255 mg/dL (07-11-23 @ 17:18)  POCT Blood Glucose.: 149 mg/dL (07-11-23 @ 12:29)  POCT Blood Glucose.: 125 mg/dL (07-11-23 @ 08:42)  POCT Blood Glucose.: 246 mg/dL (07-10-23 @ 22:13)  POCT Blood Glucose.: 323 mg/dL (07-10-23 @ 17:26)  POCT Blood Glucose.: 282 mg/dL (07-10-23 @ 12:49)  POCT Blood Glucose.: 266 mg/dL (07-10-23 @ 08:35)  POCT Blood Glucose.: 352 mg/dL (07-09-23 @ 21:30)  POCT Blood Glucose.: 259 mg/dL (07-09-23 @ 17:42)                            7.5    4.48  )-----------( 46       ( 12 Jul 2023 06:53 )             24.2       07-12    135  |  98  |  32<H>  ----------------------------<  214<H>  4.3   |  26  |  0.77    eGFR: 102    Ca    9.0      07-12  Mg     2.1     07-12  Phos  3.8     07-12    TPro  6.1  /  Alb  2.9<L>  /  TBili  0.4  /  DBili  x   /  AST  12  /  ALT  7<L>  /  AlkPhos  96  07-12    Thyroid Function Tests:  07-11 @ 07:24 TSH 15.20 FreeT4 1.2 T3 -- Anti TPO -- Anti Thyroglobulin Ab -- TSI --  07-04 @ 09:51 TSH -- FreeT4 1.4 T3 -- Anti TPO -- Anti Thyroglobulin Ab -- TSI --      A1C with Estimated Average Glucose Result: 5.7 % (07-05-23 @ 06:15)    Estimated Average Glucose: 117 (07-05-23 @ 06:15)    07-04 Chol 69 Direct LDL -- LDL calculated 31 HDL 12<L> Trig 128

## 2023-07-12 NOTE — PROGRESS NOTE ADULT - NS ATTEND AMEND GEN_ALL_CORE FT
63 year old male with  PMH DM2 (a1c 5.7) but no other known hx (pt. has not seen PCP in some time), admitted to [facility]  on 7/2 with malaise, B-symptoms (fevers/chills, weight loss, night sweats), hepatosplenomegaly and labs c/f B-PLL, started on hydroxyurea 1500 BID and allopurinol, transferred to [facility] for further management. Leukocytosis, anemia and thrombocytopenia secondary to disease.    B-cell prolymphocytic leukemia.   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 100 mg IV on Day 1, 635 mg IV on Day 2. Dexamethasone 12 mg IV given pretreatment. Elitek given x 1.  WBC, ALC markedly decreased. Now off HU.    Will consider treating with bendamustine and rituxan pre d/c; path review pointing away from B-PLL  and more towards plasmacytoid lymphoma, marginal zone lymphoma.   - F/u BMBx FISH, cytogenetics, and biopsy results. F/u G6PD results.  - now off HU  - c/w IVF and allopurinol  - can decr TLS labs monitoring to once/d  - receiving PRBC for hgb < 7.0, platelets <10K or <20K if febrile   - hepatosplenomegaly noted on CT including splenic infarcts,    On 7/8, patient had incr phosphate previously had Phoslo, resolved now.     Endocrine seeing for persistent hyperglycemia on Insulin coverage.  Insulin dosage being adjusted.    ID- patient had been on Zosyn IV  - cults neg, not febrile >>> was d/c'd on 7/10 63 year old male with  PMH DM2 (a1c 5.7) but no other known hx (pt. has not seen PCP in some time), admitted to [facility]  on 7/2 with malaise, B-symptoms (fevers/chills, weight loss, night sweats), hepatosplenomegaly and labs c/f B-PLL, started on hydroxyurea 1500 BID and allopurinol, transferred to [facility] for further management. Leukocytosis, anemia and thrombocytopenia secondary to disease.    B-cell prolymphocytic leukemia.   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 100 mg IV on Day 1, 635 mg IV on Day 2. Dexamethasone 12 mg IV given pretreatment. Elitek given x 1.  WBC, ALC markedly decreased. Now off HU.  feels well today    Will consider treating with bendamustine and rituxan pre d/c; path review pointing away from B-PLL  and more towards plasmacytoid lymphoma, marginal zone lymphoma.   - F/u BMBx FISH, cytogenetics, and biopsy results. F/u G6PD results.  - now off HU  - c/w IVF and allopurinol  - can decr TLS labs monitoring to once/d  - receiving PRBC for hgb < 7.0, platelets <10K or <20K if febrile   - hepatosplenomegaly noted on CT including splenic infarcts,    On 7/8, patient had incr phosphate previously had Phoslo, resolved now.     Endocrine seeing for persistent hyperglycemia on Insulin coverage.  Insulin dosage being adjusted.    ID- patient had been on Zosyn IV  - cults neg, not febrile >>> was d/c'd on 7/10  afebrile off antibiotics    OOB

## 2023-07-12 NOTE — PROGRESS NOTE ADULT - PROBLEM SELECTOR PLAN 4
Hx of DM2, on insulin (Novolin N) 35U qAM/15-20U qPM at home  - lipid panel unremarkable  - a1c 5.7%  - restart insulin at 10u given elevated glucoses recently  - start ISS; adjust per patient's insulin requirements  - monitor FS for goal -180 while inpatient.  - Monitor sugars while patient on dexamethasone.  - f/u fructosamine for elevated glucoses but normal a1c.  7/8 BGs consistently in the 400s. 12 U sliding scale this AM without resolution. 12 U + 10 additional U around lunch time also without resolution.   7/8 bedtime lantus switched to 20 U from 10 U  7/8 Endocrine consulted, will alter regimen according to recs.  7/9 BGs improving.   7/11- Endo aware regarding steroid, will need Insulin at discharge with outpatient follow up. Hx of DM2, on insulin (Novolin N) 35U qAM/15-20U qPM at home  - lipid panel unremarkable  - a1c 5.7%  - restart insulin at 10u given elevated glucoses recently  - start ISS; adjust per patient's insulin requirements  - monitor FS for goal -180 while inpatient.  - Monitor sugars while patient on dexamethasone.  - f/u fructosamine for elevated glucoses but normal a1c.  7/8 BGs consistently in the 400s. 12 U sliding scale this AM without resolution. 12 U + 10 additional U around lunch time also without resolution.   7/8 bedtime lantus switched to 20 U from 10 U  7/8 Endocrine consulted, will alter regimen according to recs.  7/11- Endo aware regarding steroid, will need Insulin at discharge with outpatient follow up.

## 2023-07-12 NOTE — PROGRESS NOTE ADULT - PROBLEM SELECTOR PLAN 6
patient has mild hyponatremia trending 132 > 129 > 132. > 128 >130 > 128 Patient mildly fatigued (likely from anemia) otherwise asymptomatic. Serum osmolality 291, UCr 50, Daniela 108, UOsm 468. Hyponatremia likely in setting of SIADH 2/2 hypothyroidism  Will increase fluid restriction to 1.2L, can consider salt tablets if Na does not improve  - Trend Na especially on standing IVF.  7/8 Renal consulted, Na corrected for hyperglycemia 133 today. Per renal, okay to monitor for now.  7/11- NA corrected.

## 2023-07-12 NOTE — PROGRESS NOTE ADULT - PROBLEM SELECTOR PLAN 2
- Thyroid Function Tests:  07-11 @ 07:24 TSH 15.20 FreeT4 1.2 T3 -- Anti TPO -- Anti Thyroglobulin Ab -- TSI --  07-04 @ 09:51 TSH -- FreeT4 1.4 T3 -- Anti TPO -- Anti Thyroglobulin Ab -- TSI --  -On Levothyroxine 100 mcg daily  -Discussed new results with Dr Segovia attending endo who recommends to adjusts Levothyroxine to 112mcg day  -Please make sure LT4 is given on an empty stomach at least one hour apart from other meds and food to ensure med absorption. DO NOT GIVE WITH VITAMINS/ANTACIDS   -Check levels as out pt in 6-8 weeks

## 2023-07-12 NOTE — PROGRESS NOTE ADULT - PROBLEM SELECTOR PLAN 1
-Test BG ac and hs  -Increase Glargine to 20 units at bedtime tonight   -Increase Lispro dose to 10 units TID.   -C/w Moderate correctional scale ac and hs and 2am for now  -IF steroid is restarted, please inform endocrine team  -Please teach use of insulin pen. Please document teach back  D/c recs:   -Will be determined according to BG/insulin needs/PO intake and steroid therapy at time of discharge.  -Would benefit from basal/bolus. Doses TBD  -Pt reports BG levels were well controlled on bid Humulin N but pt agreeable to start basal/bolus. Pt needs to learn use of insulin pens since pt was using insulin syringes at home.   -Please write Rxs for: Solo Star Insulin pen/Humalog Kwik insulin pen/Robyn insulin pen needles/glucose meter/strips/lancets. Can switch to any equivalent insulin analogs cover by pt's insurance  -Consider Freestyle Sabine CGM if cover by pt's insurance  -Can follow at [facility]. [address]. Phone . Call for apt closer to discharge  -Needs optho f/u as out pt

## 2023-07-13 LAB
ALBUMIN SERPL ELPH-MCNC: 2.7 G/DL — LOW (ref 3.3–5)
ALP SERPL-CCNC: 89 U/L — SIGNIFICANT CHANGE UP (ref 40–120)
ALT FLD-CCNC: 8 U/L — LOW (ref 10–45)
ANION GAP SERPL CALC-SCNC: 10 MMOL/L — SIGNIFICANT CHANGE UP (ref 5–17)
APTT BLD: 31.7 SEC — SIGNIFICANT CHANGE UP (ref 27.5–35.5)
AST SERPL-CCNC: 11 U/L — SIGNIFICANT CHANGE UP (ref 10–40)
BASOPHILS # BLD AUTO: 0.01 K/UL — SIGNIFICANT CHANGE UP (ref 0–0.2)
BASOPHILS NFR BLD AUTO: 0.1 % — SIGNIFICANT CHANGE UP (ref 0–2)
BILIRUB SERPL-MCNC: 0.5 MG/DL — SIGNIFICANT CHANGE UP (ref 0.2–1.2)
BUN SERPL-MCNC: 28 MG/DL — HIGH (ref 7–23)
CALCIUM SERPL-MCNC: 8.9 MG/DL — SIGNIFICANT CHANGE UP (ref 8.4–10.5)
CHLORIDE SERPL-SCNC: 99 MMOL/L — SIGNIFICANT CHANGE UP (ref 96–108)
CO2 SERPL-SCNC: 26 MMOL/L — SIGNIFICANT CHANGE UP (ref 22–31)
CREAT SERPL-MCNC: 0.76 MG/DL — SIGNIFICANT CHANGE UP (ref 0.5–1.3)
CULTURE RESULTS: SIGNIFICANT CHANGE UP
CULTURE RESULTS: SIGNIFICANT CHANGE UP
D DIMER BLD IA.RAPID-MCNC: 4681 NG/ML DDU — HIGH
EGFR: 102 ML/MIN/1.73M2 — SIGNIFICANT CHANGE UP
EOSINOPHIL # BLD AUTO: 0.02 K/UL — SIGNIFICANT CHANGE UP (ref 0–0.5)
EOSINOPHIL NFR BLD AUTO: 0.2 % — SIGNIFICANT CHANGE UP (ref 0–6)
FIBRINOGEN PPP-MCNC: 223 MG/DL — SIGNIFICANT CHANGE UP (ref 200–445)
GLUCOSE BLDC GLUCOMTR-MCNC: 182 MG/DL — HIGH (ref 70–99)
GLUCOSE BLDC GLUCOMTR-MCNC: 256 MG/DL — HIGH (ref 70–99)
GLUCOSE BLDC GLUCOMTR-MCNC: 269 MG/DL — HIGH (ref 70–99)
GLUCOSE BLDC GLUCOMTR-MCNC: 389 MG/DL — HIGH (ref 70–99)
GLUCOSE SERPL-MCNC: 243 MG/DL — HIGH (ref 70–99)
HCT VFR BLD CALC: 24.4 % — LOW (ref 39–50)
HGB BLD-MCNC: 7.4 G/DL — LOW (ref 13–17)
IMM GRANULOCYTES NFR BLD AUTO: 0.1 % — SIGNIFICANT CHANGE UP (ref 0–0.9)
INR BLD: 1.33 RATIO — HIGH (ref 0.88–1.16)
LDH SERPL L TO P-CCNC: 308 U/L — HIGH (ref 50–242)
LYMPHOCYTES # BLD AUTO: 5.96 K/UL — HIGH (ref 1–3.3)
LYMPHOCYTES # BLD AUTO: 72.6 % — HIGH (ref 13–44)
MAGNESIUM SERPL-MCNC: 2 MG/DL — SIGNIFICANT CHANGE UP (ref 1.6–2.6)
MCHC RBC-ENTMCNC: 24.7 PG — LOW (ref 27–34)
MCHC RBC-ENTMCNC: 30.3 GM/DL — LOW (ref 32–36)
MCV RBC AUTO: 81.6 FL — SIGNIFICANT CHANGE UP (ref 80–100)
MONOCYTES # BLD AUTO: 1.3 K/UL — HIGH (ref 0–0.9)
MONOCYTES NFR BLD AUTO: 15.8 % — HIGH (ref 2–14)
NEUTROPHILS # BLD AUTO: 0.91 K/UL — LOW (ref 1.8–7.4)
NEUTROPHILS NFR BLD AUTO: 11.2 % — LOW (ref 43–77)
NRBC # BLD: 0 /100 WBCS — SIGNIFICANT CHANGE UP (ref 0–0)
PHOSPHATE SERPL-MCNC: 3.5 MG/DL — SIGNIFICANT CHANGE UP (ref 2.5–4.5)
PLATELET # BLD AUTO: 57 K/UL — LOW (ref 150–400)
POTASSIUM SERPL-MCNC: 4.7 MMOL/L — SIGNIFICANT CHANGE UP (ref 3.5–5.3)
POTASSIUM SERPL-SCNC: 4.7 MMOL/L — SIGNIFICANT CHANGE UP (ref 3.5–5.3)
PROT SERPL-MCNC: 6.1 G/DL — SIGNIFICANT CHANGE UP (ref 6–8.3)
PROTHROM AB SERPL-ACNC: 15.5 SEC — HIGH (ref 10.5–13.4)
RBC # BLD: 2.99 M/UL — LOW (ref 4.2–5.8)
RBC # FLD: 23.5 % — HIGH (ref 10.3–14.5)
SODIUM SERPL-SCNC: 135 MMOL/L — SIGNIFICANT CHANGE UP (ref 135–145)
SPECIMEN SOURCE: SIGNIFICANT CHANGE UP
SPECIMEN SOURCE: SIGNIFICANT CHANGE UP
URATE SERPL-MCNC: 4.7 MG/DL — SIGNIFICANT CHANGE UP (ref 3.4–8.8)
WBC # BLD: 8.21 K/UL — SIGNIFICANT CHANGE UP (ref 3.8–10.5)
WBC # FLD AUTO: 8.21 K/UL — SIGNIFICANT CHANGE UP (ref 3.8–10.5)

## 2023-07-13 PROCEDURE — 99232 SBSQ HOSP IP/OBS MODERATE 35: CPT

## 2023-07-13 RX ORDER — DEXAMETHASONE 0.5 MG/5ML
8 ELIXIR ORAL ONCE
Refills: 0 | Status: COMPLETED | OUTPATIENT
Start: 2023-07-13 | End: 2023-07-13

## 2023-07-13 RX ORDER — DIPHENHYDRAMINE HCL 50 MG
50 CAPSULE ORAL ONCE
Refills: 0 | Status: COMPLETED | OUTPATIENT
Start: 2023-07-13 | End: 2023-07-13

## 2023-07-13 RX ORDER — ONDANSETRON 8 MG/1
16 TABLET, FILM COATED ORAL EVERY 24 HOURS
Refills: 0 | Status: COMPLETED | OUTPATIENT
Start: 2023-07-13 | End: 2023-07-14

## 2023-07-13 RX ORDER — BENDAMUSTINE HYDROCHLORIDE 100 MG/20ML
176 INJECTION, POWDER, LYOPHILIZED, FOR SOLUTION INTRAVENOUS EVERY 24 HOURS
Refills: 0 | Status: COMPLETED | OUTPATIENT
Start: 2023-07-13 | End: 2023-07-14

## 2023-07-13 RX ORDER — FILGRASTIM 480MCG/1.6
480 VIAL (ML) INJECTION EVERY 24 HOURS
Refills: 0 | Status: DISCONTINUED | OUTPATIENT
Start: 2023-07-15 | End: 2023-07-15

## 2023-07-13 RX ORDER — INSULIN GLARGINE 100 [IU]/ML
36 INJECTION, SOLUTION SUBCUTANEOUS AT BEDTIME
Refills: 0 | Status: DISCONTINUED | OUTPATIENT
Start: 2023-07-13 | End: 2023-07-15

## 2023-07-13 RX ORDER — DEXAMETHASONE 0.5 MG/5ML
8 ELIXIR ORAL ONCE
Refills: 0 | Status: COMPLETED | OUTPATIENT
Start: 2023-07-14 | End: 2023-07-14

## 2023-07-13 RX ORDER — INSULIN LISPRO 100/ML
VIAL (ML) SUBCUTANEOUS
Refills: 0 | Status: DISCONTINUED | OUTPATIENT
Start: 2023-07-13 | End: 2023-07-15

## 2023-07-13 RX ORDER — ACETAMINOPHEN 500 MG
650 TABLET ORAL ONCE
Refills: 0 | Status: COMPLETED | OUTPATIENT
Start: 2023-07-13 | End: 2023-07-13

## 2023-07-13 RX ORDER — INSULIN LISPRO 100/ML
20 VIAL (ML) SUBCUTANEOUS
Refills: 0 | Status: DISCONTINUED | OUTPATIENT
Start: 2023-07-13 | End: 2023-07-15

## 2023-07-13 RX ORDER — SODIUM CHLORIDE 9 MG/ML
1000 INJECTION INTRAMUSCULAR; INTRAVENOUS; SUBCUTANEOUS
Refills: 0 | Status: DISCONTINUED | OUTPATIENT
Start: 2023-07-13 | End: 2023-07-15

## 2023-07-13 RX ORDER — RITUXIMAB 10 MG/ML
735 INJECTION, SOLUTION INTRAVENOUS ONCE
Refills: 0 | Status: COMPLETED | OUTPATIENT
Start: 2023-07-13 | End: 2023-07-13

## 2023-07-13 RX ADMIN — SODIUM CHLORIDE 50 MILLILITER(S): 9 INJECTION INTRAMUSCULAR; INTRAVENOUS; SUBCUTANEOUS at 05:41

## 2023-07-13 RX ADMIN — Medication 2: at 18:00

## 2023-07-13 RX ADMIN — ONDANSETRON 116 MILLIGRAM(S): 8 TABLET, FILM COATED ORAL at 16:08

## 2023-07-13 RX ADMIN — Medication 6: at 08:44

## 2023-07-13 RX ADMIN — RITUXIMAB 735 MILLIGRAM(S): 10 INJECTION, SOLUTION INTRAVENOUS at 13:08

## 2023-07-13 RX ADMIN — INSULIN GLARGINE 36 UNIT(S): 100 INJECTION, SOLUTION SUBCUTANEOUS at 22:23

## 2023-07-13 RX ADMIN — Medication 6: at 22:22

## 2023-07-13 RX ADMIN — BENDAMUSTINE HYDROCHLORIDE 176 MILLIGRAM(S): 100 INJECTION, POWDER, LYOPHILIZED, FOR SOLUTION INTRAVENOUS at 16:28

## 2023-07-13 RX ADMIN — Medication 112 MICROGRAM(S): at 05:40

## 2023-07-13 RX ADMIN — Medication 50 MILLIGRAM(S): at 11:49

## 2023-07-13 RX ADMIN — Medication 6: at 12:44

## 2023-07-13 RX ADMIN — Medication 20 UNIT(S): at 12:44

## 2023-07-13 RX ADMIN — Medication 20 UNIT(S): at 17:59

## 2023-07-13 RX ADMIN — Medication 101.6 MILLIGRAM(S): at 11:49

## 2023-07-13 RX ADMIN — Medication 10 UNIT(S): at 08:44

## 2023-07-13 RX ADMIN — Medication 650 MILLIGRAM(S): at 11:49

## 2023-07-13 RX ADMIN — SODIUM CHLORIDE 100 MILLILITER(S): 9 INJECTION INTRAMUSCULAR; INTRAVENOUS; SUBCUTANEOUS at 21:44

## 2023-07-13 RX ADMIN — Medication 3 MILLIGRAM(S): at 22:27

## 2023-07-13 RX ADMIN — Medication 300 MILLIGRAM(S): at 11:32

## 2023-07-13 NOTE — PROGRESS NOTE ADULT - PROBLEM SELECTOR PLAN 1
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 100 mg on Day 1, 635 mg on Day 2. Dexamethasone 12 mg on 7/7, Dexamethsone 20 mg x 1 on Day 1 and Day 2.  F/u BMBx FISH, cytogenetics, and biopsy results. F/u G6PD results.  C/w IVF and allopurinol  Monitor tumor lysis labs daily.   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 100 mg on Day 1, 635 mg on Day 2. Dexamethasone 12 mg on 7/7, Dexamethsone 20 mg x 1 on Day 1 and Day 2.  F/u BMBx FISH, cytogenetics, and biopsy results. F/u G6PD results.  C/w IVF and allopurinol  Monitor tumor lysis labs daily.   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 100 mg on Day 1, 635 mg on Day 2. Dexamethasone 12 mg on 7/7, Dexamethsone 20 mg x 1 on Day 1 and Day 2.  F/u BMBx FISH, cytogenetics, and biopsy results. F/u G6PD results.  C/w IVF and allopurinol  Monitor tumor lysis labs daily.   prbcs for hgb<7.0, platelets <10K or <20K if febrile   - hepatosplenomegaly noted on CT including splenic infarcts, may need repeat imaging in future to further evaluate  7/13- Begin Rituximab/bendamustine  7/13 Plan to start Zarxio on Day 3 of rituxan/bendamustine (7/15)  7/13 increased fluids to 100

## 2023-07-13 NOTE — PROGRESS NOTE ADULT - SUBJECTIVE AND OBJECTIVE BOX
Diabetes Follow up note:    Chief complaint:     Interval Hx:    Review of Systems:  General:  GI: Tolerating POs. Denies N/V/D/Abd pain  CV: Denies CP/SOB  ENDO: No S&Sx of hypoglycemia    MEDS:  allopurinol 300 milliGRAM(s) Oral every 24 hours  insulin glargine Injectable (LANTUS) 36 Unit(s) SubCutaneous at bedtime  insulin lispro (ADMELOG) corrective regimen sliding scale   SubCutaneous three times a day before meals  insulin lispro (ADMELOG) corrective regimen sliding scale   SubCutaneous at bedtime  insulin lispro Injectable (ADMELOG) 20 Unit(s) SubCutaneous three times a day before meals  levothyroxine 112 MICROGram(s) Oral daily  rasburicase IVPB 3 milliGRAM(s) IV Intermittent once      Allergies    No Known Allergies        PE:  General:  Vital Signs Last 24 Hrs  T(C): 36.8 (13 Jul 2023 09:27), Max: 37.5 (12 Jul 2023 13:24)  T(F): 98.3 (13 Jul 2023 09:27), Max: 99.5 (12 Jul 2023 13:24)  HR: 77 (13 Jul 2023 09:27) (66 - 78)  BP: 136/71 (13 Jul 2023 09:27) (97/52 - 147/69)  BP(mean): --  RR: 18 (13 Jul 2023 09:27) (18 - 18)  SpO2: 98% (13 Jul 2023 09:27) (96% - 100%)    Parameters below as of 13 Jul 2023 09:27  Patient On (Oxygen Delivery Method): room air      Abd: Soft, NT,ND,   Extremities: Warm  Neuro: A&O X3    LABS:  POCT Blood Glucose.: 256 mg/dL (07-13-23 @ 12:37)  POCT Blood Glucose.: 269 mg/dL (07-13-23 @ 08:26)  POCT Blood Glucose.: 290 mg/dL (07-12-23 @ 21:47)  POCT Blood Glucose.: 278 mg/dL (07-12-23 @ 17:16)  POCT Blood Glucose.: 275 mg/dL (07-12-23 @ 11:58)  POCT Blood Glucose.: 263 mg/dL (07-12-23 @ 08:04)  POCT Blood Glucose.: 333 mg/dL (07-11-23 @ 21:52)  POCT Blood Glucose.: 255 mg/dL (07-11-23 @ 17:18)  POCT Blood Glucose.: 149 mg/dL (07-11-23 @ 12:29)  POCT Blood Glucose.: 125 mg/dL (07-11-23 @ 08:42)  POCT Blood Glucose.: 246 mg/dL (07-10-23 @ 22:13)  POCT Blood Glucose.: 323 mg/dL (07-10-23 @ 17:26)                            7.4    8.21  )-----------( 57       ( 13 Jul 2023 07:31 )             24.4       07-13    135  |  99  |  28<H>  ----------------------------<  243<H>  4.7   |  26  |  0.76    eGFR: 102    Ca    8.9      07-13  Mg     2.0     07-13  Phos  3.5     07-13    TPro  6.1  /  Alb  2.7<L>  /  TBili  0.5  /  DBili  x   /  AST  11  /  ALT  8<L>  /  AlkPhos  89  07-13      Thyroid Function Tests:  07-11 @ 07:24 TSH 15.20 FreeT4 1.2 T3 -- Anti TPO -- Anti Thyroglobulin Ab -- TSI --  07-04 @ 09:51 TSH -- FreeT4 1.4 T3 -- Anti TPO -- Anti Thyroglobulin Ab -- TSI --      A1C with Estimated Average Glucose Result: 5.7 % (07-05-23 @ 06:15)          Contact number: yandy [phone] or [phone]       Diabetes Follow up note:    Chief complaint: T2DM    Interval Hx: BG values 200s over past 24 hours. Pt seen at bedside. Reports eating full meals w/good appetite. Due for another course of decadron today and tomorrow.     Review of Systems:  General: no complaints  GI: Tolerating POs. Denies N/V/D/Abd pain  CV: Denies CP/SOB  ENDO: No S&Sx of hypoglycemia    MEDS:  allopurinol 300 milliGRAM(s) Oral every 24 hours  insulin glargine Injectable (LANTUS) 36 Unit(s) SubCutaneous at bedtime  insulin lispro (ADMELOG) corrective regimen sliding scale   SubCutaneous three times a day before meals  insulin lispro (ADMELOG) corrective regimen sliding scale   SubCutaneous at bedtime  insulin lispro Injectable (ADMELOG) 20 Unit(s) SubCutaneous three times a day before meals  levothyroxine 112 MICROGram(s) Oral daily  rasburicase IVPB 3 milliGRAM(s) IV Intermittent once      Allergies    No Known Allergies        PE:  General: Male sitting in bed. NAD.   Vital Signs Last 24 Hrs  T(C): 36.8 (13 Jul 2023 09:27), Max: 37.5 (12 Jul 2023 13:24)  T(F): 98.3 (13 Jul 2023 09:27), Max: 99.5 (12 Jul 2023 13:24)  HR: 77 (13 Jul 2023 09:27) (66 - 78)  BP: 136/71 (13 Jul 2023 09:27) (97/52 - 147/69)  BP(mean): --  RR: 18 (13 Jul 2023 09:27) (18 - 18)  SpO2: 98% (13 Jul 2023 09:27) (96% - 100%)    Parameters below as of 13 Jul 2023 09:27  Patient On (Oxygen Delivery Method): room air      Abd: Soft, NT,ND,   Extremities: Warm  Neuro: A&O X3    LABS:  POCT Blood Glucose.: 256 mg/dL (07-13-23 @ 12:37)  POCT Blood Glucose.: 269 mg/dL (07-13-23 @ 08:26)  POCT Blood Glucose.: 290 mg/dL (07-12-23 @ 21:47)  POCT Blood Glucose.: 278 mg/dL (07-12-23 @ 17:16)  POCT Blood Glucose.: 275 mg/dL (07-12-23 @ 11:58)  POCT Blood Glucose.: 263 mg/dL (07-12-23 @ 08:04)  POCT Blood Glucose.: 333 mg/dL (07-11-23 @ 21:52)  POCT Blood Glucose.: 255 mg/dL (07-11-23 @ 17:18)  POCT Blood Glucose.: 149 mg/dL (07-11-23 @ 12:29)  POCT Blood Glucose.: 125 mg/dL (07-11-23 @ 08:42)  POCT Blood Glucose.: 246 mg/dL (07-10-23 @ 22:13)  POCT Blood Glucose.: 323 mg/dL (07-10-23 @ 17:26)                            7.4    8.21  )-----------( 57       ( 13 Jul 2023 07:31 )             24.4       07-13    135  |  99  |  28<H>  ----------------------------<  243<H>  4.7   |  26  |  0.76    eGFR: 102    Ca    8.9      07-13  Mg     2.0     07-13  Phos  3.5     07-13    TPro  6.1  /  Alb  2.7<L>  /  TBili  0.5  /  DBili  x   /  AST  11  /  ALT  8<L>  /  AlkPhos  89  07-13      Thyroid Function Tests:  07-11 @ 07:24 TSH 15.20 FreeT4 1.2 T3 -- Anti TPO -- Anti Thyroglobulin Ab -- TSI --  07-04 @ 09:51 TSH -- FreeT4 1.4 T3 -- Anti TPO -- Anti Thyroglobulin Ab -- TSI --      A1C with Estimated Average Glucose Result: 5.7 % (07-05-23 @ 06:15)          Contact number: yandy [phone] or [phone]

## 2023-07-13 NOTE — PROGRESS NOTE ADULT - ASSESSMENT
63 year old M w/h/o T2DM > unknown control (A1C skewed due to anemia) on Novolin insulin bid. No known DM complications. Also h/o hypothyroidism. Presented to OSH with malaise, generalized weakness, and gait instability. Transferred to [facility] for further treatment of suspected B-PLL w/ rituximab. BG values remains elevated off steroids. To restart on decadron today/tomorrow.  Will continue to adjust insulin to BG goal 100 to 180s.  No hypoglycemia.

## 2023-07-13 NOTE — PROGRESS NOTE ADULT - PROBLEM SELECTOR PLAN 2
Febrile to 100.5 F on admission to [facility] 7/7 PM, improved to 99.2 after tylenol.  exam with RLL crackles but breathing comfortably on room air. No cough or SOB. No dysuria.  HIV negative and RVP negative on admission to [facility] this past week.  not currently neutropenic  CXR with no acute pulmonary disease.  Follow up 7/7 blood cultures (-). Neutropenic, afebrile  HIV negative and RVP negative on admission

## 2023-07-13 NOTE — PROGRESS NOTE ADULT - PROBLEM SELECTOR PLAN 4
Hx of DM2, on insulin (Novolin N) 35U qAM/15-20U qPM at home  - lipid panel unremarkable  - a1c 5.7%  - restart insulin at 10u given elevated glucoses recently  - start ISS; adjust per patient's insulin requirements  - monitor FS for goal -180 while inpatient.  - Monitor sugars while patient on dexamethasone.  - f/u fructosamine for elevated glucoses but normal a1c.  7/8 BGs consistently in the 400s. 12 U sliding scale this AM without resolution. 12 U + 10 additional U around lunch time also without resolution.   7/8 bedtime lantus switched to 20 U from 10 U  7/8 Endocrine consulted, will alter regimen according to recs.  7/11- Endo aware regarding steroid, will need Insulin at discharge with outpatient follow up. Hx of DM2, on insulin (Novolin N) 35U qAM/15-20U qPM at home  - lipid panel unremarkable  - a1c 5.7%  - restart insulin at 10u given elevated glucoses recently  - start ISS; adjust per patient's insulin requirements  - monitor FS for goal -180 while inpatient.  - Monitor sugars while patient on dexamethasone.  - f/u fructosamine for elevated glucoses but normal a1c.  7/8 BGs consistently in the 400s. 12 U sliding scale this AM without resolution. 12 U + 10 additional U around lunch time also without resolution.   7/8 bedtime lantus switched to 20 U from 10 U  7/8 Endocrine consulted, will alter regimen according to recs.  7/11- Endo aware regarding steroid, will need Insulin at discharge with outpatient follow up.  Endo following

## 2023-07-13 NOTE — ADVANCED PRACTICE NURSE CONSULT - ASSESSMENT
Pt A/Ox4, vital signs stable, afebrile. Pt denies pain/discomfort, N/V/D, SOB, chest pain. R 20g PIV inserted today, has positive blood return, dressing C/D/I, no bleeding, redness, swelling. Chemotherapy education provided and patient verbalized understanding. Labs approved by Dr. Garnett and team during rounds prior to signing orders. 2 RN verification completed prior to hanging. Pt premedicated with 50 mg Benadryl IV, 650mg Tylenol PO, 8mg Dexamethasone 8mg IV. At 13:08 riTUXimab-pvvr (RUXIENCE) IVPB (eMAR)   735 milliGRAM(s) in sodium chloride 0.9% 176.5 milliLiter(s), IV Intermittent, once, infuse over 90 Minute(s)  Special Instructions: see chemo order: administer 50 ml over 30 minutes; if no reaction, infuse remaining 200 ml over 60 minutes. Rituxan attached to lowest port of NS line, through PIV via Alaris pump. Started at 50mL/Hr. Vitals taken Q30 and infusion increased to 200mL/Hr after 30 mins. Infusion tolerated well. No reaction noted. Primary RN aware. Pt safety maintained     Pt A/Ox4, vital signs stable, afebrile. Pt denies pain/discomfort, N/V/D, SOB, chest pain. R 20g PIV inserted today, has positive blood return, dressing C/D/I, no bleeding, redness, swelling. Chemotherapy education provided and patient verbalized understanding. Labs approved by Dr. Garnett and team during rounds prior to signing orders. 2 RN verification completed prior to hanging. Pt premedicated with 50 mg Benadryl IV, 650mg Tylenol PO, 8mg Dexamethasone 8mg IV. At 13:08 riTUXimab-pvvr (RUXIENCE) IVPB (eMAR)   735 milliGRAM(s) in sodium chloride 0.9% 176.5 milliLiter(s), IV Intermittent, once, infuse over 90 Minute(s). Special Instructions: see chemo order: administer 50 ml over 30 minutes; if no reaction, infuse remaining 200 ml over 60 minutes. Rituxan attached to lowest port of NS line, through PIV via Alaris pump. Started at 50mL/Hr. Vitals taken Q30 and infusion increased to 200mL/Hr after 30 mins. Infusion tolerated well. No reaction noted. bendamustine IVPB (BENDEKA) (eMAR) [Known as BENDEKA (eMAR)]  176 milliGRAM(s) in sodium chloride 0.9% 50 milliLiter(s), IV Intermittent, every 24 hours, infuse over 10 Minute(s), infuse at 342.24 mL/Hr. Bendamustine was given via secondary line to normal saline line through Right 20g PIV. Pt tolerated well.   .Primary RN aware. Pt safety maintained

## 2023-07-13 NOTE — PROGRESS NOTE ADULT - SUBJECTIVE AND OBJECTIVE BOX
Diagnosis: B-PLL    Protocol/Chemo Regimen: s/p Rituximab 375 mg/m2 = 735 mg split into two doses. 100 mg on Day 1, 635 mg on Day 2. Dexamethasone 12 mg on 7/7, Dexamethsone 20 mg x 1 on Day 1 and Day 2.    Day: 5     Pt endorsed: no complaints today     Review of Systems:  Denies any nausea, vomiting, diarrhea, chest pain, palpitation, SOB or abdominal pain.     Pain scale: denies    Diet: Consistent Carbohydrate    Allergies: No Known Allergies    ANTIMICROBIALS      HEME/ONC MEDICATIONS      STANDING MEDICATIONS  allopurinol 300 milliGRAM(s) Oral every 24 hours  dextrose 5%. 1000 milliLiter(s) IV Continuous <Continuous>  dextrose 5%. 1000 milliLiter(s) IV Continuous <Continuous>  dextrose 50% Injectable 12.5 Gram(s) IV Push once  dextrose 50% Injectable 25 Gram(s) IV Push once  dextrose 50% Injectable 25 Gram(s) IV Push once  glucagon  Injectable 1 milliGRAM(s) IntraMuscular once  insulin glargine Injectable (LANTUS) 20 Unit(s) SubCutaneous at bedtime  insulin lispro (ADMELOG) corrective regimen sliding scale   SubCutaneous three times a day before meals  insulin lispro (ADMELOG) corrective regimen sliding scale   SubCutaneous at bedtime  insulin lispro Injectable (ADMELOG) 10 Unit(s) SubCutaneous three times a day before meals  levothyroxine 112 MICROGram(s) Oral daily  melatonin 3 milliGRAM(s) Oral at bedtime  rasburicase IVPB 3 milliGRAM(s) IV Intermittent once  sodium chloride 0.9%. 1000 milliLiter(s) IV Continuous <Continuous>      PRN MEDICATIONS  acetaminophen     Tablet .. 650 milliGRAM(s) Oral every 6 hours PRN  dextrose Oral Gel 15 Gram(s) Oral once PRN  senna 2 Tablet(s) Oral at bedtime PRN    Vital Signs Last 24 Hrs  T(C): 36.7 (13 Jul 2023 05:25), Max: 37.5 (12 Jul 2023 13:24)  T(F): 98.1 (13 Jul 2023 05:25), Max: 99.5 (12 Jul 2023 13:24)  HR: 67 (13 Jul 2023 05:25) (66 - 79)  BP: 122/70 (13 Jul 2023 05:25) (97/52 - 147/69)  BP(mean): --  RR: 18 (13 Jul 2023 05:25) (18 - 18)  SpO2: 100% (13 Jul 2023 05:25) (96% - 100%)    Parameters below as of 13 Jul 2023 05:25  Patient On (Oxygen Delivery Method): room air    PHYSICAL EXAM  General: NAD  HEENT: clear oropharynx, anicteric sclera  CV: (+) S1/S2 RRR  Lungs: positive air movement b/l ant lungs, clear to auscultation, no wheezes, no rales  Abdomen: soft, non-tender, non-distended  Ext: no edema  Skin: +LN in B/L axilla, +LN supraclavicular, no rashes  Neuro: alert and oriented X 3  Central Line: PICC CDI    RECENT CULTURES:  07-07 @ 21:06  .Blood Blood  No growth at 5 days    LABS:                        7.5    4.48  )-----------( 46       ( 12 Jul 2023 06:53 )             24.2         Mean Cell Volume : 81.2 fl  Mean Cell Hemoglobin : 25.2 pg  Mean Cell Hemoglobin Concentration : 31.0 gm/dL  Auto Neutrophil # : 1.12 K/uL  Auto Lymphocyte # : 2.92 K/uL  Auto Monocyte # : 0.28 K/uL  Auto Eosinophil # : 0.00 K/uL  Auto Basophil # : 0.00 K/uL  Auto Neutrophil % : 25.0 %  Auto Lymphocyte % : 65.2 %  Auto Monocyte % : 6.2 %  Auto Eosinophil % : 0.0 %  Auto Basophil % : 0.0 %      07-12    135  |  98  |  32<H>  ----------------------------<  214<H>  4.3   |  26  |  0.77    Ca    9.0      12 Jul 2023 06:53  Phos  3.8     07-12  Mg     2.1     07-12    TPro  6.1  /  Alb  2.9<L>  /  TBili  0.4  /  DBili  x   /  AST  12  /  ALT  7<L>  /  AlkPhos  96  07-12    PT/INR - ( 12 Jul 2023 06:53 )   PT: 14.3 sec;   INR: 1.24 ratio       PTT - ( 12 Jul 2023 06:53 )  PTT:27.8 sec        RADIOLOGY & ADDITIONAL STUDIES:         Diagnosis: B-PLL    Protocol/Chemo Regimen: Rituximab 375 mg/m2 on Day 1, Bendamustine 90 mg/m2 on Day 1 and Day 2   s/p Rituximab 375 mg/m2 = 735 mg split into two doses. 100 mg on Day 1, 635 mg on Day 2. Dexamethasone 12 mg on 7/7, Dexamethsone 20 mg x 1 on Day 1 and Day 2.    Day: 1     Pt endorsed: no complaints today     Review of Systems:  Denies any nausea, vomiting, diarrhea, chest pain, palpitation, SOB or abdominal pain.     Pain scale: denies    Diet: Consistent Carbohydrate    Allergies: No Known Allergies    ANTIMICROBIALS      HEME/ONC MEDICATIONS      STANDING MEDICATIONS  allopurinol 300 milliGRAM(s) Oral every 24 hours  dextrose 5%. 1000 milliLiter(s) IV Continuous <Continuous>  dextrose 5%. 1000 milliLiter(s) IV Continuous <Continuous>  dextrose 50% Injectable 12.5 Gram(s) IV Push once  dextrose 50% Injectable 25 Gram(s) IV Push once  dextrose 50% Injectable 25 Gram(s) IV Push once  glucagon  Injectable 1 milliGRAM(s) IntraMuscular once  insulin glargine Injectable (LANTUS) 20 Unit(s) SubCutaneous at bedtime  insulin lispro (ADMELOG) corrective regimen sliding scale   SubCutaneous three times a day before meals  insulin lispro (ADMELOG) corrective regimen sliding scale   SubCutaneous at bedtime  insulin lispro Injectable (ADMELOG) 10 Unit(s) SubCutaneous three times a day before meals  levothyroxine 112 MICROGram(s) Oral daily  melatonin 3 milliGRAM(s) Oral at bedtime  rasburicase IVPB 3 milliGRAM(s) IV Intermittent once  sodium chloride 0.9%. 1000 milliLiter(s) IV Continuous <Continuous>      PRN MEDICATIONS  acetaminophen     Tablet .. 650 milliGRAM(s) Oral every 6 hours PRN  dextrose Oral Gel 15 Gram(s) Oral once PRN  senna 2 Tablet(s) Oral at bedtime PRN    Vital Signs Last 24 Hrs  T(C): 36.7 (13 Jul 2023 05:25), Max: 37.5 (12 Jul 2023 13:24)  T(F): 98.1 (13 Jul 2023 05:25), Max: 99.5 (12 Jul 2023 13:24)  HR: 67 (13 Jul 2023 05:25) (66 - 79)  BP: 122/70 (13 Jul 2023 05:25) (97/52 - 147/69)  BP(mean): --  RR: 18 (13 Jul 2023 05:25) (18 - 18)  SpO2: 100% (13 Jul 2023 05:25) (96% - 100%)    Parameters below as of 13 Jul 2023 05:25  Patient On (Oxygen Delivery Method): room air    PHYSICAL EXAM  General: NAD  HEENT: clear oropharynx, anicteric sclera  CV: (+) S1/S2 RRR  Lungs: positive air movement b/l ant lungs, clear to auscultation, no wheezes, no rales  Abdomen: soft, non-tender, non-distended  Ext: no edema  Skin: +LN in B/L axilla, +LN supraclavicular, no rashes  Neuro: alert and oriented X 3  Central Line: PICC CDI    RECENT CULTURES:  07-07 @ 21:06  .Blood Blood  No growth at 5 days    LABS:                        7.5    4.48  )-----------( 46       ( 12 Jul 2023 06:53 )             24.2         Mean Cell Volume : 81.2 fl  Mean Cell Hemoglobin : 25.2 pg  Mean Cell Hemoglobin Concentration : 31.0 gm/dL  Auto Neutrophil # : 1.12 K/uL  Auto Lymphocyte # : 2.92 K/uL  Auto Monocyte # : 0.28 K/uL  Auto Eosinophil # : 0.00 K/uL  Auto Basophil # : 0.00 K/uL  Auto Neutrophil % : 25.0 %  Auto Lymphocyte % : 65.2 %  Auto Monocyte % : 6.2 %  Auto Eosinophil % : 0.0 %  Auto Basophil % : 0.0 %      07-12    135  |  98  |  32<H>  ----------------------------<  214<H>  4.3   |  26  |  0.77    Ca    9.0      12 Jul 2023 06:53  Phos  3.8     07-12  Mg     2.1     07-12    TPro  6.1  /  Alb  2.9<L>  /  TBili  0.4  /  DBili  x   /  AST  12  /  ALT  7<L>  /  AlkPhos  96  07-12    PT/INR - ( 12 Jul 2023 06:53 )   PT: 14.3 sec;   INR: 1.24 ratio       PTT - ( 12 Jul 2023 06:53 )  PTT:27.8 sec        RADIOLOGY & ADDITIONAL STUDIES:         Diagnosis: B-PLL    Protocol/Chemo Regimen: Rituximab 375 mg/m2 on Day 1, Bendamustine 90 mg/m2 on Day 1 and Day 2   s/p Rituximab 375 mg/m2 = 735 mg split into two doses. 100 mg on Day 1, 635 mg on Day 2. Dexamethasone 12 mg on 7/7, Dexamethsone 20 mg x 1 on Day 1 and Day 2.    Day: 1     Pt endorsed: no complaints today     Review of Systems:  Denies any nausea, vomiting, diarrhea, chest pain, palpitation, SOB or abdominal pain.     Pain scale: denies    Diet: Consistent Carbohydrate    Allergies: No Known Allergies    ANTIMICROBIALS      HEME/ONC MEDICATIONS      STANDING MEDICATIONS  allopurinol 300 milliGRAM(s) Oral every 24 hours  dextrose 5%. 1000 milliLiter(s) IV Continuous <Continuous>  dextrose 5%. 1000 milliLiter(s) IV Continuous <Continuous>  dextrose 50% Injectable 12.5 Gram(s) IV Push once  dextrose 50% Injectable 25 Gram(s) IV Push once  dextrose 50% Injectable 25 Gram(s) IV Push once  glucagon  Injectable 1 milliGRAM(s) IntraMuscular once  insulin glargine Injectable (LANTUS) 20 Unit(s) SubCutaneous at bedtime  insulin lispro (ADMELOG) corrective regimen sliding scale   SubCutaneous three times a day before meals  insulin lispro (ADMELOG) corrective regimen sliding scale   SubCutaneous at bedtime  insulin lispro Injectable (ADMELOG) 10 Unit(s) SubCutaneous three times a day before meals  levothyroxine 112 MICROGram(s) Oral daily  melatonin 3 milliGRAM(s) Oral at bedtime  rasburicase IVPB 3 milliGRAM(s) IV Intermittent once  sodium chloride 0.9%. 1000 milliLiter(s) IV Continuous <Continuous>      PRN MEDICATIONS  acetaminophen     Tablet .. 650 milliGRAM(s) Oral every 6 hours PRN  dextrose Oral Gel 15 Gram(s) Oral once PRN  senna 2 Tablet(s) Oral at bedtime PRN    Vital Signs Last 24 Hrs  T(C): 36.7 (13 Jul 2023 05:25), Max: 37.5 (12 Jul 2023 13:24)  T(F): 98.1 (13 Jul 2023 05:25), Max: 99.5 (12 Jul 2023 13:24)  HR: 67 (13 Jul 2023 05:25) (66 - 79)  BP: 122/70 (13 Jul 2023 05:25) (97/52 - 147/69)  BP(mean): --  RR: 18 (13 Jul 2023 05:25) (18 - 18)  SpO2: 100% (13 Jul 2023 05:25) (96% - 100%)    Parameters below as of 13 Jul 2023 05:25  Patient On (Oxygen Delivery Method): room air    PHYSICAL EXAM  General: NAD  HEENT: clear oropharynx, anicteric sclera  CV: (+) S1/S2 RRR  Lungs: positive air movement b/l ant lungs, clear to auscultation, no wheezes, no rales  Abdomen: soft, non-tender, non-distended  Ext: no edema  Skin: +LN in B/L axilla, +LN supraclavicular, no rashes  Neuro: alert and oriented X 3  Central Line: PICC CDI    RECENT CULTURES:  07-07 @ 21:06  .Blood Blood  No growth at 5 days    LABS:                          7.4    8.21  )-----------( 57       ( 13 Jul 2023 07:31 )             24.4         Mean Cell Volume : 81.6 fl  Mean Cell Hemoglobin : 24.7 pg  Mean Cell Hemoglobin Concentration : 30.3 gm/dL  Auto Neutrophil # : 0.91 K/uL  Auto Lymphocyte # : 5.96 K/uL  Auto Monocyte # : 1.30 K/uL  Auto Eosinophil # : 0.02 K/uL  Auto Basophil # : 0.01 K/uL  Auto Neutrophil % : 11.2 %  Auto Lymphocyte % : 72.6 %  Auto Monocyte % : 15.8 %  Auto Eosinophil % : 0.2 %  Auto Basophil % : 0.1 %      07-13    135  |  99  |  28<H>  ----------------------------<  243<H>  4.7   |  26  |  0.76    Ca    8.9      13 Jul 2023 07:28  Phos  3.5     07-13  Mg     2.0     07-13    TPro  6.1  /  Alb  2.7<L>  /  TBili  0.5  /  DBili  x   /  AST  11  /  ALT  8<L>  /  AlkPhos  89  07-13    PT/INR - ( 13 Jul 2023 07:31 )   PT: 15.5 sec;   INR: 1.33 ratio         PTT - ( 13 Jul 2023 07:31 )  PTT:31.7 sec      Uric Acid 4.7               Diagnosis: B-PLL    Protocol/Chemo Regimen: Rituximab 375 mg/m2 on Day 1, Bendamustine 90 mg/m2 on Day 1 and Day 2   s/p Rituximab 375 mg/m2 = 735 mg split into two doses. 100 mg on Day 1, 635 mg on Day 2. Dexamethasone 12 mg on 7/7, Dexamethsone 20 mg x 1 on Day 1 and Day 2.    Day: 1     Pt endorsed: no complaints today     Review of Systems:  Denies any nausea, vomiting, diarrhea, chest pain, palpitation, SOB or abdominal pain.     Pain scale: denies    Diet: Consistent Carbohydrate    Allergies: No Known Allergies    ANTIMICROBIALS      HEME/ONC MEDICATIONS      STANDING MEDICATIONS  allopurinol 300 milliGRAM(s) Oral every 24 hours  dextrose 5%. 1000 milliLiter(s) IV Continuous <Continuous>  dextrose 5%. 1000 milliLiter(s) IV Continuous <Continuous>  dextrose 50% Injectable 12.5 Gram(s) IV Push once  dextrose 50% Injectable 25 Gram(s) IV Push once  dextrose 50% Injectable 25 Gram(s) IV Push once  glucagon  Injectable 1 milliGRAM(s) IntraMuscular once  insulin glargine Injectable (LANTUS) 20 Unit(s) SubCutaneous at bedtime  insulin lispro (ADMELOG) corrective regimen sliding scale   SubCutaneous three times a day before meals  insulin lispro (ADMELOG) corrective regimen sliding scale   SubCutaneous at bedtime  insulin lispro Injectable (ADMELOG) 10 Unit(s) SubCutaneous three times a day before meals  levothyroxine 112 MICROGram(s) Oral daily  melatonin 3 milliGRAM(s) Oral at bedtime  rasburicase IVPB 3 milliGRAM(s) IV Intermittent once  sodium chloride 0.9%. 1000 milliLiter(s) IV Continuous <Continuous>      PRN MEDICATIONS  acetaminophen     Tablet .. 650 milliGRAM(s) Oral every 6 hours PRN  dextrose Oral Gel 15 Gram(s) Oral once PRN  senna 2 Tablet(s) Oral at bedtime PRN    Vital Signs Last 24 Hrs  T(C): 36.7 (13 Jul 2023 05:25), Max: 37.5 (12 Jul 2023 13:24)  T(F): 98.1 (13 Jul 2023 05:25), Max: 99.5 (12 Jul 2023 13:24)  HR: 67 (13 Jul 2023 05:25) (66 - 79)  BP: 122/70 (13 Jul 2023 05:25) (97/52 - 147/69)  BP(mean): --  RR: 18 (13 Jul 2023 05:25) (18 - 18)  SpO2: 100% (13 Jul 2023 05:25) (96% - 100%)    Parameters below as of 13 Jul 2023 05:25  Patient On (Oxygen Delivery Method): room air    PHYSICAL EXAM  General: NAD  HEENT: clear oropharynx, anicteric sclera  CV: (+) S1/S2 RRR  Lungs: positive air movement b/l ant lungs, clear to auscultation, no wheezes, no rales  Abdomen: soft, non-tender, non-distended  Ext: no edema  Skin: +LN in B/L axilla, +LN supraclavicular, no rashes  Neuro: alert and oriented X 3  Central Line: PIV CDI    RECENT CULTURES:  07-07 @ 21:06  .Blood Blood  No growth at 5 days    LABS:                          7.4    8.21  )-----------( 57       ( 13 Jul 2023 07:31 )             24.4         Mean Cell Volume : 81.6 fl  Mean Cell Hemoglobin : 24.7 pg  Mean Cell Hemoglobin Concentration : 30.3 gm/dL  Auto Neutrophil # : 0.91 K/uL  Auto Lymphocyte # : 5.96 K/uL  Auto Monocyte # : 1.30 K/uL  Auto Eosinophil # : 0.02 K/uL  Auto Basophil # : 0.01 K/uL  Auto Neutrophil % : 11.2 %  Auto Lymphocyte % : 72.6 %  Auto Monocyte % : 15.8 %  Auto Eosinophil % : 0.2 %  Auto Basophil % : 0.1 %      07-13    135  |  99  |  28<H>  ----------------------------<  243<H>  4.7   |  26  |  0.76    Ca    8.9      13 Jul 2023 07:28  Phos  3.5     07-13  Mg     2.0     07-13    TPro  6.1  /  Alb  2.7<L>  /  TBili  0.5  /  DBili  x   /  AST  11  /  ALT  8<L>  /  AlkPhos  89  07-13    PT/INR - ( 13 Jul 2023 07:31 )   PT: 15.5 sec;   INR: 1.33 ratio         PTT - ( 13 Jul 2023 07:31 )  PTT:31.7 sec      Uric Acid 4.7

## 2023-07-13 NOTE — CHART NOTE - NSCHARTNOTEFT_GEN_A_CORE
Nutrition Follow Up Note  Patient seen for: Malnutrition follow-up     Chart reviewed, events noted. Per chart "64yo M PMH DM2 admitted to [facility]  with malaise, B-symptoms (fevers/chills, weight loss, night sweats) and hepatosplenomegaly. Patient transferred to [facility] for further management. 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 Patient s/p Rituxan ( and 7/10). Now receiving Rituximab/Bendamustine ().  Patient has leukocytosis, anemia and thrombocytopenia secondary to disease condition"    Source: [X] Patient       [x] EMR        [] RN        [] Family at bedside       [] Other:    -If unable to interview patient: [] Trach/Vent/BiPAP  [] Disoriented/confused/inappropriate to interview    Diet Order:   Diet, Consistent Carbohydrate/No Snacks:   1200mL Fluid Restriction ([id number])  Supplement Feeding Modality:  Oral  Ensure Max Cans or Servings Per Day:  2       Frequency:  Daily (23)    - Is current order appropriate/adequate? see below     - PO intake :   [X] >75%  Adequate    [] 50-75%  Fair       [] <50%  Poor    - Nutrition-related concerns:      - Intake: Pt reports good appetite/PO intake, ~% of meals consumed.       - GI: No GI distress reported at time of RD visit. Last BM:  2x . Bowel regimen: senna.      - Endo: Endocrinology following- DM2 (A1C 5.7%), BG elevated in setting of steroid regimen (Decadron), insulin regimen per chart (long-acting 10U 1x/day PM, short-acting 10U 3x/day with meals, SSI). Continues on PO synthroid.       - Renal: K+ Phos and Mg WNL today . Continues on IVF>     Weights:   Dosing Wieght in k ()  Daily Weight in k.8 (), Weight in k (), Weight in k.4 (), Weight in k.7 (07-10), Weight in k.1 (), Weight in k ()    Nutritionally Pertinent MEDICATIONS  (STANDING):  allopurinol  bendamustine IVPB (BENDEKA) (eMAR)  dextrose 5%.  dextrose 5%.  dextrose 50% Injectable  dextrose 50% Injectable  dextrose 50% Injectable  glucagon  Injectable  insulin glargine Injectable (LANTUS)  insulin lispro (ADMELOG) corrective regimen sliding scale  insulin lispro (ADMELOG) corrective regimen sliding scale  insulin lispro Injectable (ADMELOG)  levothyroxine  rasburicase IVPB  riTUXimab-pvvr (RUXIENCE) IVPB (eMAR)  sodium chloride 0.9%.    Pertinent Labs:  @ 07:28: Na 135, BUN 28<H>, Cr 0.76, <H>, K+ 4.7, Phos 3.5, Mg 2.0, Alk Phos 89, ALT/SGPT 8<L>, AST/SGOT 11, HbA1c --    A1C with Estimated Average Glucose Result: 5.7 % (23 @ 06:15)    Finger Sticks:  POCT Blood Glucose.: 269 mg/dL ( @ 08:26)  POCT Blood Glucose.: 290 mg/dL ( @ 21:47)  POCT Blood Glucose.: 278 mg/dL ( @ 17:16)    Skin per nursing documentation: no pressure injuries noted   Edema: no peripheral edema noted per nursing flow sheets     Estimated Needs:   [X] no change since previous assessment  [] recalculated:     Previous Nutrition Diagnosis: severe Malnutrition , increased protein-energy needs   Nutrition Diagnosis is: [X] ongoing  - being addressed with PO intake, oral nutritional supplements, food preferences     New Nutrition Diagnosis: [X] Not applicable    Nutrition Care Plan:  [X] In Progress  [] Achieved  [] Not applicable    Nutrition Interventions:     Education Provided:       [X] Yes:  [] No: RD gave pt "Be Well Bag" and discussed items in bag including nutrient dense snacks as needed; discussed packet on "Eating Tips: Before, During and After Cancer Treatment" to address symptom management; Hyperglycemia nutrition therapy.        Recommendations:         [X] Continue current diet order: consistent carbohydrate (no snack)             [X] Continue oral nutrition supplement: Ensure Max 2x/day     [X] Encourage PO intake, obtain food preferences, provide feeding assistance as needed.      [X] Add multivitamin to optimize micronutrient intake if no contraindications.      [X] RD provided Be Well Bag at bedside, will follow-up for survey.      Monitoring and Evaluation:   Continue to monitor nutritional intake, tolerance to diet prescription, weights, labs, skin integrity    RD remains available upon request and will follow up per protocol  Quynh Merino RDN N [facility] #[phone] or TEAMS

## 2023-07-13 NOTE — PROGRESS NOTE ADULT - ASSESSMENT
64yo M PMH DM2 admitted to [facility] 7/2 with malaise, B-symptoms (fevers/chills, weight loss, night sweats) and hepatosplenomegaly. Patient transferred to [facility] for further management. 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 Patient received Rituxan (7/9 and 7/10). Patient has leukocytosis, anemia and thrombocytopenia secondary to disease condition.     62yo M PMH DM2 admitted to [facility] 7/2 with malaise, B-symptoms (fevers/chills, weight loss, night sweats) and hepatosplenomegaly. Patient transferred to [facility] for further management. 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 Patient s/p Rituxan (7/9 and 7/10). Now receiving Rituximab/Bendamustine (7/13).  Patient has leukocytosis, anemia and thrombocytopenia secondary to disease condition.     62yo M PMH DM2 admitted to [facility] 7/2 with malaise, B-symptoms (fevers/chills, weight loss, night sweats) and hepatosplenomegaly. Patient transferred to [facility] for further management. 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 Patient s/p Rituxan (7/9 and 7/10). Rituximab/Bendamustine started on 7/13.  Patient has pancytopenia secondary to disease condition.

## 2023-07-13 NOTE — PROGRESS NOTE ADULT - PROBLEM SELECTOR PLAN 2
Thyroid Function Tests:  07-11 @ 07:24 TSH 15.20 FreeT4 1.2 T3 -- Anti TPO -- Anti Thyroglobulin Ab -- TSI --  07-04 @ 09:51 TSH -- FreeT4 1.4 T3 -- Anti TPO -- Anti Thyroglobulin Ab -- TSI --  -Discussed new results with Dr Segovia attending endo who recommends to adjusts Levothyroxine to 112mcg day  -Please make sure LT4 is given on an empty stomach at least one hour apart from other meds and food to ensure med absorption. DO NOT GIVE WITH VITAMINS/ANTACIDS   -Check levels as out pt in 6-8 weeks.    discussed w/pt and team  Can be reached via TEAMS/pager: [phone]   office:  [phone] (M-F 9a-5pm)               [phone] (nights/weekends)   Can access efw-suhl coverage via sunrise/tools

## 2023-07-13 NOTE — PROGRESS NOTE ADULT - NS ATTEND AMEND GEN_ALL_CORE FT
63 year old male with  PMH DM2 (a1c 5.7) but no other known hx (pt. has not seen PCP in some time), admitted to [facility]  on 7/2 with malaise, B-symptoms (fevers/chills, weight loss, night sweats), hepatosplenomegaly and labs c/f B-PLL, started on hydroxyurea 1500 BID and allopurinol, transferred to [facility] for further management. Leukocytosis, anemia and thrombocytopenia secondary to disease.    B-cell prolymphocytic leukemia.   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 100 mg IV on Day 1, 635 mg IV on Day 2. Dexamethasone 12 mg IV given pretreatment. Elitek given x 1.  WBC, ALC markedly decreased. Now off HU.  feels well today    Will consider treating with bendamustine and rituxan pre d/c; path review pointing away from B-PLL  and more towards plasmacytoid lymphoma, marginal zone lymphoma.   - F/u BMBx FISH, cytogenetics, and biopsy results. F/u G6PD results.  - now off HU  - c/w IVF and allopurinol  - can decr TLS labs monitoring to once/d  - receiving PRBC for hgb < 7.0, platelets <10K or <20K if febrile   - hepatosplenomegaly noted on CT including splenic infarcts,    On 7/8, patient had incr phosphate previously had Phoslo, resolved now.     Endocrine seeing for persistent hyperglycemia on Insulin coverage.  Insulin dosage being adjusted.    ID- patient had been on Zosyn IV  - cults neg, not febrile >>> was d/c'd on 7/10  afebrile off antibiotics    OOB 63 year old male with  PMH DM2 (a1c 5.7) but no other known hx (pt. has not seen PCP in some time), admitted to [facility]  on 7/2 with malaise, B-symptoms (fevers/chills, weight loss, night sweats), hepatosplenomegaly and labs c/f B-PLL, started on hydroxyurea 1500 BID and allopurinol, transferred to [facility] for further management. Leukocytosis, anemia and thrombocytopenia secondary to disease.    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   -F/u BMBx FISH, cytogenetics, and biopsy results. F/u G6PD results.  had  treatment with Rituximab 375 mg/m2 = 735 mg split into two doses. 100 mg IV on Day 1, 635 mg IV on Day 2. Dexamethasone 12 mg IV given pretreatment. Elitek given x 1.  WBC, ALC markedly decreased. Now off HU.  feels well today  d/w pt risks, benefits, toxicities of therapy with R-bendamustine and consent obtained  To receive day 1 R-Rc today, day 2 rc 7/14  Start G-CSF starting 7/15 frantz in light of neutropenia, replaced marrow  Will treat with bendamustine and rituxan;   - now off HU  - c/w IVF and allopurinol  - decr TLS labs monitoring to once/d    - receiving PRBC for hgb < 7.0, platelets <10K or <20K if febrile   - hepatosplenomegaly noted on CT including splenic infarcts,    On 7/8, patient had incr phosphate previously had Phoslo, resolved now.     Endocrine seeing for persistent hyperglycemia on Insulin coverage.  Insulin dosage being adjusted.    ID- patient had been on Zosyn IV  - cults neg, not febrile >>> was d/c'd on 7/10  afebrile off antibiotics    OOB

## 2023-07-13 NOTE — PROGRESS NOTE ADULT - PROBLEM SELECTOR PLAN 1
-test BG AC/HS/2AM  -Increase Lantus 36 units QHS while on high dose steroids  -Increase Admelog 20 units TID w/meals while on high dose steroids  -c/w Admelog moderate correction scale AC and mod HS/2AM scale  -Started on Decadron 8mg IV daily 7/13 and 7/14  -cons carb diet  D/c recs:   -Will be determined according to BG/insulin needs/PO intake and steroid therapy at time of discharge.  -Would benefit from basal/bolus. Doses TBD  -Pt reports BG levels were well controlled on bid Humulin N but pt agreeable to start basal/bolus. Pt needs to learn use of insulin pens since pt was using insulin syringes at home.   -Please write Rxs for: Solo Star Insulin pen/Humalog Kwik insulin pen/Robyn insulin pen needles/glucose meter/strips/lancets. Can switch to any equivalent insulin analogs cover by pt's insurance  -Consider Freestyle Sabine CGM if cover by pt's insurance  -Can follow at [facility] practice. [address] [place] [address] [address]. Phone . Call for apt closer to discharge  -Needs optho f/u as out pt.

## 2023-07-14 LAB
ALBUMIN SERPL ELPH-MCNC: 3 G/DL — LOW (ref 3.3–5)
ALP SERPL-CCNC: 97 U/L — SIGNIFICANT CHANGE UP (ref 40–120)
ALT FLD-CCNC: 13 U/L — SIGNIFICANT CHANGE UP (ref 10–45)
ANION GAP SERPL CALC-SCNC: 8 MMOL/L — SIGNIFICANT CHANGE UP (ref 5–17)
APTT BLD: 28.2 SEC — SIGNIFICANT CHANGE UP (ref 27.5–35.5)
AST SERPL-CCNC: 17 U/L — SIGNIFICANT CHANGE UP (ref 10–40)
BASOPHILS # BLD AUTO: 0 K/UL — SIGNIFICANT CHANGE UP (ref 0–0.2)
BASOPHILS NFR BLD AUTO: 0 % — SIGNIFICANT CHANGE UP (ref 0–2)
BILIRUB SERPL-MCNC: 0.4 MG/DL — SIGNIFICANT CHANGE UP (ref 0.2–1.2)
BUN SERPL-MCNC: 32 MG/DL — HIGH (ref 7–23)
CALCIUM SERPL-MCNC: 8.6 MG/DL — SIGNIFICANT CHANGE UP (ref 8.4–10.5)
CHLORIDE SERPL-SCNC: 100 MMOL/L — SIGNIFICANT CHANGE UP (ref 96–108)
CO2 SERPL-SCNC: 26 MMOL/L — SIGNIFICANT CHANGE UP (ref 22–31)
CREAT SERPL-MCNC: 0.82 MG/DL — SIGNIFICANT CHANGE UP (ref 0.5–1.3)
D DIMER BLD IA.RAPID-MCNC: 3180 NG/ML DDU — HIGH
EGFR: 100 ML/MIN/1.73M2 — SIGNIFICANT CHANGE UP
EOSINOPHIL # BLD AUTO: 0.01 K/UL — SIGNIFICANT CHANGE UP (ref 0–0.5)
EOSINOPHIL NFR BLD AUTO: 0.2 % — SIGNIFICANT CHANGE UP (ref 0–6)
FIBRINOGEN PPP-MCNC: 276 MG/DL — SIGNIFICANT CHANGE UP (ref 200–445)
GLUCOSE BLDC GLUCOMTR-MCNC: 110 MG/DL — HIGH (ref 70–99)
GLUCOSE BLDC GLUCOMTR-MCNC: 176 MG/DL — HIGH (ref 70–99)
GLUCOSE BLDC GLUCOMTR-MCNC: 189 MG/DL — HIGH (ref 70–99)
GLUCOSE BLDC GLUCOMTR-MCNC: 298 MG/DL — HIGH (ref 70–99)
GLUCOSE BLDC GLUCOMTR-MCNC: 315 MG/DL — HIGH (ref 70–99)
GLUCOSE SERPL-MCNC: 200 MG/DL — HIGH (ref 70–99)
HCT VFR BLD CALC: 25.2 % — LOW (ref 39–50)
HGB BLD-MCNC: 8 G/DL — LOW (ref 13–17)
IMM GRANULOCYTES NFR BLD AUTO: 0.4 % — SIGNIFICANT CHANGE UP (ref 0–0.9)
INR BLD: 1.28 RATIO — HIGH (ref 0.88–1.16)
LDH SERPL L TO P-CCNC: 376 U/L — HIGH (ref 50–242)
LYMPHOCYTES # BLD AUTO: 2.71 K/UL — SIGNIFICANT CHANGE UP (ref 1–3.3)
LYMPHOCYTES # BLD AUTO: 57.4 % — HIGH (ref 13–44)
MAGNESIUM SERPL-MCNC: 2.2 MG/DL — SIGNIFICANT CHANGE UP (ref 1.6–2.6)
MCHC RBC-ENTMCNC: 25.4 PG — LOW (ref 27–34)
MCHC RBC-ENTMCNC: 31.7 GM/DL — LOW (ref 32–36)
MCV RBC AUTO: 80 FL — SIGNIFICANT CHANGE UP (ref 80–100)
MONOCYTES # BLD AUTO: 0.44 K/UL — SIGNIFICANT CHANGE UP (ref 0–0.9)
MONOCYTES NFR BLD AUTO: 9.3 % — SIGNIFICANT CHANGE UP (ref 2–14)
NEUTROPHILS # BLD AUTO: 1.54 K/UL — LOW (ref 1.8–7.4)
NEUTROPHILS NFR BLD AUTO: 32.7 % — LOW (ref 43–77)
NRBC # BLD: 0 /100 WBCS — SIGNIFICANT CHANGE UP (ref 0–0)
PHOSPHATE SERPL-MCNC: 3.6 MG/DL — SIGNIFICANT CHANGE UP (ref 2.5–4.5)
PLATELET # BLD AUTO: 49 K/UL — LOW (ref 150–400)
POTASSIUM SERPL-MCNC: 4.3 MMOL/L — SIGNIFICANT CHANGE UP (ref 3.5–5.3)
POTASSIUM SERPL-SCNC: 4.3 MMOL/L — SIGNIFICANT CHANGE UP (ref 3.5–5.3)
PROT SERPL-MCNC: 6.5 G/DL — SIGNIFICANT CHANGE UP (ref 6–8.3)
PROTHROM AB SERPL-ACNC: 14.9 SEC — HIGH (ref 10.5–13.4)
RBC # BLD: 3.15 M/UL — LOW (ref 4.2–5.8)
RBC # FLD: 23.3 % — HIGH (ref 10.3–14.5)
SODIUM SERPL-SCNC: 134 MMOL/L — LOW (ref 135–145)
URATE SERPL-MCNC: 4.7 MG/DL — SIGNIFICANT CHANGE UP (ref 3.4–8.8)
WBC # BLD: 4.72 K/UL — SIGNIFICANT CHANGE UP (ref 3.8–10.5)
WBC # FLD AUTO: 4.72 K/UL — SIGNIFICANT CHANGE UP (ref 3.8–10.5)

## 2023-07-14 PROCEDURE — 99232 SBSQ HOSP IP/OBS MODERATE 35: CPT

## 2023-07-14 PROCEDURE — 99231 SBSQ HOSP IP/OBS SF/LOW 25: CPT | Mod: GC

## 2023-07-14 RX ORDER — ONDANSETRON 8 MG/1
1 TABLET, FILM COATED ORAL
Qty: 42 | Refills: 0
Start: 2023-07-14 | End: 2023-07-27

## 2023-07-14 RX ORDER — LEVOTHYROXINE SODIUM 125 MCG
1 TABLET ORAL
Qty: 0 | Refills: 0 | DISCHARGE
Start: 2023-07-14

## 2023-07-14 RX ADMIN — ONDANSETRON 116 MILLIGRAM(S): 8 TABLET, FILM COATED ORAL at 14:26

## 2023-07-14 RX ADMIN — Medication 20 UNIT(S): at 12:02

## 2023-07-14 RX ADMIN — Medication 3 MILLIGRAM(S): at 22:30

## 2023-07-14 RX ADMIN — Medication 101.6 MILLIGRAM(S): at 14:27

## 2023-07-14 RX ADMIN — Medication 4: at 01:40

## 2023-07-14 RX ADMIN — Medication 2: at 08:24

## 2023-07-14 RX ADMIN — Medication 20 UNIT(S): at 08:24

## 2023-07-14 RX ADMIN — BENDAMUSTINE HYDROCHLORIDE 176 MILLIGRAM(S): 100 INJECTION, POWDER, LYOPHILIZED, FOR SOLUTION INTRAVENOUS at 15:51

## 2023-07-14 RX ADMIN — Medication 2: at 17:53

## 2023-07-14 RX ADMIN — Medication 2: at 22:30

## 2023-07-14 RX ADMIN — SODIUM CHLORIDE 100 MILLILITER(S): 9 INJECTION INTRAMUSCULAR; INTRAVENOUS; SUBCUTANEOUS at 05:56

## 2023-07-14 RX ADMIN — Medication 20 UNIT(S): at 17:52

## 2023-07-14 RX ADMIN — Medication 300 MILLIGRAM(S): at 12:02

## 2023-07-14 RX ADMIN — Medication 112 MICROGRAM(S): at 05:58

## 2023-07-14 RX ADMIN — INSULIN GLARGINE 36 UNIT(S): 100 INJECTION, SOLUTION SUBCUTANEOUS at 22:29

## 2023-07-14 NOTE — PROGRESS NOTE ADULT - SUBJECTIVE AND OBJECTIVE BOX
Diagnosis: B-PLL    Protocol/Chemo Regimen: Rituximab 375 mg/m2 on Day 1, Bendamustine 90 mg/m2 on Day 1 and Day 2   s/p Rituximab 375 mg/m2 = 735 mg split into two doses. 100 mg on Day 1, 635 mg on Day 2. Dexamethasone 12 mg on 7/7, Dexamethsone 20 mg x 1 on Day 1 and Day 2.    Day: 2      Pt endorsed: no complaints today     Review of Systems:  Denies any nausea, vomiting, diarrhea, chest pain, palpitation, SOB or abdominal pain.     Pain scale: denies    Diet: Consistent Carbohydrate    Allergies: No Known Allergies      ANTIMICROBIALS      HEME/ONC MEDICATIONS  bendamustine IVPB (BENDEKA) (eMAR) 176 milliGRAM(s) IV Intermittent every 24 hours      STANDING MEDICATIONS  allopurinol 300 milliGRAM(s) Oral every 24 hours  dexAMETHasone  IVPB 8 milliGRAM(s) IV Intermittent once  dextrose 5%. 1000 milliLiter(s) IV Continuous <Continuous>  dextrose 5%. 1000 milliLiter(s) IV Continuous <Continuous>  dextrose 50% Injectable 25 Gram(s) IV Push once  dextrose 50% Injectable 25 Gram(s) IV Push once  dextrose 50% Injectable 12.5 Gram(s) IV Push once  glucagon  Injectable 1 milliGRAM(s) IntraMuscular once  insulin glargine Injectable (LANTUS) 36 Unit(s) SubCutaneous at bedtime  insulin lispro (ADMELOG) corrective regimen sliding scale   SubCutaneous <User Schedule>  insulin lispro (ADMELOG) corrective regimen sliding scale   SubCutaneous three times a day before meals  insulin lispro Injectable (ADMELOG) 20 Unit(s) SubCutaneous three times a day before meals  levothyroxine 112 MICROGram(s) Oral daily  melatonin 3 milliGRAM(s) Oral at bedtime  ondansetron  IVPB 16 milliGRAM(s) IV Intermittent every 24 hours  rasburicase IVPB 3 milliGRAM(s) IV Intermittent once  sodium chloride 0.9%. 1000 milliLiter(s) IV Continuous <Continuous>      PRN MEDICATIONS  acetaminophen     Tablet .. 650 milliGRAM(s) Oral every 6 hours PRN  dextrose Oral Gel 15 Gram(s) Oral once PRN  senna 2 Tablet(s) Oral at bedtime PRN        Vital Signs Last 24 Hrs  T(C): 36.7 (14 Jul 2023 05:04), Max: 37.3 (13 Jul 2023 16:04)  T(F): 98 (14 Jul 2023 05:04), Max: 99.1 (13 Jul 2023 16:04)  HR: 69 (14 Jul 2023 05:04) (69 - 96)  BP: 142/72 (14 Jul 2023 05:04) (106/62 - 153/72)  BP(mean): --  RR: 18 (14 Jul 2023 05:04) (18 - 18)  SpO2: 100% (14 Jul 2023 05:04) (97% - 100%)    Parameters below as of 14 Jul 2023 05:04  Patient On (Oxygen Delivery Method): room air        PHYSICAL EXAM  General: NAD  HEENT: clear oropharynx, anicteric sclera, pink conjunctiva  Neck: supple  CV: (+) S1/S2 RRR  Lungs: positive air movement b/l ant lungs, clear to auscultation, no wheezes, no rales  Abdomen: soft, non-tender, non-distended  Ext: no clubbing cyanosis or edema  Skin: no rashes and no petechiae  Neuro: alert and oriented X 3, no focal deficits  Central Line:     RECENT CULTURES:  07-07 @ 21:06  .Blood Blood  --  --  --    No growth at 5 days  --        LABS:                        8.0    4.72  )-----------( 49       ( 14 Jul 2023 07:14 )             25.2         Mean Cell Volume : 80.0 fl  Mean Cell Hemoglobin : 25.4 pg  Mean Cell Hemoglobin Concentration : 31.7 gm/dL  Auto Neutrophil # : x  Auto Lymphocyte # : x  Auto Monocyte # : x  Auto Eosinophil # : x  Auto Basophil # : x  Auto Neutrophil % : x  Auto Lymphocyte % : x  Auto Monocyte % : x  Auto Eosinophil % : x  Auto Basophil % : x      07-14    134<L>  |  100  |  32<H>  ----------------------------<  200<H>  4.3   |  26  |  0.82    Ca    8.6      14 Jul 2023 07:13  Phos  3.6     07-14  Mg     2.2     07-14    TPro  6.5  /  Alb  3.0<L>  /  TBili  0.4  /  DBili  x   /  AST  17  /  ALT  13  /  AlkPhos  97  07-14      Mg 2.2  Phos 3.6      PT/INR - ( 14 Jul 2023 07:15 )   PT: 14.9 sec;   INR: 1.28 ratio         PTT - ( 14 Jul 2023 07:15 )  PTT:28.2 sec      Uric Acid 4.7        RADIOLOGY & ADDITIONAL STUDIES:         Diagnosis: B-PLL    Protocol/Chemo Regimen: Rituximab 375 mg/m2 on Day 1, Bendamustine 90 mg/m2 on Day 1 and Day 2   s/p Rituximab 375 mg/m2 = 735 mg split into two doses. 100 mg on Day 1, 635 mg on Day 2. Dexamethasone 12 mg on 7/7, Dexamethsone 20 mg x 1 on Day 1 and Day 2.    Day: 2      Pt endorsed: no complaints today     Review of Systems:  Denies any nausea, vomiting, diarrhea, chest pain, palpitation, SOB or abdominal pain.     Pain scale: denies    Diet: Consistent Carbohydrate    Allergies: No Known Allergies      ANTIMICROBIALS      HEME/ONC MEDICATIONS  bendamustine IVPB (BENDEKA) (eMAR) 176 milliGRAM(s) IV Intermittent every 24 hours      STANDING MEDICATIONS  allopurinol 300 milliGRAM(s) Oral every 24 hours  dexAMETHasone  IVPB 8 milliGRAM(s) IV Intermittent once  dextrose 5%. 1000 milliLiter(s) IV Continuous <Continuous>  dextrose 5%. 1000 milliLiter(s) IV Continuous <Continuous>  dextrose 50% Injectable 25 Gram(s) IV Push once  dextrose 50% Injectable 25 Gram(s) IV Push once  dextrose 50% Injectable 12.5 Gram(s) IV Push once  glucagon  Injectable 1 milliGRAM(s) IntraMuscular once  insulin glargine Injectable (LANTUS) 36 Unit(s) SubCutaneous at bedtime  insulin lispro (ADMELOG) corrective regimen sliding scale   SubCutaneous <User Schedule>  insulin lispro (ADMELOG) corrective regimen sliding scale   SubCutaneous three times a day before meals  insulin lispro Injectable (ADMELOG) 20 Unit(s) SubCutaneous three times a day before meals  levothyroxine 112 MICROGram(s) Oral daily  melatonin 3 milliGRAM(s) Oral at bedtime  ondansetron  IVPB 16 milliGRAM(s) IV Intermittent every 24 hours  rasburicase IVPB 3 milliGRAM(s) IV Intermittent once  sodium chloride 0.9%. 1000 milliLiter(s) IV Continuous <Continuous>      PRN MEDICATIONS  acetaminophen     Tablet .. 650 milliGRAM(s) Oral every 6 hours PRN  dextrose Oral Gel 15 Gram(s) Oral once PRN  senna 2 Tablet(s) Oral at bedtime PRN        Vital Signs Last 24 Hrs  T(C): 36.7 (14 Jul 2023 05:04), Max: 37.3 (13 Jul 2023 16:04)  T(F): 98 (14 Jul 2023 05:04), Max: 99.1 (13 Jul 2023 16:04)  HR: 69 (14 Jul 2023 05:04) (69 - 96)  BP: 142/72 (14 Jul 2023 05:04) (106/62 - 153/72)  BP(mean): --  RR: 18 (14 Jul 2023 05:04) (18 - 18)  SpO2: 100% (14 Jul 2023 05:04) (97% - 100%)    Parameters below as of 14 Jul 2023 05:04  Patient On (Oxygen Delivery Method): room air        PHYSICAL EXAM  General: NAD  HEENT: clear oropharynx, anicteric sclera, pink conjunctiva  Neck: supple  CV: (+) S1/S2 RRR  Lungs: positive air movement b/l ant lungs, clear to auscultation, no wheezes, no rales  Abdomen: soft, non-tender, non-distended  Ext: no clubbing cyanosis or edema  Skin: no rashes and no petechiae  Neuro: alert and oriented X 3, no focal deficits  Central Line: peripheral IV, CDI    RECENT CULTURES:  07-07 @ 21:06  .Blood Blood  --  --  --    No growth at 5 days  --        LABS:                        8.0    4.72  )-----------( 49       ( 14 Jul 2023 07:14 )             25.2         Mean Cell Volume : 80.0 fl  Mean Cell Hemoglobin : 25.4 pg  Mean Cell Hemoglobin Concentration : 31.7 gm/dL  Auto Neutrophil # : x  Auto Lymphocyte # : x  Auto Monocyte # : x  Auto Eosinophil # : x  Auto Basophil # : x  Auto Neutrophil % : x  Auto Lymphocyte % : x  Auto Monocyte % : x  Auto Eosinophil % : x  Auto Basophil % : x      07-14    134<L>  |  100  |  32<H>  ----------------------------<  200<H>  4.3   |  26  |  0.82    Ca    8.6      14 Jul 2023 07:13  Phos  3.6     07-14  Mg     2.2     07-14    TPro  6.5  /  Alb  3.0<L>  /  TBili  0.4  /  DBili  x   /  AST  17  /  ALT  13  /  AlkPhos  97  07-14      Mg 2.2  Phos 3.6      PT/INR - ( 14 Jul 2023 07:15 )   PT: 14.9 sec;   INR: 1.28 ratio         PTT - ( 14 Jul 2023 07:15 )  PTT:28.2 sec      Uric Acid 4.7        RADIOLOGY & ADDITIONAL STUDIES:         Diagnosis: B-PLL    Protocol/Chemo Regimen: Rituximab 375 mg/m2 on Day 1, Bendamustine 90 mg/m2 on Day 1 and Day 2   s/p Rituximab 375 mg/m2 = 735 mg split into two doses. 100 mg on Day 1, 635 mg on Day 2. Dexamethasone 12 mg on 7/7, Dexamethsone 20 mg x 1 on Day 1 and Day 2.    Day: 2      Pt endorsed: no complaints today     Review of Systems:  Denies any nausea, vomiting, diarrhea, chest pain, palpitation, SOB or abdominal pain.     Pain scale: denies    Diet: Consistent Carbohydrate    Allergies: No Known Allergies      ANTIMICROBIALS      HEME/ONC MEDICATIONS  bendamustine IVPB (BENDEKA) (eMAR) 176 milliGRAM(s) IV Intermittent every 24 hours      STANDING MEDICATIONS  allopurinol 300 milliGRAM(s) Oral every 24 hours  dexAMETHasone  IVPB 8 milliGRAM(s) IV Intermittent once  dextrose 5%. 1000 milliLiter(s) IV Continuous <Continuous>  dextrose 5%. 1000 milliLiter(s) IV Continuous <Continuous>  dextrose 50% Injectable 25 Gram(s) IV Push once  dextrose 50% Injectable 25 Gram(s) IV Push once  dextrose 50% Injectable 12.5 Gram(s) IV Push once  glucagon  Injectable 1 milliGRAM(s) IntraMuscular once  insulin glargine Injectable (LANTUS) 36 Unit(s) SubCutaneous at bedtime  insulin lispro (ADMELOG) corrective regimen sliding scale   SubCutaneous <User Schedule>  insulin lispro (ADMELOG) corrective regimen sliding scale   SubCutaneous three times a day before meals  insulin lispro Injectable (ADMELOG) 20 Unit(s) SubCutaneous three times a day before meals  levothyroxine 112 MICROGram(s) Oral daily  melatonin 3 milliGRAM(s) Oral at bedtime  ondansetron  IVPB 16 milliGRAM(s) IV Intermittent every 24 hours  rasburicase IVPB 3 milliGRAM(s) IV Intermittent once  sodium chloride 0.9%. 1000 milliLiter(s) IV Continuous <Continuous>    PRN MEDICATIONS  acetaminophen     Tablet .. 650 milliGRAM(s) Oral every 6 hours PRN  dextrose Oral Gel 15 Gram(s) Oral once PRN  senna 2 Tablet(s) Oral at bedtime PRN    Vital Signs Last 24 Hrs  T(C): 36.7 (14 Jul 2023 05:04), Max: 37.3 (13 Jul 2023 16:04)  T(F): 98 (14 Jul 2023 05:04), Max: 99.1 (13 Jul 2023 16:04)  HR: 69 (14 Jul 2023 05:04) (69 - 96)  BP: 142/72 (14 Jul 2023 05:04) (106/62 - 153/72)  BP(mean): --  RR: 18 (14 Jul 2023 05:04) (18 - 18)  SpO2: 100% (14 Jul 2023 05:04) (97% - 100%)    Parameters below as of 14 Jul 2023 05:04  Patient On (Oxygen Delivery Method): room air      PHYSICAL EXAM  General: NAD  HEENT: clear oropharynx, anicteric sclera  Neck: +LN supraclavicular.   CV: (+) S1/S2 RRR  Lungs: positive air movement b/l ant lungs, clear to auscultation, no wheezes, no rales  Abdomen: soft, non-tender, non-distended  Ext: no edema  Skin: + LN B/L axillas. no rashes  Neuro: alert and oriented X 3  Central Line: peripheral IV, CDI    RECENT CULTURES:  07-07 @ 21:06  .Blood Blood  No growth at 5 days      LABS:                        8.0    4.72  )-----------( 49       ( 14 Jul 2023 07:14 )             25.2         Mean Cell Volume : 80.0 fl  Mean Cell Hemoglobin : 25.4 pg  Mean Cell Hemoglobin Concentration : 31.7 gm/dL  Auto Neutrophil # : x  Auto Lymphocyte # : x  Auto Monocyte # : x  Auto Eosinophil # : x  Auto Basophil # : x  Auto Neutrophil % : x  Auto Lymphocyte % : x  Auto Monocyte % : x  Auto Eosinophil % : x  Auto Basophil % : x      07-14    134<L>  |  100  |  32<H>  ----------------------------<  200<H>  4.3   |  26  |  0.82    Ca    8.6      14 Jul 2023 07:13  Phos  3.6     07-14  Mg     2.2     07-14    TPro  6.5  /  Alb  3.0<L>  /  TBili  0.4  /  DBili  x   /  AST  17  /  ALT  13  /  AlkPhos  97  07-14    PT/INR - ( 14 Jul 2023 07:15 )   PT: 14.9 sec;   INR: 1.28 ratio         PTT - ( 14 Jul 2023 07:15 )  PTT:28.2 sec      Uric Acid 4.7         Diagnosis: B-PLL    Protocol/Chemo Regimen: Rituximab 375 mg/m2 on Day 1, Bendamustine 90 mg/m2 on Day 1 and Day 2   s/p Rituximab 375 mg/m2 = 735 mg split into two doses. 100 mg on Day 1, 635 mg on Day 2. Dexamethasone 12 mg on 7/7, Dexamethsone 20 mg x 1 on Day 1 and Day 2.    Day: 2      Pt endorsed: no complaints today     Review of Systems:  Denies any nausea, vomiting, diarrhea, chest pain, palpitation, SOB or abdominal pain.     Pain scale: denies    Diet: Consistent Carbohydrate    Allergies: No Known Allergies      ANTIMICROBIALS      HEME/ONC MEDICATIONS  bendamustine IVPB (BENDEKA) (eMAR) 176 milliGRAM(s) IV Intermittent every 24 hours      STANDING MEDICATIONS  allopurinol 300 milliGRAM(s) Oral every 24 hours  dexAMETHasone  IVPB 8 milliGRAM(s) IV Intermittent once  dextrose 5%. 1000 milliLiter(s) IV Continuous <Continuous>  dextrose 5%. 1000 milliLiter(s) IV Continuous <Continuous>  dextrose 50% Injectable 25 Gram(s) IV Push once  dextrose 50% Injectable 25 Gram(s) IV Push once  dextrose 50% Injectable 12.5 Gram(s) IV Push once  glucagon  Injectable 1 milliGRAM(s) IntraMuscular once  insulin glargine Injectable (LANTUS) 36 Unit(s) SubCutaneous at bedtime  insulin lispro (ADMELOG) corrective regimen sliding scale   SubCutaneous <User Schedule>  insulin lispro (ADMELOG) corrective regimen sliding scale   SubCutaneous three times a day before meals  insulin lispro Injectable (ADMELOG) 20 Unit(s) SubCutaneous three times a day before meals  levothyroxine 112 MICROGram(s) Oral daily  melatonin 3 milliGRAM(s) Oral at bedtime  ondansetron  IVPB 16 milliGRAM(s) IV Intermittent every 24 hours  rasburicase IVPB 3 milliGRAM(s) IV Intermittent once  sodium chloride 0.9%. 1000 milliLiter(s) IV Continuous <Continuous>    PRN MEDICATIONS  acetaminophen     Tablet .. 650 milliGRAM(s) Oral every 6 hours PRN  dextrose Oral Gel 15 Gram(s) Oral once PRN  senna 2 Tablet(s) Oral at bedtime PRN    Vital Signs Last 24 Hrs  T(C): 36.7 (14 Jul 2023 05:04), Max: 37.3 (13 Jul 2023 16:04)  T(F): 98 (14 Jul 2023 05:04), Max: 99.1 (13 Jul 2023 16:04)  HR: 69 (14 Jul 2023 05:04) (69 - 96)  BP: 142/72 (14 Jul 2023 05:04) (106/62 - 153/72)  BP(mean): --  RR: 18 (14 Jul 2023 05:04) (18 - 18)  SpO2: 100% (14 Jul 2023 05:04) (97% - 100%)    Parameters below as of 14 Jul 2023 05:04  Patient On (Oxygen Delivery Method): room air      PHYSICAL EXAM  General: NAD  HEENT: clear oropharynx, anicteric sclera  Neck: +LN supraclavicular.   CV: (+) S1/S2 RRR  Lungs: positive air movement b/l ant lungs, clear to auscultation, no wheezes, no rales  Abdomen: soft, non-tender, non-distended  Ext: no edema  Skin: + LN B/L axillas. no rashes  Neuro: alert and oriented X 3  Central Line: peripheral IV, CDI    RECENT CULTURES:  07-07 @ 21:06  .Blood Blood  No growth at 5 days      LABS:                        8.0    4.72  )-----------( 49       ( 14 Jul 2023 07:14 )             25.2         Mean Cell Volume : 80.0 fl  Mean Cell Hemoglobin : 25.4 pg  Mean Cell Hemoglobin Concentration : 31.7 gm/dL  Auto Neutrophil # : x  Auto Lymphocyte # : x  Auto Monocyte # : x  Auto Eosinophil # : x  Auto Basophil # : x  Auto Neutrophil % : x  Auto Lymphocyte % : x  Auto Monocyte % : x  Auto Eosinophil % : x  Auto Basophil % : x      07-14    134<L>  |  100  |  32<H>  ----------------------------<  200<H>  4.3   |  26  |  0.82    Ca    8.6      14 Jul 2023 07:13  Phos  3.6     07-14  Mg     2.2     07-14    TPro  6.5  /  Alb  3.0<L>  /  TBili  0.4  /  DBili  x   /  AST  17  /  ALT  13  /  AlkPhos  97  07-14    PT/INR - ( 14 Jul 2023 07:15 )   PT: 14.9 sec;   INR: 1.28 ratio         PTT - ( 14 Jul 2023 07:15 )  PTT:28.2 sec      Uric Acid 4.7

## 2023-07-14 NOTE — PROGRESS NOTE ADULT - ATTENDING COMMENTS
64 yo M with a PMH of DM and recently diagnosed with Lymphoma. Plan to initiate inpatient treatment with Rituxan. Serum potassium elevated at 6.0. Nephrology following for hyperkalemia and monitoring of TLS.   1.  Hyperkalemia--modest pseudo effect.  Blood gas specimen more accurate, binder, NaHCO3 rx for modest increase  2.  Tumor lysis syndrome--uric acid responding to rasburicase and P<4.  Would use PO4 binder for level >4.5.  Moderate risk for renal failure.  Check urine uric acid, Cr.    discussed with hemeonc team  Syed Valerio MD  contact me on TEAMS
64 yo M with a PMH of DM and recently diagnosed with Lymphoma. Plan to initiate inpatient treatment with Rituxan. Serum potassium elevated at 6.0. Nephrology following for hyperkalemia and monitoring of TLS.   1.  Hyperkalemia--modest pseudo effect.  Blood gas specimen more accurate, binder, NaHCO3  2.  Tumor lysis syndrome--uric acid responding to rasburicase and P<4.  Would use PO4 binder for level >4.5.  Moderate risk for renal failure.  Check urine uric acid, Cr.    discussed with hemeonc team  Syed Valerio MD  contact me on TEAMS

## 2023-07-14 NOTE — PROGRESS NOTE ADULT - NS ATTEND AMEND GEN_ALL_CORE FT
ICU DOWNGRADE NOTE:    60y Female transferred to floor from ICU    Patient is a 60y old Female who presents with a chief complaint of Opioid overdose (08 Dec 2019 03:45)    The patient is currently admitted for the primary diagnosis of Opioid overdose    The patient was admitted to the unit for (1) Days.    The patient (was never) intubated for (days), and (was never) on pressors for (days).    Indwelling vascular catheters: peripheral     Urinary Catheter: none    Disposition: down graded to medical floor    Code Status: FULL CODE    ICU COURSE OF EVENTS:  -------------------------------------------------------------------------------------------      Assessment and Plan:  Opioid overdose on narcan drip  h/o hep C not treated  h/o osteonecrosis bilateral knees  prediabetes    >>>pt is off Narcan drip ,taking PO feed  down graded to medical floor as per ICU team    -------------------------------------------------------------------------------------------    Current workup in progress: none    SIGN OUT AT 12-08-19 @ 10:10 GIVEN TO: team 6 63 year old male with  PMH DM2 (a1c 5.7) but no other known hx (pt. has not seen PCP in some time), admitted to [facility]  on 7/2 with malaise, B-symptoms (fevers/chills, weight loss, night sweats), hepatosplenomegaly and labs c/f B-PLL, started on hydroxyurea 1500 BID and allopurinol, transferred to [facility] for further management. Leukocytosis, anemia and thrombocytopenia secondary to disease.    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   -F/u BMBx FISH, cytogenetics, and biopsy results. F/u G6PD results.  had  treatment with Rituximab 375 mg/m2 = 735 mg split into two doses. 100 mg IV on Day 1, 635 mg IV on Day 2. Dexamethasone 12 mg IV given pretreatment. Elitek given x 1.  WBC, ALC markedly decreased. Now off HU.  feels well today  d/w pt risks, benefits, toxicities of therapy with R-bendamustine and consent obtained  To receive day 1 R-Rc today, day 2 rc 7/14  Start G-CSF starting 7/15 frantz in light of neutropenia, replaced marrow  Will treat with bendamustine and rituxan;   - now off HU  - c/w IVF and allopurinol  - decr TLS labs monitoring to once/d    - receiving PRBC for hgb < 7.0, platelets <10K or <20K if febrile   - hepatosplenomegaly noted on CT including splenic infarcts,    On 7/8, patient had incr phosphate previously had Phoslo, resolved now.     Endocrine seeing for persistent hyperglycemia on Insulin coverage.  Insulin dosage being adjusted.    ID- patient had been on Zosyn IV  - cults neg, not febrile >>> was d/c'd on 7/10  afebrile off antibiotics    OOB 63 year old male with  PMH DM2 (a1c 5.7) but no other known hx (pt. has not seen PCP in some time), admitted to [facility]  on 7/2 with malaise, B-symptoms (fevers/chills, weight loss, night sweats), hepatosplenomegaly and labs c/f B-PLL, started on hydroxyurea 1500 BID and allopurinol, transferred to [facility] for further management. Leukocytosis, anemia and thrombocytopenia secondary to disease.    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   -F/u BMBx FISH, cytogenetics, and biopsy results.   had  treatment with Rituximab 375 mg/m2 = 735 mg split into two doses. 100 mg IV on Day 1, 635 mg IV on Day 2. Dexamethasone 12 mg IV given pretreatment. Elitek given x 1. Treated 7/8, 7/9.  WBC, ALC markedly decreased. Has been off HU.  cont to feel well today  d/w pt risks, benefits, toxicities of therapy with R-bendamustine and consent obtained  Received day 1 R-Rc 7/13, day 2 rc 7/14  No therapy related toxicity to date  If stable home tomorrow to receive PEG-filgrastim at [facility] 7/17 in setting of replaced marrow, may not subsequently  require growth factor support  to be followed by Dr. Garnett    Insulin regimen being adjusted by Endocrine    c/w IVF and allopurinol    ID- patient had been on Zosyn IV  - cults neg, not febrile >>> was d/c'd on 7/10  afebrile off antibiotics    OOB

## 2023-07-14 NOTE — PROGRESS NOTE ADULT - PROBLEM SELECTOR PLAN 4
Hx of DM2, on insulin (Novolin N) 35U qAM/15-20U qPM at home  - lipid panel unremarkable  - a1c 5.7%  - restart insulin at 10u given elevated glucoses recently  - start ISS; adjust per patient's insulin requirements  - monitor FS for goal -180 while inpatient.  - Monitor sugars while patient on dexamethasone.  - f/u fructosamine for elevated glucoses but normal a1c.  7/8 BGs consistently in the 400s. 12 U sliding scale this AM without resolution. 12 U + 10 additional U around lunch time also without resolution.   7/8 bedtime lantus switched to 20 U from 10 U  7/8 Endocrine consulted, will alter regimen according to recs.  7/11- Endo aware regarding steroid, will need Insulin at discharge with outpatient follow up.  Endo following

## 2023-07-14 NOTE — PROGRESS NOTE ADULT - PROBLEM SELECTOR PLAN 1
-test BG AC/HS/2AM  -C/w Lantus 36 units QHS  tonight due to steroid dose  -C/w Admelog 20 units TID w/meals while on high dose steroids. while adjust prior discharge if needed  -c/w Admelog moderate correction scale AC and mod HS/2AM scale  -Started on Decadron 8mg IV daily 7/13 and 7/14  Please teach and allow pt to do own insulin injections under RN supervision  Please teach use of insulin pen. Spoke to RN  Please document teach back  D/c recs:   -Will be determined according to BG/insulin needs/PO intake and steroid therapy at time of discharge.  -Would benefit from basal/bolus. Doses TBD. Spoke to team to sedn rx to pharmacy to make sure insurance covers meds  -Pt reports BG levels were well controlled on bid Humulin N but pt agreeable to start basal/bolus. Pt needs to learn use of insulin pens since pt was using insulin syringes at home.   -Please write Rxs for: Solo Star Insulin pen/Humalog Kwik insulin pen/Robyn insulin pen needles/glucose meter/strips/lancets. Can switch to any equivalent insulin analogs cover by pt's insurance  -Consider Freestyle Sabine CGM if cover by pt's insurance  -Can follow at [facility]. [address]. Phone . Call for apt closer to discharge  -Needs optho f/u as out pt.

## 2023-07-14 NOTE — PROGRESS NOTE ADULT - ASSESSMENT
64yo M PMH DM2 admitted to [facility] 7/2 with malaise, B-symptoms (fevers/chills, weight loss, night sweats) and hepatosplenomegaly. Patient transferred to [facility] for further management. 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 Patient s/p Rituxan (7/9 and 7/10). Rituximab/Bendamustine started on 7/13.  Patient has pancytopenia secondary to disease condition.

## 2023-07-14 NOTE — PROGRESS NOTE ADULT - REASON FOR ADMISSION
Treatment with Rituximab
r/o lymphoproliferative disorder
r/o lymphoproliferative disorder

## 2023-07-14 NOTE — ADVANCED PRACTICE NURSE CONSULT - ASSESSMENT
Pt A/Ox4, vital signs stable, afebrile. Pt denies pain/discomfort, N/V/D, SOB, chest pain. R 20g PIV inserted today 7/14/23, has positive blood return, dressing C/D/I, no bleeding, redness, swelling. Chemotherapy education provided and patient verbalized understanding. Labs approved by Dr. Garnett and team during rounds. 2 RN verification completed prior to hanging. Pt premedicated with Decadron 8mg IVPB and zofran 16mg IVPB. Day 2 at 1551, bendamustine IVPB (BENDEKA) (eMAR) [Known as BENDEKA (eMAR)] - 176 mg was given via secondary line on primary normal saline line through Right 20g PIV from 7/14/23 via alaris pump over 10 minutes. Primary RN aware. Pt tolerated well.

## 2023-07-14 NOTE — PROGRESS NOTE ADULT - PROBLEM SELECTOR PLAN 2
Thyroid Function Tests:  07-11 @ 07:24 TSH 15.20 FreeT4 1.2 T3 -- Anti TPO -- Anti Thyroglobulin Ab -- TSI --  07-04 @ 09:51 TSH -- FreeT4 1.4 T3 -- Anti TPO -- Anti Thyroglobulin Ab -- TSI --  -Discussed new results with Dr Segovia attending endo who recommends to adjusts Levothyroxine to 112mcg day  -Please make sure LT4 is given on an empty stomach at least one hour apart from other meds and food to ensure med absorption. DO NOT GIVE WITH VITAMINS/ANTACIDS   -Check levels as out pt in 6-8 weeks.

## 2023-07-14 NOTE — PROGRESS NOTE ADULT - SUBJECTIVE AND OBJECTIVE BOX
DIABETES FOLLOW UP NOTE: Saw pt earlier today    Chief Complaint: Endocrine consult requested for management of T2DM    INTERVAL HX: Pt stable, reports tolerating POs with BG levels variable due to on/off steroids. Received Dexa 8 yesterday afternoon with BG at night 300s> improved today. . However, getting Dexa 8mg today again so expecting BG to go up again tonight. Per team pt will be off steroid tomorrow and going home over the weekend. No hypoglycemia. Pt states feeling well, no pain, SOB.       Review of Systems:  General: As above  Cardiovascular: No chest pain, palpitations  Respiratory: No SOB, no cough  GI: No nausea, vomiting, abdominal pain  Endocrine: No polyuria, polydipsia or S&Sx of hypoglycemia    Allergies    No Known Allergies    Intolerances      MEDICATIONS:  insulin glargine Injectable (LANTUS) 36 Unit(s) SubCutaneous at bedtime  insulin lispro (ADMELOG) corrective regimen sliding scale   SubCutaneous <User Schedule>  insulin lispro (ADMELOG) corrective regimen sliding scale   SubCutaneous three times a day before meals  insulin lispro Injectable (ADMELOG) 20 Unit(s) SubCutaneous three times a day before meals  levothyroxine 112 MICROGram(s) Oral daily      PHYSICAL EXAM:  VITALS: T(C): 37.1 (07-14-23 @ 17:03)  T(F): 98.7 (07-14-23 @ 17:03), Max: 99 (07-13-23 @ 20:42)  HR: 70 (07-14-23 @ 17:03) (67 - 89)  BP: 144/71 (07-14-23 @ 17:03) (108/60 - 160/73)  RR:  (18 - 18)  SpO2:  (98% - 100%)  Wt(kg): --  GENERAL: Male laying in bed in NAD  Abdomen: Soft, nontender, non distended  Extremities: Warm, no edema in all 4 exts  NEURO: A&O X3        LABS:  POCT Blood Glucose.: 176 mg/dL (07-14-23 @ 17:45)  POCT Blood Glucose.: 110 mg/dL (07-14-23 @ 11:58)  POCT Blood Glucose.: 189 mg/dL (07-14-23 @ 08:18)  POCT Blood Glucose.: 315 mg/dL (07-14-23 @ 01:07)  POCT Blood Glucose.: 389 mg/dL (07-13-23 @ 21:33)  POCT Blood Glucose.: 182 mg/dL (07-13-23 @ 17:12)  POCT Blood Glucose.: 256 mg/dL (07-13-23 @ 12:37)  POCT Blood Glucose.: 269 mg/dL (07-13-23 @ 08:26)  POCT Blood Glucose.: 290 mg/dL (07-12-23 @ 21:47)  POCT Blood Glucose.: 278 mg/dL (07-12-23 @ 17:16)  POCT Blood Glucose.: 275 mg/dL (07-12-23 @ 11:58)  POCT Blood Glucose.: 263 mg/dL (07-12-23 @ 08:04)  POCT Blood Glucose.: 333 mg/dL (07-11-23 @ 21:52)                            8.0    4.72  )-----------( 49       ( 14 Jul 2023 07:14 )             25.2       07-14    134<L>  |  100  |  32<H>  ----------------------------<  200<H>  4.3   |  26  |  0.82    eGFR: 100    Ca    8.6      07-14  Mg     2.2     07-14  Phos  3.6     07-14    TPro  6.5  /  Alb  3.0<L>  /  TBili  0.4  /  DBili  x   /  AST  17  /  ALT  13  /  AlkPhos  97  07-14    Thyroid Function Tests:  07-11 @ 07:24 TSH 15.20 FreeT4 1.2 T3 -- Anti TPO -- Anti Thyroglobulin Ab -- TSI --  07-04 @ 09:51 TSH -- FreeT4 1.4 T3 -- Anti TPO -- Anti Thyroglobulin Ab -- TSI --      A1C with Estimated Average Glucose Result: 5.7 % (07-05-23 @ 06:15)      Estimated Average Glucose: 117 (07-05-23 @ 06:15)        07-04 Chol 69 Direct LDL -- LDL calculated 31 HDL 12<L> Trig 128

## 2023-07-14 NOTE — PROGRESS NOTE ADULT - PROBLEM SELECTOR PLAN 10
normal... Patient previously had swelling of left forearm at previous IV site, now resolved.  - continue to monitor, warm packs to arm as needed Well appearing, awake, alert, oriented to person, place, time/situation and in no apparent distress.

## 2023-07-14 NOTE — PROGRESS NOTE ADULT - PROBLEM SELECTOR PLAN 1
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 100 mg on Day 1, 635 mg on Day 2. Dexamethasone 12 mg on 7/7, Dexamethsone 20 mg x 1 on Day 1 and Day 2.  F/u BMBx FISH, cytogenetics, and biopsy results. F/u G6PD results.  C/w IVF and allopurinol  Monitor tumor lysis labs daily.   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 100 mg on Day 1, 635 mg on Day 2. Dexamethasone 12 mg on 7/7, Dexamethsone 20 mg x 1 on Day 1 and Day 2.  F/u BMBx FISH, cytogenetics, and biopsy results. F/u G6PD results.  C/w IVF and allopurinol  Monitor tumor lysis labs daily.   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 100 mg on Day 1, 635 mg on Day 2. Dex 12 mg on 7/7, Dex 20 mg x 1 on Day 1 and Day 2.  F/u BMBx FISH, cytogenetics, and biopsy results. F/u G6PD results.  C/w IVF and allopurinol  Monitor TLS daily.   prbcs for hgb<7.0, platelets <10K or <20K if febrile   - hepatosplenomegaly noted on CT including splenic infarcts, may need repeat imaging in future to further evaluate  7/13- Begin Rituximab/bendamustine  7/13 Plan to start Zarxio on Day 3 of rituxan/bendamustine (7/15)  7/14 plan to discharge tomorrow 7/15

## 2023-07-14 NOTE — PROGRESS NOTE ADULT - SUBJECTIVE AND OBJECTIVE BOX
[facility] DIVISION OF KIDNEY DISEASES AND HYPERTENSION   FOLLOW UP NOTE  --------------------------------------------------------------------------------  HPI: 64 yo M with a PMH of DM and recently diagnosed with Lymphoma. Initiated on inpatient treatment with Rituxan. Serum potassium was elevated at 6.0, has been within normal limits for many days now. Nephrology following for hyperkalemia and monitoring of TLS.     24 hour events/subjective: Pt. was seen and examined today. Resting comfortably.     PAST HISTORY  --------------------------------------------------------------------------------  No significant changes to PMH, PSH, FHx, SHx, unless otherwise noted    ALLERGIES & MEDICATIONS  --------------------------------------------------------------------------------  Allergies  No Known Allergies    Intolerances    Standing Inpatient Medications  allopurinol 300 milliGRAM(s) Oral every 24 hours  bendamustine IVPB (BENDEKA) (eMAR) 176 milliGRAM(s) IV Intermittent every 24 hours  dexAMETHasone  IVPB 8 milliGRAM(s) IV Intermittent once  dextrose 5%. 1000 milliLiter(s) IV Continuous <Continuous>  dextrose 5%. 1000 milliLiter(s) IV Continuous <Continuous>  dextrose 50% Injectable 25 Gram(s) IV Push once  dextrose 50% Injectable 12.5 Gram(s) IV Push once  dextrose 50% Injectable 25 Gram(s) IV Push once  glucagon  Injectable 1 milliGRAM(s) IntraMuscular once  insulin glargine Injectable (LANTUS) 36 Unit(s) SubCutaneous at bedtime  insulin lispro (ADMELOG) corrective regimen sliding scale   SubCutaneous three times a day before meals  insulin lispro (ADMELOG) corrective regimen sliding scale   SubCutaneous <User Schedule>  insulin lispro Injectable (ADMELOG) 20 Unit(s) SubCutaneous three times a day before meals  levothyroxine 112 MICROGram(s) Oral daily  melatonin 3 milliGRAM(s) Oral at bedtime  ondansetron  IVPB 16 milliGRAM(s) IV Intermittent every 24 hours  rasburicase IVPB 3 milliGRAM(s) IV Intermittent once  sodium chloride 0.9%. 1000 milliLiter(s) IV Continuous <Continuous>    PRN Inpatient Medications  acetaminophen     Tablet .. 650 milliGRAM(s) Oral every 6 hours PRN  dextrose Oral Gel 15 Gram(s) Oral once PRN  senna 2 Tablet(s) Oral at bedtime PRN    REVIEW OF SYSTEMS  --------------------------------------------------------------------------------  All other systems were reviewed and are negative, except as noted.    VITALS/PHYSICAL EXAM  --------------------------------------------------------------------------------  T(C): 36.7 (07-14-23 @ 05:04), Max: 37.3 (07-13-23 @ 16:04)  HR: 69 (07-14-23 @ 05:04) (69 - 96)  BP: 142/72 (07-14-23 @ 05:04) (106/62 - 153/72)  RR: 18 (07-14-23 @ 05:04) (18 - 18)  SpO2: 100% (07-14-23 @ 05:04) (97% - 100%)  Wt(kg): --    07-13-23 @ 07:01  -  07-14-23 @ 07:00  --------------------------------------------------------  IN: 2151 mL / OUT: 0 mL / NET: 2151 mL    Physical Exam:  	Gen: Laying in bed and in NAD  	HEENT: Anicteric  	Pulm: CTA B/L  	CV: S1S2+  	Abd: Soft, +BS    	Ext: No LE edema B/L  	Neuro: Awake  	Skin: Warm and dry    LABS/STUDIES  --------------------------------------------------------------------------------              8.0    4.72  >-----------<  49       [07-14-23 @ 07:14]              25.2     135  |  99  |  28  ----------------------------<  243      [07-13-23 @ 07:28]  4.7   |  26  |  0.76        Ca     8.9     [07-13-23 @ 07:28]      Mg     2.0     [07-13-23 @ 07:28]      Phos  3.5     [07-13-23 @ 07:28]    TPro  6.1  /  Alb  2.7  /  TBili  0.5  /  DBili  x   /  AST  11  /  ALT  8   /  AlkPhos  89  [07-13-23 @ 07:28]    PT/INR: PT 15.5 , INR 1.33       [07-13-23 @ 07:31]  PTT: 31.7       [07-13-23 @ 07:31]    Uric acid 4.7      [07-13-23 @ 07:28]        [07-13-23 @ 07:28]    Creatinine Trend:  SCr 0.76 [07-13 @ 07:28]  SCr 0.77 [07-12 @ 06:53]  SCr 0.78 [07-11 @ 07:23]  SCr 1.04 [07-10 @ 19:25]  SCr 0.98 [07-10 @ 06:37]

## 2023-07-14 NOTE — PROGRESS NOTE ADULT - PROBLEM SELECTOR PLAN 7
7/10- Resolved.               7/8 K 6.0 and then K 5.7 on repeat CMP. Lasix 20 IV x 2. Insulin given for hyperglycemia should help with hyperkalemia as well.  7/8 Renal consulted. Lokelma 10 x 1 per renal recs.  7/8 Likely pseudohypokalemia per renal, can hold off on additional doses of lokelma for now. 7/10- Resolved.               7/8 K 6.0 and then K 5.7 on repeat CMP. Lasix 20 IV x 2. Insulin given for hyperglycemia should help with hyperkalemia as well.  7/8 Renal consulted. Lokelma 10 x 1 per renal recs.  7/8 Likely pseudohyperkalemia per renal, can hold off on additional doses of lokelma for now.

## 2023-07-14 NOTE — PROGRESS NOTE ADULT - NSPROGADDITIONALINFOA_GEN_ALL_CORE
-Plan discussed with pt/team.  Contact info: [phone] (24/7). pager [phone]  Amion on [place]  Teams on M-T-W-F. Unavailable Thu/Weekends/Holidays  Assessed pt/labs/meds and discussed plan of care with primary team  Adjusting insulin  Discharge plan  Follow up care
-Plan discussed with pt/team.  Contact info: [phone] (24/7). pager [phone]  Amion on [place]  Teams on M-T-W-F. Unavailable Thu/Weekends/Holidays  Provided face to face education as well as assessed  pt/labs/meds and discussed plan with primary team  Adjusting insulin  Discharge plan  Follow up care
-Plan discussed with pt/team.  Contact info: [phone] (24/7). pager [phone]  Amion on [place]  Teams on M-T-W-F. Unavailable Thu/Weekends/Holidays  Assessed pt/labs/meds and discussed plan of care with primary team  Adjusting insulin  Discharge plan  Follow up care
-Plan discussed with pt/team.  Contact info: [phone] (24/7). pager [phone]  Amion on [place]  Teams on M-T-W-F. Unavailable Thu/Weekends/Holidays  Assessed pt/labs/meds and discussed plan of care with primary team  Adjusting insulin  Discharge plan  Follow up care

## 2023-07-14 NOTE — ADVANCED PRACTICE NURSE CONSULT - REASON FOR CONSULT
Chemo Note: Cycle 1 Rituxan, consent in chart.
Chemo Note: Cycle 1 Bendamustine Day 2, consent in chart.
Chemo Notes : Day 1 retuximab
Chemo Notes : Day 2 rituximab

## 2023-07-14 NOTE — PROGRESS NOTE ADULT - ASSESSMENT
63 year old M w/h/o T2DM > unknown control (A1C skewed due to anemia) on Novolin insulin bid. No known DM complications. Also h/o hypothyroidism. Presented to OSH with malaise, generalized weakness, and gait instability. Transferred to [facility] for further treatment of suspected B-PLL w/ rituximab. BG values variable depending on steroid doses. Hyperglycemic after Dexa with improve BG within 12 hours. Per team no more steroids and pt going home tomorrow.  Will continue with insulin doses for now since pt received steroid dose again today. BG goal 100 to 180s.  No hypoglycemia. Will need to readjust insulin doses once pt off steroid therapy.   Spoke to pt in depth about discharge plan of care. Pt states no education given to him regarding use of insulin pens. Spoke to RN/TEAM and left pt watching educational video on hospital TV.

## 2023-07-15 ENCOUNTER — TRANSCRIPTION ENCOUNTER (OUTPATIENT)
Age: 63
End: 2023-07-15

## 2023-07-15 VITALS
RESPIRATION RATE: 16 BRPM | SYSTOLIC BLOOD PRESSURE: 125 MMHG | HEART RATE: 77 BPM | TEMPERATURE: 98 F | DIASTOLIC BLOOD PRESSURE: 60 MMHG | OXYGEN SATURATION: 100 %

## 2023-07-15 DIAGNOSIS — C83.00 SMALL CELL B-CELL LYMPHOMA, UNSPECIFIED SITE: ICD-10-CM

## 2023-07-15 LAB
ALBUMIN SERPL ELPH-MCNC: 3.1 G/DL — LOW (ref 3.3–5)
ALP SERPL-CCNC: 90 U/L — SIGNIFICANT CHANGE UP (ref 40–120)
ALT FLD-CCNC: 12 U/L — SIGNIFICANT CHANGE UP (ref 10–45)
ANION GAP SERPL CALC-SCNC: 10 MMOL/L — SIGNIFICANT CHANGE UP (ref 5–17)
APTT BLD: 27.4 SEC — LOW (ref 27.5–35.5)
AST SERPL-CCNC: 13 U/L — SIGNIFICANT CHANGE UP (ref 10–40)
BASOPHILS # BLD AUTO: 0.03 K/UL — SIGNIFICANT CHANGE UP (ref 0–0.2)
BASOPHILS NFR BLD AUTO: 0.9 % — SIGNIFICANT CHANGE UP (ref 0–2)
BILIRUB SERPL-MCNC: 0.4 MG/DL — SIGNIFICANT CHANGE UP (ref 0.2–1.2)
BUN SERPL-MCNC: 29 MG/DL — HIGH (ref 7–23)
CALCIUM SERPL-MCNC: 8.8 MG/DL — SIGNIFICANT CHANGE UP (ref 8.4–10.5)
CHLORIDE SERPL-SCNC: 99 MMOL/L — SIGNIFICANT CHANGE UP (ref 96–108)
CO2 SERPL-SCNC: 26 MMOL/L — SIGNIFICANT CHANGE UP (ref 22–31)
CREAT SERPL-MCNC: 0.88 MG/DL — SIGNIFICANT CHANGE UP (ref 0.5–1.3)
D DIMER BLD IA.RAPID-MCNC: 2194 NG/ML DDU — HIGH
EGFR: 98 ML/MIN/1.73M2 — SIGNIFICANT CHANGE UP
EOSINOPHIL # BLD AUTO: 0 K/UL — SIGNIFICANT CHANGE UP (ref 0–0.5)
EOSINOPHIL NFR BLD AUTO: 0 % — SIGNIFICANT CHANGE UP (ref 0–6)
FIBRINOGEN PPP-MCNC: 270 MG/DL — SIGNIFICANT CHANGE UP (ref 200–445)
GLUCOSE BLDC GLUCOMTR-MCNC: 224 MG/DL — HIGH (ref 70–99)
GLUCOSE BLDC GLUCOMTR-MCNC: 249 MG/DL — HIGH (ref 70–99)
GLUCOSE BLDC GLUCOMTR-MCNC: 311 MG/DL — HIGH (ref 70–99)
GLUCOSE SERPL-MCNC: 219 MG/DL — HIGH (ref 70–99)
HCT VFR BLD CALC: 24.7 % — LOW (ref 39–50)
HGB BLD-MCNC: 7.8 G/DL — LOW (ref 13–17)
INR BLD: 1.26 RATIO — HIGH (ref 0.88–1.16)
LDH SERPL L TO P-CCNC: 297 U/L — HIGH (ref 50–242)
LYMPHOCYTES # BLD AUTO: 1.39 K/UL — SIGNIFICANT CHANGE UP (ref 1–3.3)
LYMPHOCYTES # BLD AUTO: 46.1 % — HIGH (ref 13–44)
MAGNESIUM SERPL-MCNC: 2.1 MG/DL — SIGNIFICANT CHANGE UP (ref 1.6–2.6)
MCHC RBC-ENTMCNC: 24.8 PG — LOW (ref 27–34)
MCHC RBC-ENTMCNC: 31.6 GM/DL — LOW (ref 32–36)
MCV RBC AUTO: 78.4 FL — LOW (ref 80–100)
MONOCYTES # BLD AUTO: 0.21 K/UL — SIGNIFICANT CHANGE UP (ref 0–0.9)
MONOCYTES NFR BLD AUTO: 6.9 % — SIGNIFICANT CHANGE UP (ref 2–14)
NEUTROPHILS # BLD AUTO: 1.39 K/UL — LOW (ref 1.8–7.4)
NEUTROPHILS NFR BLD AUTO: 46.1 % — SIGNIFICANT CHANGE UP (ref 43–77)
PHOSPHATE SERPL-MCNC: 3.9 MG/DL — SIGNIFICANT CHANGE UP (ref 2.5–4.5)
PLATELET # BLD AUTO: 69 K/UL — LOW (ref 150–400)
POTASSIUM SERPL-MCNC: 4.5 MMOL/L — SIGNIFICANT CHANGE UP (ref 3.5–5.3)
POTASSIUM SERPL-SCNC: 4.5 MMOL/L — SIGNIFICANT CHANGE UP (ref 3.5–5.3)
PROT SERPL-MCNC: 6.6 G/DL — SIGNIFICANT CHANGE UP (ref 6–8.3)
PROTHROM AB SERPL-ACNC: 14.7 SEC — HIGH (ref 10.5–13.4)
RBC # BLD: 3.15 M/UL — LOW (ref 4.2–5.8)
RBC # FLD: 23.6 % — HIGH (ref 10.3–14.5)
SODIUM SERPL-SCNC: 135 MMOL/L — SIGNIFICANT CHANGE UP (ref 135–145)
URATE SERPL-MCNC: 5.5 MG/DL — SIGNIFICANT CHANGE UP (ref 3.4–8.8)
WBC # BLD: 3.01 K/UL — LOW (ref 3.8–10.5)
WBC # FLD AUTO: 3.01 K/UL — LOW (ref 3.8–10.5)

## 2023-07-15 PROCEDURE — 84300 ASSAY OF URINE SODIUM: CPT

## 2023-07-15 PROCEDURE — 93005 ELECTROCARDIOGRAM TRACING: CPT

## 2023-07-15 PROCEDURE — 82962 GLUCOSE BLOOD TEST: CPT

## 2023-07-15 PROCEDURE — 83735 ASSAY OF MAGNESIUM: CPT

## 2023-07-15 PROCEDURE — 84540 ASSAY OF URINE/UREA-N: CPT

## 2023-07-15 PROCEDURE — 99232 SBSQ HOSP IP/OBS MODERATE 35: CPT

## 2023-07-15 PROCEDURE — 84100 ASSAY OF PHOSPHORUS: CPT

## 2023-07-15 PROCEDURE — 82570 ASSAY OF URINE CREATININE: CPT

## 2023-07-15 PROCEDURE — 82947 ASSAY GLUCOSE BLOOD QUANT: CPT

## 2023-07-15 PROCEDURE — 86922 COMPATIBILITY TEST ANTIGLOB: CPT

## 2023-07-15 PROCEDURE — 71045 X-RAY EXAM CHEST 1 VIEW: CPT

## 2023-07-15 PROCEDURE — 83605 ASSAY OF LACTIC ACID: CPT

## 2023-07-15 PROCEDURE — 83935 ASSAY OF URINE OSMOLALITY: CPT

## 2023-07-15 PROCEDURE — 99239 HOSP IP/OBS DSCHRG MGMT >30: CPT

## 2023-07-15 PROCEDURE — 80053 COMPREHEN METABOLIC PANEL: CPT

## 2023-07-15 PROCEDURE — 86901 BLOOD TYPING SEROLOGIC RH(D): CPT

## 2023-07-15 PROCEDURE — 82803 BLOOD GASES ANY COMBINATION: CPT

## 2023-07-15 PROCEDURE — 87040 BLOOD CULTURE FOR BACTERIA: CPT

## 2023-07-15 PROCEDURE — 86870 RBC ANTIBODY IDENTIFICATION: CPT

## 2023-07-15 PROCEDURE — 82340 ASSAY OF CALCIUM IN URINE: CPT

## 2023-07-15 PROCEDURE — 84560 ASSAY OF URINE/URIC ACID: CPT

## 2023-07-15 PROCEDURE — 36415 COLL VENOUS BLD VENIPUNCTURE: CPT

## 2023-07-15 PROCEDURE — 84105 ASSAY OF URINE PHOSPHORUS: CPT

## 2023-07-15 PROCEDURE — 85018 HEMOGLOBIN: CPT

## 2023-07-15 PROCEDURE — 84133 ASSAY OF URINE POTASSIUM: CPT

## 2023-07-15 PROCEDURE — 85379 FIBRIN DEGRADATION QUANT: CPT

## 2023-07-15 PROCEDURE — 84550 ASSAY OF BLOOD/URIC ACID: CPT

## 2023-07-15 PROCEDURE — 85384 FIBRINOGEN ACTIVITY: CPT

## 2023-07-15 PROCEDURE — 84443 ASSAY THYROID STIM HORMONE: CPT

## 2023-07-15 PROCEDURE — 84439 ASSAY OF FREE THYROXINE: CPT

## 2023-07-15 PROCEDURE — 86850 RBC ANTIBODY SCREEN: CPT

## 2023-07-15 PROCEDURE — 86880 COOMBS TEST DIRECT: CPT

## 2023-07-15 PROCEDURE — 82985 ASSAY OF GLYCATED PROTEIN: CPT

## 2023-07-15 PROCEDURE — 85730 THROMBOPLASTIN TIME PARTIAL: CPT

## 2023-07-15 PROCEDURE — P9040: CPT

## 2023-07-15 PROCEDURE — 82010 KETONE BODYS QUAN: CPT

## 2023-07-15 PROCEDURE — 82330 ASSAY OF CALCIUM: CPT

## 2023-07-15 PROCEDURE — 82435 ASSAY OF BLOOD CHLORIDE: CPT

## 2023-07-15 PROCEDURE — 85610 PROTHROMBIN TIME: CPT

## 2023-07-15 PROCEDURE — 36430 TRANSFUSION BLD/BLD COMPNT: CPT

## 2023-07-15 PROCEDURE — 85025 COMPLETE CBC W/AUTO DIFF WBC: CPT

## 2023-07-15 PROCEDURE — 84156 ASSAY OF PROTEIN URINE: CPT

## 2023-07-15 PROCEDURE — 84132 ASSAY OF SERUM POTASSIUM: CPT

## 2023-07-15 PROCEDURE — 84295 ASSAY OF SERUM SODIUM: CPT

## 2023-07-15 PROCEDURE — 85014 HEMATOCRIT: CPT

## 2023-07-15 PROCEDURE — 86900 BLOOD TYPING SEROLOGIC ABO: CPT

## 2023-07-15 PROCEDURE — 83615 LACTATE (LD) (LDH) ENZYME: CPT

## 2023-07-15 RX ORDER — INSULIN GLARGINE 100 [IU]/ML
24 INJECTION, SOLUTION SUBCUTANEOUS
Qty: 1 | Refills: 6
Start: 2023-07-15 | End: 2024-02-09

## 2023-07-15 RX ORDER — INSULIN LISPRO 100/ML
12 VIAL (ML) SUBCUTANEOUS
Qty: 1 | Refills: 6
Start: 2023-07-15 | End: 2024-02-09

## 2023-07-15 RX ORDER — ISOPROPYL ALCOHOL, BENZOCAINE .7; .06 ML/ML; ML/ML
0 SWAB TOPICAL
Qty: 100 | Refills: 1
Start: 2023-07-15

## 2023-07-15 RX ADMIN — Medication 20 UNIT(S): at 08:30

## 2023-07-15 RX ADMIN — Medication 8: at 12:53

## 2023-07-15 RX ADMIN — Medication 112 MICROGRAM(S): at 06:24

## 2023-07-15 RX ADMIN — Medication 480 MICROGRAM(S): at 13:39

## 2023-07-15 RX ADMIN — Medication 20 UNIT(S): at 12:52

## 2023-07-15 RX ADMIN — SODIUM CHLORIDE 100 MILLILITER(S): 9 INJECTION INTRAMUSCULAR; INTRAVENOUS; SUBCUTANEOUS at 06:25

## 2023-07-15 RX ADMIN — Medication 4: at 08:13

## 2023-07-15 NOTE — PROGRESS NOTE ADULT - PROVIDER SPECIALTY LIST ADULT
Endocrinology
Endocrinology
Heme/Onc
Nephrology
Nephrology
Heme/Onc
Endocrinology
Heme/Onc

## 2023-07-15 NOTE — PROGRESS NOTE ADULT - SUBJECTIVE AND OBJECTIVE BOX
Diagnosis    Protocol/Chemo Regimen:  Day:      Pt endorsed:    Review of Systems:      Pain scale:                                        Location:    Diet:     Allergies    No Known Allergies    Intolerances        ANTIMICROBIALS      HEME/ONC MEDICATIONS      STANDING MEDICATIONS  dextrose 5%. 1000 milliLiter(s) IV Continuous <Continuous>  dextrose 5%. 1000 milliLiter(s) IV Continuous <Continuous>  dextrose 50% Injectable 12.5 Gram(s) IV Push once  dextrose 50% Injectable 25 Gram(s) IV Push once  dextrose 50% Injectable 25 Gram(s) IV Push once  filgrastim-sndz (ZARXIO) Injectable 480 MICROGram(s) SubCutaneous every 24 hours  glucagon  Injectable 1 milliGRAM(s) IntraMuscular once  insulin glargine Injectable (LANTUS) 36 Unit(s) SubCutaneous at bedtime  insulin lispro (ADMELOG) corrective regimen sliding scale   SubCutaneous three times a day before meals  insulin lispro (ADMELOG) corrective regimen sliding scale   SubCutaneous <User Schedule>  insulin lispro Injectable (ADMELOG) 20 Unit(s) SubCutaneous three times a day before meals  levothyroxine 112 MICROGram(s) Oral daily  melatonin 3 milliGRAM(s) Oral at bedtime  sodium chloride 0.9%. 1000 milliLiter(s) IV Continuous <Continuous>      PRN MEDICATIONS  dextrose Oral Gel 15 Gram(s) Oral once PRN  senna 2 Tablet(s) Oral at bedtime PRN        Vital Signs Last 24 Hrs  T(C): 36.7 (15 Jul 2023 09:00), Max: 37.2 (14 Jul 2023 13:33)  T(F): 98.1 (15 Jul 2023 09:00), Max: 99 (14 Jul 2023 13:33)  HR: 69 (15 Jul 2023 09:00) (64 - 79)  BP: 143/71 (15 Jul 2023 09:00) (125/66 - 160/73)  BP(mean): --  RR: 18 (15 Jul 2023 09:00) (16 - 18)  SpO2: 100% (15 Jul 2023 09:00) (98% - 100%)    Parameters below as of 15 Jul 2023 09:00  Patient On (Oxygen Delivery Method): room air        PHYSICAL EXAM  General: adult in NAD  HEENT: clear oropharynx, anicteric sclera, pink conjunctiva  Neck: supple  CV: normal S1/S2 RRR  Lungs: positive air movement b/l ant lungs,clear to auscultation, no wheezes, no rales  Abdomen: soft non-tender non-distended, no hepatosplenomegaly  Ext: no clubbing cyanosis or edema  Skin: no rashes and no petechiae  Neuro: alert and oriented X 3, no focal deficits  Central Line: normal    LABS:    Blood Cultures:                           7.8    3.01  )-----------( 69       ( 15 Jul 2023 07:15 )             24.7         Mean Cell Volume : 78.4 fl  Mean Cell Hemoglobin : 24.8 pg  Mean Cell Hemoglobin Concentration : 31.6 gm/dL  Auto Neutrophil # : 1.39 K/uL  Auto Lymphocyte # : 1.39 K/uL  Auto Monocyte # : 0.21 K/uL  Auto Eosinophil # : 0.00 K/uL  Auto Basophil # : 0.03 K/uL  Auto Neutrophil % : 46.1 %  Auto Lymphocyte % : 46.1 %  Auto Monocyte % : 6.9 %  Auto Eosinophil % : 0.0 %  Auto Basophil % : 0.9 %      07-15    135  |  99  |  29<H>  ----------------------------<  219<H>  4.5   |  26  |  0.88    Ca    8.8      15 Jul 2023 07:13  Phos  3.9     07-15  Mg     2.1     07-15    TPro  6.6  /  Alb  3.1<L>  /  TBili  0.4  /  DBili  x   /  AST  13  /  ALT  12  /  AlkPhos  90  07-15      Mg 2.1  Phos 3.9      PT/INR - ( 15 Jul 2023 07:14 )   PT: 14.7 sec;   INR: 1.26 ratio         PTT - ( 15 Jul 2023 07:14 )  PTT:27.4 sec      Uric Acid 5.5        RADIOLOGY & ADDITIONAL STUDIES:         Diagnosis: Plasmacytoid Lymphoma    Protocol/Chemo Regimen: Rituximab 375 mg/m2 on Day 1, Bendamustine 90 mg/m2 on Day 1 and Day 2   s/p Rituximab 375 mg/m2 = 735 mg split into two doses. 100 mg on Day 1, 635 mg on Day 2. Dexamethasone 12 mg on 7/7, Dexamethsone 20 mg x 1 on Day 1 and Day 2.    Day: 3     Pt endorsed: no complaints today     Review of Systems:  Denies any nausea, vomiting, diarrhea, chest pain, palpitation, SOB or abdominal pain.     Pain scale: denies    Diet: Consistent Carbohydrate    Allergies: No Known Allergies      ANTIMICROBIALS      HEME/ONC MEDICATIONS      STANDING MEDICATIONS  dextrose 5%. 1000 milliLiter(s) IV Continuous <Continuous>  dextrose 5%. 1000 milliLiter(s) IV Continuous <Continuous>  dextrose 50% Injectable 12.5 Gram(s) IV Push once  dextrose 50% Injectable 25 Gram(s) IV Push once  dextrose 50% Injectable 25 Gram(s) IV Push once  filgrastim-sndz (ZARXIO) Injectable 480 MICROGram(s) SubCutaneous every 24 hours  glucagon  Injectable 1 milliGRAM(s) IntraMuscular once  insulin glargine Injectable (LANTUS) 36 Unit(s) SubCutaneous at bedtime  insulin lispro (ADMELOG) corrective regimen sliding scale   SubCutaneous three times a day before meals  insulin lispro (ADMELOG) corrective regimen sliding scale   SubCutaneous <User Schedule>  insulin lispro Injectable (ADMELOG) 20 Unit(s) SubCutaneous three times a day before meals  levothyroxine 112 MICROGram(s) Oral daily  melatonin 3 milliGRAM(s) Oral at bedtime  sodium chloride 0.9%. 1000 milliLiter(s) IV Continuous <Continuous>      PRN MEDICATIONS  dextrose Oral Gel 15 Gram(s) Oral once PRN  senna 2 Tablet(s) Oral at bedtime PRN        Vital Signs Last 24 Hrs  T(C): 36.7 (15 Jul 2023 09:00), Max: 37.2 (14 Jul 2023 13:33)  T(F): 98.1 (15 Jul 2023 09:00), Max: 99 (14 Jul 2023 13:33)  HR: 69 (15 Jul 2023 09:00) (64 - 79)  BP: 143/71 (15 Jul 2023 09:00) (125/66 - 160/73)  BP(mean): --  RR: 18 (15 Jul 2023 09:00) (16 - 18)  SpO2: 100% (15 Jul 2023 09:00) (98% - 100%)    Parameters below as of 15 Jul 2023 09:00  Patient On (Oxygen Delivery Method): room air      PHYSICAL EXAM  General: NAD  HEENT: clear oropharynx, anicteric sclera  Neck: +LN supraclavicular.   CV: (+) S1/S2 RRR  Lungs: CTAB, no wheezes  Abdomen: soft, non-tender, non-distended  Ext: no edema  Skin: + LN B/L axillas. no rashes  Neuro: alert and oriented X 3  Central Line: peripheral IV, CDI      LABS:                        7.8    3.01  )-----------( 69       ( 15 Jul 2023 07:15 )             24.7         Mean Cell Volume : 78.4 fl  Mean Cell Hemoglobin : 24.8 pg  Mean Cell Hemoglobin Concentration : 31.6 gm/dL  Auto Neutrophil # : 1.39 K/uL  Auto Lymphocyte # : 1.39 K/uL  Auto Monocyte # : 0.21 K/uL  Auto Eosinophil # : 0.00 K/uL  Auto Basophil # : 0.03 K/uL  Auto Neutrophil % : 46.1 %  Auto Lymphocyte % : 46.1 %  Auto Monocyte % : 6.9 %  Auto Eosinophil % : 0.0 %  Auto Basophil % : 0.9 %      07-15    135  |  99  |  29<H>  ----------------------------<  219<H>  4.5   |  26  |  0.88    Ca    8.8      15 Jul 2023 07:13  Phos  3.9     07-15  Mg     2.1     07-15    TPro  6.6  /  Alb  3.1<L>  /  TBili  0.4  /  DBili  x   /  AST  13  /  ALT  12  /  AlkPhos  90  07-15    Mg 2.1  Phos 3.9    PT/INR - ( 15 Jul 2023 07:14 )   PT: 14.7 sec;   INR: 1.26 ratio        PTT - ( 15 Jul 2023 07:14 )  PTT:27.4 sec      Uric Acid 5.5      RECENT CULTURES:  07-07 @ 21:06  .Blood Blood  No growth at 5 days

## 2023-07-15 NOTE — PROGRESS NOTE ADULT - PROBLEM SELECTOR PROBLEM 2
Hypothyroidism
Hypothyroidism
Elevated TSH
Hypothyroidism
Infectious disease
Hypothyroidism
Infectious disease

## 2023-07-15 NOTE — PROGRESS NOTE ADULT - NUTRITIONAL ASSESSMENT
Diet, Consistent Carbohydrate/No Snacks:   1200mL Fluid Restriction ([id number])  Supplement Feeding Modality:  Oral  Ensure Max Cans or Servings Per Day:  2       Frequency:  Daily (07-08-23 @ 10:08) [Active]      Please see RD assessment and/or follow up.  Managed by primary team as well
This patient has been assessed with a concern for Malnutrition and has been determined to have a diagnosis/diagnoses of Severe protein-calorie malnutrition.    This patient is being managed with:   Diet Consistent Carbohydrate/No Snacks-  1200mL Fluid Restriction ([id number])  Supplement Feeding Modality:  Oral  Ensure Max Cans or Servings Per Day:  2       Frequency:  Daily  Entered: Jul 8 2023 10:08AM  
This patient has been assessed with a concern for Malnutrition and has been determined to have a diagnosis/diagnoses of Severe protein-calorie malnutrition.    This patient is being managed with:   Diet Consistent Carbohydrate/No Snacks-  1200mL Fluid Restriction ([id number])  Supplement Feeding Modality:  Oral  Ensure Max Cans or Servings Per Day:  2       Frequency:  Daily  Entered: Jul 8 2023 10:08AM  
Diet, Consistent Carbohydrate/No Snacks:   1200mL Fluid Restriction ([id number])  Supplement Feeding Modality:  Oral  Ensure Max Cans or Servings Per Day:  2       Frequency:  Daily (07-08-23 @ 10:08) [Active]
Diet, Consistent Carbohydrate/No Snacks:   1200mL Fluid Restriction ([id number])  Supplement Feeding Modality:  Oral  Ensure Max Cans or Servings Per Day:  2       Frequency:  Daily (07-08-23 @ 10:08) [Active]      Please see RD assessment and/or follow up.  Managed by primary team as well
Diet, Consistent Carbohydrate/No Snacks:   1200mL Fluid Restriction ([id number])  Supplement Feeding Modality:  Oral  Ensure Max Cans or Servings Per Day:  2       Frequency:  Daily (07-08-23 @ 10:08) [Active]      Please see RD assessment and/or follow up.  Managed by primary team as well
Diet, Consistent Carbohydrate/No Snacks:   1200mL Fluid Restriction ([id number])  Supplement Feeding Modality:  Oral  Ensure Max Cans or Servings Per Day:  2       Frequency:  Daily (07-08-23 @ 10:08) [Active]
This patient has been assessed with a concern for Malnutrition and has been determined to have a diagnosis/diagnoses of Severe protein-calorie malnutrition.    This patient is being managed with:   Diet Consistent Carbohydrate/No Snacks-  1200mL Fluid Restriction ([id number])  Supplement Feeding Modality:  Oral  Ensure Max Cans or Servings Per Day:  2       Frequency:  Daily  Entered: Jul 8 2023 10:08AM  
This patient has been assessed with a concern for Malnutrition and has been determined to have a diagnosis/diagnoses of Severe protein-calorie malnutrition.    This patient is being managed with:   Diet Consistent Carbohydrate/No Snacks-  1200mL Fluid Restriction ([id number])  Supplement Feeding Modality:  Oral  Ensure Max Cans or Servings Per Day:  2       Frequency:  Daily  Entered: Jul 8 2023 10:08AM  
This patient has been assessed with a concern for Malnutrition and has been determined to have a diagnosis/diagnoses of Severe protein-calorie malnutrition.    This patient is being managed with:   Diet Consistent Carbohydrate/No Snacks-  1200mL Fluid Restriction ([id number])  Supplement Feeding Modality:  Oral  Ensure Max Cans or Servings Per Day:  2       Frequency:  Daily  Entered: Jul 8 2023 10:08AM  
This patient has been assessed with a concern for Malnutrition and has been determined to have a diagnosis/diagnoses of Severe protein-calorie malnutrition.    This patient is being managed with:   Diet Consistent Carbohydrate/No Snacks-  1200mL Fluid Restriction ([id number])  Supplement Feeding Modality:  Oral  Ensure Max Cans or Servings Per Day:  2       Frequency:  Daily  Entered: Jul 8 2023 10:08AM  
This patient has been assessed with a concern for Malnutrition and has been determined to have a diagnosis/diagnoses of Severe protein-calorie malnutrition.    This patient is being managed with:   Diet Consistent Carbohydrate/No Snacks-  1200mL Fluid Restriction ([id number])  Supplement Feeding Modality:  Oral  Ensure Max Cans or Servings Per Day:  2       Frequency:  Daily  Entered: Jul 8 2023 10:08AM

## 2023-07-15 NOTE — PROGRESS NOTE ADULT - PROBLEM SELECTOR PLAN 4
Hx of DM2, on insulin (Novolin N) 35U qAM/15-20U qPM at home  7/8 BGs consistently in the 400s. 12 U sliding scale this AM without resolution. 12 U + 10 additional U around lunch time also without resolution.   7/8 Endocrine consulted, will alter regimen according to recs.  7/11- Endo aware regarding steroid, will need Insulin at discharge with outpatient follow up.  Endo following, appreciate discharge recs.

## 2023-07-15 NOTE — PROGRESS NOTE ADULT - PROBLEM SELECTOR PLAN 1
-test BG AC/HS/2AM  -Adjust Lantus to 24 units QHS when off steroids  -Decrease Admelog 12 units TID w/meals  -c/w Admelog moderate correction scale AC and mod HS/2AM scale  -cons carb diet  Please teach and allow pt to do own insulin injections under RN supervision  Please teach use of insulin pen. Spoke to RN  Please document teach back  D/c recs:   -Will be determined according to BG/insulin needs/PO intake and steroid therapy at time of discharge.  -Would benefit from basal/bolus. Doses TBD. Spoke to team to sedn rx to pharmacy to make sure insurance covers meds  Lantus or equivalent basal insulin 24 units QHS and Admelog or equivalent rapid acting insulin 12 units TID w/meals  -Please write Rxs for: Solo Star Insulin pen/Humalog Kwik insulin pen/Robyn insulin pen needles/glucose meter/strips/lancets. Can switch to any equivalent insulin analogs cover by pt's insurance  -Consider Freestyle Sabine CGM if cover by pt's insurance  -Can follow at [facility] practice. [address]. Phone . Email sent to office yesterday. Provide information for pt to call on monday.   -Needs optho f/u as out pt.

## 2023-07-15 NOTE — PROGRESS NOTE ADULT - PROBLEM SELECTOR PLAN 10
Patient previously had swelling of left forearm at previous IV site, now resolved.  - continue to monitor, warm packs to arm as needed  RESOLVED

## 2023-07-15 NOTE — PROGRESS NOTE ADULT - PROBLEM SELECTOR PLAN 9
Pt. found to have elevated TSH to 12.35 on admission w/ symptoms of malaise, fatigue. Free T4 1.4. Pt. has not seen a PCP in some time  Started levothyroxine 100mcg qd (1.7 mcg/kg); repeat TFTs as an outpatient in 4-6 weeks for dose adjustments.  7/14 levothyroxine dose increased to 112 mcg

## 2023-07-15 NOTE — PROGRESS NOTE ADULT - ASSESSMENT
62yo M PMH DM2 admitted to [facility] 7/2 with malaise, B-symptoms (fevers/chills, weight loss, night sweats) and hepatosplenomegaly. Patient transferred to [facility] for further management. 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 Patient s/p Rituxan (7/9 and 7/10). Rituximab/Bendamustine started on 7/13.  Hospital course complicated by hyperglycemia, managed by endocrine. Patient has pancytopenia secondary to disease condition.     62yo M PMH DM2 admitted to [facility] 7/2 with malaise, B-symptoms (fevers/chills, weight loss, night sweats) and hepatosplenomegaly. Patient transferred to [facility] for further management. 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 Patient s/p Rituxan (7/9 and 7/10). Rituximab/Bendamustine started on 7/13.  Hospital course complicated by hyperglycemia, managed by endocrine. Patient has pancytopenia secondary to disease condition and chemotherapy.

## 2023-07-15 NOTE — PROGRESS NOTE ADULT - ASSESSMENT
63 year old M w/h/o T2DM > unknown control (A1C skewed due to anemia) on Novolin insulin bid. No known DM complications. Also h/o hypothyroidism. Presented to OSH with malaise, generalized weakness, and gait instability. Transferred to [facility] for further treatment of suspected B-PLL w/ rituximab. BG values 100-200s over past 24 hours while on steroids. DC planning for home today/off steroids. Discussed transition to basal/bolus at home w/pt. BG goal (100-180mg/dl).

## 2023-07-15 NOTE — PROGRESS NOTE ADULT - PROBLEM SELECTOR PLAN 1
Originally thought to be B-PLL, but path review pointing away from B-PLL and more towards plasmacytoid lymphoma, marginal zone lymphoma.   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 100 mg on Day 1, 635 mg on Day 2. Dex 12 mg on 7/7, Dex 20 mg x 1 on Day 1 and Day 2.  F/u BMBx FISH, cytogenetics, and biopsy results. F/u G6PD results.  C/w IVF and allopurinol  Monitor TLS daily.   prbcs for hgb<7.0, platelets <10K or <20K if febrile   - hepatosplenomegaly noted on CT including splenic infarcts, may need repeat imaging in future to further evaluate  7/13- Begin Rituximab/bendamustine  7/13 Plan to start Zarxio on Day 3 of rituxan/bendamustine (7/15)  7/15 1 unit PRBC for anemia (goal hgb > 8 for discharge)  Discharge planning 7/15

## 2023-07-15 NOTE — DISCHARGE NOTE NURSING/CASE MANAGEMENT/SOCIAL WORK - PATIENT PORTAL LINK FT
You can access the FollowMyHealth Patient Portal offered by [facility] by registering at the following website: [email or web address]. By joining [email or web address]’s FollowMyHealth portal, you will also be able to view your health information using other applications (apps) compatible with our system.

## 2023-07-15 NOTE — PROGRESS NOTE ADULT - NS ATTEND AMEND GEN_ALL_CORE FT
63 year old male with  marginal zone lymphoma day 3 R alber.      PMH DM2     Neulasta as an outpatient.     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       Insulin regimen being adjusted by Endocrine    c/w IVF and allopurinol    ID  afebrile off antibiotics    OOB Primary: Mariola    Assessment: 63 year old male with marginal zone lymphoma day 3 R alber.      PMH DM2     Plan:  D/C allopurinol.  Neulasta as an outpatient.       Over 30 minutes were spent in discharge planning.

## 2023-07-15 NOTE — PROGRESS NOTE ADULT - SUBJECTIVE AND OBJECTIVE BOX
Diabetes Follow up note:    Chief complaint: T2DM w/steroid induced hyperglycemia    Interval Hx: Pt received 2nd dose of Decadron yesterday, BG values in 200s. Reports feeling well, to receive transfusion prior to discharge home today. Discussed adjustment of insulin regimen upon discharge today.     Review of Systems:  General: denies pain  GI: Tolerating POs. Denies N/V/D/Abd pain  CV: Denies CP/SOB  ENDO: No S&Sx of hypoglycemia    MEDS:  insulin glargine Injectable (LANTUS) 36 Unit(s) SubCutaneous at bedtime  insulin lispro (ADMELOG) corrective regimen sliding scale   SubCutaneous <User Schedule>  insulin lispro (ADMELOG) corrective regimen sliding scale   SubCutaneous three times a day before meals  insulin lispro Injectable (ADMELOG) 20 Unit(s) SubCutaneous three times a day before meals  levothyroxine 112 MICROGram(s) Oral daily      Allergies    No Known Allergies      PE:  General: Male lying in bed. NAD.   Vital Signs Last 24 Hrs  T(C): 36.7 (15 Jul 2023 09:00), Max: 37.2 (14 Jul 2023 13:33)  T(F): 98.1 (15 Jul 2023 09:00), Max: 99 (14 Jul 2023 13:33)  HR: 69 (15 Jul 2023 09:00) (64 - 79)  BP: 143/71 (15 Jul 2023 09:00) (125/66 - 160/73)  BP(mean): --  RR: 18 (15 Jul 2023 09:00) (16 - 18)  SpO2: 100% (15 Jul 2023 09:00) (98% - 100%)    Parameters below as of 15 Jul 2023 09:00  Patient On (Oxygen Delivery Method): room air      Abd: Soft, NT,ND,   Extremities: Warm  Neuro: A&O X3    LABS:  POCT Blood Glucose.: 224 mg/dL (07-15-23 @ 08:10)  POCT Blood Glucose.: 249 mg/dL (07-15-23 @ 02:15)  POCT Blood Glucose.: 298 mg/dL (07-14-23 @ 21:41)  POCT Blood Glucose.: 176 mg/dL (07-14-23 @ 17:45)  POCT Blood Glucose.: 110 mg/dL (07-14-23 @ 11:58)  POCT Blood Glucose.: 189 mg/dL (07-14-23 @ 08:18)  POCT Blood Glucose.: 315 mg/dL (07-14-23 @ 01:07)  POCT Blood Glucose.: 389 mg/dL (07-13-23 @ 21:33)  POCT Blood Glucose.: 182 mg/dL (07-13-23 @ 17:12)  POCT Blood Glucose.: 256 mg/dL (07-13-23 @ 12:37)  POCT Blood Glucose.: 269 mg/dL (07-13-23 @ 08:26)  POCT Blood Glucose.: 290 mg/dL (07-12-23 @ 21:47)  POCT Blood Glucose.: 278 mg/dL (07-12-23 @ 17:16)                            7.8    3.01  )-----------( 69       ( 15 Jul 2023 07:15 )             24.7       07-15    135  |  99  |  29<H>  ----------------------------<  219<H>  4.5   |  26  |  0.88    eGFR: 98    Ca    8.8      07-15  Mg     2.1     07-15  Phos  3.9     07-15    TPro  6.6  /  Alb  3.1<L>  /  TBili  0.4  /  DBili  x   /  AST  13  /  ALT  12  /  AlkPhos  90  07-15      Thyroid Function Tests:  07-11 @ 07:24 TSH 15.20 FreeT4 1.2 T3 -- Anti TPO -- Anti Thyroglobulin Ab -- TSI --  07-04 @ 09:51 TSH -- FreeT4 1.4 T3 -- Anti TPO -- Anti Thyroglobulin Ab -- TSI --      A1C with Estimated Average Glucose Result: 5.7 % (07-05-23 @ 06:15)          Contact number: yandy [phone] or [phone]

## 2023-07-15 NOTE — PROGRESS NOTE ADULT - PROBLEM SELECTOR PLAN 7
7/10- Resolved.               7/8 K 6.0 and then K 5.7 on repeat CMP. Lasix 20 IV x 2. Insulin given for hyperglycemia should help with hyperkalemia as well.  7/8 Renal consulted. Lokelma 10 x 1 per renal recs.  7/8 Likely pseudohyperkalemia per renal, can hold off on additional doses of lokelma for now.

## 2023-07-15 NOTE — PROGRESS NOTE ADULT - PROBLEM SELECTOR PROBLEM 3
DIC (disseminated intravascular coagulation)

## 2023-07-16 ENCOUNTER — OUTPATIENT (OUTPATIENT)
Dept: OUTPATIENT SERVICES | Facility: HOSPITAL | Age: 63
LOS: 1 days | End: 2023-07-16

## 2023-07-16 DIAGNOSIS — I50.83 HIGH OUTPUT HEART FAILURE: ICD-10-CM

## 2023-07-16 PROBLEM — Z00.00 ENCOUNTER FOR PREVENTIVE HEALTH EXAMINATION: Status: ACTIVE | Noted: 2023-07-16

## 2023-07-17 ENCOUNTER — APPOINTMENT (OUTPATIENT)
Dept: INFUSION THERAPY | Facility: HOSPITAL | Age: 63
End: 2023-07-17

## 2023-07-17 ENCOUNTER — RESULT REVIEW (OUTPATIENT)
Age: 63
End: 2023-07-17

## 2023-07-17 ENCOUNTER — OUTPATIENT (OUTPATIENT)
Dept: OUTPATIENT SERVICES | Facility: HOSPITAL | Age: 63
LOS: 1 days | Discharge: HOME | End: 2023-07-17

## 2023-07-17 ENCOUNTER — APPOINTMENT (OUTPATIENT)
Dept: HEMATOLOGY ONCOLOGY | Facility: CLINIC | Age: 63
End: 2023-07-17

## 2023-07-17 DIAGNOSIS — C83.00 SMALL CELL B-CELL LYMPHOMA, UNSPECIFIED SITE: ICD-10-CM

## 2023-07-17 LAB
BASOPHILS # BLD AUTO: 0 K/UL — SIGNIFICANT CHANGE UP (ref 0–0.2)
BASOPHILS NFR BLD AUTO: 0 % — SIGNIFICANT CHANGE UP (ref 0–2)
ELLIPTOCYTES BLD QL SMEAR: SLIGHT — SIGNIFICANT CHANGE UP
EOSINOPHIL # BLD AUTO: 0.02 K/UL — SIGNIFICANT CHANGE UP (ref 0–0.5)
EOSINOPHIL NFR BLD AUTO: 0.5 % — SIGNIFICANT CHANGE UP (ref 0–6)
HCT VFR BLD CALC: 28.6 % — LOW (ref 39–50)
HGB BLD-MCNC: 9.2 G/DL — LOW (ref 13–17)
LYMPHOCYTES # BLD AUTO: 1.05 K/UL — SIGNIFICANT CHANGE UP (ref 1–3.3)
LYMPHOCYTES # BLD AUTO: 23 % — SIGNIFICANT CHANGE UP (ref 13–44)
MCHC RBC-ENTMCNC: 25.5 PG — LOW (ref 27–34)
MCHC RBC-ENTMCNC: 32.2 G/DL — SIGNIFICANT CHANGE UP (ref 32–36)
MCV RBC AUTO: 79.2 FL — LOW (ref 80–100)
MONOCYTES # BLD AUTO: 0.34 K/UL — SIGNIFICANT CHANGE UP (ref 0–0.9)
MONOCYTES NFR BLD AUTO: 7.5 % — SIGNIFICANT CHANGE UP (ref 2–14)
NEUTROPHILS # BLD AUTO: 3.16 K/UL — SIGNIFICANT CHANGE UP (ref 1.8–7.4)
NEUTROPHILS NFR BLD AUTO: 69 % — SIGNIFICANT CHANGE UP (ref 43–77)
NRBC # BLD: 0 /100 — SIGNIFICANT CHANGE UP (ref 0–0)
NRBC # BLD: SIGNIFICANT CHANGE UP /100 WBCS (ref 0–0)
PLAT MORPH BLD: NORMAL — SIGNIFICANT CHANGE UP
PLATELET # BLD AUTO: 141 K/UL — LOW (ref 150–400)
POIKILOCYTOSIS BLD QL AUTO: SLIGHT — SIGNIFICANT CHANGE UP
RBC # BLD: 3.61 M/UL — LOW (ref 4.2–5.8)
RBC # FLD: 23.7 % — HIGH (ref 10.3–14.5)
RBC BLD AUTO: ABNORMAL
WBC # BLD: 4.58 K/UL — SIGNIFICANT CHANGE UP (ref 3.8–10.5)
WBC # FLD AUTO: 4.58 K/UL — SIGNIFICANT CHANGE UP (ref 3.8–10.5)

## 2023-07-18 ENCOUNTER — APPOINTMENT (OUTPATIENT)
Dept: HEMATOLOGY ONCOLOGY | Facility: CLINIC | Age: 63
End: 2023-07-18
Payer: COMMERCIAL

## 2023-07-18 ENCOUNTER — APPOINTMENT (OUTPATIENT)
Dept: HEMATOLOGY ONCOLOGY | Facility: CLINIC | Age: 63
End: 2023-07-18

## 2023-07-18 VITALS
DIASTOLIC BLOOD PRESSURE: 69 MMHG | RESPIRATION RATE: 16 BRPM | SYSTOLIC BLOOD PRESSURE: 129 MMHG | TEMPERATURE: 97.7 F | HEIGHT: 69.41 IN | HEART RATE: 82 BPM | OXYGEN SATURATION: 99 % | WEIGHT: 170.17 LBS | BODY MASS INDEX: 24.92 KG/M2

## 2023-07-18 DIAGNOSIS — D69.6 THROMBOCYTOPENIA, UNSPECIFIED: ICD-10-CM

## 2023-07-18 DIAGNOSIS — R16.0 HEPATOMEGALY, NOT ELSEWHERE CLASSIFIED: ICD-10-CM

## 2023-07-18 DIAGNOSIS — Z51.89 ENCOUNTER FOR OTHER SPECIFIED AFTERCARE: ICD-10-CM

## 2023-07-18 DIAGNOSIS — D64.9 ANEMIA, UNSPECIFIED: ICD-10-CM

## 2023-07-18 DIAGNOSIS — R16.1 SPLENOMEGALY, NOT ELSEWHERE CLASSIFIED: ICD-10-CM

## 2023-07-18 DIAGNOSIS — Z80.3 FAMILY HISTORY OF MALIGNANT NEOPLASM OF BREAST: ICD-10-CM

## 2023-07-18 LAB
CHROM ANALY INTERPHASE BLD FISH-IMP: SIGNIFICANT CHANGE UP
CHROM ANALY OVERALL INTERP SPEC-IMP: SIGNIFICANT CHANGE UP
MISCELLANEOUS TEST NAME: SIGNIFICANT CHANGE UP

## 2023-07-18 PROCEDURE — 99215 OFFICE O/P EST HI 40 MIN: CPT

## 2023-07-22 PROBLEM — R16.0 HEPATOMEGALY: Status: ACTIVE | Noted: 2023-07-22

## 2023-07-22 PROBLEM — D69.6 THROMBOCYTOPENIA, ACQUIRED: Status: ACTIVE | Noted: 2023-07-22

## 2023-07-22 PROBLEM — R16.1 SPLENOMEGALY: Status: ACTIVE | Noted: 2023-07-22

## 2023-07-22 PROBLEM — D64.9 ANEMIA OF UNKNOWN ETIOLOGY: Status: ACTIVE | Noted: 2023-07-22

## 2023-07-22 NOTE — REVIEW OF SYSTEMS
[Fatigue] : fatigue [Recent Change In Weight] : ~T recent weight change [Negative] : Heme/Lymph [Fever] : no fever [Night Sweats] : no night sweats [FreeTextEntry2] : approx 15 lb over last yr [de-identified] : uses prescribed insulin at home, newly dx'd hypothyroid [de-identified] : as above

## 2023-07-22 NOTE — ASSESSMENT
[FreeTextEntry1] : Stage IV plasmacytoid lymphoma with lymphocytosis, hepatosplenomegaly, anemia, thrombocytopenia, extensive marrow involvement. The lymphoma has a highly complex karyotype including abnormalities involving 7q, 17p, MYC, IgH, CEP8 loci.\par He has had rapid clearance of PB lymphocytes with beginning of spleen response after first cycle of BR (day 6).\par Fulphila was given on 7/17.\par Plan to give six cycles every 4 weeks.\par CBC results reviewed and d/w pt\par WBC 4.58, Hgb 9.2, Plt 141K, MCV 79.2, 69% PMN, 23% lymphs\par Will see if addl studies can be done for MYD88, CXCR4.\par Remains to be seen if he has a clinically significant cold agglutinin.\par Follow CBC, IgM M spike, LDH, spleen size, transfusion needs.\par Check CMP, LDH, T4, T3, TSH, PT, APTT, fibrinogen  today.\par Repeat CBC, CMP in 7-10 days.\par Continue to follow glucose at home, Endocrine f/u\par Continue synthroid 112 ug/d.\par RV 2 weeks post cycle 2 or prn

## 2023-07-22 NOTE — PHYSICAL EXAM
[Fully active, able to carry on all pre-disease performance without restriction] : Status 0 - Fully active, able to carry on all pre-disease performance without restriction [Normal] : affect appropriate [de-identified] : marked regression of splenomegaly, palp 5 fb below the LCM, +liver edge

## 2023-07-22 NOTE — HISTORY OF PRESENT ILLNESS
[Disease:__________________________] : Disease: [unfilled] [de-identified] : Mr. Louie is a 62 yo man with newly diagnosed B cell lymphoproliferative disorder (plasmacytoid vs marginal zone lymphoma) who presented with\par massive splenomegaly, marked lymphocytosis and constitutional c/o.\par He has a h/o IDDM who had not had medical care when he presented at the [facility] ED on 7/2/23 with several day h/o weakness, unsteady gait, malaise \par and several wks of feverish sensation, night sweats, myalgias, 15-20 lb loss. WBC was 95K, Hgb 5.7, Plt 87K.Haptoglobin was < 20, fibrinogen decr (161),\par .U/S showed H/S megaly. CT abd/pelvis showed hepatomegaly and massive splenomegaly with evidence of splenic infarcts. Liver showed a possible hemangioma.\par He was transferred to [facility] for further mgmt and therapy on 7/7/23 after transfusion of 4U PRBC,. BMA/Bx.showed extensive \par trabecular and paratrabecular involvement with B-cell small lymphocytic lymphoma most consistent with plasmacytoid lymphoma.  Trilineage maturation was seen.  Iron stores were not seen on the biopsy.  Flow showed 66% monotypic kappa B cells, (+) for CD19, CD20, (-) for CD5, CD10, CD23. Cytogenetics showed a very complex karyotype which included 7q.,17, MYC and IgH loci abnormalities.  FISH demonstrated 3 copies of BCL6  (62%), 10% with 3 copies of MYC and 7.5% with 3 copies of MYC along with 2 copies of CEP 8 and IgH. IgM was incr at 1366 with nl IgG, IgA, kappa FLC were incr to 18.37 with a free k/l ratio of 7.15.\par RON was (-), a cold agglutinin was identified.\par \par He received "stepped up"  Rituximab  (7/9 and 7/10) then received Rituximab/Bendamustine at 90 mg/sq m (7/13 and 7/14) . He\par tolerated treatment. He had a rapid drop in WBC, clearance of PBL w/o metabolic complications other than hyperglycemia which was exacerbated \par by steroid premedication.  Endocrine was consulted and insulin regimen was adjusted.\par Hgb was 7.8 prior to d/c and one more unit of PRBC.\par Synthroid was started for newly diagnosed hypothyroidism.\par \par He was d/c'd home 7/15/23.\par \par  [de-identified] : He has felt increasingly well since d/c home\par No constitutional c/o\par He senses that what he had perceived to be a hard mass in his LUQ is regressing.\par He is taking po well.

## 2023-07-28 ENCOUNTER — RESULT REVIEW (OUTPATIENT)
Age: 63
End: 2023-07-28

## 2023-07-28 ENCOUNTER — APPOINTMENT (OUTPATIENT)
Dept: HEMATOLOGY ONCOLOGY | Facility: CLINIC | Age: 63
End: 2023-07-28

## 2023-07-28 LAB
ANISOCYTOSIS BLD QL: SLIGHT — SIGNIFICANT CHANGE UP
BASOPHILS # BLD AUTO: 0 K/UL — SIGNIFICANT CHANGE UP (ref 0–0.2)
BASOPHILS NFR BLD AUTO: 0 % — SIGNIFICANT CHANGE UP (ref 0–2)
ELLIPTOCYTES BLD QL SMEAR: SLIGHT — SIGNIFICANT CHANGE UP
EOSINOPHIL # BLD AUTO: 0 K/UL — SIGNIFICANT CHANGE UP (ref 0–0.5)
EOSINOPHIL NFR BLD AUTO: 0 % — SIGNIFICANT CHANGE UP (ref 0–6)
HCT VFR BLD CALC: 24.9 % — LOW (ref 39–50)
HGB BLD-MCNC: 7.9 G/DL — LOW (ref 13–17)
HYPOCHROMIA BLD QL: SLIGHT — SIGNIFICANT CHANGE UP
LYMPHOCYTES # BLD AUTO: 1.25 K/UL — SIGNIFICANT CHANGE UP (ref 1–3.3)
LYMPHOCYTES # BLD AUTO: 21 % — SIGNIFICANT CHANGE UP (ref 13–44)
MCHC RBC-ENTMCNC: 26.1 PG — LOW (ref 27–34)
MCHC RBC-ENTMCNC: 31.7 G/DL — LOW (ref 32–36)
MCV RBC AUTO: 82.2 FL — SIGNIFICANT CHANGE UP (ref 80–100)
MONOCYTES # BLD AUTO: 0.18 K/UL — SIGNIFICANT CHANGE UP (ref 0–0.9)
MONOCYTES NFR BLD AUTO: 3 % — SIGNIFICANT CHANGE UP (ref 2–14)
NEUTROPHILS # BLD AUTO: 4.54 K/UL — SIGNIFICANT CHANGE UP (ref 1.8–7.4)
NEUTROPHILS NFR BLD AUTO: 76 % — SIGNIFICANT CHANGE UP (ref 43–77)
NRBC # BLD: 0 /100 — SIGNIFICANT CHANGE UP (ref 0–0)
NRBC # BLD: SIGNIFICANT CHANGE UP /100 WBCS (ref 0–0)
PLAT MORPH BLD: NORMAL — SIGNIFICANT CHANGE UP
PLATELET # BLD AUTO: 55 K/UL — LOW (ref 150–400)
POIKILOCYTOSIS BLD QL AUTO: SLIGHT — SIGNIFICANT CHANGE UP
RBC # BLD: 3.03 M/UL — LOW (ref 4.2–5.8)
RBC # FLD: 24.3 % — HIGH (ref 10.3–14.5)
RBC BLD AUTO: ABNORMAL
WBC # BLD: 5.97 K/UL — SIGNIFICANT CHANGE UP (ref 3.8–10.5)
WBC # FLD AUTO: 5.97 K/UL — SIGNIFICANT CHANGE UP (ref 3.8–10.5)

## 2023-08-10 ENCOUNTER — APPOINTMENT (OUTPATIENT)
Dept: HEMATOLOGY ONCOLOGY | Facility: CLINIC | Age: 63
End: 2023-08-10
Payer: COMMERCIAL

## 2023-08-10 ENCOUNTER — APPOINTMENT (OUTPATIENT)
Dept: HEMATOLOGY ONCOLOGY | Facility: CLINIC | Age: 63
End: 2023-08-10

## 2023-08-10 ENCOUNTER — RESULT REVIEW (OUTPATIENT)
Age: 63
End: 2023-08-10

## 2023-08-10 ENCOUNTER — APPOINTMENT (OUTPATIENT)
Dept: INFUSION THERAPY | Facility: HOSPITAL | Age: 63
End: 2023-08-10

## 2023-08-10 ENCOUNTER — NON-APPOINTMENT (OUTPATIENT)
Age: 63
End: 2023-08-10

## 2023-08-10 DIAGNOSIS — T80.90XA UNSPECIFIED COMPLICATION FOLLOWING INFUSION AND THERAPEUTIC INJECTION, INITIAL ENCOUNTER: ICD-10-CM

## 2023-08-10 LAB
A1C WITH ESTIMATED AVERAGE GLUCOSE RESULT: 6.2 % — HIGH (ref 4–5.6)
ANISOCYTOSIS BLD QL: SLIGHT — SIGNIFICANT CHANGE UP
BASOPHILS # BLD AUTO: 0 K/UL — SIGNIFICANT CHANGE UP (ref 0–0.2)
BASOPHILS NFR BLD AUTO: 0 % — SIGNIFICANT CHANGE UP (ref 0–2)
ELLIPTOCYTES BLD QL SMEAR: SLIGHT — SIGNIFICANT CHANGE UP
EOSINOPHIL # BLD AUTO: 0 K/UL — SIGNIFICANT CHANGE UP (ref 0–0.5)
EOSINOPHIL NFR BLD AUTO: 0 % — SIGNIFICANT CHANGE UP (ref 0–6)
ESTIMATED AVERAGE GLUCOSE: 131 MG/DL — HIGH (ref 68–114)
HCT VFR BLD CALC: 25 % — LOW (ref 39–50)
HGB BLD-MCNC: 8 G/DL — LOW (ref 13–17)
LYMPHOCYTES # BLD AUTO: 0.55 K/UL — LOW (ref 1–3.3)
LYMPHOCYTES # BLD AUTO: 19 % — SIGNIFICANT CHANGE UP (ref 13–44)
MCHC RBC-ENTMCNC: 26.8 PG — LOW (ref 27–34)
MCHC RBC-ENTMCNC: 32 G/DL — SIGNIFICANT CHANGE UP (ref 32–36)
MCV RBC AUTO: 83.9 FL — SIGNIFICANT CHANGE UP (ref 80–100)
MONOCYTES # BLD AUTO: 0.23 K/UL — SIGNIFICANT CHANGE UP (ref 0–0.9)
MONOCYTES NFR BLD AUTO: 8 % — SIGNIFICANT CHANGE UP (ref 2–14)
NEUTROPHILS # BLD AUTO: 2.13 K/UL — SIGNIFICANT CHANGE UP (ref 1.8–7.4)
NEUTROPHILS NFR BLD AUTO: 73 % — SIGNIFICANT CHANGE UP (ref 43–77)
NRBC # BLD: 0 /100 — SIGNIFICANT CHANGE UP (ref 0–0)
NRBC # BLD: SIGNIFICANT CHANGE UP /100 WBCS (ref 0–0)
PLAT MORPH BLD: NORMAL — SIGNIFICANT CHANGE UP
PLATELET # BLD AUTO: 97 K/UL — LOW (ref 150–400)
POIKILOCYTOSIS BLD QL AUTO: SLIGHT — SIGNIFICANT CHANGE UP
RBC # BLD: 2.98 M/UL — LOW (ref 4.2–5.8)
RBC # FLD: 24.5 % — HIGH (ref 10.3–14.5)
RBC BLD AUTO: ABNORMAL
TSH SERPL-MCNC: 6.97 UIU/ML — HIGH (ref 0.27–4.2)
WBC # BLD: 2.92 K/UL — LOW (ref 3.8–10.5)
WBC # FLD AUTO: 2.92 K/UL — LOW (ref 3.8–10.5)

## 2023-08-10 PROCEDURE — 99213 OFFICE O/P EST LOW 20 MIN: CPT

## 2023-08-10 NOTE — HISTORY OF PRESENT ILLNESS
[de-identified] : This is a pleasant 63 year old man with plasmacytoid lymphoma, here today for C2D1 Ruxience/Bendeka (received first cycle inpatient.)   Less than five minutes into the Ruxience infusion, pt developed chest and back tightness. No SOB. Infusion immediately held by RN.  VS: 150/92, 85, 19, 89-91% on RA Pt placed on NC2L with SpO2 increasing to 95-96%.   Upon exam, pt states tightness is improving since Ruxience stopped, but still present. Lungs CTA b/l, HR regular. No erythema, rash, hives, or angioedema.   Pt medicated with Solumedrol 125mg and Pepcid 20mg IV.  Symptoms continuing to subside, then resolved.   Pt monitored for 30 minutes, VS stable, symptoms fully resolved. Supplemental oxygen weaned off.  Ruxience resumed at 50cc/hr, then gradually titrated up to 150cc/hr. Pt tolerated remainder of infusion without incident.   MD Garnett and team notified.

## 2023-08-11 ENCOUNTER — APPOINTMENT (OUTPATIENT)
Dept: INFUSION THERAPY | Facility: HOSPITAL | Age: 63
End: 2023-08-11

## 2023-08-11 DIAGNOSIS — Z51.11 ENCOUNTER FOR ANTINEOPLASTIC CHEMOTHERAPY: ICD-10-CM

## 2023-08-11 DIAGNOSIS — R11.2 NAUSEA WITH VOMITING, UNSPECIFIED: ICD-10-CM

## 2023-08-12 ENCOUNTER — APPOINTMENT (OUTPATIENT)
Dept: INFUSION THERAPY | Facility: HOSPITAL | Age: 63
End: 2023-08-12

## 2023-08-17 RX ORDER — INSULIN LISPRO 100 [IU]/ML
100 INJECTION, SOLUTION INTRAVENOUS; SUBCUTANEOUS
Qty: 5 | Refills: 3 | Status: COMPLETED | COMMUNITY
Start: 2023-08-14 | End: 2023-08-17

## 2023-08-18 ENCOUNTER — RESULT REVIEW (OUTPATIENT)
Age: 63
End: 2023-08-18

## 2023-08-18 ENCOUNTER — APPOINTMENT (OUTPATIENT)
Dept: HEMATOLOGY ONCOLOGY | Facility: CLINIC | Age: 63
End: 2023-08-18
Payer: COMMERCIAL

## 2023-08-18 VITALS
WEIGHT: 179.46 LBS | RESPIRATION RATE: 16 BRPM | OXYGEN SATURATION: 99 % | HEART RATE: 70 BPM | DIASTOLIC BLOOD PRESSURE: 76 MMHG | BODY MASS INDEX: 26.19 KG/M2 | TEMPERATURE: 97.4 F | SYSTOLIC BLOOD PRESSURE: 169 MMHG

## 2023-08-18 LAB
ANISOCYTOSIS BLD QL: SLIGHT — SIGNIFICANT CHANGE UP
BASOPHILS # BLD AUTO: 0 K/UL — SIGNIFICANT CHANGE UP (ref 0–0.2)
BASOPHILS NFR BLD AUTO: 0 % — SIGNIFICANT CHANGE UP (ref 0–2)
DACRYOCYTES BLD QL SMEAR: SLIGHT — SIGNIFICANT CHANGE UP
ELLIPTOCYTES BLD QL SMEAR: SLIGHT — SIGNIFICANT CHANGE UP
EOSINOPHIL # BLD AUTO: 0.17 K/UL — SIGNIFICANT CHANGE UP (ref 0–0.5)
EOSINOPHIL NFR BLD AUTO: 2 % — SIGNIFICANT CHANGE UP (ref 0–6)
HCT VFR BLD CALC: 26.7 % — LOW (ref 39–50)
HGB BLD-MCNC: 8.5 G/DL — LOW (ref 13–17)
HYPOCHROMIA BLD QL: SLIGHT — SIGNIFICANT CHANGE UP
LYMPHOCYTES # BLD AUTO: 0.59 K/UL — LOW (ref 1–3.3)
LYMPHOCYTES # BLD AUTO: 7 % — LOW (ref 13–44)
MCHC RBC-ENTMCNC: 27.5 PG — SIGNIFICANT CHANGE UP (ref 27–34)
MCHC RBC-ENTMCNC: 31.8 G/DL — LOW (ref 32–36)
MCV RBC AUTO: 86.4 FL — SIGNIFICANT CHANGE UP (ref 80–100)
METAMYELOCYTES # FLD: 2 % — HIGH (ref 0–0)
MONOCYTES # BLD AUTO: 0.67 K/UL — SIGNIFICANT CHANGE UP (ref 0–0.9)
MONOCYTES NFR BLD AUTO: 8 % — SIGNIFICANT CHANGE UP (ref 2–14)
MYELOCYTES NFR BLD: 2 % — HIGH (ref 0–0)
NEUTROPHILS # BLD AUTO: 6.63 K/UL — SIGNIFICANT CHANGE UP (ref 1.8–7.4)
NEUTROPHILS NFR BLD AUTO: 79 % — HIGH (ref 43–77)
NRBC # BLD: 0 /100 — SIGNIFICANT CHANGE UP (ref 0–0)
NRBC # BLD: SIGNIFICANT CHANGE UP /100 WBCS (ref 0–0)
PLAT MORPH BLD: NORMAL — SIGNIFICANT CHANGE UP
PLATELET # BLD AUTO: 94 K/UL — LOW (ref 150–400)
POIKILOCYTOSIS BLD QL AUTO: SLIGHT — SIGNIFICANT CHANGE UP
POLYCHROMASIA BLD QL SMEAR: SLIGHT — SIGNIFICANT CHANGE UP
RBC # BLD: 3.09 M/UL — LOW (ref 4.2–5.8)
RBC # FLD: 24.9 % — HIGH (ref 10.3–14.5)
RBC BLD AUTO: ABNORMAL
SCHISTOCYTES BLD QL AUTO: SLIGHT — SIGNIFICANT CHANGE UP
WBC # BLD: 8.39 K/UL — SIGNIFICANT CHANGE UP (ref 3.8–10.5)
WBC # FLD AUTO: 8.39 K/UL — SIGNIFICANT CHANGE UP (ref 3.8–10.5)

## 2023-08-18 PROCEDURE — 99213 OFFICE O/P EST LOW 20 MIN: CPT

## 2023-08-21 NOTE — REVIEW OF SYSTEMS
[Fatigue] : fatigue [Recent Change In Weight] : ~T recent weight change [Negative] : Allergic/Immunologic [FreeTextEntry7] : x2 weeks on and off diarrhea soft to watery 1-2 a day  [de-identified] : BS good

## 2023-08-21 NOTE — ASSESSMENT
[FreeTextEntry1] : Stage IV plasmacytoid lymphoma with lymphocytosis, hepatosplenomegaly, anemia, thrombocytopenia, extensive marrow involvement. The lymphoma has a highly complex karyotype including abnormalities involving 7q, 17p, MYC, IgH, CEP8 loci. He has had rapid clearance of PB lymphocytes with beginning of spleen response after first cycle of BR.  Plan to give six cycles every 4 weeks. CBC results reviewed and d/w pt Will see if addl studies can be done for MYD88, CXCR4. Remains to be seen if he has a clinically significant cold agglutinin. Follow CBC, IgM M spike, LDH, spleen size, transfusion needs. Continue to follow glucose at home, Endocrine f/u Continue synthroid 112 ug/d. RV 2 weeks post cycle 3 or prn [Palliative] : Goals of care discussed with patient: Palliative [Palliative Care Plan] : not applicable at this time

## 2023-08-21 NOTE — HISTORY OF PRESENT ILLNESS
[Disease:__________________________] : Disease: [unfilled] [Cycle: ___] : Cycle: [unfilled] [de-identified] : Mr. Louie is a 62 yo man with newly diagnosed B cell lymphoproliferative disorder (plasmacytoid vs marginal zone lymphoma) who presented with\par  massive splenomegaly, marked lymphocytosis and constitutional c/o.\par  He has a h/o IDDM who had not had medical care when he presented at the [facility] ED on 7/2/23 with several day h/o weakness, unsteady gait, malaise \par  and several wks of feverish sensation, night sweats, myalgias, 15-20 lb loss. WBC was 95K, Hgb 5.7, Plt 87K.Haptoglobin was < 20, fibrinogen decr (161),\par  .U/S showed H/S megaly. CT abd/pelvis showed hepatomegaly and massive splenomegaly with evidence of splenic infarcts. Liver showed a possible hemangioma.\par  He was transferred to [facility] for further mgmt and therapy on 7/7/23 after transfusion of 4U PRBC,. BMA/Bx.showed extensive \par  trabecular and paratrabecular involvement with B-cell small lymphocytic lymphoma most consistent with plasmacytoid lymphoma.  Trilineage maturation was seen.  Iron stores were not seen on the biopsy.  Flow showed 66% monotypic kappa B cells, (+) for CD19, CD20, (-) for CD5, CD10, CD23. Cytogenetics showed a very complex karyotype which included 7q.,17, MYC and IgH loci abnormalities.  FISH demonstrated 3 copies of BCL6  (62%), 10% with 3 copies of MYC and 7.5% with 3 copies of MYC along with 2 copies of CEP 8 and IgH. IgM was incr at 1366 with nl IgG, IgA, kappa FLC were incr to 18.37 with a free k/l ratio of 7.15.\par  RON was (-), a cold agglutinin was identified.\par  \par  He received "stepped up"  Rituximab  (7/9 and 7/10) then received Rituximab/Bendamustine at 90 mg/sq m (7/13 and 7/14) . He\par  tolerated treatment. He had a rapid drop in WBC, clearance of PBL w/o metabolic complications other than hyperglycemia which was exacerbated \par  by steroid premedication.  Endocrine was consulted and insulin regimen was adjusted.\par  Hgb was 7.8 prior to d/c and one more unit of PRBC.\par  Synthroid was started for newly diagnosed hypothyroidism.\par  \par  He was d/c'd home 7/15/23.\par  \par   [FreeTextEntry1] : ruxience bendamustine x 2 days with Onpri 8/10 [de-identified] : No constitutional c/o He senses that what he had perceived to be a hard mass in his LUQ is regressing. He is taking po well. DM - states BS doing ok

## 2023-09-07 ENCOUNTER — RESULT REVIEW (OUTPATIENT)
Age: 63
End: 2023-09-07

## 2023-09-07 ENCOUNTER — APPOINTMENT (OUTPATIENT)
Dept: INFUSION THERAPY | Facility: HOSPITAL | Age: 63
End: 2023-09-07

## 2023-09-07 ENCOUNTER — LABORATORY RESULT (OUTPATIENT)
Age: 63
End: 2023-09-07

## 2023-09-07 ENCOUNTER — APPOINTMENT (OUTPATIENT)
Dept: HEMATOLOGY ONCOLOGY | Facility: CLINIC | Age: 63
End: 2023-09-07

## 2023-09-07 LAB
ALBUMIN SERPL ELPH-MCNC: 4.4 G/DL — SIGNIFICANT CHANGE UP (ref 3.3–5)
ALP SERPL-CCNC: 91 U/L — SIGNIFICANT CHANGE UP (ref 40–120)
ALT FLD-CCNC: 19 U/L — SIGNIFICANT CHANGE UP (ref 10–45)
ANION GAP SERPL CALC-SCNC: 11 MMOL/L — SIGNIFICANT CHANGE UP (ref 5–17)
AST SERPL-CCNC: 29 U/L — SIGNIFICANT CHANGE UP (ref 10–40)
BASOPHILS # BLD AUTO: 0.01 K/UL — SIGNIFICANT CHANGE UP (ref 0–0.2)
BASOPHILS NFR BLD AUTO: 0.3 % — SIGNIFICANT CHANGE UP (ref 0–2)
BILIRUB SERPL-MCNC: 0.4 MG/DL — SIGNIFICANT CHANGE UP (ref 0.2–1.2)
BUN SERPL-MCNC: 16 MG/DL — SIGNIFICANT CHANGE UP (ref 7–23)
CALCIUM SERPL-MCNC: 9.1 MG/DL — SIGNIFICANT CHANGE UP (ref 8.4–10.5)
CHLORIDE SERPL-SCNC: 103 MMOL/L — SIGNIFICANT CHANGE UP (ref 96–108)
CO2 SERPL-SCNC: 26 MMOL/L — SIGNIFICANT CHANGE UP (ref 22–31)
CREAT SERPL-MCNC: 0.97 MG/DL — SIGNIFICANT CHANGE UP (ref 0.5–1.3)
EGFR: 89 ML/MIN/1.73M2 — SIGNIFICANT CHANGE UP
EOSINOPHIL # BLD AUTO: 0.1 K/UL — SIGNIFICANT CHANGE UP (ref 0–0.5)
EOSINOPHIL NFR BLD AUTO: 2.8 % — SIGNIFICANT CHANGE UP (ref 0–6)
GLUCOSE SERPL-MCNC: 297 MG/DL — HIGH (ref 70–99)
HCT VFR BLD CALC: 31.8 % — LOW (ref 39–50)
HGB BLD-MCNC: 10.9 G/DL — LOW (ref 13–17)
IMM GRANULOCYTES NFR BLD AUTO: 0.6 % — SIGNIFICANT CHANGE UP (ref 0–0.9)
LDH SERPL L TO P-CCNC: 223 U/L — SIGNIFICANT CHANGE UP (ref 50–242)
LYMPHOCYTES # BLD AUTO: 0.6 K/UL — LOW (ref 1–3.3)
LYMPHOCYTES # BLD AUTO: 16.9 % — SIGNIFICANT CHANGE UP (ref 13–44)
MAGNESIUM SERPL-MCNC: 1.8 MG/DL — SIGNIFICANT CHANGE UP (ref 1.6–2.6)
MCHC RBC-ENTMCNC: 29.5 PG — SIGNIFICANT CHANGE UP (ref 27–34)
MCHC RBC-ENTMCNC: 34.3 G/DL — SIGNIFICANT CHANGE UP (ref 32–36)
MCV RBC AUTO: 86.2 FL — SIGNIFICANT CHANGE UP (ref 80–100)
MONOCYTES # BLD AUTO: 0.31 K/UL — SIGNIFICANT CHANGE UP (ref 0–0.9)
MONOCYTES NFR BLD AUTO: 8.8 % — SIGNIFICANT CHANGE UP (ref 2–14)
NEUTROPHILS # BLD AUTO: 2.5 K/UL — SIGNIFICANT CHANGE UP (ref 1.8–7.4)
NEUTROPHILS NFR BLD AUTO: 70.6 % — SIGNIFICANT CHANGE UP (ref 43–77)
NRBC # BLD: 0 /100 WBCS — SIGNIFICANT CHANGE UP (ref 0–0)
PLATELET # BLD AUTO: 86 K/UL — LOW (ref 150–400)
POTASSIUM SERPL-MCNC: 4.2 MMOL/L — SIGNIFICANT CHANGE UP (ref 3.5–5.3)
POTASSIUM SERPL-SCNC: 4.2 MMOL/L — SIGNIFICANT CHANGE UP (ref 3.5–5.3)
PROT SERPL-MCNC: 7.3 G/DL — SIGNIFICANT CHANGE UP (ref 6–8.3)
RBC # BLD: 3.69 M/UL — LOW (ref 4.2–5.8)
RBC # FLD: 18.2 % — HIGH (ref 10.3–14.5)
SODIUM SERPL-SCNC: 140 MMOL/L — SIGNIFICANT CHANGE UP (ref 135–145)
TSH SERPL-MCNC: 4.32 UIU/ML — HIGH (ref 0.27–4.2)
WBC # BLD: 3.54 K/UL — LOW (ref 3.8–10.5)
WBC # FLD AUTO: 3.54 K/UL — LOW (ref 3.8–10.5)

## 2023-09-08 ENCOUNTER — APPOINTMENT (OUTPATIENT)
Dept: INFUSION THERAPY | Facility: HOSPITAL | Age: 63
End: 2023-09-08

## 2023-09-08 DIAGNOSIS — E86.0 DEHYDRATION: ICD-10-CM

## 2023-09-13 ENCOUNTER — OUTPATIENT (OUTPATIENT)
Dept: OUTPATIENT SERVICES | Facility: HOSPITAL | Age: 63
LOS: 1 days | Discharge: HOME | End: 2023-09-13

## 2023-09-13 DIAGNOSIS — C83.00 SMALL CELL B-CELL LYMPHOMA, UNSPECIFIED SITE: ICD-10-CM

## 2023-09-14 ENCOUNTER — RX RENEWAL (OUTPATIENT)
Age: 63
End: 2023-09-14

## 2023-09-19 ENCOUNTER — APPOINTMENT (OUTPATIENT)
Dept: HEMATOLOGY ONCOLOGY | Facility: CLINIC | Age: 63
End: 2023-09-19
Payer: COMMERCIAL

## 2023-09-19 ENCOUNTER — RESULT REVIEW (OUTPATIENT)
Age: 63
End: 2023-09-19

## 2023-09-19 VITALS
WEIGHT: 184.09 LBS | SYSTOLIC BLOOD PRESSURE: 134 MMHG | BODY MASS INDEX: 26.86 KG/M2 | DIASTOLIC BLOOD PRESSURE: 75 MMHG | OXYGEN SATURATION: 99 % | RESPIRATION RATE: 16 BRPM | HEART RATE: 84 BPM | TEMPERATURE: 97.3 F

## 2023-09-19 DIAGNOSIS — Z78.9 OTHER SPECIFIED HEALTH STATUS: ICD-10-CM

## 2023-09-19 DIAGNOSIS — Z87.891 PERSONAL HISTORY OF NICOTINE DEPENDENCE: ICD-10-CM

## 2023-09-19 LAB
ALBUMIN SERPL ELPH-MCNC: 3.7 G/DL
ALBUMIN SERPL ELPH-MCNC: 4.1 G/DL
ALP BLD-CCNC: 118 U/L
ALP BLD-CCNC: 98 U/L
ALT SERPL-CCNC: 11 U/L
ALT SERPL-CCNC: 18 U/L
ANION GAP SERPL CALC-SCNC: 12 MMOL/L
ANION GAP SERPL CALC-SCNC: 14 MMOL/L
APTT BLD: 34.9 SEC
AST SERPL-CCNC: 17 U/L
AST SERPL-CCNC: 23 U/L
BASOPHILS # BLD AUTO: 0.02 K/UL — SIGNIFICANT CHANGE UP (ref 0–0.2)
BASOPHILS NFR BLD AUTO: 0.2 % — SIGNIFICANT CHANGE UP (ref 0–2)
BILIRUB SERPL-MCNC: 0.5 MG/DL
BILIRUB SERPL-MCNC: 0.5 MG/DL
BUN SERPL-MCNC: 14 MG/DL
BUN SERPL-MCNC: 17 MG/DL
CALCIUM SERPL-MCNC: 8.8 MG/DL
CALCIUM SERPL-MCNC: 9.2 MG/DL
CHLORIDE SERPL-SCNC: 102 MMOL/L
CHLORIDE SERPL-SCNC: 105 MMOL/L
CO2 SERPL-SCNC: 24 MMOL/L
CO2 SERPL-SCNC: 26 MMOL/L
CREAT SERPL-MCNC: 0.95 MG/DL
CREAT SERPL-MCNC: 1.05 MG/DL
EGFR: 81 ML/MIN/1.73M2
EGFR: 91 ML/MIN/1.73M2
EOSINOPHIL # BLD AUTO: 0.1 K/UL — SIGNIFICANT CHANGE UP (ref 0–0.5)
EOSINOPHIL NFR BLD AUTO: 1.2 % — SIGNIFICANT CHANGE UP (ref 0–6)
FIBRINOGEN AG PPP IA-MCNC: 270 MG/DL
GLUCOSE SERPL-MCNC: 109 MG/DL
GLUCOSE SERPL-MCNC: 68 MG/DL
HAPTOGLOB SERPL-MCNC: <20 MG/DL
HCT VFR BLD CALC: 30.5 % — LOW (ref 39–50)
HGB BLD-MCNC: 10.5 G/DL — LOW (ref 13–17)
IMM GRANULOCYTES NFR BLD AUTO: 1.9 % — HIGH (ref 0–0.9)
INR PPP: 1.49 RATIO
LDH SERPL-CCNC: 249 U/L
LDH SERPL-CCNC: 362 U/L
LYMPHOCYTES # BLD AUTO: 0.57 K/UL — LOW (ref 1–3.3)
LYMPHOCYTES # BLD AUTO: 6.8 % — LOW (ref 13–44)
MAGNESIUM SERPL-MCNC: 1.8 MG/DL
MCHC RBC-ENTMCNC: 29.5 PG — SIGNIFICANT CHANGE UP (ref 27–34)
MCHC RBC-ENTMCNC: 34.4 G/DL — SIGNIFICANT CHANGE UP (ref 32–36)
MCV RBC AUTO: 85.7 FL — SIGNIFICANT CHANGE UP (ref 80–100)
MONOCYTES # BLD AUTO: 0.4 K/UL — SIGNIFICANT CHANGE UP (ref 0–0.9)
MONOCYTES NFR BLD AUTO: 4.8 % — SIGNIFICANT CHANGE UP (ref 2–14)
NEUTROPHILS # BLD AUTO: 7.09 K/UL — SIGNIFICANT CHANGE UP (ref 1.8–7.4)
NEUTROPHILS NFR BLD AUTO: 85.1 % — HIGH (ref 43–77)
NRBC # BLD: 0 /100 WBCS — SIGNIFICANT CHANGE UP (ref 0–0)
PHOSPHATE SERPL-MCNC: 3.9 MG/DL
PHOSPHATE SERPL-MCNC: 4.7 MG/DL
PLATELET # BLD AUTO: 74 K/UL — LOW (ref 150–400)
POTASSIUM SERPL-SCNC: 3.8 MMOL/L
POTASSIUM SERPL-SCNC: 4.3 MMOL/L
PROT SERPL-MCNC: 6.9 G/DL
PROT SERPL-MCNC: 7 G/DL
PT BLD: 17.5 SEC
RBC # BLD: 3.56 M/UL — LOW (ref 4.2–5.8)
RBC # FLD: 15.8 % — HIGH (ref 10.3–14.5)
SODIUM SERPL-SCNC: 140 MMOL/L
SODIUM SERPL-SCNC: 142 MMOL/L
T3FREE SERPL-MCNC: 1.9 PG/ML
T4 FREE SERPL-MCNC: 1.5 NG/DL
TSH SERPL-ACNC: 7.8 UIU/ML
URATE SERPL-MCNC: 8.7 MG/DL
URATE SERPL-MCNC: 8.7 MG/DL
WBC # BLD: 8.34 K/UL — SIGNIFICANT CHANGE UP (ref 3.8–10.5)
WBC # FLD AUTO: 8.34 K/UL — SIGNIFICANT CHANGE UP (ref 3.8–10.5)

## 2023-09-19 PROCEDURE — 99214 OFFICE O/P EST MOD 30 MIN: CPT

## 2023-09-21 LAB
ESTIMATED AVERAGE GLUCOSE: 137 MG/DL
HBA1C MFR BLD HPLC: 6.4 %

## 2023-10-02 ENCOUNTER — RX RENEWAL (OUTPATIENT)
Age: 63
End: 2023-10-02

## 2023-10-04 RX ORDER — PEN NEEDLE, DIABETIC 32GX 5/32"
32G X 4 MM NEEDLE, DISPOSABLE MISCELLANEOUS
Qty: 30 | Refills: 1 | Status: ACTIVE | COMMUNITY
Start: 2023-10-03 | End: 1900-01-01

## 2023-10-06 ENCOUNTER — NON-APPOINTMENT (OUTPATIENT)
Age: 63
End: 2023-10-06

## 2023-10-12 ENCOUNTER — RESULT REVIEW (OUTPATIENT)
Age: 63
End: 2023-10-12

## 2023-10-12 ENCOUNTER — APPOINTMENT (OUTPATIENT)
Dept: HEMATOLOGY ONCOLOGY | Facility: CLINIC | Age: 63
End: 2023-10-12

## 2023-10-12 ENCOUNTER — APPOINTMENT (OUTPATIENT)
Dept: INFUSION THERAPY | Facility: HOSPITAL | Age: 63
End: 2023-10-12

## 2023-10-12 LAB
A1C WITH ESTIMATED AVERAGE GLUCOSE RESULT: 6.1 % — HIGH (ref 4–5.6)
ALBUMIN SERPL ELPH-MCNC: 4.2 G/DL
ALP BLD-CCNC: 104 U/L
ALT SERPL-CCNC: 32 U/L
ANION GAP SERPL CALC-SCNC: 11 MMOL/L
AST SERPL-CCNC: 31 U/L
BASOPHILS # BLD AUTO: 0.01 K/UL — SIGNIFICANT CHANGE UP (ref 0–0.2)
BASOPHILS NFR BLD AUTO: 0.5 % — SIGNIFICANT CHANGE UP (ref 0–2)
BILIRUB SERPL-MCNC: 0.3 MG/DL
BUN SERPL-MCNC: 14 MG/DL
CALCIUM SERPL-MCNC: 9 MG/DL
CHLORIDE SERPL-SCNC: 103 MMOL/L
CHOLEST SERPL-MCNC: 91 MG/DL — SIGNIFICANT CHANGE UP
CHOLEST SERPL-MCNC: 91 MG/DL — SIGNIFICANT CHANGE UP
CO2 SERPL-SCNC: 26 MMOL/L
CREAT SERPL-MCNC: 0.98 MG/DL
EGFR: 88 ML/MIN/1.73M2
EOSINOPHIL # BLD AUTO: 0.06 K/UL — SIGNIFICANT CHANGE UP (ref 0–0.5)
EOSINOPHIL NFR BLD AUTO: 2.7 % — SIGNIFICANT CHANGE UP (ref 0–6)
ESTIMATED AVERAGE GLUCOSE: 128 MG/DL — HIGH (ref 68–114)
GLUCOSE SERPL-MCNC: 123 MG/DL
HCT VFR BLD CALC: 28.7 % — LOW (ref 39–50)
HGB BLD-MCNC: 10.1 G/DL — LOW (ref 13–17)
IMM GRANULOCYTES NFR BLD AUTO: 0.9 % — SIGNIFICANT CHANGE UP (ref 0–0.9)
LDH SERPL-CCNC: 236 U/L
LYMPHOCYTES # BLD AUTO: 0.53 K/UL — LOW (ref 1–3.3)
LYMPHOCYTES # BLD AUTO: 24.2 % — SIGNIFICANT CHANGE UP (ref 13–44)
MAGNESIUM SERPL-MCNC: 2.1 MG/DL
MCHC RBC-ENTMCNC: 29.5 PG — SIGNIFICANT CHANGE UP (ref 27–34)
MCHC RBC-ENTMCNC: 35.2 G/DL — SIGNIFICANT CHANGE UP (ref 32–36)
MCV RBC AUTO: 83.9 FL — SIGNIFICANT CHANGE UP (ref 80–100)
MONOCYTES # BLD AUTO: 0.24 K/UL — SIGNIFICANT CHANGE UP (ref 0–0.9)
MONOCYTES NFR BLD AUTO: 11 % — SIGNIFICANT CHANGE UP (ref 2–14)
NEUTROPHILS # BLD AUTO: 1.33 K/UL — LOW (ref 1.8–7.4)
NEUTROPHILS NFR BLD AUTO: 60.7 % — SIGNIFICANT CHANGE UP (ref 43–77)
NON HDL CHOLESTEROL: 59 MG/DL — SIGNIFICANT CHANGE UP
NON HDL CHOLESTEROL: 59 MG/DL — SIGNIFICANT CHANGE UP
NRBC # BLD: 0 /100 WBCS — SIGNIFICANT CHANGE UP (ref 0–0)
PLATELET # BLD AUTO: 134 K/UL — LOW (ref 150–400)
POTASSIUM SERPL-SCNC: 4.1 MMOL/L
PROT SERPL-MCNC: 7.2 G/DL
RBC # BLD: 3.42 M/UL — LOW (ref 4.2–5.8)
RBC # FLD: 14.6 % — HIGH (ref 10.3–14.5)
SODIUM SERPL-SCNC: 140 MMOL/L
TSH SERPL-ACNC: 2.71 UIU/ML
WBC # BLD: 2.19 K/UL — LOW (ref 3.8–10.5)
WBC # FLD AUTO: 2.19 K/UL — LOW (ref 3.8–10.5)

## 2023-10-13 ENCOUNTER — APPOINTMENT (OUTPATIENT)
Dept: INFUSION THERAPY | Facility: HOSPITAL | Age: 63
End: 2023-10-13

## 2023-10-13 DIAGNOSIS — Z51.11 ENCOUNTER FOR ANTINEOPLASTIC CHEMOTHERAPY: ICD-10-CM

## 2023-10-13 DIAGNOSIS — R11.2 NAUSEA WITH VOMITING, UNSPECIFIED: ICD-10-CM

## 2023-10-13 DIAGNOSIS — E86.0 DEHYDRATION: ICD-10-CM

## 2023-10-19 ENCOUNTER — RESULT REVIEW (OUTPATIENT)
Age: 63
End: 2023-10-19

## 2023-10-19 ENCOUNTER — APPOINTMENT (OUTPATIENT)
Dept: HEMATOLOGY ONCOLOGY | Facility: CLINIC | Age: 63
End: 2023-10-19
Payer: COMMERCIAL

## 2023-10-19 ENCOUNTER — APPOINTMENT (OUTPATIENT)
Dept: HEMATOLOGY ONCOLOGY | Facility: CLINIC | Age: 63
End: 2023-10-19

## 2023-10-19 VITALS
RESPIRATION RATE: 16 BRPM | OXYGEN SATURATION: 99 % | HEART RATE: 73 BPM | BODY MASS INDEX: 27.6 KG/M2 | SYSTOLIC BLOOD PRESSURE: 159 MMHG | TEMPERATURE: 97.7 F | WEIGHT: 189.16 LBS | DIASTOLIC BLOOD PRESSURE: 75 MMHG

## 2023-10-19 LAB
BASOPHILS # BLD AUTO: 0 K/UL — SIGNIFICANT CHANGE UP (ref 0–0.2)
BASOPHILS # BLD AUTO: 0 K/UL — SIGNIFICANT CHANGE UP (ref 0–0.2)
BASOPHILS NFR BLD AUTO: 0 % — SIGNIFICANT CHANGE UP (ref 0–2)
BASOPHILS NFR BLD AUTO: 0 % — SIGNIFICANT CHANGE UP (ref 0–2)
EOSINOPHIL # BLD AUTO: 0.24 K/UL — SIGNIFICANT CHANGE UP (ref 0–0.5)
EOSINOPHIL # BLD AUTO: 0.24 K/UL — SIGNIFICANT CHANGE UP (ref 0–0.5)
EOSINOPHIL NFR BLD AUTO: 2 % — SIGNIFICANT CHANGE UP (ref 0–6)
EOSINOPHIL NFR BLD AUTO: 2 % — SIGNIFICANT CHANGE UP (ref 0–6)
HCT VFR BLD CALC: 30.7 % — LOW (ref 39–50)
HCT VFR BLD CALC: 30.7 % — LOW (ref 39–50)
HGB BLD-MCNC: 10.7 G/DL — LOW (ref 13–17)
HGB BLD-MCNC: 10.7 G/DL — LOW (ref 13–17)
LYMPHOCYTES # BLD AUTO: 0.47 K/UL — LOW (ref 1–3.3)
LYMPHOCYTES # BLD AUTO: 0.47 K/UL — LOW (ref 1–3.3)
LYMPHOCYTES # BLD AUTO: 4 % — LOW (ref 13–44)
LYMPHOCYTES # BLD AUTO: 4 % — LOW (ref 13–44)
MCHC RBC-ENTMCNC: 30 PG — SIGNIFICANT CHANGE UP (ref 27–34)
MCHC RBC-ENTMCNC: 30 PG — SIGNIFICANT CHANGE UP (ref 27–34)
MCHC RBC-ENTMCNC: 34.9 G/DL — SIGNIFICANT CHANGE UP (ref 32–36)
MCHC RBC-ENTMCNC: 34.9 G/DL — SIGNIFICANT CHANGE UP (ref 32–36)
MCV RBC AUTO: 86 FL — SIGNIFICANT CHANGE UP (ref 80–100)
MCV RBC AUTO: 86 FL — SIGNIFICANT CHANGE UP (ref 80–100)
MONOCYTES # BLD AUTO: 0.82 K/UL — SIGNIFICANT CHANGE UP (ref 0–0.9)
MONOCYTES # BLD AUTO: 0.82 K/UL — SIGNIFICANT CHANGE UP (ref 0–0.9)
MONOCYTES NFR BLD AUTO: 7 % — SIGNIFICANT CHANGE UP (ref 2–14)
MONOCYTES NFR BLD AUTO: 7 % — SIGNIFICANT CHANGE UP (ref 2–14)
MYELOCYTES NFR BLD: 2 % — HIGH (ref 0–0)
MYELOCYTES NFR BLD: 2 % — HIGH (ref 0–0)
NEUTROPHILS # BLD AUTO: 10.01 K/UL — HIGH (ref 1.8–7.4)
NEUTROPHILS # BLD AUTO: 10.01 K/UL — HIGH (ref 1.8–7.4)
NEUTROPHILS NFR BLD AUTO: 85 % — HIGH (ref 43–77)
NEUTROPHILS NFR BLD AUTO: 85 % — HIGH (ref 43–77)
NRBC # BLD: 0 /100 — SIGNIFICANT CHANGE UP (ref 0–0)
NRBC # BLD: 0 /100 — SIGNIFICANT CHANGE UP (ref 0–0)
NRBC # BLD: SIGNIFICANT CHANGE UP /100 WBCS (ref 0–0)
NRBC # BLD: SIGNIFICANT CHANGE UP /100 WBCS (ref 0–0)
PLAT MORPH BLD: NORMAL — SIGNIFICANT CHANGE UP
PLAT MORPH BLD: NORMAL — SIGNIFICANT CHANGE UP
PLATELET # BLD AUTO: 84 K/UL — LOW (ref 150–400)
PLATELET # BLD AUTO: 84 K/UL — LOW (ref 150–400)
POLYCHROMASIA BLD QL SMEAR: SLIGHT — SIGNIFICANT CHANGE UP
POLYCHROMASIA BLD QL SMEAR: SLIGHT — SIGNIFICANT CHANGE UP
RBC # BLD: 3.57 M/UL — LOW (ref 4.2–5.8)
RBC # BLD: 3.57 M/UL — LOW (ref 4.2–5.8)
RBC # FLD: 15.2 % — HIGH (ref 10.3–14.5)
RBC # FLD: 15.2 % — HIGH (ref 10.3–14.5)
RBC BLD AUTO: SIGNIFICANT CHANGE UP
RBC BLD AUTO: SIGNIFICANT CHANGE UP
WBC # BLD: 11.78 K/UL — HIGH (ref 3.8–10.5)
WBC # BLD: 11.78 K/UL — HIGH (ref 3.8–10.5)
WBC # FLD AUTO: 11.78 K/UL — HIGH (ref 3.8–10.5)
WBC # FLD AUTO: 11.78 K/UL — HIGH (ref 3.8–10.5)

## 2023-10-19 PROCEDURE — 99213 OFFICE O/P EST LOW 20 MIN: CPT

## 2023-10-19 RX ORDER — INSULIN GLARGINE 100 [IU]/ML
100 INJECTION, SOLUTION SUBCUTANEOUS
Qty: 1 | Refills: 0 | Status: ACTIVE | COMMUNITY
Start: 2023-09-01 | End: 1900-01-01

## 2023-10-25 ENCOUNTER — RESULT REVIEW (OUTPATIENT)
Age: 63
End: 2023-10-25

## 2023-10-25 ENCOUNTER — APPOINTMENT (OUTPATIENT)
Dept: HEMATOLOGY ONCOLOGY | Facility: CLINIC | Age: 63
End: 2023-10-25

## 2023-10-25 LAB
BASOPHILS # BLD AUTO: 0.02 K/UL — SIGNIFICANT CHANGE UP (ref 0–0.2)
BASOPHILS # BLD AUTO: 0.02 K/UL — SIGNIFICANT CHANGE UP (ref 0–0.2)
BASOPHILS NFR BLD AUTO: 0.3 % — SIGNIFICANT CHANGE UP (ref 0–2)
BASOPHILS NFR BLD AUTO: 0.3 % — SIGNIFICANT CHANGE UP (ref 0–2)
EOSINOPHIL # BLD AUTO: 0.14 K/UL — SIGNIFICANT CHANGE UP (ref 0–0.5)
EOSINOPHIL # BLD AUTO: 0.14 K/UL — SIGNIFICANT CHANGE UP (ref 0–0.5)
EOSINOPHIL NFR BLD AUTO: 2.2 % — SIGNIFICANT CHANGE UP (ref 0–6)
EOSINOPHIL NFR BLD AUTO: 2.2 % — SIGNIFICANT CHANGE UP (ref 0–6)
HCT VFR BLD CALC: 32.5 % — LOW (ref 39–50)
HCT VFR BLD CALC: 32.5 % — LOW (ref 39–50)
HGB BLD-MCNC: 11.4 G/DL — LOW (ref 13–17)
HGB BLD-MCNC: 11.4 G/DL — LOW (ref 13–17)
IMM GRANULOCYTES NFR BLD AUTO: 1 % — HIGH (ref 0–0.9)
IMM GRANULOCYTES NFR BLD AUTO: 1 % — HIGH (ref 0–0.9)
LYMPHOCYTES # BLD AUTO: 0.61 K/UL — LOW (ref 1–3.3)
LYMPHOCYTES # BLD AUTO: 0.61 K/UL — LOW (ref 1–3.3)
LYMPHOCYTES # BLD AUTO: 9.7 % — LOW (ref 13–44)
LYMPHOCYTES # BLD AUTO: 9.7 % — LOW (ref 13–44)
MCHC RBC-ENTMCNC: 29.5 PG — SIGNIFICANT CHANGE UP (ref 27–34)
MCHC RBC-ENTMCNC: 29.5 PG — SIGNIFICANT CHANGE UP (ref 27–34)
MCHC RBC-ENTMCNC: 35.1 G/DL — SIGNIFICANT CHANGE UP (ref 32–36)
MCHC RBC-ENTMCNC: 35.1 G/DL — SIGNIFICANT CHANGE UP (ref 32–36)
MCV RBC AUTO: 84.2 FL — SIGNIFICANT CHANGE UP (ref 80–100)
MCV RBC AUTO: 84.2 FL — SIGNIFICANT CHANGE UP (ref 80–100)
MONOCYTES # BLD AUTO: 0.32 K/UL — SIGNIFICANT CHANGE UP (ref 0–0.9)
MONOCYTES # BLD AUTO: 0.32 K/UL — SIGNIFICANT CHANGE UP (ref 0–0.9)
MONOCYTES NFR BLD AUTO: 5.1 % — SIGNIFICANT CHANGE UP (ref 2–14)
MONOCYTES NFR BLD AUTO: 5.1 % — SIGNIFICANT CHANGE UP (ref 2–14)
NEUTROPHILS # BLD AUTO: 5.12 K/UL — SIGNIFICANT CHANGE UP (ref 1.8–7.4)
NEUTROPHILS # BLD AUTO: 5.12 K/UL — SIGNIFICANT CHANGE UP (ref 1.8–7.4)
NEUTROPHILS NFR BLD AUTO: 81.7 % — HIGH (ref 43–77)
NEUTROPHILS NFR BLD AUTO: 81.7 % — HIGH (ref 43–77)
NRBC # BLD: 0 /100 WBCS — SIGNIFICANT CHANGE UP (ref 0–0)
NRBC # BLD: 0 /100 WBCS — SIGNIFICANT CHANGE UP (ref 0–0)
PLATELET # BLD AUTO: 83 K/UL — LOW (ref 150–400)
PLATELET # BLD AUTO: 83 K/UL — LOW (ref 150–400)
RBC # BLD: 3.86 M/UL — LOW (ref 4.2–5.8)
RBC # BLD: 3.86 M/UL — LOW (ref 4.2–5.8)
RBC # FLD: 14.8 % — HIGH (ref 10.3–14.5)
RBC # FLD: 14.8 % — HIGH (ref 10.3–14.5)
WBC # BLD: 6.27 K/UL — SIGNIFICANT CHANGE UP (ref 3.8–10.5)
WBC # BLD: 6.27 K/UL — SIGNIFICANT CHANGE UP (ref 3.8–10.5)
WBC # FLD AUTO: 6.27 K/UL — SIGNIFICANT CHANGE UP (ref 3.8–10.5)
WBC # FLD AUTO: 6.27 K/UL — SIGNIFICANT CHANGE UP (ref 3.8–10.5)

## 2023-11-08 ENCOUNTER — NON-APPOINTMENT (OUTPATIENT)
Age: 63
End: 2023-11-08

## 2023-11-09 ENCOUNTER — RESULT REVIEW (OUTPATIENT)
Age: 63
End: 2023-11-09

## 2023-11-09 ENCOUNTER — APPOINTMENT (OUTPATIENT)
Dept: HEMATOLOGY ONCOLOGY | Facility: CLINIC | Age: 63
End: 2023-11-09

## 2023-11-09 ENCOUNTER — APPOINTMENT (OUTPATIENT)
Dept: INFUSION THERAPY | Facility: HOSPITAL | Age: 63
End: 2023-11-09

## 2023-11-09 LAB
A1C WITH ESTIMATED AVERAGE GLUCOSE RESULT: 6.8 % — HIGH (ref 4–5.6)
A1C WITH ESTIMATED AVERAGE GLUCOSE RESULT: 6.8 % — HIGH (ref 4–5.6)
ALBUMIN SERPL ELPH-MCNC: 4.8 G/DL — SIGNIFICANT CHANGE UP (ref 3.3–5)
ALBUMIN SERPL ELPH-MCNC: 4.8 G/DL — SIGNIFICANT CHANGE UP (ref 3.3–5)
ALP SERPL-CCNC: 126 U/L — HIGH (ref 40–120)
ALP SERPL-CCNC: 126 U/L — HIGH (ref 40–120)
ALT FLD-CCNC: 55 U/L — HIGH (ref 10–45)
ALT FLD-CCNC: 55 U/L — HIGH (ref 10–45)
ANION GAP SERPL CALC-SCNC: 12 MMOL/L — SIGNIFICANT CHANGE UP (ref 5–17)
ANION GAP SERPL CALC-SCNC: 12 MMOL/L — SIGNIFICANT CHANGE UP (ref 5–17)
AST SERPL-CCNC: 47 U/L — HIGH (ref 10–40)
AST SERPL-CCNC: 47 U/L — HIGH (ref 10–40)
BASOPHILS # BLD AUTO: 0.02 K/UL — SIGNIFICANT CHANGE UP (ref 0–0.2)
BASOPHILS # BLD AUTO: 0.02 K/UL — SIGNIFICANT CHANGE UP (ref 0–0.2)
BASOPHILS NFR BLD AUTO: 0.7 % — SIGNIFICANT CHANGE UP (ref 0–2)
BASOPHILS NFR BLD AUTO: 0.7 % — SIGNIFICANT CHANGE UP (ref 0–2)
BILIRUB SERPL-MCNC: 0.5 MG/DL — SIGNIFICANT CHANGE UP (ref 0.2–1.2)
BILIRUB SERPL-MCNC: 0.5 MG/DL — SIGNIFICANT CHANGE UP (ref 0.2–1.2)
BUN SERPL-MCNC: 15 MG/DL — SIGNIFICANT CHANGE UP (ref 7–23)
BUN SERPL-MCNC: 15 MG/DL — SIGNIFICANT CHANGE UP (ref 7–23)
CALCIUM SERPL-MCNC: 9.6 MG/DL — SIGNIFICANT CHANGE UP (ref 8.4–10.5)
CALCIUM SERPL-MCNC: 9.6 MG/DL — SIGNIFICANT CHANGE UP (ref 8.4–10.5)
CHLORIDE SERPL-SCNC: 103 MMOL/L — SIGNIFICANT CHANGE UP (ref 96–108)
CHLORIDE SERPL-SCNC: 103 MMOL/L — SIGNIFICANT CHANGE UP (ref 96–108)
CO2 SERPL-SCNC: 26 MMOL/L — SIGNIFICANT CHANGE UP (ref 22–31)
CO2 SERPL-SCNC: 26 MMOL/L — SIGNIFICANT CHANGE UP (ref 22–31)
CREAT SERPL-MCNC: 1.03 MG/DL — SIGNIFICANT CHANGE UP (ref 0.5–1.3)
CREAT SERPL-MCNC: 1.03 MG/DL — SIGNIFICANT CHANGE UP (ref 0.5–1.3)
DACRYOCYTES BLD QL SMEAR: SLIGHT — SIGNIFICANT CHANGE UP
DACRYOCYTES BLD QL SMEAR: SLIGHT — SIGNIFICANT CHANGE UP
EGFR: 83 ML/MIN/1.73M2 — SIGNIFICANT CHANGE UP
EGFR: 83 ML/MIN/1.73M2 — SIGNIFICANT CHANGE UP
EOSINOPHIL # BLD AUTO: 0.08 K/UL — SIGNIFICANT CHANGE UP (ref 0–0.5)
EOSINOPHIL # BLD AUTO: 0.08 K/UL — SIGNIFICANT CHANGE UP (ref 0–0.5)
EOSINOPHIL NFR BLD AUTO: 2.8 % — SIGNIFICANT CHANGE UP (ref 0–6)
EOSINOPHIL NFR BLD AUTO: 2.8 % — SIGNIFICANT CHANGE UP (ref 0–6)
ESTIMATED AVERAGE GLUCOSE: 148 MG/DL — HIGH (ref 68–114)
ESTIMATED AVERAGE GLUCOSE: 148 MG/DL — HIGH (ref 68–114)
GLUCOSE SERPL-MCNC: 151 MG/DL — HIGH (ref 70–99)
GLUCOSE SERPL-MCNC: 151 MG/DL — HIGH (ref 70–99)
HCT VFR BLD CALC: 34.1 % — LOW (ref 39–50)
HCT VFR BLD CALC: 34.1 % — LOW (ref 39–50)
HGB BLD-MCNC: 12.1 G/DL — LOW (ref 13–17)
HGB BLD-MCNC: 12.1 G/DL — LOW (ref 13–17)
IMM GRANULOCYTES NFR BLD AUTO: 0.4 % — SIGNIFICANT CHANGE UP (ref 0–0.9)
IMM GRANULOCYTES NFR BLD AUTO: 0.4 % — SIGNIFICANT CHANGE UP (ref 0–0.9)
LDH SERPL L TO P-CCNC: 281 U/L — HIGH (ref 50–242)
LDH SERPL L TO P-CCNC: 281 U/L — HIGH (ref 50–242)
LYMPHOCYTES # BLD AUTO: 0.43 K/UL — LOW (ref 1–3.3)
LYMPHOCYTES # BLD AUTO: 0.43 K/UL — LOW (ref 1–3.3)
LYMPHOCYTES # BLD AUTO: 15.1 % — SIGNIFICANT CHANGE UP (ref 13–44)
LYMPHOCYTES # BLD AUTO: 15.1 % — SIGNIFICANT CHANGE UP (ref 13–44)
MCHC RBC-ENTMCNC: 29.6 PG — SIGNIFICANT CHANGE UP (ref 27–34)
MCHC RBC-ENTMCNC: 29.6 PG — SIGNIFICANT CHANGE UP (ref 27–34)
MCHC RBC-ENTMCNC: 35.5 G/DL — SIGNIFICANT CHANGE UP (ref 32–36)
MCHC RBC-ENTMCNC: 35.5 G/DL — SIGNIFICANT CHANGE UP (ref 32–36)
MCV RBC AUTO: 83.4 FL — SIGNIFICANT CHANGE UP (ref 80–100)
MCV RBC AUTO: 83.4 FL — SIGNIFICANT CHANGE UP (ref 80–100)
MONOCYTES # BLD AUTO: 0.26 K/UL — SIGNIFICANT CHANGE UP (ref 0–0.9)
MONOCYTES # BLD AUTO: 0.26 K/UL — SIGNIFICANT CHANGE UP (ref 0–0.9)
MONOCYTES NFR BLD AUTO: 9.1 % — SIGNIFICANT CHANGE UP (ref 2–14)
MONOCYTES NFR BLD AUTO: 9.1 % — SIGNIFICANT CHANGE UP (ref 2–14)
NEUTROPHILS # BLD AUTO: 2.05 K/UL — SIGNIFICANT CHANGE UP (ref 1.8–7.4)
NEUTROPHILS # BLD AUTO: 2.05 K/UL — SIGNIFICANT CHANGE UP (ref 1.8–7.4)
NEUTROPHILS NFR BLD AUTO: 71.9 % — SIGNIFICANT CHANGE UP (ref 43–77)
NEUTROPHILS NFR BLD AUTO: 71.9 % — SIGNIFICANT CHANGE UP (ref 43–77)
NRBC # BLD: 0 /100 WBCS — SIGNIFICANT CHANGE UP (ref 0–0)
NRBC # BLD: 0 /100 WBCS — SIGNIFICANT CHANGE UP (ref 0–0)
PLAT MORPH BLD: NORMAL — SIGNIFICANT CHANGE UP
PLAT MORPH BLD: NORMAL — SIGNIFICANT CHANGE UP
PLATELET # BLD AUTO: 95 K/UL — LOW (ref 150–400)
PLATELET # BLD AUTO: 95 K/UL — LOW (ref 150–400)
POIKILOCYTOSIS BLD QL AUTO: SLIGHT — SIGNIFICANT CHANGE UP
POIKILOCYTOSIS BLD QL AUTO: SLIGHT — SIGNIFICANT CHANGE UP
POTASSIUM SERPL-MCNC: 4.1 MMOL/L — SIGNIFICANT CHANGE UP (ref 3.5–5.3)
POTASSIUM SERPL-MCNC: 4.1 MMOL/L — SIGNIFICANT CHANGE UP (ref 3.5–5.3)
POTASSIUM SERPL-SCNC: 4.1 MMOL/L — SIGNIFICANT CHANGE UP (ref 3.5–5.3)
POTASSIUM SERPL-SCNC: 4.1 MMOL/L — SIGNIFICANT CHANGE UP (ref 3.5–5.3)
PROT SERPL-MCNC: 7.9 G/DL — SIGNIFICANT CHANGE UP (ref 6–8.3)
PROT SERPL-MCNC: 7.9 G/DL — SIGNIFICANT CHANGE UP (ref 6–8.3)
RBC # BLD: 4.09 M/UL — LOW (ref 4.2–5.8)
RBC # BLD: 4.09 M/UL — LOW (ref 4.2–5.8)
RBC # FLD: 14.5 % — SIGNIFICANT CHANGE UP (ref 10.3–14.5)
RBC # FLD: 14.5 % — SIGNIFICANT CHANGE UP (ref 10.3–14.5)
RBC BLD AUTO: ABNORMAL
RBC BLD AUTO: ABNORMAL
SCHISTOCYTES BLD QL AUTO: SLIGHT — SIGNIFICANT CHANGE UP
SCHISTOCYTES BLD QL AUTO: SLIGHT — SIGNIFICANT CHANGE UP
SODIUM SERPL-SCNC: 141 MMOL/L — SIGNIFICANT CHANGE UP (ref 135–145)
SODIUM SERPL-SCNC: 141 MMOL/L — SIGNIFICANT CHANGE UP (ref 135–145)
WBC # BLD: 2.85 K/UL — LOW (ref 3.8–10.5)
WBC # BLD: 2.85 K/UL — LOW (ref 3.8–10.5)
WBC # FLD AUTO: 2.85 K/UL — LOW (ref 3.8–10.5)
WBC # FLD AUTO: 2.85 K/UL — LOW (ref 3.8–10.5)

## 2023-11-10 ENCOUNTER — APPOINTMENT (OUTPATIENT)
Dept: INFUSION THERAPY | Facility: HOSPITAL | Age: 63
End: 2023-11-10

## 2023-11-15 ENCOUNTER — OUTPATIENT (OUTPATIENT)
Dept: OUTPATIENT SERVICES | Facility: HOSPITAL | Age: 63
LOS: 1 days | Discharge: HOME | End: 2023-11-15

## 2023-11-15 DIAGNOSIS — C83.00 SMALL CELL B-CELL LYMPHOMA, UNSPECIFIED SITE: ICD-10-CM

## 2023-11-16 ENCOUNTER — APPOINTMENT (OUTPATIENT)
Dept: HEMATOLOGY ONCOLOGY | Facility: CLINIC | Age: 63
End: 2023-11-16

## 2023-11-21 ENCOUNTER — APPOINTMENT (OUTPATIENT)
Dept: HEMATOLOGY ONCOLOGY | Facility: CLINIC | Age: 63
End: 2023-11-21
Payer: COMMERCIAL

## 2023-11-21 ENCOUNTER — RESULT REVIEW (OUTPATIENT)
Age: 63
End: 2023-11-21

## 2023-11-21 VITALS
OXYGEN SATURATION: 98 % | DIASTOLIC BLOOD PRESSURE: 76 MMHG | SYSTOLIC BLOOD PRESSURE: 143 MMHG | TEMPERATURE: 98.6 F | WEIGHT: 191.8 LBS | RESPIRATION RATE: 16 BRPM | HEART RATE: 74 BPM | BODY MASS INDEX: 27.99 KG/M2

## 2023-11-21 LAB
BASOPHILS # BLD AUTO: 0.02 K/UL — SIGNIFICANT CHANGE UP (ref 0–0.2)
BASOPHILS # BLD AUTO: 0.02 K/UL — SIGNIFICANT CHANGE UP (ref 0–0.2)
BASOPHILS NFR BLD AUTO: 0.4 % — SIGNIFICANT CHANGE UP (ref 0–2)
BASOPHILS NFR BLD AUTO: 0.4 % — SIGNIFICANT CHANGE UP (ref 0–2)
EOSINOPHIL # BLD AUTO: 0.08 K/UL — SIGNIFICANT CHANGE UP (ref 0–0.5)
EOSINOPHIL # BLD AUTO: 0.08 K/UL — SIGNIFICANT CHANGE UP (ref 0–0.5)
EOSINOPHIL NFR BLD AUTO: 1.5 % — SIGNIFICANT CHANGE UP (ref 0–6)
EOSINOPHIL NFR BLD AUTO: 1.5 % — SIGNIFICANT CHANGE UP (ref 0–6)
HCT VFR BLD CALC: 34 % — LOW (ref 39–50)
HCT VFR BLD CALC: 34 % — LOW (ref 39–50)
HGB BLD-MCNC: 11.8 G/DL — LOW (ref 13–17)
HGB BLD-MCNC: 11.8 G/DL — LOW (ref 13–17)
IMM GRANULOCYTES NFR BLD AUTO: 1.5 % — HIGH (ref 0–0.9)
IMM GRANULOCYTES NFR BLD AUTO: 1.5 % — HIGH (ref 0–0.9)
LYMPHOCYTES # BLD AUTO: 0.43 K/UL — LOW (ref 1–3.3)
LYMPHOCYTES # BLD AUTO: 0.43 K/UL — LOW (ref 1–3.3)
LYMPHOCYTES # BLD AUTO: 7.8 % — LOW (ref 13–44)
LYMPHOCYTES # BLD AUTO: 7.8 % — LOW (ref 13–44)
MCHC RBC-ENTMCNC: 29.1 PG — SIGNIFICANT CHANGE UP (ref 27–34)
MCHC RBC-ENTMCNC: 29.1 PG — SIGNIFICANT CHANGE UP (ref 27–34)
MCHC RBC-ENTMCNC: 34.7 G/DL — SIGNIFICANT CHANGE UP (ref 32–36)
MCHC RBC-ENTMCNC: 34.7 G/DL — SIGNIFICANT CHANGE UP (ref 32–36)
MCV RBC AUTO: 84 FL — SIGNIFICANT CHANGE UP (ref 80–100)
MCV RBC AUTO: 84 FL — SIGNIFICANT CHANGE UP (ref 80–100)
MONOCYTES # BLD AUTO: 0.41 K/UL — SIGNIFICANT CHANGE UP (ref 0–0.9)
MONOCYTES # BLD AUTO: 0.41 K/UL — SIGNIFICANT CHANGE UP (ref 0–0.9)
MONOCYTES NFR BLD AUTO: 7.5 % — SIGNIFICANT CHANGE UP (ref 2–14)
MONOCYTES NFR BLD AUTO: 7.5 % — SIGNIFICANT CHANGE UP (ref 2–14)
NEUTROPHILS # BLD AUTO: 4.47 K/UL — SIGNIFICANT CHANGE UP (ref 1.8–7.4)
NEUTROPHILS # BLD AUTO: 4.47 K/UL — SIGNIFICANT CHANGE UP (ref 1.8–7.4)
NEUTROPHILS NFR BLD AUTO: 81.3 % — HIGH (ref 43–77)
NEUTROPHILS NFR BLD AUTO: 81.3 % — HIGH (ref 43–77)
NRBC # BLD: 0 /100 WBCS — SIGNIFICANT CHANGE UP (ref 0–0)
NRBC # BLD: 0 /100 WBCS — SIGNIFICANT CHANGE UP (ref 0–0)
PLATELET # BLD AUTO: 66 K/UL — LOW (ref 150–400)
PLATELET # BLD AUTO: 66 K/UL — LOW (ref 150–400)
RBC # BLD: 4.05 M/UL — LOW (ref 4.2–5.8)
RBC # BLD: 4.05 M/UL — LOW (ref 4.2–5.8)
RBC # FLD: 14.4 % — SIGNIFICANT CHANGE UP (ref 10.3–14.5)
RBC # FLD: 14.4 % — SIGNIFICANT CHANGE UP (ref 10.3–14.5)
WBC # BLD: 5.49 K/UL — SIGNIFICANT CHANGE UP (ref 3.8–10.5)
WBC # BLD: 5.49 K/UL — SIGNIFICANT CHANGE UP (ref 3.8–10.5)
WBC # FLD AUTO: 5.49 K/UL — SIGNIFICANT CHANGE UP (ref 3.8–10.5)
WBC # FLD AUTO: 5.49 K/UL — SIGNIFICANT CHANGE UP (ref 3.8–10.5)

## 2023-11-21 PROCEDURE — 99213 OFFICE O/P EST LOW 20 MIN: CPT

## 2023-12-07 ENCOUNTER — APPOINTMENT (OUTPATIENT)
Dept: HEMATOLOGY ONCOLOGY | Facility: CLINIC | Age: 63
End: 2023-12-07

## 2023-12-07 ENCOUNTER — RESULT REVIEW (OUTPATIENT)
Age: 63
End: 2023-12-07

## 2023-12-07 ENCOUNTER — APPOINTMENT (OUTPATIENT)
Dept: INFUSION THERAPY | Facility: HOSPITAL | Age: 63
End: 2023-12-07

## 2023-12-07 LAB
A1C WITH ESTIMATED AVERAGE GLUCOSE RESULT: 7.2 % — HIGH (ref 4–5.6)
A1C WITH ESTIMATED AVERAGE GLUCOSE RESULT: 7.2 % — HIGH (ref 4–5.6)
ALBUMIN SERPL ELPH-MCNC: 4 G/DL — SIGNIFICANT CHANGE UP (ref 3.3–5)
ALBUMIN SERPL ELPH-MCNC: 4 G/DL — SIGNIFICANT CHANGE UP (ref 3.3–5)
ALP SERPL-CCNC: 131 U/L — HIGH (ref 40–120)
ALP SERPL-CCNC: 131 U/L — HIGH (ref 40–120)
ALT FLD-CCNC: 39 U/L — SIGNIFICANT CHANGE UP (ref 10–45)
ALT FLD-CCNC: 39 U/L — SIGNIFICANT CHANGE UP (ref 10–45)
ANION GAP SERPL CALC-SCNC: 9 MMOL/L — SIGNIFICANT CHANGE UP (ref 5–17)
ANION GAP SERPL CALC-SCNC: 9 MMOL/L — SIGNIFICANT CHANGE UP (ref 5–17)
AST SERPL-CCNC: 35 U/L — SIGNIFICANT CHANGE UP (ref 10–40)
AST SERPL-CCNC: 35 U/L — SIGNIFICANT CHANGE UP (ref 10–40)
BASOPHILS # BLD AUTO: 0.02 K/UL — SIGNIFICANT CHANGE UP (ref 0–0.2)
BASOPHILS # BLD AUTO: 0.02 K/UL — SIGNIFICANT CHANGE UP (ref 0–0.2)
BASOPHILS NFR BLD AUTO: 0.7 % — SIGNIFICANT CHANGE UP (ref 0–2)
BASOPHILS NFR BLD AUTO: 0.7 % — SIGNIFICANT CHANGE UP (ref 0–2)
BILIRUB SERPL-MCNC: 0.3 MG/DL — SIGNIFICANT CHANGE UP (ref 0.2–1.2)
BILIRUB SERPL-MCNC: 0.3 MG/DL — SIGNIFICANT CHANGE UP (ref 0.2–1.2)
BUN SERPL-MCNC: 17 MG/DL — SIGNIFICANT CHANGE UP (ref 7–23)
BUN SERPL-MCNC: 17 MG/DL — SIGNIFICANT CHANGE UP (ref 7–23)
CALCIUM SERPL-MCNC: 8.8 MG/DL — SIGNIFICANT CHANGE UP (ref 8.4–10.5)
CALCIUM SERPL-MCNC: 8.8 MG/DL — SIGNIFICANT CHANGE UP (ref 8.4–10.5)
CHLORIDE SERPL-SCNC: 102 MMOL/L — SIGNIFICANT CHANGE UP (ref 96–108)
CHLORIDE SERPL-SCNC: 102 MMOL/L — SIGNIFICANT CHANGE UP (ref 96–108)
CO2 SERPL-SCNC: 27 MMOL/L — SIGNIFICANT CHANGE UP (ref 22–31)
CO2 SERPL-SCNC: 27 MMOL/L — SIGNIFICANT CHANGE UP (ref 22–31)
CREAT SERPL-MCNC: 0.98 MG/DL — SIGNIFICANT CHANGE UP (ref 0.5–1.3)
CREAT SERPL-MCNC: 0.98 MG/DL — SIGNIFICANT CHANGE UP (ref 0.5–1.3)
DACRYOCYTES BLD QL SMEAR: SLIGHT — SIGNIFICANT CHANGE UP
DACRYOCYTES BLD QL SMEAR: SLIGHT — SIGNIFICANT CHANGE UP
EGFR: 88 ML/MIN/1.73M2 — SIGNIFICANT CHANGE UP
EGFR: 88 ML/MIN/1.73M2 — SIGNIFICANT CHANGE UP
EOSINOPHIL # BLD AUTO: 0.1 K/UL — SIGNIFICANT CHANGE UP (ref 0–0.5)
EOSINOPHIL # BLD AUTO: 0.1 K/UL — SIGNIFICANT CHANGE UP (ref 0–0.5)
EOSINOPHIL NFR BLD AUTO: 3.7 % — SIGNIFICANT CHANGE UP (ref 0–6)
EOSINOPHIL NFR BLD AUTO: 3.7 % — SIGNIFICANT CHANGE UP (ref 0–6)
ESTIMATED AVERAGE GLUCOSE: 160 MG/DL — HIGH (ref 68–114)
ESTIMATED AVERAGE GLUCOSE: 160 MG/DL — HIGH (ref 68–114)
GLUCOSE SERPL-MCNC: 270 MG/DL — HIGH (ref 70–99)
GLUCOSE SERPL-MCNC: 270 MG/DL — HIGH (ref 70–99)
HCT VFR BLD CALC: 31.5 % — LOW (ref 39–50)
HCT VFR BLD CALC: 31.5 % — LOW (ref 39–50)
HGB BLD-MCNC: 11.2 G/DL — LOW (ref 13–17)
HGB BLD-MCNC: 11.2 G/DL — LOW (ref 13–17)
IMM GRANULOCYTES NFR BLD AUTO: 1.5 % — HIGH (ref 0–0.9)
IMM GRANULOCYTES NFR BLD AUTO: 1.5 % — HIGH (ref 0–0.9)
LDH SERPL L TO P-CCNC: 177 U/L — SIGNIFICANT CHANGE UP (ref 50–242)
LDH SERPL L TO P-CCNC: 177 U/L — SIGNIFICANT CHANGE UP (ref 50–242)
LYMPHOCYTES # BLD AUTO: 0.33 K/UL — LOW (ref 1–3.3)
LYMPHOCYTES # BLD AUTO: 0.33 K/UL — LOW (ref 1–3.3)
LYMPHOCYTES # BLD AUTO: 12.1 % — LOW (ref 13–44)
LYMPHOCYTES # BLD AUTO: 12.1 % — LOW (ref 13–44)
MCHC RBC-ENTMCNC: 29.2 PG — SIGNIFICANT CHANGE UP (ref 27–34)
MCHC RBC-ENTMCNC: 29.2 PG — SIGNIFICANT CHANGE UP (ref 27–34)
MCHC RBC-ENTMCNC: 35.6 G/DL — SIGNIFICANT CHANGE UP (ref 32–36)
MCHC RBC-ENTMCNC: 35.6 G/DL — SIGNIFICANT CHANGE UP (ref 32–36)
MCV RBC AUTO: 82 FL — SIGNIFICANT CHANGE UP (ref 80–100)
MCV RBC AUTO: 82 FL — SIGNIFICANT CHANGE UP (ref 80–100)
MONOCYTES # BLD AUTO: 0.21 K/UL — SIGNIFICANT CHANGE UP (ref 0–0.9)
MONOCYTES # BLD AUTO: 0.21 K/UL — SIGNIFICANT CHANGE UP (ref 0–0.9)
MONOCYTES NFR BLD AUTO: 7.7 % — SIGNIFICANT CHANGE UP (ref 2–14)
MONOCYTES NFR BLD AUTO: 7.7 % — SIGNIFICANT CHANGE UP (ref 2–14)
NEUTROPHILS # BLD AUTO: 2.03 K/UL — SIGNIFICANT CHANGE UP (ref 1.8–7.4)
NEUTROPHILS # BLD AUTO: 2.03 K/UL — SIGNIFICANT CHANGE UP (ref 1.8–7.4)
NEUTROPHILS NFR BLD AUTO: 74.3 % — SIGNIFICANT CHANGE UP (ref 43–77)
NEUTROPHILS NFR BLD AUTO: 74.3 % — SIGNIFICANT CHANGE UP (ref 43–77)
NRBC # BLD: 0 /100 WBCS — SIGNIFICANT CHANGE UP (ref 0–0)
NRBC # BLD: 0 /100 WBCS — SIGNIFICANT CHANGE UP (ref 0–0)
PLAT MORPH BLD: NORMAL — SIGNIFICANT CHANGE UP
PLAT MORPH BLD: NORMAL — SIGNIFICANT CHANGE UP
PLATELET # BLD AUTO: 117 K/UL — LOW (ref 150–400)
PLATELET # BLD AUTO: 117 K/UL — LOW (ref 150–400)
POIKILOCYTOSIS BLD QL AUTO: SLIGHT — SIGNIFICANT CHANGE UP
POIKILOCYTOSIS BLD QL AUTO: SLIGHT — SIGNIFICANT CHANGE UP
POTASSIUM SERPL-MCNC: 4.4 MMOL/L — SIGNIFICANT CHANGE UP (ref 3.5–5.3)
POTASSIUM SERPL-MCNC: 4.4 MMOL/L — SIGNIFICANT CHANGE UP (ref 3.5–5.3)
POTASSIUM SERPL-SCNC: 4.4 MMOL/L — SIGNIFICANT CHANGE UP (ref 3.5–5.3)
POTASSIUM SERPL-SCNC: 4.4 MMOL/L — SIGNIFICANT CHANGE UP (ref 3.5–5.3)
PROT SERPL-MCNC: 6.6 G/DL — SIGNIFICANT CHANGE UP (ref 6–8.3)
PROT SERPL-MCNC: 6.6 G/DL — SIGNIFICANT CHANGE UP (ref 6–8.3)
RBC # BLD: 3.84 M/UL — LOW (ref 4.2–5.8)
RBC # BLD: 3.84 M/UL — LOW (ref 4.2–5.8)
RBC # FLD: 14.2 % — SIGNIFICANT CHANGE UP (ref 10.3–14.5)
RBC # FLD: 14.2 % — SIGNIFICANT CHANGE UP (ref 10.3–14.5)
RBC BLD AUTO: ABNORMAL
RBC BLD AUTO: ABNORMAL
SODIUM SERPL-SCNC: 139 MMOL/L — SIGNIFICANT CHANGE UP (ref 135–145)
SODIUM SERPL-SCNC: 139 MMOL/L — SIGNIFICANT CHANGE UP (ref 135–145)
WBC # BLD: 2.73 K/UL — LOW (ref 3.8–10.5)
WBC # BLD: 2.73 K/UL — LOW (ref 3.8–10.5)
WBC # FLD AUTO: 2.73 K/UL — LOW (ref 3.8–10.5)
WBC # FLD AUTO: 2.73 K/UL — LOW (ref 3.8–10.5)

## 2023-12-07 RX ORDER — DOXYCYCLINE HYCLATE 100 MG/1
100 TABLET ORAL
Qty: 14 | Refills: 0 | Status: DISCONTINUED | COMMUNITY
Start: 2023-11-21 | End: 2023-12-07

## 2023-12-08 ENCOUNTER — APPOINTMENT (OUTPATIENT)
Dept: INFUSION THERAPY | Facility: HOSPITAL | Age: 63
End: 2023-12-08

## 2023-12-08 DIAGNOSIS — Z51.11 ENCOUNTER FOR ANTINEOPLASTIC CHEMOTHERAPY: ICD-10-CM

## 2023-12-08 DIAGNOSIS — R11.2 NAUSEA WITH VOMITING, UNSPECIFIED: ICD-10-CM

## 2023-12-11 DIAGNOSIS — Z51.89 ENCOUNTER FOR OTHER SPECIFIED AFTERCARE: ICD-10-CM

## 2023-12-19 ENCOUNTER — RESULT REVIEW (OUTPATIENT)
Age: 63
End: 2023-12-19

## 2023-12-19 ENCOUNTER — APPOINTMENT (OUTPATIENT)
Dept: HEMATOLOGY ONCOLOGY | Facility: CLINIC | Age: 63
End: 2023-12-19
Payer: COMMERCIAL

## 2023-12-19 VITALS
OXYGEN SATURATION: 99 % | HEART RATE: 71 BPM | WEIGHT: 195.99 LBS | RESPIRATION RATE: 16 BRPM | TEMPERATURE: 97.2 F | DIASTOLIC BLOOD PRESSURE: 81 MMHG | SYSTOLIC BLOOD PRESSURE: 128 MMHG | BODY MASS INDEX: 28.6 KG/M2

## 2023-12-19 LAB
BASOPHILS # BLD AUTO: 0.02 K/UL — SIGNIFICANT CHANGE UP (ref 0–0.2)
BASOPHILS # BLD AUTO: 0.02 K/UL — SIGNIFICANT CHANGE UP (ref 0–0.2)
BASOPHILS NFR BLD AUTO: 0.3 % — SIGNIFICANT CHANGE UP (ref 0–2)
BASOPHILS NFR BLD AUTO: 0.3 % — SIGNIFICANT CHANGE UP (ref 0–2)
EOSINOPHIL # BLD AUTO: 0.11 K/UL — SIGNIFICANT CHANGE UP (ref 0–0.5)
EOSINOPHIL # BLD AUTO: 0.11 K/UL — SIGNIFICANT CHANGE UP (ref 0–0.5)
EOSINOPHIL NFR BLD AUTO: 1.5 % — SIGNIFICANT CHANGE UP (ref 0–6)
EOSINOPHIL NFR BLD AUTO: 1.5 % — SIGNIFICANT CHANGE UP (ref 0–6)
HCT VFR BLD CALC: 32.2 % — LOW (ref 39–50)
HCT VFR BLD CALC: 32.2 % — LOW (ref 39–50)
HGB BLD-MCNC: 11.2 G/DL — LOW (ref 13–17)
HGB BLD-MCNC: 11.2 G/DL — LOW (ref 13–17)
IMM GRANULOCYTES NFR BLD AUTO: 1.2 % — HIGH (ref 0–0.9)
IMM GRANULOCYTES NFR BLD AUTO: 1.2 % — HIGH (ref 0–0.9)
LYMPHOCYTES # BLD AUTO: 0.43 K/UL — LOW (ref 1–3.3)
LYMPHOCYTES # BLD AUTO: 0.43 K/UL — LOW (ref 1–3.3)
LYMPHOCYTES # BLD AUTO: 5.9 % — LOW (ref 13–44)
LYMPHOCYTES # BLD AUTO: 5.9 % — LOW (ref 13–44)
MCHC RBC-ENTMCNC: 28.9 PG — SIGNIFICANT CHANGE UP (ref 27–34)
MCHC RBC-ENTMCNC: 28.9 PG — SIGNIFICANT CHANGE UP (ref 27–34)
MCHC RBC-ENTMCNC: 34.8 G/DL — SIGNIFICANT CHANGE UP (ref 32–36)
MCHC RBC-ENTMCNC: 34.8 G/DL — SIGNIFICANT CHANGE UP (ref 32–36)
MCV RBC AUTO: 83.2 FL — SIGNIFICANT CHANGE UP (ref 80–100)
MCV RBC AUTO: 83.2 FL — SIGNIFICANT CHANGE UP (ref 80–100)
MONOCYTES # BLD AUTO: 0.33 K/UL — SIGNIFICANT CHANGE UP (ref 0–0.9)
MONOCYTES # BLD AUTO: 0.33 K/UL — SIGNIFICANT CHANGE UP (ref 0–0.9)
MONOCYTES NFR BLD AUTO: 4.5 % — SIGNIFICANT CHANGE UP (ref 2–14)
MONOCYTES NFR BLD AUTO: 4.5 % — SIGNIFICANT CHANGE UP (ref 2–14)
NEUTROPHILS # BLD AUTO: 6.28 K/UL — SIGNIFICANT CHANGE UP (ref 1.8–7.4)
NEUTROPHILS # BLD AUTO: 6.28 K/UL — SIGNIFICANT CHANGE UP (ref 1.8–7.4)
NEUTROPHILS NFR BLD AUTO: 86.6 % — HIGH (ref 43–77)
NEUTROPHILS NFR BLD AUTO: 86.6 % — HIGH (ref 43–77)
NRBC # BLD: 0 /100 WBCS — SIGNIFICANT CHANGE UP (ref 0–0)
NRBC # BLD: 0 /100 WBCS — SIGNIFICANT CHANGE UP (ref 0–0)
PLATELET # BLD AUTO: 64 K/UL — LOW (ref 150–400)
PLATELET # BLD AUTO: 64 K/UL — LOW (ref 150–400)
RBC # BLD: 3.87 M/UL — LOW (ref 4.2–5.8)
RBC # BLD: 3.87 M/UL — LOW (ref 4.2–5.8)
RBC # FLD: 15.2 % — HIGH (ref 10.3–14.5)
RBC # FLD: 15.2 % — HIGH (ref 10.3–14.5)
WBC # BLD: 7.26 K/UL — SIGNIFICANT CHANGE UP (ref 3.8–10.5)
WBC # BLD: 7.26 K/UL — SIGNIFICANT CHANGE UP (ref 3.8–10.5)
WBC # FLD AUTO: 7.26 K/UL — SIGNIFICANT CHANGE UP (ref 3.8–10.5)
WBC # FLD AUTO: 7.26 K/UL — SIGNIFICANT CHANGE UP (ref 3.8–10.5)

## 2023-12-19 PROCEDURE — 99214 OFFICE O/P EST MOD 30 MIN: CPT

## 2023-12-19 RX ORDER — OMEPRAZOLE 20 MG/1
20 CAPSULE, DELAYED RELEASE ORAL
Qty: 60 | Refills: 3 | Status: DISCONTINUED | COMMUNITY
Start: 2023-08-18 | End: 2023-12-19

## 2023-12-19 RX ORDER — CEPHALEXIN 500 MG/1
500 CAPSULE ORAL
Qty: 14 | Refills: 0 | Status: DISCONTINUED | COMMUNITY
Start: 2023-12-07 | End: 2023-12-19

## 2024-01-11 RX ORDER — MUPIROCIN 20 MG/G
2 OINTMENT TOPICAL
Qty: 22 | Refills: 2 | Status: ACTIVE | COMMUNITY
Start: 2024-01-11 | End: 1900-01-01

## 2024-01-18 ENCOUNTER — OUTPATIENT (OUTPATIENT)
Dept: OUTPATIENT SERVICES | Facility: HOSPITAL | Age: 64
LOS: 1 days | End: 2024-01-18
Payer: COMMERCIAL

## 2024-01-18 ENCOUNTER — APPOINTMENT (OUTPATIENT)
Dept: NUCLEAR MEDICINE | Facility: CLINIC | Age: 64
End: 2024-01-18

## 2024-01-18 DIAGNOSIS — C83.00 SMALL CELL B-CELL LYMPHOMA, UNSPECIFIED SITE: ICD-10-CM

## 2024-01-18 PROCEDURE — 78815 PET IMAGE W/CT SKULL-THIGH: CPT | Mod: 26,PS

## 2024-01-18 RX ORDER — INSULIN ASPART 100 [IU]/ML
100 INJECTION, SOLUTION INTRAVENOUS; SUBCUTANEOUS
Qty: 1 | Refills: 2 | Status: ACTIVE | COMMUNITY
Start: 2023-08-17 | End: 1900-01-01

## 2024-01-18 RX ORDER — BLOOD SUGAR DIAGNOSTIC
STRIP MISCELLANEOUS
Qty: 100 | Refills: 0 | Status: ACTIVE | COMMUNITY
Start: 2023-09-06 | End: 1900-01-01

## 2024-01-19 RX ORDER — INSULIN GLARGINE 100 [IU]/ML
100 INJECTION, SOLUTION SUBCUTANEOUS
Qty: 1 | Refills: 4 | Status: DISCONTINUED | COMMUNITY
Start: 2024-01-18 | End: 2024-01-19

## 2024-01-19 RX ORDER — MUPIROCIN 20 MG/G
2 OINTMENT TOPICAL
Qty: 1 | Refills: 2 | Status: DISCONTINUED | COMMUNITY
Start: 2023-11-21 | End: 2024-01-19

## 2024-01-24 NOTE — HISTORY OF PRESENT ILLNESS
[Disease:__________________________] : Disease: [unfilled] [Cycle: ___] : Cycle: [unfilled] [Day: ___] : Day: [unfilled] [de-identified] : Mr. Louie is a 62 yo man with newly diagnosed B cell lymphoproliferative disorder (plasmacytoid vs marginal zone lymphoma) who presented with\par  massive splenomegaly, marked lymphocytosis and constitutional c/o.\par  He has a h/o IDDM who had not had medical care when he presented at the [facility] ED on 7/2/23 with several day h/o weakness, unsteady gait, malaise \par  and several wks of feverish sensation, night sweats, myalgias, 15-20 lb loss. WBC was 95K, Hgb 5.7, Plt 87K.Haptoglobin was < 20, fibrinogen decr (161),\par  .U/S showed H/S megaly. CT abd/pelvis showed hepatomegaly and massive splenomegaly with evidence of splenic infarcts. Liver showed a possible hemangioma.\par  He was transferred to [facility] for further mgmt and therapy on 7/7/23 after transfusion of 4U PRBC,. BMA/Bx.showed extensive \par  trabecular and paratrabecular involvement with B-cell small lymphocytic lymphoma most consistent with plasmacytoid lymphoma.  Trilineage maturation was seen.  Iron stores were not seen on the biopsy.  Flow showed 66% monotypic kappa B cells, (+) for CD19, CD20, (-) for CD5, CD10, CD23. Cytogenetics showed a very complex karyotype which included 7q.,17, MYC and IgH loci abnormalities.  FISH demonstrated 3 copies of BCL6  (62%), 10% with 3 copies of MYC and 7.5% with 3 copies of MYC along with 2 copies of CEP 8 and IgH. IgM was incr at 1366 with nl IgG, IgA, kappa FLC were incr to 18.37 with a free k/l ratio of 7.15.\par  RON was (-), a cold agglutinin was identified.\par  \par  He received "stepped up"  Rituximab  (7/9 and 7/10) then received Rituximab/Bendamustine at 90 mg/sq m (7/13 and 7/14) . He\par  tolerated treatment. He had a rapid drop in WBC, clearance of PBL w/o metabolic complications other than hyperglycemia which was exacerbated \par  by steroid premedication.  Endocrine was consulted and insulin regimen was adjusted.\par  Hgb was 7.8 prior to d/c and one more unit of PRBC.\par  Synthroid was started for newly diagnosed hypothyroidism.\par  \par  He was d/c'd home 7/15/23.\par  \par   [FreeTextEntry1] : ruxience bendamustine x 2 days with Onpro 12/7/23 [de-identified] : plasmacytoid lymphoma- completed 6 cycles of Ruxience and Bendika tolrated some fatigue denies bruise , bleeding N/V diarrhea or constipation We discussed the use of diet and dietary therapy in the management .  DM - pt states in good control , still has not followed by with PCP or endocrine despites my efforts to assist with a apt.  multiple furuncle under axuilla and back - encouraged to see Derm given antibodtic script previosly.

## 2024-01-24 NOTE — ASSESSMENT
[Palliative] : Goals of care discussed with patient: Palliative [Palliative Care Plan] : not applicable at this time [FreeTextEntry1] : Stage IV plasmacytoid lymphoma with lymphocytosis, hepatosplenomegaly, anemia, thrombocytopenia, extensive marrow involvement. The lymphoma has a highly complex karyotype including abnormalities involving 7q, 17p, MYC, IgH, CEP8 loci. He has had rapid clearance of PB lymphocytes with beginning of spleen response after first cycle of BR. Plan to give six cycles every 4 weeks. CBC results reviewed and d/w pt Remains to be seen if he has a clinically significant cold agglutinin. Follow CBC, IgM M spike, LDH, spleen size, transfusion needs. Continue to follow glucose at home, Endocrine f/u patient told again needs endocrine or PCP follow uj,  Continue Synthroid 112 ug/d. now completed 6 cycles of therapy , will schedule PET/CT scan to assess response to therapy follow up 1 month

## 2024-02-21 ENCOUNTER — OUTPATIENT (OUTPATIENT)
Dept: OUTPATIENT SERVICES | Facility: HOSPITAL | Age: 64
LOS: 1 days | Discharge: HOME | End: 2024-02-21

## 2024-02-21 DIAGNOSIS — C83.00 SMALL CELL B-CELL LYMPHOMA, UNSPECIFIED SITE: ICD-10-CM

## 2024-02-27 ENCOUNTER — RESULT REVIEW (OUTPATIENT)
Age: 64
End: 2024-02-27

## 2024-02-27 ENCOUNTER — APPOINTMENT (OUTPATIENT)
Dept: HEMATOLOGY ONCOLOGY | Facility: CLINIC | Age: 64
End: 2024-02-27

## 2024-02-27 ENCOUNTER — APPOINTMENT (OUTPATIENT)
Dept: HEMATOLOGY ONCOLOGY | Facility: CLINIC | Age: 64
End: 2024-02-27
Payer: COMMERCIAL

## 2024-02-27 VITALS
SYSTOLIC BLOOD PRESSURE: 148 MMHG | HEART RATE: 75 BPM | TEMPERATURE: 99 F | RESPIRATION RATE: 16 BRPM | BODY MASS INDEX: 28.6 KG/M2 | HEIGHT: 69.21 IN | OXYGEN SATURATION: 97 % | DIASTOLIC BLOOD PRESSURE: 75 MMHG | WEIGHT: 195.33 LBS

## 2024-02-27 DIAGNOSIS — D47.2 MONOCLONAL GAMMOPATHY: ICD-10-CM

## 2024-02-27 LAB
BASOPHILS # BLD AUTO: 0.01 K/UL — SIGNIFICANT CHANGE UP (ref 0–0.2)
BASOPHILS NFR BLD AUTO: 0.3 % — SIGNIFICANT CHANGE UP (ref 0–2)
EOSINOPHIL # BLD AUTO: 0.1 K/UL — SIGNIFICANT CHANGE UP (ref 0–0.5)
EOSINOPHIL NFR BLD AUTO: 3.1 % — SIGNIFICANT CHANGE UP (ref 0–6)
HCT VFR BLD CALC: 30.1 % — LOW (ref 39–50)
HGB BLD-MCNC: 10 G/DL — LOW (ref 13–17)
IMM GRANULOCYTES NFR BLD AUTO: 0.6 % — SIGNIFICANT CHANGE UP (ref 0–0.9)
LYMPHOCYTES # BLD AUTO: 0.66 K/UL — LOW (ref 1–3.3)
LYMPHOCYTES # BLD AUTO: 20.2 % — SIGNIFICANT CHANGE UP (ref 13–44)
MCHC RBC-ENTMCNC: 27.7 PG — SIGNIFICANT CHANGE UP (ref 27–34)
MCHC RBC-ENTMCNC: 33.2 G/DL — SIGNIFICANT CHANGE UP (ref 32–36)
MCV RBC AUTO: 83.4 FL — SIGNIFICANT CHANGE UP (ref 80–100)
MONOCYTES # BLD AUTO: 0.33 K/UL — SIGNIFICANT CHANGE UP (ref 0–0.9)
MONOCYTES NFR BLD AUTO: 10.1 % — SIGNIFICANT CHANGE UP (ref 2–14)
NEUTROPHILS # BLD AUTO: 2.14 K/UL — SIGNIFICANT CHANGE UP (ref 1.8–7.4)
NEUTROPHILS NFR BLD AUTO: 65.7 % — SIGNIFICANT CHANGE UP (ref 43–77)
NRBC # BLD: 0 /100 WBCS — SIGNIFICANT CHANGE UP (ref 0–0)
PLATELET # BLD AUTO: 171 K/UL — SIGNIFICANT CHANGE UP (ref 150–400)
RBC # BLD: 3.61 M/UL — LOW (ref 4.2–5.8)
RBC # FLD: 14.6 % — HIGH (ref 10.3–14.5)
WBC # BLD: 3.26 K/UL — LOW (ref 3.8–10.5)
WBC # FLD AUTO: 3.26 K/UL — LOW (ref 3.8–10.5)

## 2024-02-27 PROCEDURE — 99214 OFFICE O/P EST MOD 30 MIN: CPT

## 2024-02-28 ENCOUNTER — APPOINTMENT (OUTPATIENT)
Dept: PULMONOLOGY | Facility: CLINIC | Age: 64
End: 2024-02-28

## 2024-02-28 LAB
ALBUMIN SERPL ELPH-MCNC: 4.4 G/DL
ALP BLD-CCNC: 128 U/L
ALT SERPL-CCNC: 27 U/L
ANION GAP SERPL CALC-SCNC: 14 MMOL/L
AST SERPL-CCNC: 23 U/L
BILIRUB SERPL-MCNC: 0.2 MG/DL
BUN SERPL-MCNC: 15 MG/DL
CALCIUM SERPL-MCNC: 9.6 MG/DL
CHLORIDE SERPL-SCNC: 105 MMOL/L
CO2 SERPL-SCNC: 25 MMOL/L
CREAT SERPL-MCNC: 1.04 MG/DL
DEPRECATED KAPPA LC FREE/LAMBDA SER: 1.06 RATIO
EGFR: 82 ML/MIN/1.73M2
GLUCOSE SERPL-MCNC: 86 MG/DL
IGA SER QL IEP: 285 MG/DL
IGG SER QL IEP: 1431 MG/DL
IGM SER QL IEP: 42 MG/DL
KAPPA LC CSF-MCNC: 2.65 MG/DL
KAPPA LC SERPL-MCNC: 2.81 MG/DL
LDH SERPL-CCNC: 222 U/L
POTASSIUM SERPL-SCNC: 5.2 MMOL/L
PROT SERPL-MCNC: 7.2 G/DL
RAPID RVP RESULT: DETECTED
SARS-COV-2 RNA PNL RESP NAA+PROBE: DETECTED
SODIUM SERPL-SCNC: 144 MMOL/L
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
## 2024-03-03 NOTE — REVIEW OF SYSTEMS
[Diarrhea: Grade 0] : Diarrhea: Grade 0 [Negative] : Allergic/Immunologic [FreeTextEntry6] : running nose

## 2024-03-03 NOTE — ASSESSMENT
[FreeTextEntry1] : Stage IV plasmacytoid lymphoma with lymphocytosis, hepatosplenomegaly, anemia, thrombocytopenia, extensive marrow involvement. The lymphoma has a highly complex karyotype including abnormalities involving 7q, 17p, MYC, IgH, CEP8 loci. CBC results reviewed and d/w pt Follow CBC, IgM M spike, LDH, spleen size, transfusion needs. Continue to follow glucose at home, Endocrine f/u patient told again needs endocrine or PCP follow uj,  Continue Synthroid 112 ug/d. now completed 6 cycles of therapy repeat CBC 1 month follow up 3  month    [Palliative Care Plan] : not applicable at this time [Curative] : Goals of care discussed with patient: Curative

## 2024-03-03 NOTE — HISTORY OF PRESENT ILLNESS
[Disease:__________________________] : Disease: [unfilled] [de-identified] : plasmacytoid lymphoma- completed 6 cycles of Ruxience and Bendika tolrated some fatigue denies bruise , bleeding N/V diarrhea or constipation We discussed the use of diet and dietary therapy in the management .  DM - pt states in good control , still has not followed by with PCP or endocrine despites my efforts to assist with a apt.  multiple furuncle under axuilla and back - encouraged to see Derm given antibiotic script previously. prolong lingering cold , nasal congestion took theraflu will swab for RSV COVID and flu  [de-identified] : Mr. Louie is a 62 yo man with newly diagnosed B cell lymphoproliferative disorder (plasmacytoid vs marginal zone lymphoma) who presented with\par  massive splenomegaly, marked lymphocytosis and constitutional c/o.\par  He has a h/o IDDM who had not had medical care when he presented at the [facility] ED on 7/2/23 with several day h/o weakness, unsteady gait, malaise \par  and several wks of feverish sensation, night sweats, myalgias, 15-20 lb loss. WBC was 95K, Hgb 5.7, Plt 87K.Haptoglobin was < 20, fibrinogen decr (161),\par  .U/S showed H/S megaly. CT abd/pelvis showed hepatomegaly and massive splenomegaly with evidence of splenic infarcts. Liver showed a possible hemangioma.\par  He was transferred to [facility] for further mgmt and therapy on 7/7/23 after transfusion of 4U PRBC,. BMA/Bx.showed extensive \par  trabecular and paratrabecular involvement with B-cell small lymphocytic lymphoma most consistent with plasmacytoid lymphoma.  Trilineage maturation was seen.  Iron stores were not seen on the biopsy.  Flow showed 66% monotypic kappa B cells, (+) for CD19, CD20, (-) for CD5, CD10, CD23. Cytogenetics showed a very complex karyotype which included 7q.,17, MYC and IgH loci abnormalities.  FISH demonstrated 3 copies of BCL6  (62%), 10% with 3 copies of MYC and 7.5% with 3 copies of MYC along with 2 copies of CEP 8 and IgH. IgM was incr at 1366 with nl IgG, IgA, kappa FLC were incr to 18.37 with a free k/l ratio of 7.15.\par  RON was (-), a cold agglutinin was identified.\par  \par  He received "stepped up"  Rituximab  (7/9 and 7/10) then received Rituximab/Bendamustine at 90 mg/sq m (7/13 and 7/14) . He\par  tolerated treatment. He had a rapid drop in WBC, clearance of PBL w/o metabolic complications other than hyperglycemia which was exacerbated \par  by steroid premedication.  Endocrine was consulted and insulin regimen was adjusted.\par  Hgb was 7.8 prior to d/c and one more unit of PRBC.\par  Synthroid was started for newly diagnosed hypothyroidism.\par  \par  He was d/c'd home 7/15/23.\par  \par

## 2024-03-17 ENCOUNTER — NON-APPOINTMENT (OUTPATIENT)
Age: 64
End: 2024-03-17

## 2024-03-18 ENCOUNTER — APPOINTMENT (OUTPATIENT)
Dept: HEMATOLOGY ONCOLOGY | Facility: CLINIC | Age: 64
End: 2024-03-18

## 2024-03-18 ENCOUNTER — RESULT REVIEW (OUTPATIENT)
Age: 64
End: 2024-03-18

## 2024-03-18 ENCOUNTER — APPOINTMENT (OUTPATIENT)
Dept: DERMATOLOGY | Facility: CLINIC | Age: 64
End: 2024-03-18
Payer: COMMERCIAL

## 2024-03-18 LAB
BASOPHILS # BLD AUTO: 0 K/UL — SIGNIFICANT CHANGE UP (ref 0–0.2)
BASOPHILS NFR BLD AUTO: 0 % — SIGNIFICANT CHANGE UP (ref 0–2)
EOSINOPHIL # BLD AUTO: 0.06 K/UL — SIGNIFICANT CHANGE UP (ref 0–0.5)
EOSINOPHIL NFR BLD AUTO: 2 % — SIGNIFICANT CHANGE UP (ref 0–6)
HCT VFR BLD CALC: 32.1 % — LOW (ref 39–50)
HGB BLD-MCNC: 10.7 G/DL — LOW (ref 13–17)
LYMPHOCYTES # BLD AUTO: 0.7 K/UL — LOW (ref 1–3.3)
LYMPHOCYTES # BLD AUTO: 23 % — SIGNIFICANT CHANGE UP (ref 13–44)
MCHC RBC-ENTMCNC: 27.8 PG — SIGNIFICANT CHANGE UP (ref 27–34)
MCHC RBC-ENTMCNC: 33.3 G/DL — SIGNIFICANT CHANGE UP (ref 32–36)
MCV RBC AUTO: 83.4 FL — SIGNIFICANT CHANGE UP (ref 80–100)
MONOCYTES # BLD AUTO: 0.24 K/UL — SIGNIFICANT CHANGE UP (ref 0–0.9)
MONOCYTES NFR BLD AUTO: 8 % — SIGNIFICANT CHANGE UP (ref 2–14)
NEUTROPHILS # BLD AUTO: 2.03 K/UL — SIGNIFICANT CHANGE UP (ref 1.8–7.4)
NEUTROPHILS NFR BLD AUTO: 67 % — SIGNIFICANT CHANGE UP (ref 43–77)
NRBC # BLD: 0 /100 WBCS — SIGNIFICANT CHANGE UP (ref 0–0)
NRBC # BLD: SIGNIFICANT CHANGE UP /100 WBCS (ref 0–0)
PLAT MORPH BLD: NORMAL — SIGNIFICANT CHANGE UP
PLATELET # BLD AUTO: 104 K/UL — LOW (ref 150–400)
RBC # BLD: 3.85 M/UL — LOW (ref 4.2–5.8)
RBC # FLD: 15.1 % — HIGH (ref 10.3–14.5)
RBC BLD AUTO: SIGNIFICANT CHANGE UP
WBC # BLD: 3.03 K/UL — LOW (ref 3.8–10.5)
WBC # FLD AUTO: 3.03 K/UL — LOW (ref 3.8–10.5)

## 2024-03-18 PROCEDURE — 99204 OFFICE O/P NEW MOD 45 MIN: CPT

## 2024-03-18 RX ORDER — MINOCYCLINE HYDROCHLORIDE 100 MG/1
100 TABLET ORAL
Qty: 60 | Refills: 2 | Status: ACTIVE | COMMUNITY
Start: 2024-03-18 | End: 1900-01-01

## 2024-03-18 RX ORDER — CLINDAMYCIN PHOSPHATE 10 MG/ML
1 SOLUTION TOPICAL
Qty: 1 | Refills: 6 | Status: ACTIVE | COMMUNITY
Start: 2024-03-18 | End: 1900-01-01

## 2024-03-18 NOTE — ASSESSMENT
[FreeTextEntry1] : Hidradenitis suppurativa, groin, Barros stage I/ II  Chronic condition, flaring - Discussed chronic nature of condition with periods of intermittent flaring, primary inflammatory etiology with potential secondary superinfection  - Discussed medical and surgical treatments including topical antibiotics/antiseptics, oral retinoids, oral antibiotics (doxy, clinda/rifampin), ILTAC, biologics (Adalimumab, Infliximab), laser therapy and surgical excision  - START CLN sport body wash  - START clindamycin 1% solution BID to flaring areas, SED - If persistent flares would consider 3 month course of minocycline, will rx so he has on hand if needed  - RTC if flares for ILTAC  RTC prn

## 2024-03-18 NOTE — PHYSICAL EXAM
[FreeTextEntry3] : Focused skin exam performed  The relevant portions of the exam were performed today  AAOx3, NAD, well-appearing / pleasant Focused examination within normal limits with the exception of:  - firm nodules in the b/l axilla  - comedones in b/l axilla

## 2024-03-18 NOTE — HISTORY OF PRESENT ILLNESS
[FreeTextEntry1] : NPV: bumps under arms [de-identified] : SRI HOUGH is a 63 year old w/ B cell lymphoproliferative disorder (plasmacytoid vs marginal zone lymphoma) s/p  completed 6 cycles of Ruxience and Bendika (completed 12/2023) who is presenting for evaluation of:   1. Bumps under arms - intermittent x yrs (occurs approx 1 every 6 months) towards end of chemo started happening more frequently approx Nov- Jan had worsening of disease. Was treated with PO doxy 100mg BID x 7 days x 3 rounds and also did topical mupirocin until he ran out, does not smoke.   Skin Cancer Hx: none  FH of skin cancer: none

## 2024-04-16 NOTE — DIETITIAN INITIAL EVALUATION ADULT - PROBLEM SELECTOR PROBLEM 5
Elevated [facility]  Psychiatry  [address] [place] [address]  Phone: [phone]  Fax:

## 2024-05-11 ENCOUNTER — RX RENEWAL (OUTPATIENT)
Age: 64
End: 2024-05-11

## 2024-05-13 ENCOUNTER — RX RENEWAL (OUTPATIENT)
Age: 64
End: 2024-05-13

## 2024-05-17 RX ORDER — INSULIN GLARGINE 100 [IU]/ML
100 INJECTION, SOLUTION SUBCUTANEOUS
Qty: 240 | Refills: 2 | Status: ACTIVE | COMMUNITY
Start: 2024-05-17 | End: 1900-01-01

## 2024-05-17 RX ORDER — INSULIN GLARGINE 100 [IU]/ML
100 INJECTION, SOLUTION SUBCUTANEOUS
Qty: 2 | Refills: 1 | Status: DISCONTINUED | COMMUNITY
Start: 2024-01-19 | End: 2024-05-17

## 2024-06-21 ENCOUNTER — OUTPATIENT (OUTPATIENT)
Dept: OUTPATIENT SERVICES | Facility: HOSPITAL | Age: 64
LOS: 1 days | Discharge: HOME | End: 2024-06-21

## 2024-06-21 DIAGNOSIS — C83.00 SMALL CELL B-CELL LYMPHOMA, UNSPECIFIED SITE: ICD-10-CM

## 2024-06-23 ENCOUNTER — NON-APPOINTMENT (OUTPATIENT)
Age: 64
End: 2024-06-23

## 2024-06-24 ENCOUNTER — APPOINTMENT (OUTPATIENT)
Dept: HEMATOLOGY ONCOLOGY | Facility: CLINIC | Age: 64
End: 2024-06-24

## 2024-06-24 ENCOUNTER — RESULT REVIEW (OUTPATIENT)
Age: 64
End: 2024-06-24

## 2024-06-24 VITALS
DIASTOLIC BLOOD PRESSURE: 78 MMHG | TEMPERATURE: 98.2 F | OXYGEN SATURATION: 98 % | WEIGHT: 203.91 LBS | SYSTOLIC BLOOD PRESSURE: 156 MMHG | HEART RATE: 73 BPM | RESPIRATION RATE: 16 BRPM | BODY MASS INDEX: 29.93 KG/M2

## 2024-06-24 DIAGNOSIS — E11.9 TYPE 2 DIABETES MELLITUS W/OUT COMPLICATIONS: ICD-10-CM

## 2024-06-24 DIAGNOSIS — L73.2 HIDRADENITIS SUPPURATIVA: ICD-10-CM

## 2024-06-24 DIAGNOSIS — E03.9 HYPOTHYROIDISM, UNSPECIFIED: ICD-10-CM

## 2024-06-24 DIAGNOSIS — Z51.11 ENCOUNTER FOR ANTINEOPLASTIC CHEMOTHERAPY: ICD-10-CM

## 2024-06-24 DIAGNOSIS — C83.00 SMALL CELL B-CELL LYMPHOMA, UNSPECIFIED SITE: ICD-10-CM

## 2024-06-24 LAB
BASOPHILS # BLD AUTO: 0.02 K/UL — SIGNIFICANT CHANGE UP (ref 0–0.2)
BASOPHILS NFR BLD AUTO: 0.5 % — SIGNIFICANT CHANGE UP (ref 0–2)
EOSINOPHIL # BLD AUTO: 0.14 K/UL — SIGNIFICANT CHANGE UP (ref 0–0.5)
EOSINOPHIL NFR BLD AUTO: 3.7 % — SIGNIFICANT CHANGE UP (ref 0–6)
HCT VFR BLD CALC: 34.9 % — LOW (ref 39–50)
HGB BLD-MCNC: 11.7 G/DL — LOW (ref 13–17)
IMM GRANULOCYTES NFR BLD AUTO: 0.3 % — SIGNIFICANT CHANGE UP (ref 0–0.9)
LYMPHOCYTES # BLD AUTO: 0.82 K/UL — LOW (ref 1–3.3)
LYMPHOCYTES # BLD AUTO: 21.6 % — SIGNIFICANT CHANGE UP (ref 13–44)
MCHC RBC-ENTMCNC: 27.7 PG — SIGNIFICANT CHANGE UP (ref 27–34)
MCHC RBC-ENTMCNC: 33.5 G/DL — SIGNIFICANT CHANGE UP (ref 32–36)
MCV RBC AUTO: 82.7 FL — SIGNIFICANT CHANGE UP (ref 80–100)
MONOCYTES # BLD AUTO: 0.29 K/UL — SIGNIFICANT CHANGE UP (ref 0–0.9)
MONOCYTES NFR BLD AUTO: 7.6 % — SIGNIFICANT CHANGE UP (ref 2–14)
NEUTROPHILS # BLD AUTO: 2.52 K/UL — SIGNIFICANT CHANGE UP (ref 1.8–7.4)
NEUTROPHILS NFR BLD AUTO: 66.3 % — SIGNIFICANT CHANGE UP (ref 43–77)
NRBC # BLD: 0 /100 WBCS — SIGNIFICANT CHANGE UP (ref 0–0)
PLATELET # BLD AUTO: 116 K/UL — LOW (ref 150–400)
RBC # BLD: 4.22 M/UL — SIGNIFICANT CHANGE UP (ref 4.2–5.8)
RBC # FLD: 14.6 % — HIGH (ref 10.3–14.5)
WBC # BLD: 3.8 K/UL — SIGNIFICANT CHANGE UP (ref 3.8–10.5)
WBC # FLD AUTO: 3.8 K/UL — SIGNIFICANT CHANGE UP (ref 3.8–10.5)

## 2024-06-24 PROCEDURE — 99214 OFFICE O/P EST MOD 30 MIN: CPT

## 2024-06-24 RX ORDER — LEVOTHYROXINE SODIUM 0.11 MG/1
112 TABLET ORAL
Qty: 30 | Refills: 3 | Status: ACTIVE | COMMUNITY
Start: 2023-09-14 | End: 1900-01-01

## 2024-06-24 RX ORDER — INSULIN GLARGINE 100 [IU]/ML
100 INJECTION, SOLUTION SUBCUTANEOUS
Qty: 2 | Refills: 1 | Status: ACTIVE | COMMUNITY
Start: 2024-06-24 | End: 1900-01-01

## 2024-06-24 RX ORDER — DOXYCYCLINE HYCLATE 100 MG/1
100 CAPSULE ORAL
Qty: 14 | Refills: 0 | Status: DISCONTINUED | COMMUNITY
Start: 2024-01-11 | End: 2024-06-24

## 2024-06-30 LAB
ALBUMIN SERPL ELPH-MCNC: 4.6 G/DL
ALP BLD-CCNC: 115 U/L
ALT SERPL-CCNC: 39 U/L
ANION GAP SERPL CALC-SCNC: 11 MMOL/L
AST SERPL-CCNC: 26 U/L
BILIRUB SERPL-MCNC: 0.3 MG/DL
BUN SERPL-MCNC: 15 MG/DL
CALCIUM SERPL-MCNC: 9.5 MG/DL
CHLORIDE SERPL-SCNC: 103 MMOL/L
CO2 SERPL-SCNC: 26 MMOL/L
CREAT SERPL-MCNC: 1.19 MG/DL
DEPRECATED KAPPA LC FREE/LAMBDA SER: 1.09 RATIO
EGFR: 69 ML/MIN/1.73M2
GLUCOSE SERPL-MCNC: 107 MG/DL
IGA SER QL IEP: 291 MG/DL
IGG SER QL IEP: 1132 MG/DL
IGM SER QL IEP: 46 MG/DL
KAPPA LC CSF-MCNC: 1.75 MG/DL
KAPPA LC SERPL-MCNC: 1.9 MG/DL
LDH SERPL-CCNC: 212 U/L
POTASSIUM SERPL-SCNC: 3.9 MMOL/L
PROT SERPL-MCNC: 7.2 G/DL
SODIUM SERPL-SCNC: 141 MMOL/L

## 2024-07-12 DIAGNOSIS — C83.00 SMALL CELL B-CELL LYMPHOMA, UNSPECIFIED SITE: ICD-10-CM

## 2024-07-12 DIAGNOSIS — L73.2 HIDRADENITIS SUPPURATIVA: ICD-10-CM

## 2024-07-19 ENCOUNTER — APPOINTMENT (OUTPATIENT)
Dept: CT IMAGING | Facility: IMAGING CENTER | Age: 64
End: 2024-07-19
Payer: COMMERCIAL

## 2024-07-19 ENCOUNTER — OUTPATIENT (OUTPATIENT)
Dept: OUTPATIENT SERVICES | Facility: HOSPITAL | Age: 64
LOS: 1 days | End: 2024-07-19
Payer: COMMERCIAL

## 2024-07-19 ENCOUNTER — RESULT REVIEW (OUTPATIENT)
Age: 64
End: 2024-07-19

## 2024-07-19 DIAGNOSIS — C83.00 SMALL CELL B-CELL LYMPHOMA, UNSPECIFIED SITE: ICD-10-CM

## 2024-07-19 PROCEDURE — 74177 CT ABD & PELVIS W/CONTRAST: CPT

## 2024-07-19 PROCEDURE — 74177 CT ABD & PELVIS W/CONTRAST: CPT | Mod: 26

## 2024-08-09 ENCOUNTER — APPOINTMENT (OUTPATIENT)
Dept: CT IMAGING | Facility: HOSPITAL | Age: 64
End: 2024-08-09

## 2024-08-09 ENCOUNTER — OUTPATIENT (OUTPATIENT)
Dept: OUTPATIENT SERVICES | Facility: HOSPITAL | Age: 64
LOS: 1 days | End: 2024-08-09
Payer: COMMERCIAL

## 2024-08-09 DIAGNOSIS — C83.00 SMALL CELL B-CELL LYMPHOMA, UNSPECIFIED SITE: ICD-10-CM

## 2024-08-09 PROCEDURE — 71260 CT THORAX DX C+: CPT | Mod: 26

## 2024-08-09 PROCEDURE — 71260 CT THORAX DX C+: CPT

## 2024-09-22 ENCOUNTER — OUTPATIENT (OUTPATIENT)
Dept: OUTPATIENT SERVICES | Facility: HOSPITAL | Age: 64
LOS: 1 days | Discharge: HOME | End: 2024-09-22

## 2024-09-22 DIAGNOSIS — C83.00 SMALL CELL B-CELL LYMPHOMA, UNSPECIFIED SITE: ICD-10-CM

## 2024-09-30 ENCOUNTER — RESULT REVIEW (OUTPATIENT)
Age: 64
End: 2024-09-30

## 2024-09-30 ENCOUNTER — APPOINTMENT (OUTPATIENT)
Dept: HEMATOLOGY ONCOLOGY | Facility: CLINIC | Age: 64
End: 2024-09-30
Payer: COMMERCIAL

## 2024-09-30 VITALS
SYSTOLIC BLOOD PRESSURE: 158 MMHG | BODY MASS INDEX: 30.58 KG/M2 | TEMPERATURE: 97.5 F | RESPIRATION RATE: 16 BRPM | OXYGEN SATURATION: 98 % | WEIGHT: 208.34 LBS | HEART RATE: 73 BPM | DIASTOLIC BLOOD PRESSURE: 76 MMHG

## 2024-09-30 DIAGNOSIS — Z87.891 PERSONAL HISTORY OF NICOTINE DEPENDENCE: ICD-10-CM

## 2024-09-30 DIAGNOSIS — D47.2 MONOCLONAL GAMMOPATHY: ICD-10-CM

## 2024-09-30 DIAGNOSIS — Z78.9 OTHER SPECIFIED HEALTH STATUS: ICD-10-CM

## 2024-09-30 DIAGNOSIS — Z51.11 ENCOUNTER FOR ANTINEOPLASTIC CHEMOTHERAPY: ICD-10-CM

## 2024-09-30 DIAGNOSIS — C83.00 SMALL CELL B-CELL LYMPHOMA, UNSPECIFIED SITE: ICD-10-CM

## 2024-09-30 DIAGNOSIS — L73.2 HIDRADENITIS SUPPURATIVA: ICD-10-CM

## 2024-09-30 DIAGNOSIS — E11.9 TYPE 2 DIABETES MELLITUS W/OUT COMPLICATIONS: ICD-10-CM

## 2024-09-30 LAB
ALBUMIN SERPL ELPH-MCNC: 4.3 G/DL
ALP BLD-CCNC: 214 U/L
ALT SERPL-CCNC: 27 U/L
ANION GAP SERPL CALC-SCNC: 14 MMOL/L
ANISOCYTOSIS BLD QL: SLIGHT — SIGNIFICANT CHANGE UP
AST SERPL-CCNC: 22 U/L
BASOPHILS # BLD AUTO: 0 K/UL — SIGNIFICANT CHANGE UP (ref 0–0.2)
BASOPHILS NFR BLD AUTO: 0 % — SIGNIFICANT CHANGE UP (ref 0–2)
BILIRUB SERPL-MCNC: 0.3 MG/DL
BLASTS # FLD: 1 % — HIGH (ref 0–0)
BUN SERPL-MCNC: 16 MG/DL
CALCIUM SERPL-MCNC: 9.3 MG/DL
CHLORIDE SERPL-SCNC: 100 MMOL/L
CO2 SERPL-SCNC: 24 MMOL/L
CREAT SERPL-MCNC: 1.09 MG/DL
EGFR: 77 ML/MIN/1.73M2
ELLIPTOCYTES BLD QL SMEAR: SLIGHT — SIGNIFICANT CHANGE UP
EOSINOPHIL # BLD AUTO: 0.03 K/UL — SIGNIFICANT CHANGE UP (ref 0–0.5)
EOSINOPHIL NFR BLD AUTO: 1 % — SIGNIFICANT CHANGE UP (ref 0–6)
GLUCOSE SERPL-MCNC: 286 MG/DL
HCT VFR BLD CALC: 29.4 % — LOW (ref 39–50)
HGB BLD-MCNC: 9.8 G/DL — LOW (ref 13–17)
HYPOCHROMIA BLD QL: SLIGHT — SIGNIFICANT CHANGE UP
LDH SERPL-CCNC: 266 U/L
LYMPHOCYTES # BLD AUTO: 0.68 K/UL — LOW (ref 1–3.3)
LYMPHOCYTES # BLD AUTO: 22 % — SIGNIFICANT CHANGE UP (ref 13–44)
MCHC RBC-ENTMCNC: 27.4 PG — SIGNIFICANT CHANGE UP (ref 27–34)
MCHC RBC-ENTMCNC: 33.3 G/DL — SIGNIFICANT CHANGE UP (ref 32–36)
MCV RBC AUTO: 82.1 FL — SIGNIFICANT CHANGE UP (ref 80–100)
MONOCYTES # BLD AUTO: 0.25 K/UL — SIGNIFICANT CHANGE UP (ref 0–0.9)
MONOCYTES NFR BLD AUTO: 8 % — SIGNIFICANT CHANGE UP (ref 2–14)
NEUTROPHILS # BLD AUTO: 2.09 K/UL — SIGNIFICANT CHANGE UP (ref 1.8–7.4)
NEUTROPHILS NFR BLD AUTO: 68 % — SIGNIFICANT CHANGE UP (ref 43–77)
NRBC # BLD: 0 /100 WBCS — SIGNIFICANT CHANGE UP (ref 0–0)
NRBC # BLD: SIGNIFICANT CHANGE UP /100 WBCS (ref 0–0)
PLAT MORPH BLD: NORMAL — SIGNIFICANT CHANGE UP
PLATELET # BLD AUTO: 128 K/UL — LOW (ref 150–400)
POIKILOCYTOSIS BLD QL AUTO: SLIGHT — SIGNIFICANT CHANGE UP
POLYCHROMASIA BLD QL SMEAR: SLIGHT — SIGNIFICANT CHANGE UP
POTASSIUM SERPL-SCNC: 4.3 MMOL/L
PROT SERPL-MCNC: 7 G/DL
RBC # BLD: 3.58 M/UL — LOW (ref 4.2–5.8)
RBC # FLD: 13.9 % — SIGNIFICANT CHANGE UP (ref 10.3–14.5)
RBC BLD AUTO: ABNORMAL
SODIUM SERPL-SCNC: 137 MMOL/L
WBC # BLD: 3.07 K/UL — LOW (ref 3.8–10.5)
WBC # FLD AUTO: 3.07 K/UL — LOW (ref 3.8–10.5)

## 2024-09-30 PROCEDURE — 99214 OFFICE O/P EST MOD 30 MIN: CPT

## 2024-09-30 RX ORDER — DOXYCYCLINE HYCLATE 100 MG/1
100 CAPSULE ORAL
Qty: 14 | Refills: 0 | Status: ACTIVE | COMMUNITY
Start: 2024-09-30 | End: 1900-01-01

## 2024-09-30 NOTE — REVIEW OF SYSTEMS
[Diarrhea: Grade 0] : Diarrhea: Grade 0 [Negative] : Allergic/Immunologic [de-identified] :      rt axilla boil multiple other area some drainage

## 2024-09-30 NOTE — ASSESSMENT
[FreeTextEntry1] : Stage IV plasmacytoid lymphoma with lymphocytosis, hepatosplenomegaly, anemia, thrombocytopenia, extensive marrow involvement. The lymphoma has a highly complex karyotype including abnormalities involving 7q, 17p, MYC, IgH, CEP8 loci. CBC results reviewed and d/w pt Follow CBC, IgM M spike, LDH, spleen size,   #  Diabetic - Continue to follow glucose at home, Endocrine f/u patient told again given Dr Youssef name asked to make apt   #Hidradentis suppurativa - having flare  bactroban ointment to site  doxcycline 100 mg q 12  recommend derm follow up follow up 3  month    [Curative] : Goals of care discussed with patient: Curative [Palliative Care Plan] : not applicable at this time [Reviewed updated] : Reviewed updated [AdvancecareDate] : 370599 [Name: ___] : Name: [unfilled]

## 2024-09-30 NOTE — PHYSICAL EXAM
[Fully active, able to carry on all pre-disease performance without restriction] : Status 0 - Fully active, able to carry on all pre-disease performance without restriction [Normal] : affect appropriate [de-identified] : hidradenitis suppurativa axilla and CW

## 2024-09-30 NOTE — REVIEW OF SYSTEMS
[Diarrhea: Grade 0] : Diarrhea: Grade 0 [Negative] : Allergic/Immunologic [de-identified] :      rt axilla boil multiple other area some drainage

## 2024-09-30 NOTE — HISTORY OF PRESENT ILLNESS
[Disease:__________________________] : Disease: [unfilled] [Day: ___] : Day: [unfilled] [Cardiovascular] : Cardiovascular [Constitutional] : Constitutional [ENT] : ENT [Dermatologic] : Dermatologic [Endocrine] : Endocrine [Gastrointestinal] : Gastrointestinal [Genitourinary] : Genitourinary [Gynecologic] : Gynecologic [Infectious] : Infectious [Musculoskeletal] : Musculoskeletal [Neurologic] : Neurologic [Pain] : Pain [Pulmonary] : Pulmonary [Hematologic] : Hematologic [de-identified] : Mr. Louie is a 64 yo man with newly diagnosed B cell lymphoproliferative disorder (plasmacytoid vs marginal zone lymphoma) who presented with\par  massive splenomegaly, marked lymphocytosis and constitutional c/o.\par  He has a h/o IDDM who had not had medical care when he presented at the [facility] ED on 7/2/23 with several day h/o weakness, unsteady gait, malaise \par  and several wks of feverish sensation, night sweats, myalgias, 15-20 lb loss. WBC was 95K, Hgb 5.7, Plt 87K.Haptoglobin was < 20, fibrinogen decr (161),\par  .U/S showed H/S megaly. CT abd/pelvis showed hepatomegaly and massive splenomegaly with evidence of splenic infarcts. Liver showed a possible hemangioma.\par  He was transferred to [facility] for further mgmt and therapy on 7/7/23 after transfusion of 4U PRBC,. BMA/Bx.showed extensive \par  trabecular and paratrabecular involvement with B-cell small lymphocytic lymphoma most consistent with plasmacytoid lymphoma.  Trilineage maturation was seen.  Iron stores were not seen on the biopsy.  Flow showed 66% monotypic kappa B cells, (+) for CD19, CD20, (-) for CD5, CD10, CD23. Cytogenetics showed a very complex karyotype which included 7q.,17, MYC and IgH loci abnormalities.  FISH demonstrated 3 copies of BCL6  (62%), 10% with 3 copies of MYC and 7.5% with 3 copies of MYC along with 2 copies of CEP 8 and IgH. IgM was incr at 1366 with nl IgG, IgA, kappa FLC were incr to 18.37 with a free k/l ratio of 7.15.\par  RON was (-), a cold agglutinin was identified.\par  \par  He received "stepped up"  Rituximab  (7/9 and 7/10) then received Rituximab/Bendamustine at 90 mg/sq m (7/13 and 7/14) . He\par  tolerated treatment. He had a rapid drop in WBC, clearance of PBL w/o metabolic complications other than hyperglycemia which was exacerbated \par  by steroid premedication.  Endocrine was consulted and insulin regimen was adjusted.\par  Hgb was 7.8 prior to d/c and one more unit of PRBC.\par  Synthroid was started for newly diagnosed hypothyroidism.\par  \par  He was d/c'd home 7/15/23.\par  \par   [FreeTextEntry1] : ruxience bendamustine x 2 days with Onpri0 8/10/23 [de-identified] : plasmacytoid lymphoma- completed 6 cycles of Ruxience and Bendika tolrated some fatigue denies bruise , bleeding N/V diarrhea or constipation We discussed the use of diet and dietary therapy in the management .  DM - pt states in good control , still has not followed by with PCP or endocrine despites my efforts to assist with a apt. asked to make apt with Dr Youssef  multiple furuncle under axilla and back - saw Derm recommend at present START CLN sport body wash has one draining under axilla - START clindamycin 1% solution BID to flaring areas, SED - If persistent flares would consider 3 month course of minocycline, will rx so he has on hand if needed post Tx Pet/CT scan showed Compared with CT 7/6/2023, there is been interval decrease in the size of the spleen, now 15 cm. Mildly increased uptake in areas of calcification/infarction remain.  2. No FDG avid lymphadenopathy.  3. Two FDG avid cutaneous/subcutaneous nodules, one in the right axilla and one in the right lower back. Recommend clinical examination for further characterization. ct chest -No thoracic adenopathy.New very small left upper lobe nodule. Recommend spelled CT chest in 6 months, or per guidelines of primary neoplasm. Ct abd/pelvic- The liver lesion is visible on image 30 of series 2 measuring approximately 4 cm with peripheral nodular enhancement and similar to the prior CT, likely a small hemangioma. Another similar lesion is visible on image 26 of series 2 measuring 1.0 cm. The liver vasculature is patent.. BILE DUCTS: Normal caliber. GALLBLADDER: Cholelithiasis. SPLEEN: The spleen measures 14.4 cm versus on the prior CT 25.8 cm; significantly decreased in size. Multiple ill-defined the patient is within the spleen likely related to prior splenic infarct, however splenic lesions cannot be entirely excluded; follow-up recommended. PANCREAS: Sequela of chronic pancreatitis are similar to the prior CT with a dilated pancreatic duct and multiple coarse calcifications.. ADRENALS: Within normal limits. KIDNEYS/URETERS: Within normal limits.  BLADDER: Within normal limits. REPRODUCTIVE ORGANS: Prostate within normal limits.  BOWEL: No bowel obstruction. Appendix is normal. PERITONEUM/RETROPERITONEUM: Extensive retroperitoneal and periportal pathologic lymphadenopathy has significantly decreased to almost resolved. A tiny retroperitoneal lymph node is visible on image 60 of series 2 measuring 1.2 cm x 0.4 cm versus 3.2 x 2.1 cm on the prior CT.. VESSELS: Within normal limits. LYMPH NODES: No lymphadenopathy. ABDOMINAL WALL: Within normal limits. BONES: Within normal limits.  IMPRESSION: The spleen measures 14.4 cm versus on the prior CT 25.8 cm; significantly decreased in size.  Multiple ill-defined the patient is within the spleen likely related to prior splenic infarct, however splenic lesions cannot be entirely excluded; follow-up recommended  Extensive retroperitoneal and periportal pathologic lymphadenopathy has significantly decreased to almost resolved. A tiny retroperitoneal lymph node is visible on image 60 of series 2 measuring 1.2 cm x 0.4 cm versus 3.2 x 2.1 cm on the prior CT..  There are 2 enhancing liver lesions which are likely hemangiomas to ensure stability.

## 2024-09-30 NOTE — PHYSICAL EXAM
[Fully active, able to carry on all pre-disease performance without restriction] : Status 0 - Fully active, able to carry on all pre-disease performance without restriction [Normal] : affect appropriate [de-identified] : hidradenitis suppurativa axilla and CW

## 2024-09-30 NOTE — ASSESSMENT
[FreeTextEntry1] : Stage IV plasmacytoid lymphoma with lymphocytosis, hepatosplenomegaly, anemia, thrombocytopenia, extensive marrow involvement. The lymphoma has a highly complex karyotype including abnormalities involving 7q, 17p, MYC, IgH, CEP8 loci. CBC results reviewed and d/w pt Follow CBC, IgM M spike, LDH, spleen size,   #  Diabetic - Continue to follow glucose at home, Endocrine f/u patient told again given Dr Youssef name asked to make apt   #Hidradentis suppurativa - having flare  bactroban ointment to site  doxcycline 100 mg q 12  recommend derm follow up follow up 3  month    [Curative] : Goals of care discussed with patient: Curative [Palliative Care Plan] : not applicable at this time [Reviewed updated] : Reviewed updated [AdvancecareDate] : 717816 [Name: ___] : Name: [unfilled]

## 2024-10-03 LAB
FERRITIN SERPL-MCNC: 508 NG/ML
IRON SATN MFR SERPL: 20 %
IRON SERPL-MCNC: 43 UG/DL
STFR SERPL-MCNC: 30.7 NMOL/L
TIBC SERPL-MCNC: 215 UG/DL
UIBC SERPL-MCNC: 172 UG/DL

## 2024-10-04 LAB
ALBUMIN MFR SERPL ELPH: 57.8 %
ALBUMIN MFR SERPL ELPH: 58.2 %
ALBUMIN SERPL-MCNC: 4.1 G/DL
ALBUMIN SERPL-MCNC: 4.1 G/DL
ALBUMIN/GLOB SERPL: 1.4 RATIO
ALBUMIN/GLOB SERPL: 1.4 RATIO
ALPHA1 GLOB MFR SERPL ELPH: 6.4 %
ALPHA1 GLOB MFR SERPL ELPH: 6.6 %
ALPHA1 GLOB SERPL ELPH-MCNC: 0.5 G/DL
ALPHA1 GLOB SERPL ELPH-MCNC: 0.5 G/DL
ALPHA2 GLOB MFR SERPL ELPH: 7.4 %
ALPHA2 GLOB MFR SERPL ELPH: 7.5 %
ALPHA2 GLOB SERPL ELPH-MCNC: 0.5 G/DL
ALPHA2 GLOB SERPL ELPH-MCNC: 0.5 G/DL
B-GLOBULIN MFR SERPL ELPH: 12.5 %
B-GLOBULIN MFR SERPL ELPH: 12.7 %
B-GLOBULIN SERPL ELPH-MCNC: 0.9 G/DL
B-GLOBULIN SERPL ELPH-MCNC: 0.9 G/DL
DEPRECATED KAPPA LC FREE/LAMBDA SER: 1.55 RATIO
DEPRECATED KAPPA LC FREE/LAMBDA SER: 1.63 RATIO
GAMMA GLOB FLD ELPH-MCNC: 1.1 G/DL
GAMMA GLOB FLD ELPH-MCNC: 1.1 G/DL
GAMMA GLOB MFR SERPL ELPH: 15.3 %
GAMMA GLOB MFR SERPL ELPH: 15.6 %
IGA SER QL IEP: 305 MG/DL
IGA SER QL IEP: 311 MG/DL
IGG SER QL IEP: 1158 MG/DL
IGG SER QL IEP: 1169 MG/DL
IGM SER QL IEP: 61 MG/DL
IGM SER QL IEP: 62 MG/DL
INTERPRETATION SERPL IEP-IMP: NORMAL
INTERPRETATION SERPL IEP-IMP: NORMAL
KAPPA LC CSF-MCNC: 1.85 MG/DL
KAPPA LC CSF-MCNC: 1.89 MG/DL
KAPPA LC SERPL-MCNC: 2.86 MG/DL
KAPPA LC SERPL-MCNC: 3.08 MG/DL
M PROTEIN SPEC IFE-MCNC: NORMAL
M PROTEIN SPEC IFE-MCNC: NORMAL
PROT SERPL-MCNC: 7 G/DL
PROT SERPL-MCNC: 7 G/DL
PROT SERPL-MCNC: 7.1 G/DL
PROT SERPL-MCNC: 7.1 G/DL

## 2024-10-22 ENCOUNTER — RX RENEWAL (OUTPATIENT)
Age: 64
End: 2024-10-22

## 2024-11-14 ENCOUNTER — RESULT REVIEW (OUTPATIENT)
Age: 64
End: 2024-11-14

## 2024-11-14 ENCOUNTER — OUTPATIENT (OUTPATIENT)
Dept: OUTPATIENT SERVICES | Facility: HOSPITAL | Age: 64
LOS: 1 days | End: 2024-11-14
Payer: COMMERCIAL

## 2024-11-14 ENCOUNTER — APPOINTMENT (OUTPATIENT)
Dept: HEMATOLOGY ONCOLOGY | Facility: CLINIC | Age: 64
End: 2024-11-14
Payer: COMMERCIAL

## 2024-11-14 VITALS
RESPIRATION RATE: 16 BRPM | SYSTOLIC BLOOD PRESSURE: 148 MMHG | BODY MASS INDEX: 30.94 KG/M2 | TEMPERATURE: 98 F | HEART RATE: 90 BPM | WEIGHT: 210.76 LBS | OXYGEN SATURATION: 93 % | DIASTOLIC BLOOD PRESSURE: 81 MMHG

## 2024-11-14 DIAGNOSIS — L73.2 HIDRADENITIS SUPPURATIVA: ICD-10-CM

## 2024-11-14 DIAGNOSIS — E03.9 HYPOTHYROIDISM, UNSPECIFIED: ICD-10-CM

## 2024-11-14 DIAGNOSIS — Z78.9 OTHER SPECIFIED HEALTH STATUS: ICD-10-CM

## 2024-11-14 DIAGNOSIS — Z87.891 PERSONAL HISTORY OF NICOTINE DEPENDENCE: ICD-10-CM

## 2024-11-14 DIAGNOSIS — E11.9 TYPE 2 DIABETES MELLITUS W/OUT COMPLICATIONS: ICD-10-CM

## 2024-11-14 DIAGNOSIS — C83.00 SMALL CELL B-CELL LYMPHOMA, UNSPECIFIED SITE: ICD-10-CM

## 2024-11-14 LAB
ANISOCYTOSIS BLD QL: SLIGHT — SIGNIFICANT CHANGE UP
APTT BLD: 31 SEC
BASOPHILS # BLD AUTO: 0 K/UL — SIGNIFICANT CHANGE UP (ref 0–0.2)
BASOPHILS NFR BLD AUTO: 0 % — SIGNIFICANT CHANGE UP (ref 0–2)
BLASTS # FLD: 1 % — HIGH (ref 0–0)
ELLIPTOCYTES BLD QL SMEAR: SLIGHT — SIGNIFICANT CHANGE UP
EOSINOPHIL # BLD AUTO: 0 K/UL — SIGNIFICANT CHANGE UP (ref 0–0.5)
EOSINOPHIL NFR BLD AUTO: 0 % — SIGNIFICANT CHANGE UP (ref 0–6)
HCT VFR BLD CALC: 20.1 % — CRITICAL LOW (ref 39–50)
HGB BLD-MCNC: 6.1 G/DL — CRITICAL LOW (ref 13–17)
HYPOCHROMIA BLD QL: SLIGHT — SIGNIFICANT CHANGE UP
INR PPP: 1.38 RATIO
LYMPHOCYTES # BLD AUTO: 1.38 K/UL — SIGNIFICANT CHANGE UP (ref 1–3.3)
LYMPHOCYTES # BLD AUTO: 51 % — HIGH (ref 13–44)
MCHC RBC-ENTMCNC: 24.3 PG — LOW (ref 27–34)
MCHC RBC-ENTMCNC: 30.3 G/DL — LOW (ref 32–36)
MCV RBC AUTO: 80.1 FL — SIGNIFICANT CHANGE UP (ref 80–100)
MONOCYTES # BLD AUTO: 0.11 K/UL — SIGNIFICANT CHANGE UP (ref 0–0.9)
MONOCYTES NFR BLD AUTO: 4 % — SIGNIFICANT CHANGE UP (ref 2–14)
MYELOCYTES NFR BLD: 1 % — HIGH (ref 0–0)
NEUTROPHILS # BLD AUTO: 1.17 K/UL — LOW (ref 1.8–7.4)
NEUTROPHILS NFR BLD AUTO: 43 % — SIGNIFICANT CHANGE UP (ref 43–77)
NRBC # BLD: 2 /100 WBCS — HIGH (ref 0–0)
NRBC # BLD: SIGNIFICANT CHANGE UP /100 WBCS (ref 0–0)
PLAT MORPH BLD: NORMAL — SIGNIFICANT CHANGE UP
PLATELET # BLD AUTO: 63 K/UL — LOW (ref 150–400)
POIKILOCYTOSIS BLD QL AUTO: SLIGHT — SIGNIFICANT CHANGE UP
POLYCHROMASIA BLD QL SMEAR: SLIGHT — SIGNIFICANT CHANGE UP
PT BLD: 16.2 SEC
RBC # BLD: 2.51 M/UL — LOW (ref 4.2–5.8)
RBC # FLD: 17.5 % — HIGH (ref 10.3–14.5)
RBC BLD AUTO: ABNORMAL
RETICS #: 123 K/UL — SIGNIFICANT CHANGE UP (ref 25–125)
RETICS/RBC NFR: 4.9 % — HIGH (ref 0.5–2.5)
WBC # BLD: 2.71 K/UL — LOW (ref 3.8–10.5)
WBC # FLD AUTO: 2.71 K/UL — LOW (ref 3.8–10.5)

## 2024-11-14 PROCEDURE — 99214 OFFICE O/P EST MOD 30 MIN: CPT

## 2024-11-14 RX ORDER — FOLIC ACID 1 MG/1
1 TABLET ORAL DAILY
Qty: 30 | Refills: 3 | Status: ACTIVE | COMMUNITY
Start: 2024-11-14 | End: 1900-01-01

## 2024-11-15 LAB
ALBUMIN SERPL ELPH-MCNC: 3.5 G/DL
ALP BLD-CCNC: 258 U/L
ALT SERPL-CCNC: 19 U/L
ANION GAP SERPL CALC-SCNC: 13 MMOL/L
AST SERPL-CCNC: 28 U/L
BILIRUB SERPL-MCNC: 0.9 MG/DL
BUN SERPL-MCNC: 14 MG/DL
CALCIUM SERPL-MCNC: 8.5 MG/DL
CHLORIDE SERPL-SCNC: 103 MMOL/L
CO2 SERPL-SCNC: 23 MMOL/L
CREAT SERPL-MCNC: 1.26 MG/DL
DEPRECATED KAPPA LC FREE/LAMBDA SER: 4.17 RATIO
EGFR: 64 ML/MIN/1.73M2
GLUCOSE SERPL-MCNC: 199 MG/DL
IGA SER QL IEP: 239 MG/DL
IGG SER QL IEP: 941 MG/DL
IGM SER QL IEP: 163 MG/DL
KAPPA LC CSF-MCNC: 2.17 MG/DL
KAPPA LC SERPL-MCNC: 9.05 MG/DL
LDH SERPL-CCNC: 564 U/L
PHOSPHATE SERPL-MCNC: 3.2 MG/DL
POTASSIUM SERPL-SCNC: 4 MMOL/L
PROT SERPL-MCNC: 6.3 G/DL
SODIUM SERPL-SCNC: 139 MMOL/L
URATE SERPL-MCNC: 8.2 MG/DL
VIT B12 SERPL-MCNC: 832 PG/ML

## 2024-11-16 ENCOUNTER — APPOINTMENT (OUTPATIENT)
Dept: INFUSION THERAPY | Facility: HOSPITAL | Age: 64
End: 2024-11-16

## 2024-11-18 DIAGNOSIS — Z51.89 ENCOUNTER FOR OTHER SPECIFIED AFTERCARE: ICD-10-CM

## 2024-11-20 LAB — FIBRINOGEN AG PPP IA-MCNC: 501 MG/DL

## 2024-11-21 ENCOUNTER — APPOINTMENT (OUTPATIENT)
Dept: HEMATOLOGY ONCOLOGY | Facility: CLINIC | Age: 64
End: 2024-11-21
Payer: COMMERCIAL

## 2024-11-21 ENCOUNTER — LABORATORY RESULT (OUTPATIENT)
Age: 64
End: 2024-11-21

## 2024-11-21 ENCOUNTER — RESULT REVIEW (OUTPATIENT)
Age: 64
End: 2024-11-21

## 2024-11-21 VITALS
TEMPERATURE: 98.3 F | RESPIRATION RATE: 17 BRPM | DIASTOLIC BLOOD PRESSURE: 74 MMHG | SYSTOLIC BLOOD PRESSURE: 158 MMHG | OXYGEN SATURATION: 95 % | WEIGHT: 206.99 LBS | BODY MASS INDEX: 30.38 KG/M2 | HEART RATE: 99 BPM

## 2024-11-21 DIAGNOSIS — C83.00 SMALL CELL B-CELL LYMPHOMA, UNSPECIFIED SITE: ICD-10-CM

## 2024-11-21 LAB
ANISOCYTOSIS BLD QL: SLIGHT — SIGNIFICANT CHANGE UP
BASOPHILS # BLD AUTO: 0 K/UL — SIGNIFICANT CHANGE UP (ref 0–0.2)
BASOPHILS NFR BLD AUTO: 0 % — SIGNIFICANT CHANGE UP (ref 0–2)
ELLIPTOCYTES BLD QL SMEAR: SLIGHT — SIGNIFICANT CHANGE UP
EOSINOPHIL # BLD AUTO: 0 K/UL — SIGNIFICANT CHANGE UP (ref 0–0.5)
EOSINOPHIL NFR BLD AUTO: 0 % — SIGNIFICANT CHANGE UP (ref 0–6)
HCT VFR BLD CALC: 21.6 % — LOW (ref 39–50)
HGB BLD-MCNC: 6.7 G/DL — CRITICAL LOW (ref 13–17)
HYPOCHROMIA BLD QL: SLIGHT — SIGNIFICANT CHANGE UP
LYMPHOCYTES # BLD AUTO: 1.26 K/UL — SIGNIFICANT CHANGE UP (ref 1–3.3)
LYMPHOCYTES # BLD AUTO: 43 % — SIGNIFICANT CHANGE UP (ref 13–44)
MCHC RBC-ENTMCNC: 24.4 PG — LOW (ref 27–34)
MCHC RBC-ENTMCNC: 31 G/DL — LOW (ref 32–36)
MCV RBC AUTO: 78.5 FL — LOW (ref 80–100)
MONOCYTES # BLD AUTO: 0.32 K/UL — SIGNIFICANT CHANGE UP (ref 0–0.9)
MONOCYTES NFR BLD AUTO: 11 % — SIGNIFICANT CHANGE UP (ref 2–14)
MYELOCYTES NFR BLD: 1 % — HIGH (ref 0–0)
NEUTROPHILS # BLD AUTO: 1.32 K/UL — LOW (ref 1.8–7.4)
NEUTROPHILS NFR BLD AUTO: 45 % — SIGNIFICANT CHANGE UP (ref 43–77)
NRBC # BLD: 1 /100 WBCS — HIGH (ref 0–0)
NRBC # BLD: SIGNIFICANT CHANGE UP /100 WBCS (ref 0–0)
PLAT MORPH BLD: NORMAL — SIGNIFICANT CHANGE UP
PLATELET # BLD AUTO: 66 K/UL — LOW (ref 150–400)
POIKILOCYTOSIS BLD QL AUTO: SLIGHT — SIGNIFICANT CHANGE UP
POLYCHROMASIA BLD QL SMEAR: SLIGHT — SIGNIFICANT CHANGE UP
RBC # BLD: 2.75 M/UL — LOW (ref 4.2–5.8)
RBC # FLD: 18.8 % — HIGH (ref 10.3–14.5)
RBC BLD AUTO: ABNORMAL
WBC # BLD: 2.94 K/UL — LOW (ref 3.8–10.5)
WBC # FLD AUTO: 2.94 K/UL — LOW (ref 3.8–10.5)

## 2024-11-21 PROCEDURE — 38222 DX BONE MARROW BX & ASPIR: CPT | Mod: RT

## 2024-11-22 ENCOUNTER — RESULT REVIEW (OUTPATIENT)
Age: 64
End: 2024-11-22

## 2024-11-22 ENCOUNTER — APPOINTMENT (OUTPATIENT)
Dept: NUCLEAR MEDICINE | Facility: CLINIC | Age: 64
End: 2024-11-22

## 2024-11-22 ENCOUNTER — OUTPATIENT (OUTPATIENT)
Dept: OUTPATIENT SERVICES | Facility: HOSPITAL | Age: 64
LOS: 1 days | Discharge: HOME | End: 2024-11-22

## 2024-11-22 ENCOUNTER — APPOINTMENT (OUTPATIENT)
Dept: HEMATOLOGY ONCOLOGY | Facility: CLINIC | Age: 64
End: 2024-11-22

## 2024-11-22 ENCOUNTER — OUTPATIENT (OUTPATIENT)
Dept: OUTPATIENT SERVICES | Facility: HOSPITAL | Age: 64
LOS: 1 days | End: 2024-11-22
Payer: COMMERCIAL

## 2024-11-22 DIAGNOSIS — C83.00 SMALL CELL B-CELL LYMPHOMA, UNSPECIFIED SITE: ICD-10-CM

## 2024-11-22 LAB
ALBUMIN MFR SERPL ELPH: 48.4 %
ALBUMIN SERPL-MCNC: 3 G/DL
ALBUMIN/GLOB SERPL: 0.9 RATIO
ALPHA1 GLOB MFR SERPL ELPH: 11.9 %
ALPHA1 GLOB SERPL ELPH-MCNC: 0.7 G/DL
ALPHA2 GLOB MFR SERPL ELPH: 12.2 %
ALPHA2 GLOB SERPL ELPH-MCNC: 0.8 G/DL
B-GLOBULIN MFR SERPL ELPH: 12.2 %
B-GLOBULIN SERPL ELPH-MCNC: 0.8 G/DL
BASOPHILS # BLD AUTO: 0.04 K/UL — SIGNIFICANT CHANGE UP (ref 0–0.2)
BASOPHILS NFR BLD AUTO: 1 % — SIGNIFICANT CHANGE UP (ref 0–2)
BURR CELLS BLD QL SMEAR: PRESENT — SIGNIFICANT CHANGE UP
DACRYOCYTES BLD QL SMEAR: SLIGHT — SIGNIFICANT CHANGE UP
DEPRECATED KAPPA LC FREE/LAMBDA SER: 3.95 RATIO
ELLIPTOCYTES BLD QL SMEAR: SLIGHT — SIGNIFICANT CHANGE UP
EOSINOPHIL # BLD AUTO: 0 K/UL — SIGNIFICANT CHANGE UP (ref 0–0.5)
EOSINOPHIL NFR BLD AUTO: 0 % — SIGNIFICANT CHANGE UP (ref 0–6)
GAMMA GLOB FLD ELPH-MCNC: 0.9 G/DL
GAMMA GLOB MFR SERPL ELPH: 15.3 %
HCT VFR BLD CALC: 22.7 % — LOW (ref 39–50)
HGB BLD-MCNC: 6.9 G/DL — CRITICAL LOW (ref 13–17)
HYPOCHROMIA BLD QL: SLIGHT — SIGNIFICANT CHANGE UP
IGA SER QL IEP: 228 MG/DL
IGG SER QL IEP: 934 MG/DL
IGM SER QL IEP: 163 MG/DL
INTERPRETATION SERPL IEP-IMP: NORMAL
KAPPA LC CSF-MCNC: 1.99 MG/DL
KAPPA LC SERPL-MCNC: 7.87 MG/DL
LYMPHOCYTES # BLD AUTO: 1.21 K/UL — SIGNIFICANT CHANGE UP (ref 1–3.3)
LYMPHOCYTES # BLD AUTO: 34 % — SIGNIFICANT CHANGE UP (ref 13–44)
LYMPHOCYTES # SPEC AUTO: 17 % — HIGH (ref 0–0)
M PROTEIN MFR SERPL ELPH: 3.1 %
M PROTEIN SPEC IFE-MCNC: NORMAL
MCHC RBC-ENTMCNC: 24.4 PG — LOW (ref 27–34)
MCHC RBC-ENTMCNC: 30.4 G/DL — LOW (ref 32–36)
MCV RBC AUTO: 80.2 FL — SIGNIFICANT CHANGE UP (ref 80–100)
METAMYELOCYTES # FLD: 1 % — HIGH (ref 0–0)
MONOCLON BAND OBS SERPL: 0.2 G/DL
MONOCYTES # BLD AUTO: 0.11 K/UL — SIGNIFICANT CHANGE UP (ref 0–0.9)
MONOCYTES NFR BLD AUTO: 3 % — SIGNIFICANT CHANGE UP (ref 2–14)
MYELOCYTES NFR BLD: 1 % — HIGH (ref 0–0)
NEUTROPHILS # BLD AUTO: 1.53 K/UL — LOW (ref 1.8–7.4)
NEUTROPHILS NFR BLD AUTO: 43 % — SIGNIFICANT CHANGE UP (ref 43–77)
NRBC # BLD: 1 /100 WBCS — HIGH (ref 0–0)
NRBC # BLD: SIGNIFICANT CHANGE UP /100 WBCS (ref 0–0)
PLAT MORPH BLD: NORMAL — SIGNIFICANT CHANGE UP
PLATELET # BLD AUTO: 81 K/UL — LOW (ref 150–400)
POIKILOCYTOSIS BLD QL AUTO: SLIGHT — SIGNIFICANT CHANGE UP
POLYCHROMASIA BLD QL SMEAR: SLIGHT — SIGNIFICANT CHANGE UP
PROT SERPL-MCNC: 6.2 G/DL
PROT SERPL-MCNC: 6.2 G/DL
RBC # BLD: 2.83 M/UL — LOW (ref 4.2–5.8)
RBC # FLD: 19 % — HIGH (ref 10.3–14.5)
RBC BLD AUTO: ABNORMAL
RETICS #: 116.2 K/UL — SIGNIFICANT CHANGE UP (ref 25–125)
RETICS/RBC NFR: 4.1 % — HIGH (ref 0.5–2.5)
SMUDGE CELLS # BLD: PRESENT — SIGNIFICANT CHANGE UP
TARGETS BLD QL SMEAR: SLIGHT — SIGNIFICANT CHANGE UP
WBC # BLD: 3.56 K/UL — LOW (ref 3.8–10.5)
WBC # FLD AUTO: 3.56 K/UL — LOW (ref 3.8–10.5)

## 2024-11-22 PROCEDURE — 78815 PET IMAGE W/CT SKULL-THIGH: CPT | Mod: 26,PI

## 2024-11-23 ENCOUNTER — APPOINTMENT (OUTPATIENT)
Dept: INFUSION THERAPY | Facility: HOSPITAL | Age: 64
End: 2024-11-23

## 2024-11-23 LAB
ALBUMIN SERPL ELPH-MCNC: 3.2 G/DL
ALP BLD-CCNC: 323 U/L
ALT SERPL-CCNC: 20 U/L
ANION GAP SERPL CALC-SCNC: 14 MMOL/L
AST SERPL-CCNC: 35 U/L
BILIRUB SERPL-MCNC: 1.1 MG/DL
BUN SERPL-MCNC: 15 MG/DL
CALCIUM SERPL-MCNC: 8.6 MG/DL
CHLORIDE SERPL-SCNC: 103 MMOL/L
CO2 SERPL-SCNC: 20 MMOL/L
CREAT SERPL-MCNC: 1.21 MG/DL
EGFR: 68 ML/MIN/1.73M2
GLUCOSE SERPL-MCNC: 89 MG/DL
HAPTOGLOB SERPL-MCNC: 171 MG/DL
LDH SERPL-CCNC: 564 U/L
POTASSIUM SERPL-SCNC: 4.7 MMOL/L
PROT SERPL-MCNC: 6.2 G/DL
SODIUM SERPL-SCNC: 137 MMOL/L

## 2024-11-26 DIAGNOSIS — Z51.89 ENCOUNTER FOR OTHER SPECIFIED AFTERCARE: ICD-10-CM

## 2024-11-26 LAB
ALBUMIN MFR SERPL ELPH: 46.3 %
ALBUMIN SERPL-MCNC: 2.9 G/DL
ALBUMIN/GLOB SERPL: 0.9 RATIO
ALPHA1 GLOB MFR SERPL ELPH: 12.1 %
ALPHA1 GLOB SERPL ELPH-MCNC: 0.8 G/DL
ALPHA2 GLOB MFR SERPL ELPH: 13.2 %
ALPHA2 GLOB SERPL ELPH-MCNC: 0.8 G/DL
B-GLOBULIN MFR SERPL ELPH: 12.1 %
B-GLOBULIN SERPL ELPH-MCNC: 0.8 G/DL
DEPRECATED KAPPA LC FREE/LAMBDA SER: 3.95 RATIO
GAMMA GLOB FLD ELPH-MCNC: 1 G/DL
GAMMA GLOB MFR SERPL ELPH: 16.3 %
IGA SER QL IEP: 237 MG/DL
IGG SER QL IEP: 918 MG/DL
IGM SER QL IEP: 274 MG/DL
INTERPRETATION SERPL IEP-IMP: NORMAL
KAPPA LC CSF-MCNC: 2.29 MG/DL
KAPPA LC SERPL-MCNC: 9.04 MG/DL
M PROTEIN MFR SERPL ELPH: 4.1 %
M PROTEIN SPEC IFE-MCNC: NORMAL
MONOCLON BAND OBS SERPL: 0.3 G/DL
PROT SERPL-MCNC: 6.2 G/DL
PROT SERPL-MCNC: 6.2 G/DL

## 2024-12-02 ENCOUNTER — RESULT REVIEW (OUTPATIENT)
Age: 64
End: 2024-12-02

## 2024-12-02 ENCOUNTER — APPOINTMENT (OUTPATIENT)
Dept: HEMATOLOGY ONCOLOGY | Facility: CLINIC | Age: 64
End: 2024-12-02

## 2024-12-02 VITALS
TEMPERATURE: 98.6 F | BODY MASS INDEX: 28.83 KG/M2 | SYSTOLIC BLOOD PRESSURE: 122 MMHG | RESPIRATION RATE: 17 BRPM | DIASTOLIC BLOOD PRESSURE: 66 MMHG | HEART RATE: 87 BPM | WEIGHT: 196.41 LBS | OXYGEN SATURATION: 95 %

## 2024-12-02 DIAGNOSIS — D47.2 MONOCLONAL GAMMOPATHY: ICD-10-CM

## 2024-12-02 DIAGNOSIS — E11.9 TYPE 2 DIABETES MELLITUS W/OUT COMPLICATIONS: ICD-10-CM

## 2024-12-02 DIAGNOSIS — E03.9 HYPOTHYROIDISM, UNSPECIFIED: ICD-10-CM

## 2024-12-02 LAB
ANISOCYTOSIS BLD QL: SLIGHT — SIGNIFICANT CHANGE UP
BASOPHILS # BLD AUTO: 0 K/UL — SIGNIFICANT CHANGE UP (ref 0–0.2)
BASOPHILS NFR BLD AUTO: 0 % — SIGNIFICANT CHANGE UP (ref 0–2)
BURR CELLS BLD QL SMEAR: PRESENT — SIGNIFICANT CHANGE UP
ELLIPTOCYTES BLD QL SMEAR: SLIGHT — SIGNIFICANT CHANGE UP
EOSINOPHIL # BLD AUTO: 0 K/UL — SIGNIFICANT CHANGE UP (ref 0–0.5)
EOSINOPHIL NFR BLD AUTO: 0 % — SIGNIFICANT CHANGE UP (ref 0–6)
HCT VFR BLD CALC: 23.5 % — LOW (ref 39–50)
HGB BLD-MCNC: 7.2 G/DL — LOW (ref 13–17)
HYPOCHROMIA BLD QL: SLIGHT — SIGNIFICANT CHANGE UP
LYMPHOCYTES # BLD AUTO: 0.73 K/UL — LOW (ref 1–3.3)
LYMPHOCYTES # BLD AUTO: 21 % — SIGNIFICANT CHANGE UP (ref 13–44)
LYMPHOCYTES # SPEC AUTO: 17 % — HIGH (ref 0–0)
MCHC RBC-ENTMCNC: 24.6 PG — LOW (ref 27–34)
MCHC RBC-ENTMCNC: 30.6 G/DL — LOW (ref 32–36)
MCV RBC AUTO: 80.2 FL — SIGNIFICANT CHANGE UP (ref 80–100)
MONOCYTES # BLD AUTO: 0.24 K/UL — SIGNIFICANT CHANGE UP (ref 0–0.9)
MONOCYTES NFR BLD AUTO: 7 % — SIGNIFICANT CHANGE UP (ref 2–14)
NEUTROPHILS # BLD AUTO: 1.91 K/UL — SIGNIFICANT CHANGE UP (ref 1.8–7.4)
NEUTROPHILS NFR BLD AUTO: 55 % — SIGNIFICANT CHANGE UP (ref 43–77)
NRBC # BLD: 0 /100 WBCS — SIGNIFICANT CHANGE UP (ref 0–0)
NRBC # BLD: SIGNIFICANT CHANGE UP /100 WBCS (ref 0–0)
NRBC BLD-RTO: 0 /100 WBCS — SIGNIFICANT CHANGE UP (ref 0–0)
NRBC BLD-RTO: SIGNIFICANT CHANGE UP /100 WBCS (ref 0–0)
PLAT MORPH BLD: NORMAL — SIGNIFICANT CHANGE UP
PLATELET # BLD AUTO: 88 K/UL — LOW (ref 150–400)
POIKILOCYTOSIS BLD QL AUTO: SLIGHT — SIGNIFICANT CHANGE UP
RBC # BLD: 2.93 M/UL — LOW (ref 4.2–5.8)
RBC # FLD: 18.3 % — HIGH (ref 10.3–14.5)
RBC BLD AUTO: ABNORMAL
SMUDGE CELLS # BLD: PRESENT — SIGNIFICANT CHANGE UP
TARGETS BLD QL SMEAR: SLIGHT — SIGNIFICANT CHANGE UP
WBC # BLD: 3.47 K/UL — LOW (ref 3.8–10.5)
WBC # FLD AUTO: 3.47 K/UL — LOW (ref 3.8–10.5)

## 2024-12-02 PROCEDURE — 99214 OFFICE O/P EST MOD 30 MIN: CPT

## 2024-12-02 RX ORDER — ALLOPURINOL 300 MG/1
300 TABLET ORAL
Qty: 10 | Refills: 1 | Status: ACTIVE | COMMUNITY
Start: 2024-12-02 | End: 1900-01-01

## 2024-12-02 RX ORDER — METOCLOPRAMIDE 10 MG/1
10 TABLET ORAL
Qty: 120 | Refills: 1 | Status: ACTIVE | COMMUNITY
Start: 2024-12-02 | End: 1900-01-01

## 2024-12-03 ENCOUNTER — APPOINTMENT (OUTPATIENT)
Dept: INFUSION THERAPY | Facility: HOSPITAL | Age: 64
End: 2024-12-03

## 2024-12-03 ENCOUNTER — APPOINTMENT (OUTPATIENT)
Dept: HEMATOLOGY ONCOLOGY | Facility: CLINIC | Age: 64
End: 2024-12-03

## 2024-12-05 ENCOUNTER — RESULT REVIEW (OUTPATIENT)
Age: 64
End: 2024-12-05

## 2024-12-05 ENCOUNTER — APPOINTMENT (OUTPATIENT)
Dept: HEMATOLOGY ONCOLOGY | Facility: CLINIC | Age: 64
End: 2024-12-05

## 2024-12-05 ENCOUNTER — APPOINTMENT (OUTPATIENT)
Dept: INFUSION THERAPY | Facility: HOSPITAL | Age: 64
End: 2024-12-05

## 2024-12-05 LAB
ANISOCYTOSIS BLD QL: SLIGHT — SIGNIFICANT CHANGE UP
BASOPHILS # BLD AUTO: 0 K/UL — SIGNIFICANT CHANGE UP (ref 0–0.2)
BASOPHILS NFR BLD AUTO: 0 % — SIGNIFICANT CHANGE UP (ref 0–2)
BURR CELLS BLD QL SMEAR: PRESENT — SIGNIFICANT CHANGE UP
ELLIPTOCYTES BLD QL SMEAR: SLIGHT — SIGNIFICANT CHANGE UP
EOSINOPHIL # BLD AUTO: 0.02 K/UL — SIGNIFICANT CHANGE UP (ref 0–0.5)
EOSINOPHIL NFR BLD AUTO: 0.5 % — SIGNIFICANT CHANGE UP (ref 0–6)
HCT VFR BLD CALC: 25.9 % — LOW (ref 39–50)
HGB BLD-MCNC: 8.1 G/DL — LOW (ref 13–17)
HYPOCHROMIA BLD QL: SLIGHT — SIGNIFICANT CHANGE UP
LYMPHOCYTES # BLD AUTO: 1.26 K/UL — SIGNIFICANT CHANGE UP (ref 1–3.3)
LYMPHOCYTES # BLD AUTO: 40 % — SIGNIFICANT CHANGE UP (ref 13–44)
LYMPHOCYTES # SPEC AUTO: 11.5 % — HIGH (ref 0–0)
MCHC RBC-ENTMCNC: 25 PG — LOW (ref 27–34)
MCHC RBC-ENTMCNC: 31.3 G/DL — LOW (ref 32–36)
MCV RBC AUTO: 79.9 FL — LOW (ref 80–100)
METAMYELOCYTES # FLD: 1 % — HIGH (ref 0–0)
METAMYELOCYTES NFR BLD: 1 % — HIGH (ref 0–0)
MICROCYTES BLD QL: SLIGHT — SIGNIFICANT CHANGE UP
MONOCYTES # BLD AUTO: 0.24 K/UL — SIGNIFICANT CHANGE UP (ref 0–0.9)
MONOCYTES NFR BLD AUTO: 7.5 % — SIGNIFICANT CHANGE UP (ref 2–14)
NEUTROPHILS # BLD AUTO: 1.24 K/UL — LOW (ref 1.8–7.4)
NEUTROPHILS NFR BLD AUTO: 39.5 % — LOW (ref 43–77)
NRBC # BLD: 0 /100 WBCS — SIGNIFICANT CHANGE UP (ref 0–0)
NRBC # BLD: SIGNIFICANT CHANGE UP /100 WBCS (ref 0–0)
NRBC BLD-RTO: 0 /100 WBCS — SIGNIFICANT CHANGE UP (ref 0–0)
NRBC BLD-RTO: SIGNIFICANT CHANGE UP /100 WBCS (ref 0–0)
PLAT MORPH BLD: NORMAL — SIGNIFICANT CHANGE UP
PLATELET # BLD AUTO: 90 K/UL — LOW (ref 150–400)
POIKILOCYTOSIS BLD QL AUTO: SLIGHT — SIGNIFICANT CHANGE UP
POLYCHROMASIA BLD QL SMEAR: SLIGHT — SIGNIFICANT CHANGE UP
RBC # BLD: 3.24 M/UL — LOW (ref 4.2–5.8)
RBC # FLD: 17.9 % — HIGH (ref 10.3–14.5)
RBC BLD AUTO: ABNORMAL
WBC # BLD: 3.14 K/UL — LOW (ref 3.8–10.5)
WBC # FLD AUTO: 3.14 K/UL — LOW (ref 3.8–10.5)

## 2024-12-06 DIAGNOSIS — Z51.11 ENCOUNTER FOR ANTINEOPLASTIC CHEMOTHERAPY: ICD-10-CM

## 2024-12-06 DIAGNOSIS — R11.2 NAUSEA WITH VOMITING, UNSPECIFIED: ICD-10-CM

## 2024-12-06 LAB
ALBUMIN SERPL ELPH-MCNC: 3.1 G/DL — LOW (ref 3.3–5)
ALP SERPL-CCNC: 387 U/L — HIGH (ref 40–120)
ALT FLD-CCNC: 20 U/L — SIGNIFICANT CHANGE UP (ref 10–45)
ANION GAP SERPL CALC-SCNC: 13 MMOL/L — SIGNIFICANT CHANGE UP (ref 5–17)
AST SERPL-CCNC: 44 U/L — HIGH (ref 10–40)
BILIRUB SERPL-MCNC: 0.9 MG/DL — SIGNIFICANT CHANGE UP (ref 0.2–1.2)
BUN SERPL-MCNC: 15 MG/DL — SIGNIFICANT CHANGE UP (ref 7–23)
CALCIUM SERPL-MCNC: 8.4 MG/DL — SIGNIFICANT CHANGE UP (ref 8.4–10.5)
CHLORIDE SERPL-SCNC: 96 MMOL/L — SIGNIFICANT CHANGE UP (ref 96–108)
CO2 SERPL-SCNC: 23 MMOL/L — SIGNIFICANT CHANGE UP (ref 22–31)
CREAT SERPL-MCNC: 1.04 MG/DL — SIGNIFICANT CHANGE UP (ref 0.5–1.3)
EGFR: 81 ML/MIN/1.73M2 — SIGNIFICANT CHANGE UP
GLUCOSE SERPL-MCNC: 66 MG/DL — LOW (ref 70–99)
IGA FLD-MCNC: 213 MG/DL — SIGNIFICANT CHANGE UP (ref 84–499)
IGG FLD-MCNC: 882 MG/DL — SIGNIFICANT CHANGE UP (ref 610–1660)
IGM SERPL-MCNC: 328 MG/DL — HIGH (ref 35–242)
KAPPA LC SER QL IFE: 9.01 MG/DL — HIGH (ref 0.33–1.94)
KAPPA/LAMBDA FREE LIGHT CHAIN RATIO, SERUM: 3.9 RATIO — HIGH (ref 0.26–1.65)
LAMBDA LC SER QL IFE: 2.31 MG/DL — SIGNIFICANT CHANGE UP (ref 0.57–2.63)
LDH SERPL L TO P-CCNC: 459 U/L — HIGH (ref 50–242)
MAGNESIUM SERPL-MCNC: 2.2 MG/DL — SIGNIFICANT CHANGE UP (ref 1.6–2.6)
PHOSPHATE SERPL-MCNC: 2.3 MG/DL — LOW (ref 2.5–4.5)
POTASSIUM SERPL-MCNC: 4.6 MMOL/L — SIGNIFICANT CHANGE UP (ref 3.5–5.3)
POTASSIUM SERPL-SCNC: 4.6 MMOL/L — SIGNIFICANT CHANGE UP (ref 3.5–5.3)
PROT SERPL-MCNC: 6 G/DL — SIGNIFICANT CHANGE UP (ref 6–8.3)
PROT SERPL-MCNC: 6.4 G/DL — SIGNIFICANT CHANGE UP (ref 6–8.3)
SODIUM SERPL-SCNC: 132 MMOL/L — LOW (ref 135–145)
URATE SERPL-MCNC: 5.5 MG/DL — SIGNIFICANT CHANGE UP (ref 3.4–8.8)

## 2024-12-07 ENCOUNTER — RESULT REVIEW (OUTPATIENT)
Age: 64
End: 2024-12-07

## 2024-12-07 ENCOUNTER — APPOINTMENT (OUTPATIENT)
Dept: INFUSION THERAPY | Facility: HOSPITAL | Age: 64
End: 2024-12-07

## 2024-12-07 ENCOUNTER — APPOINTMENT (OUTPATIENT)
Dept: HEMATOLOGY ONCOLOGY | Facility: CLINIC | Age: 64
End: 2024-12-07

## 2024-12-07 LAB
ANISOCYTOSIS BLD QL: SLIGHT — SIGNIFICANT CHANGE UP
BASOPHILS # BLD AUTO: 0 K/UL — SIGNIFICANT CHANGE UP (ref 0–0.2)
BASOPHILS NFR BLD AUTO: 0 % — SIGNIFICANT CHANGE UP (ref 0–2)
DACRYOCYTES BLD QL SMEAR: SLIGHT — SIGNIFICANT CHANGE UP
ELLIPTOCYTES BLD QL SMEAR: SLIGHT — SIGNIFICANT CHANGE UP
EOSINOPHIL # BLD AUTO: 0 K/UL — SIGNIFICANT CHANGE UP (ref 0–0.5)
EOSINOPHIL NFR BLD AUTO: 0 % — SIGNIFICANT CHANGE UP (ref 0–6)
HCT VFR BLD CALC: 26.4 % — LOW (ref 39–50)
HGB BLD-MCNC: 8.2 G/DL — LOW (ref 13–17)
HYPOCHROMIA BLD QL: SLIGHT — SIGNIFICANT CHANGE UP
LYMPHOCYTES # BLD AUTO: 0.49 K/UL — LOW (ref 1–3.3)
LYMPHOCYTES # BLD AUTO: 26 % — SIGNIFICANT CHANGE UP (ref 13–44)
MCHC RBC-ENTMCNC: 25.2 PG — LOW (ref 27–34)
MCHC RBC-ENTMCNC: 31.1 G/DL — LOW (ref 32–36)
MCV RBC AUTO: 81.2 FL — SIGNIFICANT CHANGE UP (ref 80–100)
MONOCYTES # BLD AUTO: 0.21 K/UL — SIGNIFICANT CHANGE UP (ref 0–0.9)
MONOCYTES NFR BLD AUTO: 11 % — SIGNIFICANT CHANGE UP (ref 2–14)
MYELOCYTES NFR BLD: 1 % — HIGH (ref 0–0)
NEUTROPHILS # BLD AUTO: 1.17 K/UL — LOW (ref 1.8–7.4)
NEUTROPHILS NFR BLD AUTO: 62 % — SIGNIFICANT CHANGE UP (ref 43–77)
NRBC # BLD: 0 /100 WBCS — SIGNIFICANT CHANGE UP (ref 0–0)
NRBC # BLD: SIGNIFICANT CHANGE UP /100 WBCS (ref 0–0)
NRBC BLD-RTO: 0 /100 WBCS — SIGNIFICANT CHANGE UP (ref 0–0)
NRBC BLD-RTO: SIGNIFICANT CHANGE UP /100 WBCS (ref 0–0)
PLAT MORPH BLD: NORMAL — SIGNIFICANT CHANGE UP
PLATELET # BLD AUTO: 77 K/UL — LOW (ref 150–400)
POIKILOCYTOSIS BLD QL AUTO: SLIGHT — SIGNIFICANT CHANGE UP
POLYCHROMASIA BLD QL SMEAR: SLIGHT — SIGNIFICANT CHANGE UP
RBC # BLD: 3.25 M/UL — LOW (ref 4.2–5.8)
RBC # FLD: 17.9 % — HIGH (ref 10.3–14.5)
RBC BLD AUTO: ABNORMAL
WBC # BLD: 1.89 K/UL — LOW (ref 3.8–10.5)
WBC # FLD AUTO: 1.89 K/UL — LOW (ref 3.8–10.5)

## 2024-12-08 LAB
% ALBUMIN: 44.1 % — SIGNIFICANT CHANGE UP
% ALPHA 1: 13 % — SIGNIFICANT CHANGE UP
% ALPHA 2: 13.2 % — SIGNIFICANT CHANGE UP
% BETA: 12.2 % — SIGNIFICANT CHANGE UP
% GAMMA: 17.5 % — SIGNIFICANT CHANGE UP
% M SPIKE: 5.4 % — SIGNIFICANT CHANGE UP
ALBUMIN SERPL ELPH-MCNC: 2.6 G/DL — LOW (ref 3.6–5.5)
ALBUMIN SERPL ELPH-MCNC: 2.9 G/DL — LOW (ref 3.3–5)
ALBUMIN/GLOB SERPL ELPH: 0.8 RATIO — SIGNIFICANT CHANGE UP
ALP SERPL-CCNC: 300 U/L — HIGH (ref 40–120)
ALPHA1 GLOB SERPL ELPH-MCNC: 0.8 G/DL — HIGH (ref 0.1–0.4)
ALPHA2 GLOB SERPL ELPH-MCNC: 0.8 G/DL — SIGNIFICANT CHANGE UP (ref 0.5–1)
ALT FLD-CCNC: 20 U/L — SIGNIFICANT CHANGE UP (ref 10–45)
ANION GAP SERPL CALC-SCNC: 13 MMOL/L — SIGNIFICANT CHANGE UP (ref 5–17)
AST SERPL-CCNC: 22 U/L — SIGNIFICANT CHANGE UP (ref 10–40)
B-GLOBULIN SERPL ELPH-MCNC: 0.7 G/DL — SIGNIFICANT CHANGE UP (ref 0.5–1)
BILIRUB SERPL-MCNC: 0.7 MG/DL — SIGNIFICANT CHANGE UP (ref 0.2–1.2)
BUN SERPL-MCNC: 24 MG/DL — HIGH (ref 7–23)
CALCIUM SERPL-MCNC: 8.6 MG/DL — SIGNIFICANT CHANGE UP (ref 8.4–10.5)
CHLORIDE SERPL-SCNC: 101 MMOL/L — SIGNIFICANT CHANGE UP (ref 96–108)
CO2 SERPL-SCNC: 21 MMOL/L — LOW (ref 22–31)
CREAT SERPL-MCNC: 1.03 MG/DL — SIGNIFICANT CHANGE UP (ref 0.5–1.3)
EGFR: 82 ML/MIN/1.73M2 — SIGNIFICANT CHANGE UP
GAMMA GLOBULIN: 1 G/DL — SIGNIFICANT CHANGE UP (ref 0.6–1.6)
GLUCOSE SERPL-MCNC: 298 MG/DL — HIGH (ref 70–99)
INTERPRETATION SERPL IFE-IMP: SIGNIFICANT CHANGE UP
M-SPIKE: 0.3 G/DL — HIGH (ref 0–0)
MAGNESIUM SERPL-MCNC: 2.2 MG/DL — SIGNIFICANT CHANGE UP (ref 1.6–2.6)
PHOSPHATE SERPL-MCNC: 2.3 MG/DL — LOW (ref 2.5–4.5)
POTASSIUM SERPL-MCNC: 5 MMOL/L — SIGNIFICANT CHANGE UP (ref 3.5–5.3)
POTASSIUM SERPL-SCNC: 5 MMOL/L — SIGNIFICANT CHANGE UP (ref 3.5–5.3)
PROT PATTERN SERPL ELPH-IMP: SIGNIFICANT CHANGE UP
PROT SERPL-MCNC: 6 G/DL — SIGNIFICANT CHANGE UP (ref 6–8.3)
PROT SERPL-MCNC: 6.2 G/DL — SIGNIFICANT CHANGE UP (ref 6–8.3)
SODIUM SERPL-SCNC: 135 MMOL/L — SIGNIFICANT CHANGE UP (ref 135–145)
URATE SERPL-MCNC: 5 MG/DL — SIGNIFICANT CHANGE UP (ref 3.4–8.8)

## 2024-12-09 ENCOUNTER — APPOINTMENT (OUTPATIENT)
Dept: SURGICAL ONCOLOGY | Facility: CLINIC | Age: 64
End: 2024-12-09
Payer: COMMERCIAL

## 2024-12-10 DIAGNOSIS — E86.0 DEHYDRATION: ICD-10-CM

## 2024-12-12 ENCOUNTER — RESULT REVIEW (OUTPATIENT)
Age: 64
End: 2024-12-12

## 2024-12-12 ENCOUNTER — APPOINTMENT (OUTPATIENT)
Dept: HEMATOLOGY ONCOLOGY | Facility: CLINIC | Age: 64
End: 2024-12-12

## 2024-12-12 ENCOUNTER — APPOINTMENT (OUTPATIENT)
Dept: INFUSION THERAPY | Facility: HOSPITAL | Age: 64
End: 2024-12-12

## 2024-12-12 LAB
2D6 GENOTYPE: NORMAL
2D6 METABOLIC ACTIVITY: NORMAL
ALBUMIN SERPL ELPH-MCNC: 3 G/DL — LOW (ref 3.3–5)
ALP SERPL-CCNC: 313 U/L — HIGH (ref 40–120)
ALT FLD-CCNC: 23 U/L — SIGNIFICANT CHANGE UP (ref 10–45)
ANION GAP SERPL CALC-SCNC: 11 MMOL/L — SIGNIFICANT CHANGE UP (ref 5–17)
ANISOCYTOSIS BLD QL: SIGNIFICANT CHANGE UP
AST SERPL-CCNC: 41 U/L — HIGH (ref 10–40)
BASOPHILS # BLD AUTO: 0 K/UL — SIGNIFICANT CHANGE UP (ref 0–0.2)
BASOPHILS NFR BLD AUTO: 0 % — SIGNIFICANT CHANGE UP (ref 0–2)
BILIRUB SERPL-MCNC: 0.8 MG/DL — SIGNIFICANT CHANGE UP (ref 0.2–1.2)
BUN SERPL-MCNC: 14 MG/DL — SIGNIFICANT CHANGE UP (ref 7–23)
BURR CELLS BLD QL SMEAR: PRESENT — SIGNIFICANT CHANGE UP
CALCIUM SERPL-MCNC: 8.7 MG/DL — SIGNIFICANT CHANGE UP (ref 8.4–10.5)
CHLORIDE SERPL-SCNC: 96 MMOL/L — SIGNIFICANT CHANGE UP (ref 96–108)
CO2 SERPL-SCNC: 25 MMOL/L — SIGNIFICANT CHANGE UP (ref 22–31)
CREAT SERPL-MCNC: 0.86 MG/DL — SIGNIFICANT CHANGE UP (ref 0.5–1.3)
CYP2D6 INFORMATION: NORMAL
CYP2D6 INTERPRETATION: NORMAL
EGFR: 98 ML/MIN/1.73M2 — SIGNIFICANT CHANGE UP
ELLIPTOCYTES BLD QL SMEAR: SLIGHT — SIGNIFICANT CHANGE UP
EOSINOPHIL # BLD AUTO: 0 K/UL — SIGNIFICANT CHANGE UP (ref 0–0.5)
EOSINOPHIL NFR BLD AUTO: 0 % — SIGNIFICANT CHANGE UP (ref 0–6)
GLUCOSE SERPL-MCNC: 129 MG/DL — HIGH (ref 70–99)
HCT VFR BLD CALC: 24.4 % — LOW (ref 39–50)
HGB BLD-MCNC: 7.7 G/DL — LOW (ref 13–17)
HYPOCHROMIA BLD QL: SIGNIFICANT CHANGE UP
IGA FLD-MCNC: 217 MG/DL — SIGNIFICANT CHANGE UP (ref 84–499)
IGG FLD-MCNC: 899 MG/DL — SIGNIFICANT CHANGE UP (ref 610–1660)
IGM SERPL-MCNC: 433 MG/DL — HIGH (ref 35–242)
KAPPA LC SER QL IFE: 8.16 MG/DL — HIGH (ref 0.33–1.94)
KAPPA/LAMBDA FREE LIGHT CHAIN RATIO, SERUM: 3.23 RATIO — HIGH (ref 0.26–1.65)
LAMBDA LC SER QL IFE: 2.53 MG/DL — SIGNIFICANT CHANGE UP (ref 0.57–2.63)
LDH SERPL L TO P-CCNC: 331 U/L — HIGH (ref 50–242)
LYMPHOCYTES # BLD AUTO: 0.36 K/UL — LOW (ref 1–3.3)
LYMPHOCYTES # BLD AUTO: 14 % — SIGNIFICANT CHANGE UP (ref 13–44)
LYMPHOCYTES # SPEC AUTO: 6 % — HIGH (ref 0–0)
Lab: NORMAL
MCHC RBC-ENTMCNC: 24.8 PG — LOW (ref 27–34)
MCHC RBC-ENTMCNC: 31.6 G/DL — LOW (ref 32–36)
MCV RBC AUTO: 78.5 FL — LOW (ref 80–100)
MONOCYTES # BLD AUTO: 0.27 K/UL — SIGNIFICANT CHANGE UP (ref 0–0.9)
MONOCYTES NFR BLD AUTO: 10.5 % — SIGNIFICANT CHANGE UP (ref 2–14)
NEUTROPHILS # BLD AUTO: 1.8 K/UL — SIGNIFICANT CHANGE UP (ref 1.8–7.4)
NEUTROPHILS NFR BLD AUTO: 69.5 % — SIGNIFICANT CHANGE UP (ref 43–77)
NRBC # BLD: 0 /100 WBCS — SIGNIFICANT CHANGE UP (ref 0–0)
NRBC # BLD: SIGNIFICANT CHANGE UP /100 WBCS (ref 0–0)
NRBC BLD-RTO: 0 /100 WBCS — SIGNIFICANT CHANGE UP (ref 0–0)
NRBC BLD-RTO: SIGNIFICANT CHANGE UP /100 WBCS (ref 0–0)
PHOSPHATE SERPL-MCNC: 3.5 MG/DL — SIGNIFICANT CHANGE UP (ref 2.5–4.5)
PLAT MORPH BLD: NORMAL — SIGNIFICANT CHANGE UP
PLATELET # BLD AUTO: 87 K/UL — LOW (ref 150–400)
POIKILOCYTOSIS BLD QL AUTO: SLIGHT — SIGNIFICANT CHANGE UP
POLYCHROMASIA BLD QL SMEAR: SLIGHT — SIGNIFICANT CHANGE UP
POTASSIUM SERPL-MCNC: 4.1 MMOL/L — SIGNIFICANT CHANGE UP (ref 3.5–5.3)
POTASSIUM SERPL-SCNC: 4.1 MMOL/L — SIGNIFICANT CHANGE UP (ref 3.5–5.3)
PROT SERPL-MCNC: 5.7 G/DL — LOW (ref 6–8.3)
PROT SERPL-MCNC: 6 G/DL — SIGNIFICANT CHANGE UP (ref 6–8.3)
RBC # BLD: 3.11 M/UL — LOW (ref 4.2–5.8)
RBC # FLD: 18.4 % — HIGH (ref 10.3–14.5)
RBC BLD AUTO: ABNORMAL
SODIUM SERPL-SCNC: 131 MMOL/L — LOW (ref 135–145)
URATE SERPL-MCNC: 4.8 MG/DL — SIGNIFICANT CHANGE UP (ref 3.4–8.8)
WBC # BLD: 2.59 K/UL — LOW (ref 3.8–10.5)
WBC # FLD AUTO: 2.59 K/UL — LOW (ref 3.8–10.5)

## 2024-12-13 NOTE — DISCHARGE NOTE PROVIDER - CARE PROVIDER_API CALL
2 seconds or less 2 seconds or [place] [address]  Phone: [phone]  Fax: [phone]  Follow Up Time:    2 seconds or less

## 2024-12-14 ENCOUNTER — APPOINTMENT (OUTPATIENT)
Dept: INFUSION THERAPY | Facility: HOSPITAL | Age: 64
End: 2024-12-14

## 2024-12-15 LAB
% ALBUMIN: 46.4 % — SIGNIFICANT CHANGE UP
% ALPHA 1: 12.6 % — SIGNIFICANT CHANGE UP
% ALPHA 2: 11.2 % — SIGNIFICANT CHANGE UP
% BETA: 12.1 % — SIGNIFICANT CHANGE UP
% GAMMA: 17.7 % — SIGNIFICANT CHANGE UP
% M SPIKE: 5.8 % — SIGNIFICANT CHANGE UP
ALBUMIN SERPL ELPH-MCNC: 2.6 G/DL — LOW (ref 3.6–5.5)
ALBUMIN/GLOB SERPL ELPH: 0.8 RATIO — SIGNIFICANT CHANGE UP
ALPHA1 GLOB SERPL ELPH-MCNC: 0.7 G/DL — HIGH (ref 0.1–0.4)
ALPHA2 GLOB SERPL ELPH-MCNC: 0.6 G/DL — SIGNIFICANT CHANGE UP (ref 0.5–1)
B-GLOBULIN SERPL ELPH-MCNC: 0.7 G/DL — SIGNIFICANT CHANGE UP (ref 0.5–1)
GAMMA GLOBULIN: 1 G/DL — SIGNIFICANT CHANGE UP (ref 0.6–1.6)
INTERPRETATION SERPL IFE-IMP: SIGNIFICANT CHANGE UP
M-SPIKE: 0.3 G/DL — HIGH (ref 0–0)
PROT PATTERN SERPL ELPH-IMP: SIGNIFICANT CHANGE UP
PROT SERPL-MCNC: 5.7 G/DL — LOW (ref 6–8.3)

## 2024-12-16 ENCOUNTER — APPOINTMENT (OUTPATIENT)
Dept: SURGICAL ONCOLOGY | Facility: CLINIC | Age: 64
End: 2024-12-16
Payer: COMMERCIAL

## 2024-12-16 VITALS
DIASTOLIC BLOOD PRESSURE: 61 MMHG | SYSTOLIC BLOOD PRESSURE: 109 MMHG | WEIGHT: 194 LBS | HEIGHT: 69 IN | HEART RATE: 93 BPM | BODY MASS INDEX: 28.73 KG/M2

## 2024-12-16 DIAGNOSIS — E03.9 HYPOTHYROIDISM, UNSPECIFIED: ICD-10-CM

## 2024-12-16 DIAGNOSIS — E11.9 TYPE 2 DIABETES MELLITUS W/OUT COMPLICATIONS: ICD-10-CM

## 2024-12-16 DIAGNOSIS — C83.00 SMALL CELL B-CELL LYMPHOMA, UNSPECIFIED SITE: ICD-10-CM

## 2024-12-16 DIAGNOSIS — L73.2 HIDRADENITIS SUPPURATIVA: ICD-10-CM

## 2024-12-16 PROCEDURE — 99205 OFFICE O/P NEW HI 60 MIN: CPT

## 2024-12-17 ENCOUNTER — APPOINTMENT (OUTPATIENT)
Dept: SURGICAL ONCOLOGY | Facility: CLINIC | Age: 64
End: 2024-12-17

## 2024-12-17 RX ORDER — FUROSEMIDE 20 MG/1
20 TABLET ORAL
Qty: 7 | Refills: 0 | Status: ACTIVE | COMMUNITY
Start: 2024-12-17 | End: 1900-01-01

## 2024-12-19 ENCOUNTER — APPOINTMENT (OUTPATIENT)
Dept: INFUSION THERAPY | Facility: HOSPITAL | Age: 64
End: 2024-12-19

## 2024-12-19 ENCOUNTER — RESULT REVIEW (OUTPATIENT)
Age: 64
End: 2024-12-19

## 2024-12-19 LAB
ALBUMIN SERPL ELPH-MCNC: 3.1 G/DL — LOW (ref 3.3–5)
ALP SERPL-CCNC: 500 U/L — HIGH (ref 40–120)
ALT FLD-CCNC: 118 U/L — HIGH (ref 10–45)
ANION GAP SERPL CALC-SCNC: 8 MMOL/L — SIGNIFICANT CHANGE UP (ref 5–17)
AST SERPL-CCNC: 145 U/L — HIGH (ref 10–40)
BASOPHILS # BLD AUTO: 0 K/UL — SIGNIFICANT CHANGE UP (ref 0–0.2)
BASOPHILS NFR BLD AUTO: 0 % — SIGNIFICANT CHANGE UP (ref 0–2)
BILIRUB SERPL-MCNC: 1.4 MG/DL — HIGH (ref 0.2–1.2)
BUN SERPL-MCNC: 14 MG/DL — SIGNIFICANT CHANGE UP (ref 7–23)
BURR CELLS BLD QL SMEAR: PRESENT — SIGNIFICANT CHANGE UP
CALCIUM SERPL-MCNC: 9 MG/DL — SIGNIFICANT CHANGE UP (ref 8.4–10.5)
CHLORIDE SERPL-SCNC: 98 MMOL/L — SIGNIFICANT CHANGE UP (ref 96–108)
CO2 SERPL-SCNC: 27 MMOL/L — SIGNIFICANT CHANGE UP (ref 22–31)
CREAT SERPL-MCNC: 0.93 MG/DL — SIGNIFICANT CHANGE UP (ref 0.5–1.3)
DACRYOCYTES BLD QL SMEAR: SLIGHT — SIGNIFICANT CHANGE UP
EGFR: 93 ML/MIN/1.73M2 — SIGNIFICANT CHANGE UP
ELLIPTOCYTES BLD QL SMEAR: SLIGHT — SIGNIFICANT CHANGE UP
EOSINOPHIL # BLD AUTO: 0 K/UL — SIGNIFICANT CHANGE UP (ref 0–0.5)
EOSINOPHIL NFR BLD AUTO: 0 % — SIGNIFICANT CHANGE UP (ref 0–6)
GLUCOSE SERPL-MCNC: 132 MG/DL — HIGH (ref 70–99)
HCT VFR BLD CALC: 24.8 % — LOW (ref 39–50)
HGB BLD-MCNC: 7.9 G/DL — LOW (ref 13–17)
HYPOCHROMIA BLD QL: SIGNIFICANT CHANGE UP
LDH SERPL L TO P-CCNC: 404 U/L — HIGH (ref 50–242)
LYMPHOCYTES # BLD AUTO: 0.3 K/UL — LOW (ref 1–3.3)
LYMPHOCYTES # BLD AUTO: 17 % — SIGNIFICANT CHANGE UP (ref 13–44)
LYMPHOCYTES # SPEC AUTO: 2 % — HIGH (ref 0–0)
MCHC RBC-ENTMCNC: 24.8 PG — LOW (ref 27–34)
MCHC RBC-ENTMCNC: 31.9 G/DL — LOW (ref 32–36)
MCV RBC AUTO: 77.7 FL — LOW (ref 80–100)
METAMYELOCYTES # FLD: 1 % — HIGH (ref 0–0)
METAMYELOCYTES NFR BLD: 1 % — HIGH (ref 0–0)
MONOCYTES # BLD AUTO: 0.21 K/UL — SIGNIFICANT CHANGE UP (ref 0–0.9)
MONOCYTES NFR BLD AUTO: 12 % — SIGNIFICANT CHANGE UP (ref 2–14)
NEUTROPHILS # BLD AUTO: 1.17 K/UL — LOW (ref 1.8–7.4)
NEUTROPHILS NFR BLD AUTO: 67 % — SIGNIFICANT CHANGE UP (ref 43–77)
NRBC # BLD: 0 /100 WBCS — SIGNIFICANT CHANGE UP (ref 0–0)
NRBC # BLD: SIGNIFICANT CHANGE UP /100 WBCS (ref 0–0)
NRBC BLD-RTO: 0 /100 WBCS — SIGNIFICANT CHANGE UP (ref 0–0)
NRBC BLD-RTO: SIGNIFICANT CHANGE UP /100 WBCS (ref 0–0)
PLAT MORPH BLD: NORMAL — SIGNIFICANT CHANGE UP
PLATELET # BLD AUTO: 74 K/UL — LOW (ref 150–400)
POIKILOCYTOSIS BLD QL AUTO: SLIGHT — SIGNIFICANT CHANGE UP
POTASSIUM SERPL-MCNC: 5 MMOL/L — SIGNIFICANT CHANGE UP (ref 3.5–5.3)
POTASSIUM SERPL-SCNC: 5 MMOL/L — SIGNIFICANT CHANGE UP (ref 3.5–5.3)
PROT SERPL-MCNC: 6.1 G/DL — SIGNIFICANT CHANGE UP (ref 6–8.3)
RBC # BLD: 3.19 M/UL — LOW (ref 4.2–5.8)
RBC # FLD: 18.6 % — HIGH (ref 10.3–14.5)
RBC BLD AUTO: ABNORMAL
SCHISTOCYTES BLD QL AUTO: SLIGHT — SIGNIFICANT CHANGE UP
SODIUM SERPL-SCNC: 133 MMOL/L — LOW (ref 135–145)
TARGETS BLD QL SMEAR: SLIGHT — SIGNIFICANT CHANGE UP
VARIANT LYMPHS # BLD: 1 % — SIGNIFICANT CHANGE UP (ref 0–6)
VARIANT LYMPHS NFR BLD MANUAL: 1 % — SIGNIFICANT CHANGE UP (ref 0–6)
WBC # BLD: 1.74 K/UL — LOW (ref 3.8–10.5)
WBC # FLD AUTO: 1.74 K/UL — LOW (ref 3.8–10.5)

## 2024-12-19 PROCEDURE — 86901 BLOOD TYPING SEROLOGIC RH(D): CPT

## 2024-12-19 PROCEDURE — 86850 RBC ANTIBODY SCREEN: CPT

## 2024-12-19 PROCEDURE — 86900 BLOOD TYPING SEROLOGIC ABO: CPT

## 2024-12-19 PROCEDURE — 86922 COMPATIBILITY TEST ANTIGLOB: CPT

## 2024-12-20 ENCOUNTER — RESULT REVIEW (OUTPATIENT)
Age: 64
End: 2024-12-20

## 2024-12-20 ENCOUNTER — OUTPATIENT (OUTPATIENT)
Dept: OUTPATIENT SERVICES | Facility: HOSPITAL | Age: 64
LOS: 1 days | End: 2024-12-20

## 2024-12-20 DIAGNOSIS — D64.9 ANEMIA, UNSPECIFIED: ICD-10-CM

## 2024-12-21 ENCOUNTER — RESULT REVIEW (OUTPATIENT)
Age: 64
End: 2024-12-21

## 2024-12-21 ENCOUNTER — APPOINTMENT (OUTPATIENT)
Dept: INFUSION THERAPY | Facility: HOSPITAL | Age: 64
End: 2024-12-21

## 2024-12-21 LAB
ALBUMIN SERPL ELPH-MCNC: 3.2 G/DL — LOW (ref 3.3–5)
ALBUMIN SERPL ELPH-MCNC: 3.2 G/DL — LOW (ref 3.3–5)
ALP SERPL-CCNC: 488 U/L — HIGH (ref 40–120)
ALP SERPL-CCNC: 497 U/L — HIGH (ref 40–120)
ALT FLD-CCNC: 116 U/L — HIGH (ref 10–45)
ALT FLD-CCNC: 117 U/L — HIGH (ref 10–45)
ANION GAP SERPL CALC-SCNC: 12 MMOL/L — SIGNIFICANT CHANGE UP (ref 5–17)
AST SERPL-CCNC: 66 U/L — HIGH (ref 10–40)
AST SERPL-CCNC: 67 U/L — HIGH (ref 10–40)
BILIRUB DIRECT SERPL-MCNC: 0.7 MG/DL — HIGH (ref 0–0.3)
BILIRUB INDIRECT FLD-MCNC: 0.6 MG/DL — SIGNIFICANT CHANGE UP (ref 0.2–1.2)
BILIRUB SERPL-MCNC: 1.2 MG/DL — SIGNIFICANT CHANGE UP (ref 0.2–1.2)
BILIRUB SERPL-MCNC: 1.3 MG/DL — HIGH (ref 0.2–1.2)
BUN SERPL-MCNC: 20 MG/DL — SIGNIFICANT CHANGE UP (ref 7–23)
CALCIUM SERPL-MCNC: 9 MG/DL — SIGNIFICANT CHANGE UP (ref 8.4–10.5)
CHLORIDE SERPL-SCNC: 98 MMOL/L — SIGNIFICANT CHANGE UP (ref 96–108)
CO2 SERPL-SCNC: 26 MMOL/L — SIGNIFICANT CHANGE UP (ref 22–31)
CREAT SERPL-MCNC: 0.99 MG/DL — SIGNIFICANT CHANGE UP (ref 0.5–1.3)
EGFR: 86 ML/MIN/1.73M2 — SIGNIFICANT CHANGE UP
GLUCOSE SERPL-MCNC: 168 MG/DL — HIGH (ref 70–99)
HBV CORE AB SER-ACNC: SIGNIFICANT CHANGE UP
HBV SURFACE AB SER-ACNC: ABNORMAL
HBV SURFACE AG SER-ACNC: SIGNIFICANT CHANGE UP
HCV AB S/CO SERPL IA: 0.41 S/CO — SIGNIFICANT CHANGE UP (ref 0–0.99)
HCV AB SERPL-IMP: SIGNIFICANT CHANGE UP
POTASSIUM SERPL-MCNC: 5.1 MMOL/L — SIGNIFICANT CHANGE UP (ref 3.5–5.3)
POTASSIUM SERPL-SCNC: 5.1 MMOL/L — SIGNIFICANT CHANGE UP (ref 3.5–5.3)
PROT SERPL-MCNC: 5.8 G/DL — LOW (ref 6–8.3)
PROT SERPL-MCNC: 6.3 G/DL — SIGNIFICANT CHANGE UP (ref 6–8.3)
SODIUM SERPL-SCNC: 136 MMOL/L — SIGNIFICANT CHANGE UP (ref 135–145)

## 2024-12-22 ENCOUNTER — OUTPATIENT (OUTPATIENT)
Dept: OUTPATIENT SERVICES | Facility: HOSPITAL | Age: 64
LOS: 1 days | End: 2024-12-22
Payer: COMMERCIAL

## 2024-12-22 DIAGNOSIS — Z00.8 ENCOUNTER FOR OTHER GENERAL EXAMINATION: ICD-10-CM

## 2024-12-22 DIAGNOSIS — C83.00 SMALL CELL B-CELL LYMPHOMA, UNSPECIFIED SITE: ICD-10-CM

## 2025-01-04 ENCOUNTER — APPOINTMENT (OUTPATIENT)
Dept: INFUSION THERAPY | Facility: HOSPITAL | Age: 65
End: 2025-01-04

## 2025-01-04 ENCOUNTER — RESULT REVIEW (OUTPATIENT)
Age: 65
End: 2025-01-04

## 2025-01-04 LAB
BASOPHILS # BLD AUTO: 0.01 K/UL — SIGNIFICANT CHANGE UP (ref 0–0.2)
BASOPHILS NFR BLD AUTO: 0.3 % — SIGNIFICANT CHANGE UP (ref 0–2)
EOSINOPHIL # BLD AUTO: 0.04 K/UL — SIGNIFICANT CHANGE UP (ref 0–0.5)
EOSINOPHIL NFR BLD AUTO: 1.4 % — SIGNIFICANT CHANGE UP (ref 0–6)
HCT VFR BLD CALC: 27.2 % — LOW (ref 39–50)
HGB BLD-MCNC: 8.6 G/DL — LOW (ref 13–17)
IMM GRANULOCYTES NFR BLD AUTO: 0.7 % — SIGNIFICANT CHANGE UP (ref 0–0.9)
LYMPHOCYTES # BLD AUTO: 0.45 K/UL — LOW (ref 1–3.3)
LYMPHOCYTES # BLD AUTO: 15.6 % — SIGNIFICANT CHANGE UP (ref 13–44)
MCHC RBC-ENTMCNC: 25.1 PG — LOW (ref 27–34)
MCHC RBC-ENTMCNC: 31.6 G/DL — LOW (ref 32–36)
MCV RBC AUTO: 79.3 FL — LOW (ref 80–100)
MONOCYTES # BLD AUTO: 0.38 K/UL — SIGNIFICANT CHANGE UP (ref 0–0.9)
MONOCYTES NFR BLD AUTO: 13.2 % — SIGNIFICANT CHANGE UP (ref 2–14)
NEUTROPHILS # BLD AUTO: 1.98 K/UL — SIGNIFICANT CHANGE UP (ref 1.8–7.4)
NEUTROPHILS NFR BLD AUTO: 68.8 % — SIGNIFICANT CHANGE UP (ref 43–77)
NRBC # BLD: 0 /100 WBCS — SIGNIFICANT CHANGE UP (ref 0–0)
NRBC BLD-RTO: 0 /100 WBCS — SIGNIFICANT CHANGE UP (ref 0–0)
PLATELET # BLD AUTO: 95 K/UL — LOW (ref 150–400)
RBC # BLD: 3.43 M/UL — LOW (ref 4.2–5.8)
RBC # FLD: 18.8 % — HIGH (ref 10.3–14.5)
WBC # BLD: 2.88 K/UL — LOW (ref 3.8–10.5)
WBC # FLD AUTO: 2.88 K/UL — LOW (ref 3.8–10.5)

## 2025-01-07 ENCOUNTER — APPOINTMENT (OUTPATIENT)
Dept: INFUSION THERAPY | Facility: HOSPITAL | Age: 65
End: 2025-01-07

## 2025-01-07 DIAGNOSIS — Z23 ENCOUNTER FOR IMMUNIZATION: ICD-10-CM

## 2025-01-14 ENCOUNTER — APPOINTMENT (OUTPATIENT)
Dept: CARDIOLOGY | Facility: CLINIC | Age: 65
End: 2025-01-14
Payer: COMMERCIAL

## 2025-01-14 VITALS
WEIGHT: 185 LBS | RESPIRATION RATE: 16 BRPM | SYSTOLIC BLOOD PRESSURE: 132 MMHG | TEMPERATURE: 98.7 F | HEART RATE: 92 BPM | OXYGEN SATURATION: 98 % | HEIGHT: 69 IN | BODY MASS INDEX: 27.4 KG/M2 | DIASTOLIC BLOOD PRESSURE: 58 MMHG

## 2025-01-14 DIAGNOSIS — R01.1 CARDIAC MURMUR, UNSPECIFIED: ICD-10-CM

## 2025-01-14 DIAGNOSIS — R16.1 SPLENOMEGALY, NOT ELSEWHERE CLASSIFIED: ICD-10-CM

## 2025-01-14 DIAGNOSIS — R79.89 OTHER SPECIFIED ABNORMAL FINDINGS OF BLOOD CHEMISTRY: ICD-10-CM

## 2025-01-14 DIAGNOSIS — R91.1 SOLITARY PULMONARY NODULE: ICD-10-CM

## 2025-01-14 DIAGNOSIS — Z12.5 ENCOUNTER FOR SCREENING FOR MALIGNANT NEOPLASM OF PROSTATE: ICD-10-CM

## 2025-01-14 DIAGNOSIS — Z01.818 ENCOUNTER FOR OTHER PREPROCEDURAL EXAMINATION: ICD-10-CM

## 2025-01-14 DIAGNOSIS — C83.00 SMALL CELL B-CELL LYMPHOMA, UNSPECIFIED SITE: ICD-10-CM

## 2025-01-14 DIAGNOSIS — D47.2 MONOCLONAL GAMMOPATHY: ICD-10-CM

## 2025-01-14 DIAGNOSIS — D64.9 ANEMIA, UNSPECIFIED: ICD-10-CM

## 2025-01-14 DIAGNOSIS — Z12.11 ENCOUNTER FOR SCREENING FOR MALIGNANT NEOPLASM OF COLON: ICD-10-CM

## 2025-01-14 DIAGNOSIS — E11.9 TYPE 2 DIABETES MELLITUS W/OUT COMPLICATIONS: ICD-10-CM

## 2025-01-14 DIAGNOSIS — E03.9 HYPOTHYROIDISM, UNSPECIFIED: ICD-10-CM

## 2025-01-14 DIAGNOSIS — Z13.228 ENCOUNTER FOR SCREENING FOR OTHER METABOLIC DISORDERS: ICD-10-CM

## 2025-01-14 DIAGNOSIS — D69.6 THROMBOCYTOPENIA, UNSPECIFIED: ICD-10-CM

## 2025-01-14 DIAGNOSIS — I44.0 ATRIOVENTRICULAR BLOCK, FIRST DEGREE: ICD-10-CM

## 2025-01-14 DIAGNOSIS — R16.0 HEPATOMEGALY, NOT ELSEWHERE CLASSIFIED: ICD-10-CM

## 2025-01-14 PROCEDURE — G2211 COMPLEX E/M VISIT ADD ON: CPT | Mod: NC

## 2025-01-14 PROCEDURE — 93000 ELECTROCARDIOGRAM COMPLETE: CPT | Mod: NC

## 2025-01-14 PROCEDURE — 99204 OFFICE O/P NEW MOD 45 MIN: CPT

## 2025-01-15 ENCOUNTER — OUTPATIENT (OUTPATIENT)
Dept: OUTPATIENT SERVICES | Facility: HOSPITAL | Age: 65
LOS: 1 days | End: 2025-01-15

## 2025-01-15 VITALS
DIASTOLIC BLOOD PRESSURE: 75 MMHG | OXYGEN SATURATION: 99 % | HEART RATE: 78 BPM | HEIGHT: 70 IN | TEMPERATURE: 98 F | WEIGHT: 184.09 LBS | RESPIRATION RATE: 16 BRPM | SYSTOLIC BLOOD PRESSURE: 115 MMHG

## 2025-01-15 DIAGNOSIS — E03.9 HYPOTHYROIDISM, UNSPECIFIED: ICD-10-CM

## 2025-01-15 DIAGNOSIS — C83.00 SMALL CELL B-CELL LYMPHOMA, UNSPECIFIED SITE: ICD-10-CM

## 2025-01-15 DIAGNOSIS — E11.9 TYPE 2 DIABETES MELLITUS WITHOUT COMPLICATIONS: ICD-10-CM

## 2025-01-15 LAB
25(OH)D3 SERPL-MCNC: 30.7 NG/ML
ALBUMIN SERPL ELPH-MCNC: 4.1 G/DL
ALBUMIN SERPL ELPH-MCNC: 4.3 G/DL
ALP BLD-CCNC: 174 U/L
ALP BLD-CCNC: 185 U/L
ALT SERPL-CCNC: 16 U/L
ALT SERPL-CCNC: 17 U/L
ANION GAP SERPL CALC-SCNC: 12 MMOL/L
APPEARANCE: CLEAR
AST SERPL-CCNC: 18 U/L
AST SERPL-CCNC: 19 U/L
BACTERIA: NEGATIVE /HPF
BASOPHILS # BLD AUTO: 0.01 K/UL
BASOPHILS NFR BLD AUTO: 0.2 %
BILIRUB SERPL-MCNC: 0.6 MG/DL
BILIRUBIN URINE: NEGATIVE
BLD GP AB SCN SERPL QL: NEGATIVE — SIGNIFICANT CHANGE UP
BLOOD URINE: NEGATIVE
BUN SERPL-MCNC: 16 MG/DL
CALCIUM SERPL-MCNC: 9.3 MG/DL
CAST: 0 /LPF
CHLORIDE SERPL-SCNC: 100 MMOL/L
CHOLEST SERPL-MCNC: 95 MG/DL
CO2 SERPL-SCNC: 28 MMOL/L
COLOR: YELLOW
CREAT SERPL-MCNC: 1.08 MG/DL
CREAT SPEC-SCNC: 48 MG/DL
EGFR: 78 ML/MIN/1.73M2
EOSINOPHIL # BLD AUTO: 0.1 K/UL
EOSINOPHIL NFR BLD AUTO: 2.4 %
EPITHELIAL CELLS: 0 /HPF
ESTIMATED AVERAGE GLUCOSE: 114 MG/DL
FERRITIN SERPL-MCNC: 1209 NG/ML
FOLATE SERPL-MCNC: >20 NG/ML
GGT SERPL-CCNC: 38 U/L
GLUCOSE QUALITATIVE U: NEGATIVE MG/DL
GLUCOSE SERPL-MCNC: 82 MG/DL
HBA1C MFR BLD HPLC: 5.6 %
HCT VFR BLD CALC: 26.6 % — LOW (ref 39–50)
HCT VFR BLD CALC: 30.2 %
HDLC SERPL-MCNC: 36 MG/DL
HGB BLD-MCNC: 8.5 G/DL — LOW (ref 13–17)
HGB BLD-MCNC: 9.2 G/DL
IMM GRANULOCYTES NFR BLD AUTO: 1.2 %
IRON SATN MFR SERPL: 12 %
IRON SERPL-MCNC: 26 UG/DL
KETONES URINE: NEGATIVE MG/DL
LDLC SERPL CALC-MCNC: 46 MG/DL
LEUKOCYTE ESTERASE URINE: NEGATIVE
LYMPHOCYTES # BLD AUTO: 0.6 K/UL
LYMPHOCYTES NFR BLD AUTO: 14.4 %
MAGNESIUM SERPL-MCNC: 2 MG/DL
MAN DIFF?: NORMAL
MCHC RBC-ENTMCNC: 25 PG
MCHC RBC-ENTMCNC: 25.4 PG — LOW (ref 27–34)
MCHC RBC-ENTMCNC: 30.5 G/DL
MCHC RBC-ENTMCNC: 32 G/DL — SIGNIFICANT CHANGE UP (ref 32–36)
MCV RBC AUTO: 79.4 FL — LOW (ref 80–100)
MCV RBC AUTO: 82.1 FL
MICROALBUMIN 24H UR DL<=1MG/L-MCNC: <1.2 MG/DL
MICROALBUMIN/CREAT 24H UR-RTO: NORMAL MG/G
MICROSCOPIC-UA: NORMAL
MONOCYTES # BLD AUTO: 0.44 K/UL
MONOCYTES NFR BLD AUTO: 10.6 %
NEUTROPHILS # BLD AUTO: 2.96 K/UL
NEUTROPHILS NFR BLD AUTO: 71.2 %
NITRITE URINE: NEGATIVE
NONHDLC SERPL-MCNC: 59 MG/DL
PH URINE: 6.5
PHOSPHATE SERPL-MCNC: 3.9 MG/DL
PLATELET # BLD AUTO: 132 K/UL — LOW (ref 150–400)
PLATELET # BLD AUTO: 146 K/UL
PLATELET # PLAS AUTO: 145 K/UL
POTASSIUM SERPL-SCNC: 4.6 MMOL/L
PROT SERPL-MCNC: 7.1 G/DL
PROTEIN URINE: NEGATIVE MG/DL
PSA SERPL-MCNC: 0.82 NG/ML
RBC # BLD: 3.35 M/UL — LOW (ref 4.2–5.8)
RBC # BLD: 3.68 M/UL
RBC # FLD: 19 % — HIGH (ref 10.3–14.5)
RBC # FLD: 19.3 %
RED BLOOD CELLS URINE: 0 /HPF
RETICS #: 93.3 K/UL — SIGNIFICANT CHANGE UP (ref 25–125)
RETICS/RBC NFR: 2.8 % — HIGH (ref 0.5–2.5)
RH IG SCN BLD-IMP: POSITIVE — SIGNIFICANT CHANGE UP
SODIUM SERPL-SCNC: 139 MMOL/L
SPECIFIC GRAVITY URINE: 1.01
T4 FREE SERPL-MCNC: 1.4 NG/DL
TIBC SERPL-MCNC: 220 UG/DL
TRANSFERRIN SERPL-MCNC: 182 MG/DL
TRIGL SERPL-MCNC: 51 MG/DL
TSH SERPL-ACNC: 4.73 UIU/ML
UIBC SERPL-MCNC: 193 UG/DL
UROBILINOGEN URINE: 1 MG/DL
VIT B12 SERPL-MCNC: 737 PG/ML
WBC # BLD: 2.66 K/UL — LOW (ref 3.8–10.5)
WBC # FLD AUTO: 2.66 K/UL — LOW (ref 3.8–10.5)
WBC # FLD AUTO: 4.16 K/UL
WHITE BLOOD CELLS URINE: 0 /HPF

## 2025-01-15 RX ORDER — GLUCAGON 3 MG/1
1 POWDER NASAL ONCE
Refills: 0 | Status: DISCONTINUED | OUTPATIENT
Start: 2025-01-22 | End: 2025-01-22

## 2025-01-15 RX ORDER — SODIUM CHLORIDE 9 G/1000ML
1000 INJECTION, SOLUTION INTRAVENOUS
Refills: 0 | Status: DISCONTINUED | OUTPATIENT
Start: 2025-01-22 | End: 2025-01-22

## 2025-01-15 RX ORDER — DEXTROSE 50 % IN WATER 50 %
12.5 SYRINGE (ML) INTRAVENOUS ONCE
Refills: 0 | Status: DISCONTINUED | OUTPATIENT
Start: 2025-01-22 | End: 2025-01-22

## 2025-01-15 RX ORDER — DEXTROSE 50 % IN WATER 50 %
25 SYRINGE (ML) INTRAVENOUS ONCE
Refills: 0 | Status: DISCONTINUED | OUTPATIENT
Start: 2025-01-22 | End: 2025-01-22

## 2025-01-15 RX ORDER — DEXTROSE 50 % IN WATER 50 %
15 SYRINGE (ML) INTRAVENOUS ONCE
Refills: 0 | Status: DISCONTINUED | OUTPATIENT
Start: 2025-01-22 | End: 2025-01-22

## 2025-01-15 NOTE — H&P PST ADULT - NSICDXPASTMEDICALHX_GEN_ALL_CORE_FT
PAST MEDICAL HISTORY:  DM (diabetes mellitus)     Hypothyroidism     Small cell B-cell lymphoma

## 2025-01-15 NOTE — H&P PST ADULT - NSICDXFAMILYHX_GEN_ALL_CORE_FT
FAMILY HISTORY:  Father  Still living? Unknown  FH: CVA (cerebrovascular accident), Age at diagnosis: Age Unknown  FH: heart disease, Age at diagnosis: Age Unknown    Mother  Still living? Unknown  FH: breast cancer, Age at diagnosis: Age Unknown    Aunt  Still living? Unknown  Family history of pancreatic cancer, Age at diagnosis: Age Unknown  Family history of stomach cancer, Age at diagnosis: Age Unknown

## 2025-01-15 NOTE — H&P PST ADULT - ATTENDING COMMENTS
D/w pt plan for lap poss open splenectomy    Discussed r/b/a post op expectations poss complications.      Pt understands and agrees to proceed.

## 2025-01-15 NOTE — H&P PST ADULT - LAST ECHOCARDIOGRAM
07/2023, EF 66%; pt scheduled for echo on 01/17/25 with Margret 07/2023, sunrise, EF 66%; pt scheduled for echo on 01/17/25 with Dr. Oswald

## 2025-01-15 NOTE — H&P PST ADULT - PROBLEM SELECTOR PLAN 1
pt scheduled for laparoscopic possible open splenectomy on 0122/25  Preop instructions provided. Pt verbalized understanding.    written and verbal instructions with teach back on chlorhexidine shampoo provided,  pt verbalized understanding   CBC/anemia, T&S done @[facility], CMP, A1C in chart, h/o blood transfusion in 12/2024, pt will return to [facility] for repeat T&S on 01/21/25  s/p cardiac eval, copy in chart

## 2025-01-15 NOTE — H&P PST ADULT - PROBLEM/PLAN-1
Nonweightbearing on the left hand  2 lb lifting restriction left hand  Ice and elevate left hand for pain or swelling  Eight oxycodone tablets sent to pharmacy use 1 tablet twice a day if needed  Otherwise use Tylenol  Follow-up in 2 weeks with repeat x-ray through the cast   Patient understands that the radius is somewhat shortened and not perfect and that he could have wrist pain and problems in the future including early arthritis  He continues to not want surgery and be treated conservatively 
DISPLAY PLAN FREE TEXT

## 2025-01-15 NOTE — H&P PST ADULT - PROBLEM SELECTOR PLAN 3
pt instructed to continue medications as prescribed and take levothyroxine with a sip of water on the morning of the surgery

## 2025-01-15 NOTE — H&P PST ADULT - PROBLEM SELECTOR PLAN 2
on insulin, A1C in chart,  pt instructed to decrease lantus dose by 30 % the night before the procedure-use 16 units, patient instructed to hold Novolog on the morning of the procedure

## 2025-01-15 NOTE — H&P PST ADULT - HISTORY OF PRESENT ILLNESS
64 y.o. male with h/o hypothyroidism, DM, presents to [facility] for evaluation scheduled for laparoscopic possible open splenectomy, preop diagnosis small cell b-cell lymphoma diagnosed in 07/2023, s/p chemotherapy 07-12/2023, c/o abdominal swelling left side, s/p PET/CT scan 11/2024, concerning for recurrent lymphoma, CT A/P 12/22/24 demonstrated marked splenomegaly and upper abdominal adenopathy 64 y.o. male with h/o hypothyroidism, DM type 1,  small cell b-cell lymphoma diagnosed in 07/2023, s/p chemotherapy 07-12/2023, s/p PET/CT scan 11/2024, concerning for recurrent lymphoma, started immunotherapy in 12/2024, CT A/P 12/24 demonstrated marked splenomegaly and upper abdominal adenopathy, presents to [facility] for evaluation scheduled for laparoscopic possible open splenectomy

## 2025-01-16 LAB
ALBUMIN SERPL ELPH-MCNC: 4 G/DL — SIGNIFICANT CHANGE UP (ref 3.3–5)
ALP SERPL-CCNC: 177 U/L — HIGH (ref 40–120)
ALT FLD-CCNC: 15 U/L — SIGNIFICANT CHANGE UP (ref 10–45)
ANION GAP SERPL CALC-SCNC: 14 MMOL/L — SIGNIFICANT CHANGE UP (ref 5–17)
ANISOCYTOSIS BLD QL: SIGNIFICANT CHANGE UP
AST SERPL-CCNC: 18 U/L — SIGNIFICANT CHANGE UP (ref 10–40)
BASOPHILS # BLD AUTO: 0.01 K/UL — SIGNIFICANT CHANGE UP (ref 0–0.2)
BASOPHILS NFR BLD AUTO: 0.4 % — SIGNIFICANT CHANGE UP (ref 0–2)
BILIRUB SERPL-MCNC: 0.6 MG/DL — SIGNIFICANT CHANGE UP (ref 0.2–1.2)
BUN SERPL-MCNC: 16 MG/DL — SIGNIFICANT CHANGE UP (ref 7–23)
CALCIUM SERPL-MCNC: 8.7 MG/DL — SIGNIFICANT CHANGE UP (ref 8.4–10.5)
CHLORIDE SERPL-SCNC: 98 MMOL/L — SIGNIFICANT CHANGE UP (ref 96–108)
CO2 SERPL-SCNC: 25 MMOL/L — SIGNIFICANT CHANGE UP (ref 22–31)
CREAT SERPL-MCNC: 0.97 MG/DL — SIGNIFICANT CHANGE UP (ref 0.5–1.3)
CRP SERPL-MCNC: 49 MG/L — HIGH
EGFR: 88 ML/MIN/1.73M2 — SIGNIFICANT CHANGE UP
ELLIPTOCYTES BLD QL SMEAR: SLIGHT — SIGNIFICANT CHANGE UP
EOSINOPHIL # BLD AUTO: 0.09 K/UL — SIGNIFICANT CHANGE UP (ref 0–0.5)
EOSINOPHIL NFR BLD AUTO: 3.4 % — SIGNIFICANT CHANGE UP (ref 0–6)
FERRITIN SERPL-MCNC: 1093 NG/ML — HIGH (ref 30–400)
GLUCOSE SERPL-MCNC: 272 MG/DL — HIGH (ref 70–99)
HYPOCHROMIA BLD QL: SLIGHT — SIGNIFICANT CHANGE UP
IMM GRANULOCYTES NFR BLD AUTO: 0.8 % — SIGNIFICANT CHANGE UP (ref 0–0.9)
IRON SATN MFR SERPL: 18 % — SIGNIFICANT CHANGE UP (ref 16–55)
IRON SATN MFR SERPL: 35 UG/DL — LOW (ref 45–165)
LYMPHOCYTES # BLD AUTO: 0.52 K/UL — LOW (ref 1–3.3)
LYMPHOCYTES # BLD AUTO: 19.5 % — SIGNIFICANT CHANGE UP (ref 13–44)
MANUAL SMEAR VERIFICATION: SIGNIFICANT CHANGE UP
MICROCYTES BLD QL: SIGNIFICANT CHANGE UP
MONOCYTES # BLD AUTO: 0.28 K/UL — SIGNIFICANT CHANGE UP (ref 0–0.9)
MONOCYTES NFR BLD AUTO: 10.5 % — SIGNIFICANT CHANGE UP (ref 2–14)
NEUTROPHILS # BLD AUTO: 1.74 K/UL — LOW (ref 1.8–7.4)
NEUTROPHILS NFR BLD AUTO: 65.4 % — SIGNIFICANT CHANGE UP (ref 43–77)
PLAT MORPH BLD: NORMAL — SIGNIFICANT CHANGE UP
POIKILOCYTOSIS BLD QL AUTO: SLIGHT — SIGNIFICANT CHANGE UP
POLYCHROMASIA BLD QL SMEAR: SLIGHT — SIGNIFICANT CHANGE UP
POTASSIUM SERPL-MCNC: 4.5 MMOL/L — SIGNIFICANT CHANGE UP (ref 3.5–5.3)
POTASSIUM SERPL-SCNC: 4.5 MMOL/L — SIGNIFICANT CHANGE UP (ref 3.5–5.3)
PROT SERPL-MCNC: 6.4 G/DL — SIGNIFICANT CHANGE UP (ref 6–8.3)
RBC BLD AUTO: ABNORMAL
SODIUM SERPL-SCNC: 137 MMOL/L — SIGNIFICANT CHANGE UP (ref 135–145)
TIBC SERPL-MCNC: 196 UG/DL — LOW (ref 220–430)
UIBC SERPL-MCNC: 161 UG/DL — SIGNIFICANT CHANGE UP (ref 110–370)

## 2025-01-17 ENCOUNTER — APPOINTMENT (OUTPATIENT)
Dept: CARDIOLOGY | Facility: CLINIC | Age: 65
End: 2025-01-17
Payer: COMMERCIAL

## 2025-01-17 ENCOUNTER — NON-APPOINTMENT (OUTPATIENT)
Age: 65
End: 2025-01-17

## 2025-01-17 DIAGNOSIS — R93.1 ABNORMAL FINDINGS ON DIAGNOSTIC IMAGING OF HEART AND CORONARY CIRCULATION: ICD-10-CM

## 2025-01-17 PROCEDURE — 93356 MYOCRD STRAIN IMG SPCKL TRCK: CPT

## 2025-01-17 PROCEDURE — 93306 TTE W/DOPPLER COMPLETE: CPT

## 2025-01-21 ENCOUNTER — OUTPATIENT (OUTPATIENT)
Dept: OUTPATIENT SERVICES | Facility: HOSPITAL | Age: 65
LOS: 1 days | End: 2025-01-21

## 2025-01-21 DIAGNOSIS — C83.00 SMALL CELL B-CELL LYMPHOMA, UNSPECIFIED SITE: ICD-10-CM

## 2025-01-21 PROBLEM — E03.9 HYPOTHYROIDISM, UNSPECIFIED: Chronic | Status: ACTIVE | Noted: 2025-01-15

## 2025-01-21 LAB
BLD GP AB SCN SERPL QL: NEGATIVE — SIGNIFICANT CHANGE UP
RH IG SCN BLD-IMP: POSITIVE — SIGNIFICANT CHANGE UP

## 2025-01-22 ENCOUNTER — APPOINTMENT (OUTPATIENT)
Dept: SURGICAL ONCOLOGY | Facility: HOSPITAL | Age: 65
End: 2025-01-22

## 2025-01-22 ENCOUNTER — RESULT REVIEW (OUTPATIENT)
Age: 65
End: 2025-01-22

## 2025-01-22 ENCOUNTER — INPATIENT (INPATIENT)
Facility: HOSPITAL | Age: 65
LOS: 2 days | Discharge: HOME | End: 2025-01-25
Attending: SURGERY | Admitting: SURGERY
Payer: COMMERCIAL

## 2025-01-22 VITALS
SYSTOLIC BLOOD PRESSURE: 142 MMHG | WEIGHT: 184.09 LBS | HEIGHT: 70 IN | DIASTOLIC BLOOD PRESSURE: 58 MMHG | OXYGEN SATURATION: 99 % | HEART RATE: 96 BPM | RESPIRATION RATE: 16 BRPM | TEMPERATURE: 98 F

## 2025-01-22 DIAGNOSIS — C83.00 SMALL CELL B-CELL LYMPHOMA, UNSPECIFIED SITE: ICD-10-CM

## 2025-01-22 LAB
ANION GAP SERPL CALC-SCNC: 17 MMOL/L — HIGH (ref 7–14)
BASOPHILS # BLD AUTO: 0.02 K/UL — SIGNIFICANT CHANGE UP (ref 0–0.2)
BASOPHILS NFR BLD AUTO: 0.1 % — SIGNIFICANT CHANGE UP (ref 0–2)
BUN SERPL-MCNC: 12 MG/DL — SIGNIFICANT CHANGE UP (ref 7–23)
CALCIUM SERPL-MCNC: 8.4 MG/DL — SIGNIFICANT CHANGE UP (ref 8.4–10.5)
CHLORIDE SERPL-SCNC: 98 MMOL/L — SIGNIFICANT CHANGE UP (ref 98–107)
CO2 SERPL-SCNC: 20 MMOL/L — LOW (ref 22–31)
CREAT SERPL-MCNC: 0.8 MG/DL — SIGNIFICANT CHANGE UP (ref 0.5–1.3)
EGFR: 100 ML/MIN/1.73M2 — SIGNIFICANT CHANGE UP
EGFR: 100 ML/MIN/1.73M2 — SIGNIFICANT CHANGE UP
EOSINOPHIL # BLD AUTO: 0.01 K/UL — SIGNIFICANT CHANGE UP (ref 0–0.5)
EOSINOPHIL NFR BLD AUTO: 0.1 % — SIGNIFICANT CHANGE UP (ref 0–6)
GAS PNL BLDA: SIGNIFICANT CHANGE UP
GLUCOSE BLDC GLUCOMTR-MCNC: 113 MG/DL — HIGH (ref 70–99)
GLUCOSE BLDC GLUCOMTR-MCNC: 157 MG/DL — HIGH (ref 70–99)
GLUCOSE BLDC GLUCOMTR-MCNC: 200 MG/DL — HIGH (ref 70–99)
GLUCOSE SERPL-MCNC: 203 MG/DL — HIGH (ref 70–99)
HCT VFR BLD CALC: 27.5 % — LOW (ref 39–50)
HGB BLD-MCNC: 9.1 G/DL — LOW (ref 13–17)
IANC: 12.34 K/UL — HIGH (ref 1.8–7.4)
IMM GRANULOCYTES NFR BLD AUTO: 0.5 % — SIGNIFICANT CHANGE UP (ref 0–0.9)
LYMPHOCYTES # BLD AUTO: 0.83 K/UL — LOW (ref 1–3.3)
LYMPHOCYTES # BLD AUTO: 6 % — LOW (ref 13–44)
MAGNESIUM SERPL-MCNC: 1.6 MG/DL — SIGNIFICANT CHANGE UP (ref 1.6–2.6)
MCHC RBC-ENTMCNC: 26.5 PG — LOW (ref 27–34)
MCHC RBC-ENTMCNC: 33.1 G/DL — SIGNIFICANT CHANGE UP (ref 32–36)
MCV RBC AUTO: 79.9 FL — LOW (ref 80–100)
MONOCYTES # BLD AUTO: 0.62 K/UL — SIGNIFICANT CHANGE UP (ref 0–0.9)
MONOCYTES NFR BLD AUTO: 4.5 % — SIGNIFICANT CHANGE UP (ref 2–14)
NEUTROPHILS # BLD AUTO: 12.34 K/UL — HIGH (ref 1.8–7.4)
NEUTROPHILS NFR BLD AUTO: 88.8 % — HIGH (ref 43–77)
NRBC # BLD AUTO: 0 K/UL — SIGNIFICANT CHANGE UP (ref 0–0)
NRBC # BLD: 0 /100 WBCS — SIGNIFICANT CHANGE UP (ref 0–0)
NRBC # FLD: 0 K/UL — SIGNIFICANT CHANGE UP (ref 0–0)
NRBC BLD-RTO: 0 /100 WBCS — SIGNIFICANT CHANGE UP (ref 0–0)
PHOSPHATE SERPL-MCNC: 3.6 MG/DL — SIGNIFICANT CHANGE UP (ref 2.5–4.5)
PLATELET # BLD AUTO: 117 K/UL — LOW (ref 150–400)
POTASSIUM SERPL-MCNC: 4.2 MMOL/L — SIGNIFICANT CHANGE UP (ref 3.5–5.3)
POTASSIUM SERPL-SCNC: 4.2 MMOL/L — SIGNIFICANT CHANGE UP (ref 3.5–5.3)
RBC # BLD: 3.44 M/UL — LOW (ref 4.2–5.8)
RBC # FLD: 16.7 % — HIGH (ref 10.3–14.5)
SODIUM SERPL-SCNC: 135 MMOL/L — SIGNIFICANT CHANGE UP (ref 135–145)
WBC # BLD: 13.89 K/UL — HIGH (ref 3.8–10.5)
WBC # FLD AUTO: 13.89 K/UL — HIGH (ref 3.8–10.5)

## 2025-01-22 PROCEDURE — 47600 CHOLECYSTECTOMY: CPT

## 2025-01-22 PROCEDURE — 38999 UNLISTD PX HEMIC/LYMPHTC SYS: CPT

## 2025-01-22 PROCEDURE — 88305 TISSUE EXAM BY PATHOLOGIST: CPT | Mod: 26

## 2025-01-22 PROCEDURE — 88360 TUMOR IMMUNOHISTOCHEM/MANUAL: CPT | Mod: 26,59

## 2025-01-22 PROCEDURE — 88341 IMHCHEM/IMCYTCHM EA ADD ANTB: CPT | Mod: 26

## 2025-01-22 PROCEDURE — 88342 IMHCHEM/IMCYTCHM 1ST ANTB: CPT | Mod: 26

## 2025-01-22 PROCEDURE — 88304 TISSUE EXAM BY PATHOLOGIST: CPT | Mod: 26

## 2025-01-22 PROCEDURE — 88374 M/PHMTRC ALYS ISHQUANT/SEMIQ: CPT | Mod: 26

## 2025-01-22 PROCEDURE — 97605 NEG PRS WND THER DME<=50SQCM: CPT

## 2025-01-22 PROCEDURE — 88307 TISSUE EXAM BY PATHOLOGIST: CPT | Mod: 26

## 2025-01-22 DEVICE — SURGICEL 2 X 14": Type: IMPLANTABLE DEVICE | Status: FUNCTIONAL

## 2025-01-22 DEVICE — CLIP LIGATION TI ANGIO LG ORANGE: Type: IMPLANTABLE DEVICE | Status: FUNCTIONAL

## 2025-01-22 DEVICE — AGENT HEMOSTATIC HEMOBLAST 1.65G 10CM: Type: IMPLANTABLE DEVICE | Status: FUNCTIONAL

## 2025-01-22 DEVICE — LIGATING CLIPS WECK HORIZON MEDIUM (BLUE) 24: Type: IMPLANTABLE DEVICE | Status: FUNCTIONAL

## 2025-01-22 DEVICE — STAPLER COVIDIEN TRI-STAPLE 60MM TAN RELOAD: Type: IMPLANTABLE DEVICE | Status: FUNCTIONAL

## 2025-01-22 DEVICE — STAPLER COVIDIEN ENDO GIA 45MM GREY RELOAD: Type: IMPLANTABLE DEVICE | Status: FUNCTIONAL

## 2025-01-22 RX ORDER — NALOXONE HYDROCHLORIDE 0.4 MG/ML
0.1 INJECTION, SOLUTION INTRAMUSCULAR; INTRAVENOUS; SUBCUTANEOUS
Refills: 0 | Status: DISCONTINUED | OUTPATIENT
Start: 2025-01-22 | End: 2025-01-25

## 2025-01-22 RX ORDER — LEVOTHYROXINE SODIUM 300 MCG
112 TABLET ORAL DAILY
Refills: 0 | Status: DISCONTINUED | OUTPATIENT
Start: 2025-01-22 | End: 2025-01-25

## 2025-01-22 RX ORDER — INSULIN ASPART 100/ML
12 VIAL (ML) SUBCUTANEOUS
Refills: 0 | DISCHARGE

## 2025-01-22 RX ORDER — HYDROMORPHONE/SOD CHLOR,ISO/PF 2 MG/10 ML
0.5 SYRINGE (ML) INJECTION
Refills: 0 | Status: DISCONTINUED | OUTPATIENT
Start: 2025-01-22 | End: 2025-01-24

## 2025-01-22 RX ORDER — ONDANSETRON HCL/PF 4 MG/2 ML
4 VIAL (ML) INJECTION ONCE
Refills: 0 | Status: DISCONTINUED | OUTPATIENT
Start: 2025-01-22 | End: 2025-01-22

## 2025-01-22 RX ORDER — GLUCAGON 3 MG/1
1 POWDER NASAL ONCE
Refills: 0 | Status: DISCONTINUED | OUTPATIENT
Start: 2025-01-22 | End: 2025-01-25

## 2025-01-22 RX ORDER — INSULIN LISPRO 100 U/ML
INJECTION, SOLUTION INTRAVENOUS; SUBCUTANEOUS
Refills: 0 | Status: DISCONTINUED | OUTPATIENT
Start: 2025-01-22 | End: 2025-01-23

## 2025-01-22 RX ORDER — MAGNESIUM SULFATE 500 MG/ML
2 SYRINGE (ML) INJECTION ONCE
Refills: 0 | Status: COMPLETED | OUTPATIENT
Start: 2025-01-22 | End: 2025-01-22

## 2025-01-22 RX ORDER — HYDROMORPHONE/SOD CHLOR,ISO/PF 2 MG/10 ML
0.25 SYRINGE (ML) INJECTION EVERY 4 HOURS
Refills: 0 | Status: DISCONTINUED | OUTPATIENT
Start: 2025-01-22 | End: 2025-01-23

## 2025-01-22 RX ORDER — ACETAMINOPHEN 500 MG/5ML
1000 LIQUID (ML) ORAL EVERY 6 HOURS
Refills: 0 | Status: COMPLETED | OUTPATIENT
Start: 2025-01-22 | End: 2025-01-23

## 2025-01-22 RX ORDER — HYDROMORPHONE/SOD CHLOR,ISO/PF 2 MG/10 ML
0.5 SYRINGE (ML) INJECTION EVERY 4 HOURS
Refills: 0 | Status: DISCONTINUED | OUTPATIENT
Start: 2025-01-22 | End: 2025-01-23

## 2025-01-22 RX ORDER — HYDROMORPHONE/SOD CHLOR,ISO/PF 2 MG/10 ML
30 SYRINGE (ML) INJECTION
Refills: 0 | Status: DISCONTINUED | OUTPATIENT
Start: 2025-01-22 | End: 2025-01-24

## 2025-01-22 RX ORDER — INSULIN GLARGINE-YFGN 100 [IU]/ML
24 INJECTION, SOLUTION SUBCUTANEOUS
Refills: 0 | DISCHARGE

## 2025-01-22 RX ORDER — LEVOTHYROXINE SODIUM 175 UG/1
1 TABLET ORAL
Refills: 0 | DISCHARGE

## 2025-01-22 RX ORDER — VITAMIN A 10000 UNIT
1 TABLET ORAL
Refills: 0 | DISCHARGE

## 2025-01-22 RX ORDER — SODIUM CHLORIDE 9 G/1000ML
1000 INJECTION, SOLUTION INTRAVENOUS
Refills: 0 | Status: DISCONTINUED | OUTPATIENT
Start: 2025-01-22 | End: 2025-01-25

## 2025-01-22 RX ORDER — NALBUPHINE HYDROCHLORIDE 10 MG/ML
2.5 INJECTION INTRAMUSCULAR; INTRAVENOUS; SUBCUTANEOUS EVERY 6 HOURS
Refills: 0 | Status: DISCONTINUED | OUTPATIENT
Start: 2025-01-22 | End: 2025-01-24

## 2025-01-22 RX ORDER — INSULIN LISPRO 100 U/ML
INJECTION, SOLUTION INTRAVENOUS; SUBCUTANEOUS AT BEDTIME
Refills: 0 | Status: DISCONTINUED | OUTPATIENT
Start: 2025-01-22 | End: 2025-01-23

## 2025-01-22 RX ORDER — FENTANYL CITRATE-0.9 % NACL/PF 100MCG/2ML
25 SYRINGE (ML) INTRAVENOUS
Refills: 0 | Status: DISCONTINUED | OUTPATIENT
Start: 2025-01-22 | End: 2025-01-22

## 2025-01-22 RX ORDER — FENTANYL CITRATE-0.9 % NACL/PF 100MCG/2ML
50 SYRINGE (ML) INTRAVENOUS
Refills: 0 | Status: DISCONTINUED | OUTPATIENT
Start: 2025-01-22 | End: 2025-01-22

## 2025-01-22 RX ORDER — DEXTROSE 50 % IN WATER 50 %
25 SYRINGE (ML) INTRAVENOUS ONCE
Refills: 0 | Status: DISCONTINUED | OUTPATIENT
Start: 2025-01-22 | End: 2025-01-25

## 2025-01-22 RX ORDER — DEXTROSE 50 % IN WATER 50 %
12.5 SYRINGE (ML) INTRAVENOUS ONCE
Refills: 0 | Status: DISCONTINUED | OUTPATIENT
Start: 2025-01-22 | End: 2025-01-25

## 2025-01-22 RX ORDER — INSULIN GLARGINE-YFGN 100 [IU]/ML
12 INJECTION, SOLUTION SUBCUTANEOUS AT BEDTIME
Refills: 0 | Status: DISCONTINUED | OUTPATIENT
Start: 2025-01-22 | End: 2025-01-25

## 2025-01-22 RX ORDER — ONDANSETRON HCL/PF 4 MG/2 ML
4 VIAL (ML) INJECTION EVERY 6 HOURS
Refills: 0 | Status: DISCONTINUED | OUTPATIENT
Start: 2025-01-22 | End: 2025-01-23

## 2025-01-22 RX ORDER — SODIUM CHLORIDE 9 G/1000ML
1000 INJECTION, SOLUTION INTRAVENOUS
Refills: 0 | Status: DISCONTINUED | OUTPATIENT
Start: 2025-01-22 | End: 2025-01-24

## 2025-01-22 RX ORDER — DEXTROSE 50 % IN WATER 50 %
15 SYRINGE (ML) INTRAVENOUS ONCE
Refills: 0 | Status: DISCONTINUED | OUTPATIENT
Start: 2025-01-22 | End: 2025-01-25

## 2025-01-22 RX ORDER — HEPARIN SODIUM 1000 [USP'U]/ML
5000 INJECTION INTRAVENOUS; SUBCUTANEOUS EVERY 8 HOURS
Refills: 0 | Status: DISCONTINUED | OUTPATIENT
Start: 2025-01-22 | End: 2025-01-25

## 2025-01-22 RX ADMIN — Medication 400 MILLIGRAM(S): at 23:03

## 2025-01-22 RX ADMIN — Medication 25 GRAM(S): at 18:57

## 2025-01-22 RX ADMIN — SODIUM CHLORIDE 100 MILLILITER(S): 9 INJECTION, SOLUTION INTRAVENOUS at 18:46

## 2025-01-22 RX ADMIN — INSULIN LISPRO 1: 100 INJECTION, SOLUTION INTRAVENOUS; SUBCUTANEOUS at 16:09

## 2025-01-22 RX ADMIN — Medication 30 MILLILITER(S): at 20:01

## 2025-01-22 RX ADMIN — INSULIN GLARGINE-YFGN 12 UNIT(S): 100 INJECTION, SOLUTION SUBCUTANEOUS at 23:08

## 2025-01-22 RX ADMIN — HEPARIN SODIUM 5000 UNIT(S): 1000 INJECTION INTRAVENOUS; SUBCUTANEOUS at 23:02

## 2025-01-22 RX ADMIN — Medication 30 MILLILITER(S): at 20:31

## 2025-01-23 ENCOUNTER — RX RENEWAL (OUTPATIENT)
Age: 65
End: 2025-01-23

## 2025-01-23 LAB
A1C WITH ESTIMATED AVERAGE GLUCOSE RESULT: 5.5 % — SIGNIFICANT CHANGE UP (ref 4–5.6)
ANION GAP SERPL CALC-SCNC: 13 MMOL/L — SIGNIFICANT CHANGE UP (ref 7–14)
ANISOCYTOSIS BLD QL: SLIGHT — SIGNIFICANT CHANGE UP
BASOPHILS # BLD AUTO: 0 K/UL — SIGNIFICANT CHANGE UP (ref 0–0.2)
BASOPHILS NFR BLD AUTO: 0 % — SIGNIFICANT CHANGE UP (ref 0–2)
BUN SERPL-MCNC: 8 MG/DL — SIGNIFICANT CHANGE UP (ref 7–23)
CALCIUM SERPL-MCNC: 8.6 MG/DL — SIGNIFICANT CHANGE UP (ref 8.4–10.5)
CHLORIDE SERPL-SCNC: 102 MMOL/L — SIGNIFICANT CHANGE UP (ref 98–107)
CO2 SERPL-SCNC: 25 MMOL/L — SIGNIFICANT CHANGE UP (ref 22–31)
CREAT SERPL-MCNC: 0.74 MG/DL — SIGNIFICANT CHANGE UP (ref 0.5–1.3)
DACRYOCYTES BLD QL SMEAR: SLIGHT — SIGNIFICANT CHANGE UP
EGFR: 102 ML/MIN/1.73M2 — SIGNIFICANT CHANGE UP
EGFR: 102 ML/MIN/1.73M2 — SIGNIFICANT CHANGE UP
EOSINOPHIL # BLD AUTO: 0.08 K/UL — SIGNIFICANT CHANGE UP (ref 0–0.5)
EOSINOPHIL NFR BLD AUTO: 0.9 % — SIGNIFICANT CHANGE UP (ref 0–6)
ESTIMATED AVERAGE GLUCOSE: 111 — SIGNIFICANT CHANGE UP
GIANT PLATELETS BLD QL SMEAR: PRESENT — SIGNIFICANT CHANGE UP
GLUCOSE BLDC GLUCOMTR-MCNC: 113 MG/DL — HIGH (ref 70–99)
GLUCOSE BLDC GLUCOMTR-MCNC: 122 MG/DL — HIGH (ref 70–99)
GLUCOSE BLDC GLUCOMTR-MCNC: 124 MG/DL — HIGH (ref 70–99)
GLUCOSE BLDC GLUCOMTR-MCNC: 144 MG/DL — HIGH (ref 70–99)
GLUCOSE SERPL-MCNC: 110 MG/DL — HIGH (ref 70–99)
HCT VFR BLD CALC: 30.1 % — LOW (ref 39–50)
HGB BLD-MCNC: 9.4 G/DL — LOW (ref 13–17)
IANC: 6.44 K/UL — SIGNIFICANT CHANGE UP (ref 1.8–7.4)
LYMPHOCYTES # BLD AUTO: 0.32 K/UL — LOW (ref 1–3.3)
LYMPHOCYTES # BLD AUTO: 3.5 % — LOW (ref 13–44)
MAGNESIUM SERPL-MCNC: 2 MG/DL — SIGNIFICANT CHANGE UP (ref 1.6–2.6)
MANUAL SMEAR VERIFICATION: SIGNIFICANT CHANGE UP
MCHC RBC-ENTMCNC: 25.6 PG — LOW (ref 27–34)
MCHC RBC-ENTMCNC: 31.2 G/DL — LOW (ref 32–36)
MCV RBC AUTO: 82 FL — SIGNIFICANT CHANGE UP (ref 80–100)
MONOCYTES # BLD AUTO: 0.79 K/UL — SIGNIFICANT CHANGE UP (ref 0–0.9)
MONOCYTES NFR BLD AUTO: 8.8 % — SIGNIFICANT CHANGE UP (ref 2–14)
NEUTROPHILS # BLD AUTO: 7.59 K/UL — HIGH (ref 1.8–7.4)
NEUTROPHILS NFR BLD AUTO: 84.2 % — HIGH (ref 43–77)
OVALOCYTES BLD QL SMEAR: SLIGHT — SIGNIFICANT CHANGE UP
PHOSPHATE SERPL-MCNC: 3.2 MG/DL — SIGNIFICANT CHANGE UP (ref 2.5–4.5)
PLAT MORPH BLD: ABNORMAL
PLATELET # BLD AUTO: 191 K/UL — SIGNIFICANT CHANGE UP (ref 150–400)
PLATELET COUNT - ESTIMATE: NORMAL — SIGNIFICANT CHANGE UP
POIKILOCYTOSIS BLD QL AUTO: SLIGHT — SIGNIFICANT CHANGE UP
POLYCHROMASIA BLD QL SMEAR: SLIGHT — SIGNIFICANT CHANGE UP
POTASSIUM SERPL-MCNC: 3.8 MMOL/L — SIGNIFICANT CHANGE UP (ref 3.5–5.3)
POTASSIUM SERPL-SCNC: 3.8 MMOL/L — SIGNIFICANT CHANGE UP (ref 3.5–5.3)
RBC # BLD: 3.67 M/UL — LOW (ref 4.2–5.8)
RBC # FLD: 17.2 % — HIGH (ref 10.3–14.5)
RBC BLD AUTO: ABNORMAL
SODIUM SERPL-SCNC: 140 MMOL/L — SIGNIFICANT CHANGE UP (ref 135–145)
VARIANT LYMPHS # BLD: 2.6 % — SIGNIFICANT CHANGE UP (ref 0–6)
VARIANT LYMPHS NFR BLD MANUAL: 2.6 % — SIGNIFICANT CHANGE UP (ref 0–6)
WBC # BLD: 9.02 K/UL — SIGNIFICANT CHANGE UP (ref 3.8–10.5)
WBC # FLD AUTO: 9.02 K/UL — SIGNIFICANT CHANGE UP (ref 3.8–10.5)

## 2025-01-23 RX ORDER — ACETAMINOPHEN 500 MG/5ML
1000 LIQUID (ML) ORAL EVERY 6 HOURS
Refills: 0 | Status: DISCONTINUED | OUTPATIENT
Start: 2025-01-23 | End: 2025-01-24

## 2025-01-23 RX ORDER — INSULIN LISPRO 100 U/ML
INJECTION, SOLUTION INTRAVENOUS; SUBCUTANEOUS EVERY 6 HOURS
Refills: 0 | Status: DISCONTINUED | OUTPATIENT
Start: 2025-01-23 | End: 2025-01-24

## 2025-01-23 RX ADMIN — SODIUM CHLORIDE 100 MILLILITER(S): 9 INJECTION, SOLUTION INTRAVENOUS at 14:34

## 2025-01-23 RX ADMIN — Medication 400 MILLIGRAM(S): at 06:01

## 2025-01-23 RX ADMIN — Medication 1000 MILLIGRAM(S): at 01:11

## 2025-01-23 RX ADMIN — SODIUM CHLORIDE 100 MILLILITER(S): 9 INJECTION, SOLUTION INTRAVENOUS at 02:13

## 2025-01-23 RX ADMIN — Medication 112 MICROGRAM(S): at 06:02

## 2025-01-23 RX ADMIN — Medication 1000 MILLIGRAM(S): at 06:53

## 2025-01-23 RX ADMIN — HEPARIN SODIUM 5000 UNIT(S): 1000 INJECTION INTRAVENOUS; SUBCUTANEOUS at 15:31

## 2025-01-23 RX ADMIN — Medication 100 MILLIEQUIVALENT(S): at 11:58

## 2025-01-23 RX ADMIN — Medication 30 MILLILITER(S): at 19:31

## 2025-01-23 RX ADMIN — INSULIN GLARGINE-YFGN 12 UNIT(S): 100 INJECTION, SOLUTION SUBCUTANEOUS at 21:20

## 2025-01-23 RX ADMIN — HEPARIN SODIUM 5000 UNIT(S): 1000 INJECTION INTRAVENOUS; SUBCUTANEOUS at 21:20

## 2025-01-23 RX ADMIN — Medication 30 MILLILITER(S): at 08:30

## 2025-01-23 RX ADMIN — Medication 400 MILLIGRAM(S): at 11:58

## 2025-01-23 RX ADMIN — Medication 400 MILLIGRAM(S): at 18:14

## 2025-01-23 RX ADMIN — Medication 100 MILLIEQUIVALENT(S): at 13:00

## 2025-01-23 RX ADMIN — Medication 400 MILLIGRAM(S): at 23:58

## 2025-01-23 RX ADMIN — HEPARIN SODIUM 5000 UNIT(S): 1000 INJECTION INTRAVENOUS; SUBCUTANEOUS at 06:02

## 2025-01-24 LAB
ALBUMIN SERPL ELPH-MCNC: 3.3 G/DL — SIGNIFICANT CHANGE UP (ref 3.3–5)
ALP SERPL-CCNC: 118 U/L — SIGNIFICANT CHANGE UP (ref 40–120)
ALT FLD-CCNC: 38 U/L — SIGNIFICANT CHANGE UP (ref 4–41)
ANION GAP SERPL CALC-SCNC: 11 MMOL/L — SIGNIFICANT CHANGE UP (ref 7–14)
AST SERPL-CCNC: 64 U/L — HIGH (ref 4–40)
BILIRUB SERPL-MCNC: 0.5 MG/DL — SIGNIFICANT CHANGE UP (ref 0.2–1.2)
BUN SERPL-MCNC: 6 MG/DL — LOW (ref 7–23)
CALCIUM SERPL-MCNC: 8.9 MG/DL — SIGNIFICANT CHANGE UP (ref 8.4–10.5)
CHLORIDE SERPL-SCNC: 100 MMOL/L — SIGNIFICANT CHANGE UP (ref 98–107)
CO2 SERPL-SCNC: 25 MMOL/L — SIGNIFICANT CHANGE UP (ref 22–31)
CREAT SERPL-MCNC: 0.67 MG/DL — SIGNIFICANT CHANGE UP (ref 0.5–1.3)
EGFR: 106 ML/MIN/1.73M2 — SIGNIFICANT CHANGE UP
EGFR: 106 ML/MIN/1.73M2 — SIGNIFICANT CHANGE UP
FLOW CYTOMETRY FINAL REPORT: SIGNIFICANT CHANGE UP
GLUCOSE BLDC GLUCOMTR-MCNC: 121 MG/DL — HIGH (ref 70–99)
GLUCOSE BLDC GLUCOMTR-MCNC: 164 MG/DL — HIGH (ref 70–99)
GLUCOSE BLDC GLUCOMTR-MCNC: 178 MG/DL — HIGH (ref 70–99)
GLUCOSE BLDC GLUCOMTR-MCNC: 273 MG/DL — HIGH (ref 70–99)
GLUCOSE BLDC GLUCOMTR-MCNC: 58 MG/DL — LOW (ref 70–99)
GLUCOSE BLDC GLUCOMTR-MCNC: 61 MG/DL — LOW (ref 70–99)
GLUCOSE BLDC GLUCOMTR-MCNC: 76 MG/DL — SIGNIFICANT CHANGE UP (ref 70–99)
GLUCOSE SERPL-MCNC: 56 MG/DL — LOW (ref 70–99)
HCT VFR BLD CALC: 27.1 % — LOW (ref 39–50)
HGB BLD-MCNC: 8.8 G/DL — LOW (ref 13–17)
MAGNESIUM SERPL-MCNC: 1.9 MG/DL — SIGNIFICANT CHANGE UP (ref 1.6–2.6)
MCHC RBC-ENTMCNC: 26.1 PG — LOW (ref 27–34)
MCHC RBC-ENTMCNC: 32.5 G/DL — SIGNIFICANT CHANGE UP (ref 32–36)
MCV RBC AUTO: 80.4 FL — SIGNIFICANT CHANGE UP (ref 80–100)
NRBC # BLD AUTO: 0 K/UL — SIGNIFICANT CHANGE UP (ref 0–0)
NRBC # BLD: 0 /100 WBCS — SIGNIFICANT CHANGE UP (ref 0–0)
NRBC # FLD: 0 K/UL — SIGNIFICANT CHANGE UP (ref 0–0)
NRBC BLD-RTO: 0 /100 WBCS — SIGNIFICANT CHANGE UP (ref 0–0)
PHOSPHATE SERPL-MCNC: 2.8 MG/DL — SIGNIFICANT CHANGE UP (ref 2.5–4.5)
PLATELET # BLD AUTO: 278 K/UL — SIGNIFICANT CHANGE UP (ref 150–400)
POTASSIUM SERPL-MCNC: 4 MMOL/L — SIGNIFICANT CHANGE UP (ref 3.5–5.3)
POTASSIUM SERPL-SCNC: 4 MMOL/L — SIGNIFICANT CHANGE UP (ref 3.5–5.3)
PROT SERPL-MCNC: 6.1 G/DL — SIGNIFICANT CHANGE UP (ref 6–8.3)
RBC # BLD: 3.37 M/UL — LOW (ref 4.2–5.8)
RBC # FLD: 16.6 % — HIGH (ref 10.3–14.5)
SODIUM SERPL-SCNC: 136 MMOL/L — SIGNIFICANT CHANGE UP (ref 135–145)
WBC # BLD: 13.35 K/UL — HIGH (ref 3.8–10.5)
WBC # FLD AUTO: 13.35 K/UL — HIGH (ref 3.8–10.5)

## 2025-01-24 RX ORDER — SODIUM CHLORIDE 9 G/1000ML
1000 INJECTION, SOLUTION INTRAVENOUS
Refills: 0 | Status: DISCONTINUED | OUTPATIENT
Start: 2025-01-24 | End: 2025-01-24

## 2025-01-24 RX ORDER — OXYCODONE HYDROCHLORIDE 30 MG/1
10 TABLET ORAL
Refills: 0 | Status: DISCONTINUED | OUTPATIENT
Start: 2025-01-24 | End: 2025-01-25

## 2025-01-24 RX ORDER — OXYCODONE HYDROCHLORIDE 30 MG/1
5 TABLET ORAL
Refills: 0 | Status: DISCONTINUED | OUTPATIENT
Start: 2025-01-24 | End: 2025-01-25

## 2025-01-24 RX ORDER — INSULIN LISPRO 100 U/ML
INJECTION, SOLUTION INTRAVENOUS; SUBCUTANEOUS
Refills: 0 | Status: DISCONTINUED | OUTPATIENT
Start: 2025-01-24 | End: 2025-01-25

## 2025-01-24 RX ORDER — HYDROMORPHONE/SOD CHLOR,ISO/PF 2 MG/10 ML
0.5 SYRINGE (ML) INJECTION EVERY 4 HOURS
Refills: 0 | Status: DISCONTINUED | OUTPATIENT
Start: 2025-01-24 | End: 2025-01-25

## 2025-01-24 RX ORDER — ACETAMINOPHEN 500 MG/5ML
650 LIQUID (ML) ORAL EVERY 6 HOURS
Refills: 0 | Status: DISCONTINUED | OUTPATIENT
Start: 2025-01-24 | End: 2025-01-25

## 2025-01-24 RX ORDER — INSULIN LISPRO 100 U/ML
INJECTION, SOLUTION INTRAVENOUS; SUBCUTANEOUS AT BEDTIME
Refills: 0 | Status: DISCONTINUED | OUTPATIENT
Start: 2025-01-24 | End: 2025-01-25

## 2025-01-24 RX ORDER — MAGNESIUM SULFATE 500 MG/ML
1 SYRINGE (ML) INJECTION ONCE
Refills: 0 | Status: COMPLETED | OUTPATIENT
Start: 2025-01-24 | End: 2025-01-24

## 2025-01-24 RX ADMIN — Medication 650 MILLIGRAM(S): at 13:30

## 2025-01-24 RX ADMIN — Medication 30 MILLILITER(S): at 08:04

## 2025-01-24 RX ADMIN — OXYCODONE HYDROCHLORIDE 10 MILLIGRAM(S): 30 TABLET ORAL at 12:37

## 2025-01-24 RX ADMIN — INSULIN LISPRO 1: 100 INJECTION, SOLUTION INTRAVENOUS; SUBCUTANEOUS at 21:37

## 2025-01-24 RX ADMIN — Medication 650 MILLIGRAM(S): at 12:37

## 2025-01-24 RX ADMIN — Medication 112 MICROGRAM(S): at 05:27

## 2025-01-24 RX ADMIN — SODIUM CHLORIDE 50 MILLILITER(S): 9 INJECTION, SOLUTION INTRAVENOUS at 05:27

## 2025-01-24 RX ADMIN — HEPARIN SODIUM 5000 UNIT(S): 1000 INJECTION INTRAVENOUS; SUBCUTANEOUS at 05:27

## 2025-01-24 RX ADMIN — INSULIN GLARGINE-YFGN 12 UNIT(S): 100 INJECTION, SOLUTION SUBCUTANEOUS at 21:37

## 2025-01-24 RX ADMIN — Medication 650 MILLIGRAM(S): at 23:12

## 2025-01-24 RX ADMIN — Medication 400 MILLIGRAM(S): at 05:28

## 2025-01-24 RX ADMIN — OXYCODONE HYDROCHLORIDE 10 MILLIGRAM(S): 30 TABLET ORAL at 23:17

## 2025-01-24 RX ADMIN — HEPARIN SODIUM 5000 UNIT(S): 1000 INJECTION INTRAVENOUS; SUBCUTANEOUS at 21:36

## 2025-01-24 RX ADMIN — OXYCODONE HYDROCHLORIDE 10 MILLIGRAM(S): 30 TABLET ORAL at 13:30

## 2025-01-24 RX ADMIN — HEPARIN SODIUM 5000 UNIT(S): 1000 INJECTION INTRAVENOUS; SUBCUTANEOUS at 14:07

## 2025-01-24 RX ADMIN — INSULIN LISPRO 1: 100 INJECTION, SOLUTION INTRAVENOUS; SUBCUTANEOUS at 17:57

## 2025-01-24 RX ADMIN — Medication 650 MILLIGRAM(S): at 17:59

## 2025-01-24 RX ADMIN — Medication 100 GRAM(S): at 12:43

## 2025-01-24 RX ADMIN — INSULIN LISPRO 1: 100 INJECTION, SOLUTION INTRAVENOUS; SUBCUTANEOUS at 12:34

## 2025-01-25 ENCOUNTER — TRANSCRIPTION ENCOUNTER (OUTPATIENT)
Age: 65
End: 2025-01-25

## 2025-01-25 VITALS
OXYGEN SATURATION: 100 % | DIASTOLIC BLOOD PRESSURE: 66 MMHG | TEMPERATURE: 98 F | RESPIRATION RATE: 18 BRPM | HEART RATE: 81 BPM | SYSTOLIC BLOOD PRESSURE: 136 MMHG

## 2025-01-25 LAB
ALBUMIN SERPL ELPH-MCNC: 3.3 G/DL — SIGNIFICANT CHANGE UP (ref 3.3–5)
ALP SERPL-CCNC: 117 U/L — SIGNIFICANT CHANGE UP (ref 40–120)
ALT FLD-CCNC: 28 U/L — SIGNIFICANT CHANGE UP (ref 4–41)
AMYLASE FLD-CCNC: 17 U/L — SIGNIFICANT CHANGE UP
AMYLASE P1 CFR SERPL: 24 U/L — LOW (ref 25–125)
ANION GAP SERPL CALC-SCNC: 13 MMOL/L — SIGNIFICANT CHANGE UP (ref 7–14)
AST SERPL-CCNC: 35 U/L — SIGNIFICANT CHANGE UP (ref 4–40)
BILIRUB SERPL-MCNC: 0.3 MG/DL — SIGNIFICANT CHANGE UP (ref 0.2–1.2)
BUN SERPL-MCNC: 8 MG/DL — SIGNIFICANT CHANGE UP (ref 7–23)
CALCIUM SERPL-MCNC: 9 MG/DL — SIGNIFICANT CHANGE UP (ref 8.4–10.5)
CHLORIDE SERPL-SCNC: 100 MMOL/L — SIGNIFICANT CHANGE UP (ref 98–107)
CO2 SERPL-SCNC: 25 MMOL/L — SIGNIFICANT CHANGE UP (ref 22–31)
CREAT SERPL-MCNC: 0.67 MG/DL — SIGNIFICANT CHANGE UP (ref 0.5–1.3)
EGFR: 106 ML/MIN/1.73M2 — SIGNIFICANT CHANGE UP
EGFR: 106 ML/MIN/1.73M2 — SIGNIFICANT CHANGE UP
GLUCOSE BLDC GLUCOMTR-MCNC: 134 MG/DL — HIGH (ref 70–99)
GLUCOSE BLDC GLUCOMTR-MCNC: 225 MG/DL — HIGH (ref 70–99)
GLUCOSE SERPL-MCNC: 84 MG/DL — SIGNIFICANT CHANGE UP (ref 70–99)
HCT VFR BLD CALC: 27.1 % — LOW (ref 39–50)
HGB BLD-MCNC: 8.5 G/DL — LOW (ref 13–17)
MAGNESIUM SERPL-MCNC: 2.1 MG/DL — SIGNIFICANT CHANGE UP (ref 1.6–2.6)
MCHC RBC-ENTMCNC: 25.8 PG — LOW (ref 27–34)
MCHC RBC-ENTMCNC: 31.4 G/DL — LOW (ref 32–36)
MCV RBC AUTO: 82.4 FL — SIGNIFICANT CHANGE UP (ref 80–100)
NRBC # BLD AUTO: 0 K/UL — SIGNIFICANT CHANGE UP (ref 0–0)
NRBC # BLD: 0 /100 WBCS — SIGNIFICANT CHANGE UP (ref 0–0)
NRBC # FLD: 0 K/UL — SIGNIFICANT CHANGE UP (ref 0–0)
NRBC BLD-RTO: 0 /100 WBCS — SIGNIFICANT CHANGE UP (ref 0–0)
PHOSPHATE SERPL-MCNC: 3 MG/DL — SIGNIFICANT CHANGE UP (ref 2.5–4.5)
PLATELET # BLD AUTO: 373 K/UL — SIGNIFICANT CHANGE UP (ref 150–400)
POTASSIUM SERPL-MCNC: 4.2 MMOL/L — SIGNIFICANT CHANGE UP (ref 3.5–5.3)
POTASSIUM SERPL-SCNC: 4.2 MMOL/L — SIGNIFICANT CHANGE UP (ref 3.5–5.3)
PROT SERPL-MCNC: 6.3 G/DL — SIGNIFICANT CHANGE UP (ref 6–8.3)
RBC # BLD: 3.29 M/UL — LOW (ref 4.2–5.8)
RBC # FLD: 17 % — HIGH (ref 10.3–14.5)
SODIUM SERPL-SCNC: 138 MMOL/L — SIGNIFICANT CHANGE UP (ref 135–145)
WBC # BLD: 11.11 K/UL — HIGH (ref 3.8–10.5)
WBC # FLD AUTO: 11.11 K/UL — HIGH (ref 3.8–10.5)

## 2025-01-25 RX ORDER — ACETAMINOPHEN 500 MG/5ML
2 LIQUID (ML) ORAL
Qty: 0 | Refills: 0 | DISCHARGE
Start: 2025-01-25

## 2025-01-25 RX ORDER — OXYCODONE HYDROCHLORIDE 30 MG/1
1 TABLET ORAL
Qty: 10 | Refills: 0
Start: 2025-01-25

## 2025-01-25 RX ORDER — INSULIN ASPART 100 [IU]/ML
12 INJECTION, SOLUTION INTRAVENOUS; SUBCUTANEOUS
Qty: 1 | Refills: 0
Start: 2025-01-25 | End: 2025-02-07

## 2025-01-25 RX ADMIN — Medication 650 MILLIGRAM(S): at 12:35

## 2025-01-25 RX ADMIN — OXYCODONE HYDROCHLORIDE 5 MILLIGRAM(S): 30 TABLET ORAL at 11:27

## 2025-01-25 RX ADMIN — INSULIN LISPRO 2: 100 INJECTION, SOLUTION INTRAVENOUS; SUBCUTANEOUS at 12:34

## 2025-01-25 RX ADMIN — OXYCODONE HYDROCHLORIDE 5 MILLIGRAM(S): 30 TABLET ORAL at 10:27

## 2025-01-25 RX ADMIN — Medication 112 MICROGRAM(S): at 05:25

## 2025-01-25 RX ADMIN — HEPARIN SODIUM 5000 UNIT(S): 1000 INJECTION INTRAVENOUS; SUBCUTANEOUS at 05:26

## 2025-01-25 RX ADMIN — Medication 650 MILLIGRAM(S): at 05:25

## 2025-01-25 RX ADMIN — OXYCODONE HYDROCHLORIDE 10 MILLIGRAM(S): 30 TABLET ORAL at 00:00

## 2025-01-27 DIAGNOSIS — R16.1 SPLENOMEGALY, NOT ELSEWHERE CLASSIFIED: ICD-10-CM

## 2025-01-27 RX ORDER — OXYCODONE 5 MG/1
5 TABLET ORAL
Qty: 15 | Refills: 0 | Status: ACTIVE | COMMUNITY
Start: 2025-01-27 | End: 1900-01-01

## 2025-01-31 LAB — HEMATOPATHOLOGY REPORT: SIGNIFICANT CHANGE UP

## 2025-02-06 ENCOUNTER — APPOINTMENT (OUTPATIENT)
Dept: SURGICAL ONCOLOGY | Facility: CLINIC | Age: 65
End: 2025-02-06
Payer: COMMERCIAL

## 2025-02-06 VITALS
OXYGEN SATURATION: 99 % | DIASTOLIC BLOOD PRESSURE: 70 MMHG | SYSTOLIC BLOOD PRESSURE: 140 MMHG | HEIGHT: 69 IN | WEIGHT: 185 LBS | BODY MASS INDEX: 27.4 KG/M2 | HEART RATE: 86 BPM

## 2025-02-06 PROBLEM — C85.10 B-CELL LYMPHOMA: Status: ACTIVE | Noted: 2025-02-06

## 2025-02-06 PROCEDURE — 99214 OFFICE O/P EST MOD 30 MIN: CPT

## 2025-02-11 LAB — CHROM ANALY OVERALL INTERP SPEC-IMP: SIGNIFICANT CHANGE UP

## 2025-02-12 ENCOUNTER — RESULT REVIEW (OUTPATIENT)
Age: 65
End: 2025-02-12

## 2025-02-13 DIAGNOSIS — C85.10 UNSPECIFIED B-CELL LYMPHOMA, UNSPECIFIED SITE: ICD-10-CM

## 2025-02-17 ENCOUNTER — OUTPATIENT (OUTPATIENT)
Dept: OUTPATIENT SERVICES | Facility: HOSPITAL | Age: 65
LOS: 1 days | Discharge: HOME | End: 2025-02-17

## 2025-02-17 DIAGNOSIS — C83.00 SMALL CELL B-CELL LYMPHOMA, UNSPECIFIED SITE: ICD-10-CM

## 2025-02-18 ENCOUNTER — APPOINTMENT (OUTPATIENT)
Dept: HEMATOLOGY ONCOLOGY | Facility: CLINIC | Age: 65
End: 2025-02-18
Payer: COMMERCIAL

## 2025-02-18 ENCOUNTER — APPOINTMENT (OUTPATIENT)
Dept: CT IMAGING | Facility: IMAGING CENTER | Age: 65
End: 2025-02-18

## 2025-02-18 ENCOUNTER — OUTPATIENT (OUTPATIENT)
Dept: OUTPATIENT SERVICES | Facility: HOSPITAL | Age: 65
LOS: 1 days | End: 2025-02-18
Payer: COMMERCIAL

## 2025-02-18 ENCOUNTER — RESULT REVIEW (OUTPATIENT)
Age: 65
End: 2025-02-18

## 2025-02-18 VITALS
TEMPERATURE: 98.4 F | OXYGEN SATURATION: 99 % | RESPIRATION RATE: 16 BRPM | SYSTOLIC BLOOD PRESSURE: 130 MMHG | HEART RATE: 83 BPM | BODY MASS INDEX: 27.44 KG/M2 | DIASTOLIC BLOOD PRESSURE: 76 MMHG | WEIGHT: 185.85 LBS

## 2025-02-18 DIAGNOSIS — Z90.81 ACQUIRED ABSENCE OF SPLEEN: ICD-10-CM

## 2025-02-18 DIAGNOSIS — C83.30 DIFFUSE LARGE B-CELL LYMPHOMA, UNSPECIFIED SITE: ICD-10-CM

## 2025-02-18 DIAGNOSIS — C83.00 SMALL CELL B-CELL LYMPHOMA, UNSPECIFIED SITE: ICD-10-CM

## 2025-02-18 DIAGNOSIS — E03.9 HYPOTHYROIDISM, UNSPECIFIED: ICD-10-CM

## 2025-02-18 DIAGNOSIS — D47.2 MONOCLONAL GAMMOPATHY: ICD-10-CM

## 2025-02-18 DIAGNOSIS — D75.839 THROMBOCYTOSIS, UNSPECIFIED: ICD-10-CM

## 2025-02-18 DIAGNOSIS — D72.829 ELEVATED WHITE BLOOD CELL COUNT, UNSPECIFIED: ICD-10-CM

## 2025-02-18 DIAGNOSIS — C85.10 UNSPECIFIED B-CELL LYMPHOMA, UNSPECIFIED SITE: ICD-10-CM

## 2025-02-18 DIAGNOSIS — Z00.8 ENCOUNTER FOR OTHER GENERAL EXAMINATION: ICD-10-CM

## 2025-02-18 DIAGNOSIS — E11.9 TYPE 2 DIABETES MELLITUS W/OUT COMPLICATIONS: ICD-10-CM

## 2025-02-18 LAB
ALBUMIN SERPL ELPH-MCNC: 3.7 G/DL
ALP BLD-CCNC: 343 U/L
ALT SERPL-CCNC: 30 U/L
ANION GAP SERPL CALC-SCNC: 12 MMOL/L
AST SERPL-CCNC: 31 U/L
BASOPHILS # BLD AUTO: 0.07 K/UL — SIGNIFICANT CHANGE UP (ref 0–0.2)
BASOPHILS NFR BLD AUTO: 0.5 % — SIGNIFICANT CHANGE UP (ref 0–2)
BILIRUB SERPL-MCNC: 0.2 MG/DL
BUN SERPL-MCNC: 16 MG/DL
CALCIUM SERPL-MCNC: 9.6 MG/DL
CHLORIDE SERPL-SCNC: 97 MMOL/L
CO2 SERPL-SCNC: 28 MMOL/L
CREAT SERPL-MCNC: 0.78 MG/DL
EGFR: 101 ML/MIN/1.73M2
EOSINOPHIL # BLD AUTO: 0.19 K/UL — SIGNIFICANT CHANGE UP (ref 0–0.5)
EOSINOPHIL NFR BLD AUTO: 1.5 % — SIGNIFICANT CHANGE UP (ref 0–6)
GLUCOSE SERPL-MCNC: 177 MG/DL
HCT VFR BLD CALC: 28.4 % — LOW (ref 39–50)
HGB BLD-MCNC: 9.1 G/DL — LOW (ref 13–17)
IMM GRANULOCYTES NFR BLD AUTO: 1 % — HIGH (ref 0–0.9)
LDH SERPL-CCNC: 348 U/L
LYMPHOCYTES # BLD AUTO: 0.85 K/UL — LOW (ref 1–3.3)
LYMPHOCYTES # BLD AUTO: 6.6 % — LOW (ref 13–44)
MCHC RBC-ENTMCNC: 26.5 PG — LOW (ref 27–34)
MCHC RBC-ENTMCNC: 32 G/DL — SIGNIFICANT CHANGE UP (ref 32–36)
MCV RBC AUTO: 82.8 FL — SIGNIFICANT CHANGE UP (ref 80–100)
MONOCYTES # BLD AUTO: 1.38 K/UL — HIGH (ref 0–0.9)
MONOCYTES NFR BLD AUTO: 10.7 % — SIGNIFICANT CHANGE UP (ref 2–14)
NEUTROPHILS # BLD AUTO: 10.25 K/UL — HIGH (ref 1.8–7.4)
NEUTROPHILS NFR BLD AUTO: 79.7 % — HIGH (ref 43–77)
NRBC BLD AUTO-RTO: 0 /100 WBCS — SIGNIFICANT CHANGE UP (ref 0–0)
PHOSPHATE SERPL-MCNC: 4.9 MG/DL
PLATELET # BLD AUTO: 688 K/UL — HIGH (ref 150–400)
POTASSIUM SERPL-SCNC: 5.8 MMOL/L
PROT SERPL-MCNC: 6.7 G/DL
RBC # BLD: 3.43 M/UL — LOW (ref 4.2–5.8)
RBC # FLD: 15.8 % — HIGH (ref 10.3–14.5)
SODIUM SERPL-SCNC: 137 MMOL/L
URATE SERPL-MCNC: 5.8 MG/DL
WBC # BLD: 12.87 K/UL — HIGH (ref 3.8–10.5)
WBC # FLD AUTO: 12.87 K/UL — HIGH (ref 3.8–10.5)

## 2025-02-18 PROCEDURE — G2211 COMPLEX E/M VISIT ADD ON: CPT | Mod: NC

## 2025-02-18 PROCEDURE — 74177 CT ABD & PELVIS W/CONTRAST: CPT

## 2025-02-18 PROCEDURE — 99215 OFFICE O/P EST HI 40 MIN: CPT

## 2025-02-18 PROCEDURE — 74177 CT ABD & PELVIS W/CONTRAST: CPT | Mod: 26

## 2025-02-21 PROBLEM — C83.30 DIFFUSE LARGE B-CELL LYMPHOMA, UNSPECIFIED BODY REGION: Status: ACTIVE | Noted: 2025-02-21

## 2025-02-21 RX ORDER — ONDANSETRON 8 MG/1
8 TABLET, ORALLY DISINTEGRATING ORAL
Qty: 30 | Refills: 2 | Status: ACTIVE | COMMUNITY
Start: 2025-02-21 | End: 1900-01-01

## 2025-02-21 RX ORDER — METOCLOPRAMIDE 10 MG/1
10 TABLET ORAL
Qty: 60 | Refills: 1 | Status: ACTIVE | COMMUNITY
Start: 2025-02-21 | End: 1900-01-01

## 2025-02-21 RX ORDER — PREDNISONE 50 MG/1
50 TABLET ORAL
Qty: 60 | Refills: 0 | Status: ACTIVE | COMMUNITY
Start: 2025-02-21 | End: 1900-01-01

## 2025-02-21 RX ORDER — ALLOPURINOL 300 MG/1
300 TABLET ORAL
Qty: 14 | Refills: 0 | Status: ACTIVE | COMMUNITY
Start: 2025-02-21 | End: 1900-01-01

## 2025-02-22 NOTE — PROGRESS NOTE ADULT - PROBLEM SELECTOR PLAN 1
Pt. with resolving/stable hyperkalemia (v ?pseudohyperkalemia) in setting of normal renal function with marked hyperglycemia due to recent steroid use. Serum potassium of 6.0 on admission. Endocrinology had been consulted due to markedly elevated blood sugars. He received Lokelma, serum potassium improved to 5.7. No overt EKG changes have been noted per primary team.     Potassium on VBG was within normal limits, thus more likely pseudohyperkalemia as noted above.     Of note on admission, we discussed possibility of need renal replacement therapy if he develops anuric renal failure or persistent electrolyte abnormalities that are not responsive to medical treatment. At this time, he wishes to "think about it" and discuss it with his family. He did mention that if it were a "life threatening" situation, then he would be agreeable to HD/RRT but again, he deferred signing consents at this time.    At the same time, patient has recently diagnosed Lymphoma and has been initiated on treatment per Heme/Onc. Monitor for possible TLS. Labs reviewed, Scr, phos, K, uric acid stable.     Nephrology services to discontinue follow up. Reconsult as needed.   If you have any questions, please feel free to contact me  [place]  Nephrology Fellow  Pager # [phone]  [employer] Teams  (After 4pm or on weekends please page the on-call fellow) child with cold 2 days ago now wheezing father states, child awake alert tearful now watching cartoon on cell phone,

## 2025-02-26 LAB
ALBUMIN MFR SERPL ELPH: 46.7 %
ALBUMIN SERPL-MCNC: 3.1 G/DL
ALBUMIN/GLOB SERPL: 0.9 RATIO
ALPHA1 GLOB MFR SERPL ELPH: 9.7 %
ALPHA1 GLOB SERPL ELPH-MCNC: 0.6 G/DL
ALPHA2 GLOB MFR SERPL ELPH: 16.5 %
ALPHA2 GLOB SERPL ELPH-MCNC: 1.1 G/DL
B-GLOBULIN MFR SERPL ELPH: 13.7 %
B-GLOBULIN SERPL ELPH-MCNC: 0.9 G/DL
DEPRECATED KAPPA LC FREE/LAMBDA SER: 1.23 RATIO
GAMMA GLOB FLD ELPH-MCNC: 0.9 G/DL
GAMMA GLOB MFR SERPL ELPH: 13.4 %
IGA SER QL IEP: 219 MG/DL
IGG SER QL IEP: 1034 MG/DL
IGM SER QL IEP: 44 MG/DL
INTERPRETATION SERPL IEP-IMP: NORMAL
KAPPA LC CSF-MCNC: 2.47 MG/DL
KAPPA LC SERPL-MCNC: 3.04 MG/DL
M PROTEIN SPEC IFE-MCNC: NORMAL
PROT SERPL-MCNC: 6.7 G/DL
PROT SERPL-MCNC: 6.7 G/DL

## 2025-02-28 ENCOUNTER — RESULT REVIEW (OUTPATIENT)
Age: 65
End: 2025-02-28

## 2025-02-28 ENCOUNTER — APPOINTMENT (OUTPATIENT)
Dept: HEMATOLOGY ONCOLOGY | Facility: CLINIC | Age: 65
End: 2025-02-28

## 2025-02-28 ENCOUNTER — APPOINTMENT (OUTPATIENT)
Dept: INFUSION THERAPY | Facility: HOSPITAL | Age: 65
End: 2025-02-28

## 2025-02-28 ENCOUNTER — NON-APPOINTMENT (OUTPATIENT)
Age: 65
End: 2025-02-28

## 2025-02-28 LAB
A1C WITH ESTIMATED AVERAGE GLUCOSE RESULT: 6.7 % — HIGH (ref 4–5.6)
ALBUMIN SERPL ELPH-MCNC: 3.4 G/DL — SIGNIFICANT CHANGE UP (ref 3.3–5)
ALP SERPL-CCNC: 589 U/L — HIGH (ref 40–120)
ALT FLD-CCNC: 42 U/L — SIGNIFICANT CHANGE UP (ref 10–45)
ANION GAP SERPL CALC-SCNC: 13 MMOL/L — SIGNIFICANT CHANGE UP (ref 5–17)
ANISOCYTOSIS BLD QL: SLIGHT — SIGNIFICANT CHANGE UP
AST SERPL-CCNC: 37 U/L — SIGNIFICANT CHANGE UP (ref 10–40)
BASOPHILS # BLD AUTO: 0 K/UL — SIGNIFICANT CHANGE UP (ref 0–0.2)
BASOPHILS NFR BLD AUTO: 0 % — SIGNIFICANT CHANGE UP (ref 0–2)
BILIRUB SERPL-MCNC: 0.6 MG/DL — SIGNIFICANT CHANGE UP (ref 0.2–1.2)
BUN SERPL-MCNC: 20 MG/DL — SIGNIFICANT CHANGE UP (ref 7–23)
CALCIUM SERPL-MCNC: 9.6 MG/DL — SIGNIFICANT CHANGE UP (ref 8.4–10.5)
CHLORIDE SERPL-SCNC: 95 MMOL/L — LOW (ref 96–108)
CO2 SERPL-SCNC: 24 MMOL/L — SIGNIFICANT CHANGE UP (ref 22–31)
CREAT SERPL-MCNC: 0.86 MG/DL — SIGNIFICANT CHANGE UP (ref 0.5–1.3)
EGFR: 98 ML/MIN/1.73M2 — SIGNIFICANT CHANGE UP
EGFR: 98 ML/MIN/1.73M2 — SIGNIFICANT CHANGE UP
EOSINOPHIL # BLD AUTO: 0 K/UL — SIGNIFICANT CHANGE UP (ref 0–0.5)
EOSINOPHIL NFR BLD AUTO: 0 % — SIGNIFICANT CHANGE UP (ref 0–6)
ESTIMATED AVERAGE GLUCOSE: 146 MG/DL — HIGH (ref 68–114)
GIANT PLATELETS BLD QL SMEAR: PRESENT — SIGNIFICANT CHANGE UP
GLUCOSE SERPL-MCNC: 323 MG/DL — HIGH (ref 70–99)
HCT VFR BLD CALC: 26.2 % — LOW (ref 39–50)
HGB BLD-MCNC: 8.5 G/DL — LOW (ref 13–17)
HOWELL-JOLLY BOD BLD QL SMEAR: PRESENT — SIGNIFICANT CHANGE UP
HYPOCHROMIA BLD QL: SLIGHT — SIGNIFICANT CHANGE UP
LDH SERPL L TO P-CCNC: 460 U/L — HIGH (ref 50–242)
LG PLATELETS BLD QL AUTO: SLIGHT — SIGNIFICANT CHANGE UP
LYMPHOCYTES # BLD AUTO: 0.89 K/UL — LOW (ref 1–3.3)
LYMPHOCYTES # BLD AUTO: 6 % — LOW (ref 13–44)
MCHC RBC-ENTMCNC: 25.9 PG — LOW (ref 27–34)
MCHC RBC-ENTMCNC: 32.4 G/DL — SIGNIFICANT CHANGE UP (ref 32–36)
MCV RBC AUTO: 79.9 FL — LOW (ref 80–100)
MONOCYTES # BLD AUTO: 2.07 K/UL — HIGH (ref 0–0.9)
MONOCYTES NFR BLD AUTO: 14 % — SIGNIFICANT CHANGE UP (ref 2–14)
NEUTROPHILS # BLD AUTO: 11.85 K/UL — HIGH (ref 1.8–7.4)
NEUTROPHILS NFR BLD AUTO: 80 % — HIGH (ref 43–77)
NRBC # BLD: 0 /100 WBCS — SIGNIFICANT CHANGE UP (ref 0–0)
NRBC BLD AUTO-RTO: SIGNIFICANT CHANGE UP /100 WBCS (ref 0–0)
NRBC BLD-RTO: 0 /100 WBCS — SIGNIFICANT CHANGE UP (ref 0–0)
PHOSPHATE SERPL-MCNC: 3.6 MG/DL — SIGNIFICANT CHANGE UP (ref 2.5–4.5)
PLAT MORPH BLD: ABNORMAL
PLATELET # BLD AUTO: 775 K/UL — HIGH (ref 150–400)
POTASSIUM SERPL-MCNC: 4.4 MMOL/L — SIGNIFICANT CHANGE UP (ref 3.5–5.3)
POTASSIUM SERPL-SCNC: 4.4 MMOL/L — SIGNIFICANT CHANGE UP (ref 3.5–5.3)
PROT SERPL-MCNC: 7.1 G/DL — SIGNIFICANT CHANGE UP (ref 6–8.3)
RBC # BLD: 3.28 M/UL — LOW (ref 4.2–5.8)
RBC # FLD: 15.6 % — HIGH (ref 10.3–14.5)
RBC BLD AUTO: ABNORMAL
SODIUM SERPL-SCNC: 131 MMOL/L — LOW (ref 135–145)
TARGETS BLD QL SMEAR: SLIGHT — SIGNIFICANT CHANGE UP
URATE SERPL-MCNC: 4.3 MG/DL — SIGNIFICANT CHANGE UP (ref 3.4–8.8)
WBC # BLD: 14.81 K/UL — HIGH (ref 3.8–10.5)
WBC # FLD AUTO: 14.81 K/UL — HIGH (ref 3.8–10.5)

## 2025-03-03 DIAGNOSIS — Z51.11 ENCOUNTER FOR ANTINEOPLASTIC CHEMOTHERAPY: ICD-10-CM

## 2025-03-03 DIAGNOSIS — R11.2 NAUSEA WITH VOMITING, UNSPECIFIED: ICD-10-CM

## 2025-03-03 DIAGNOSIS — Z51.89 ENCOUNTER FOR OTHER SPECIFIED AFTERCARE: ICD-10-CM

## 2025-03-06 ENCOUNTER — APPOINTMENT (OUTPATIENT)
Dept: HEMATOLOGY ONCOLOGY | Facility: CLINIC | Age: 65
End: 2025-03-06
Payer: COMMERCIAL

## 2025-03-06 ENCOUNTER — RESULT REVIEW (OUTPATIENT)
Age: 65
End: 2025-03-06

## 2025-03-06 ENCOUNTER — APPOINTMENT (OUTPATIENT)
Dept: HEMATOLOGY ONCOLOGY | Facility: CLINIC | Age: 65
End: 2025-03-06

## 2025-03-06 ENCOUNTER — NON-APPOINTMENT (OUTPATIENT)
Age: 65
End: 2025-03-06

## 2025-03-06 VITALS
WEIGHT: 182.54 LBS | OXYGEN SATURATION: 98 % | TEMPERATURE: 97.2 F | RESPIRATION RATE: 16 BRPM | SYSTOLIC BLOOD PRESSURE: 108 MMHG | BODY MASS INDEX: 26.96 KG/M2 | HEART RATE: 81 BPM | DIASTOLIC BLOOD PRESSURE: 70 MMHG

## 2025-03-06 VITALS — DIASTOLIC BLOOD PRESSURE: 73 MMHG | SYSTOLIC BLOOD PRESSURE: 124 MMHG

## 2025-03-06 DIAGNOSIS — E11.9 TYPE 2 DIABETES MELLITUS W/OUT COMPLICATIONS: ICD-10-CM

## 2025-03-06 DIAGNOSIS — Z51.11 ENCOUNTER FOR ANTINEOPLASTIC CHEMOTHERAPY: ICD-10-CM

## 2025-03-06 DIAGNOSIS — C83.30 DIFFUSE LARGE B-CELL LYMPHOMA, UNSPECIFIED SITE: ICD-10-CM

## 2025-03-06 DIAGNOSIS — C83.00 SMALL CELL B-CELL LYMPHOMA, UNSPECIFIED SITE: ICD-10-CM

## 2025-03-06 DIAGNOSIS — D47.2 MONOCLONAL GAMMOPATHY: ICD-10-CM

## 2025-03-06 DIAGNOSIS — Z90.81 ACQUIRED ABSENCE OF SPLEEN: ICD-10-CM

## 2025-03-06 DIAGNOSIS — D75.839 THROMBOCYTOSIS, UNSPECIFIED: ICD-10-CM

## 2025-03-06 LAB
ALBUMIN SERPL ELPH-MCNC: 3.7 G/DL
ALP BLD-CCNC: 346 U/L
ALT SERPL-CCNC: 41 U/L
ANION GAP SERPL CALC-SCNC: 8 MMOL/L
AST SERPL-CCNC: 20 U/L
BASOPHILS # BLD AUTO: 0.01 K/UL — SIGNIFICANT CHANGE UP (ref 0–0.2)
BASOPHILS NFR BLD AUTO: 1 % — SIGNIFICANT CHANGE UP (ref 0–2)
BILIRUB SERPL-MCNC: 0.3 MG/DL
BUN SERPL-MCNC: 20 MG/DL
CALCIUM SERPL-MCNC: 9.8 MG/DL
CHLORIDE SERPL-SCNC: 99 MMOL/L
CO2 SERPL-SCNC: 33 MMOL/L
CREAT SERPL-MCNC: 0.74 MG/DL
EGFRCR SERPLBLD CKD-EPI 2021: 102 ML/MIN/1.73M2
ELLIPTOCYTES BLD QL SMEAR: SLIGHT — SIGNIFICANT CHANGE UP
EOSINOPHIL # BLD AUTO: 0.1 K/UL — SIGNIFICANT CHANGE UP (ref 0–0.5)
EOSINOPHIL NFR BLD AUTO: 8 % — HIGH (ref 0–6)
G6PD RBC-CCNC: 21.8 U/G HGB — HIGH (ref 7–20.5)
GLUCOSE SERPL-MCNC: 218 MG/DL
HCT VFR BLD CALC: 32.5 % — LOW (ref 39–50)
HGB BLD-MCNC: 10.3 G/DL — LOW (ref 13–17)
LDH SERPL-CCNC: 240 U/L
LYMPHOCYTES # BLD AUTO: 0.39 K/UL — LOW (ref 1–3.3)
LYMPHOCYTES # BLD AUTO: 31 % — SIGNIFICANT CHANGE UP (ref 13–44)
MAGNESIUM SERPL-MCNC: 2 MG/DL
MCHC RBC-ENTMCNC: 25.6 PG — LOW (ref 27–34)
MCHC RBC-ENTMCNC: 31.7 G/DL — LOW (ref 32–36)
MCV RBC AUTO: 80.8 FL — SIGNIFICANT CHANGE UP (ref 80–100)
MONOCYTES # BLD AUTO: 0.06 K/UL — SIGNIFICANT CHANGE UP (ref 0–0.9)
MONOCYTES NFR BLD AUTO: 5 % — SIGNIFICANT CHANGE UP (ref 2–14)
NEUTROPHILS # BLD AUTO: 0.7 K/UL — LOW (ref 1.8–7.4)
NEUTROPHILS NFR BLD AUTO: 55 % — SIGNIFICANT CHANGE UP (ref 43–77)
NRBC # BLD: 0 /100 WBCS — SIGNIFICANT CHANGE UP (ref 0–0)
NRBC BLD AUTO-RTO: SIGNIFICANT CHANGE UP /100 WBCS (ref 0–0)
NRBC BLD-RTO: 0 /100 WBCS — SIGNIFICANT CHANGE UP (ref 0–0)
PHOSPHATE SERPL-MCNC: 4.3 MG/DL
PLAT MORPH BLD: NORMAL — SIGNIFICANT CHANGE UP
PLATELET # BLD AUTO: 682 K/UL — HIGH (ref 150–400)
POIKILOCYTOSIS BLD QL AUTO: SLIGHT — SIGNIFICANT CHANGE UP
POTASSIUM SERPL-SCNC: 4.4 MMOL/L
PROT SERPL-MCNC: 6.3 G/DL
RBC # BLD: 4.02 M/UL — LOW (ref 4.2–5.8)
RBC # FLD: 16 % — HIGH (ref 10.3–14.5)
RBC BLD AUTO: ABNORMAL
SCHISTOCYTES BLD QL AUTO: SLIGHT — SIGNIFICANT CHANGE UP
SODIUM SERPL-SCNC: 140 MMOL/L
TARGETS BLD QL SMEAR: SLIGHT — SIGNIFICANT CHANGE UP
URATE SERPL-MCNC: 4.2 MG/DL
WBC # BLD: 1.27 K/UL — LOW (ref 3.8–10.5)
WBC # FLD AUTO: 1.27 K/UL — LOW (ref 3.8–10.5)

## 2025-03-06 PROCEDURE — 99214 OFFICE O/P EST MOD 30 MIN: CPT

## 2025-03-06 PROCEDURE — G2211 COMPLEX E/M VISIT ADD ON: CPT | Mod: NC

## 2025-03-06 RX ORDER — AMOXICILLIN 500 MG/1
500 CAPSULE ORAL
Qty: 60 | Refills: 0 | Status: ACTIVE | COMMUNITY
Start: 2025-03-06 | End: 1900-01-01

## 2025-03-06 RX ORDER — SALIVA SUBSTITUTE COMBO NO.9
MOUTHWASH MUCOUS MEMBRANE DAILY
Qty: 1 | Refills: 0 | Status: ACTIVE | COMMUNITY
Start: 2025-03-06 | End: 1900-01-01

## 2025-03-06 RX ORDER — LEVOFLOXACIN 500 MG/1
500 TABLET, FILM COATED ORAL DAILY
Qty: 30 | Refills: 2 | Status: ACTIVE | COMMUNITY
Start: 2025-03-06 | End: 1900-01-01

## 2025-03-08 PROBLEM — Z90.81 H/O SPLENECTOMY: Status: RESOLVED | Noted: 2025-02-18 | Resolved: 2025-03-08

## 2025-03-08 LAB
ALBUMIN MFR SERPL ELPH: 53.2 %
ALBUMIN SERPL-MCNC: 3.3 G/DL
ALBUMIN/GLOB SERPL: 1.1 RATIO
ALPHA1 GLOB MFR SERPL ELPH: 8.5 %
ALPHA1 GLOB SERPL ELPH-MCNC: 0.5 G/DL
ALPHA2 GLOB MFR SERPL ELPH: 11.9 %
ALPHA2 GLOB SERPL ELPH-MCNC: 0.7 G/DL
B-GLOBULIN MFR SERPL ELPH: 11.7 %
B-GLOBULIN SERPL ELPH-MCNC: 0.7 G/DL
DEPRECATED KAPPA LC FREE/LAMBDA SER: 1.06 RATIO
GAMMA GLOB FLD ELPH-MCNC: 0.9 G/DL
GAMMA GLOB MFR SERPL ELPH: 14.7 %
IGA SER QL IEP: 204 MG/DL
IGG SER QL IEP: 1098 MG/DL
IGM SER QL IEP: 34 MG/DL
INTERPRETATION SERPL IEP-IMP: NORMAL
KAPPA LC CSF-MCNC: 1.8 MG/DL
KAPPA LC SERPL-MCNC: 1.91 MG/DL
M PROTEIN SPEC IFE-MCNC: NORMAL
PROT SERPL-MCNC: 6.2 G/DL
PROT SERPL-MCNC: 6.2 G/DL

## 2025-03-11 DIAGNOSIS — C85.10 UNSPECIFIED B-CELL LYMPHOMA, UNSPECIFIED SITE: ICD-10-CM

## 2025-03-21 ENCOUNTER — RESULT REVIEW (OUTPATIENT)
Age: 65
End: 2025-03-21

## 2025-03-21 ENCOUNTER — APPOINTMENT (OUTPATIENT)
Dept: HEMATOLOGY ONCOLOGY | Facility: CLINIC | Age: 65
End: 2025-03-21

## 2025-03-21 ENCOUNTER — APPOINTMENT (OUTPATIENT)
Dept: INFUSION THERAPY | Facility: HOSPITAL | Age: 65
End: 2025-03-21

## 2025-03-21 LAB
A1C WITH ESTIMATED AVERAGE GLUCOSE RESULT: 8.4 % — HIGH (ref 4–5.6)
ALBUMIN SERPL ELPH-MCNC: 4.2 G/DL — SIGNIFICANT CHANGE UP (ref 3.3–5)
ALP SERPL-CCNC: 214 U/L — HIGH (ref 40–120)
ALT FLD-CCNC: 45 U/L — SIGNIFICANT CHANGE UP (ref 10–45)
ANION GAP SERPL CALC-SCNC: 11 MMOL/L — SIGNIFICANT CHANGE UP (ref 5–17)
AST SERPL-CCNC: 41 U/L — HIGH (ref 10–40)
BASOPHILS # BLD AUTO: 0.13 K/UL — SIGNIFICANT CHANGE UP (ref 0–0.2)
BASOPHILS NFR BLD AUTO: 1.6 % — SIGNIFICANT CHANGE UP (ref 0–2)
BILIRUB SERPL-MCNC: 0.2 MG/DL — SIGNIFICANT CHANGE UP (ref 0.2–1.2)
BUN SERPL-MCNC: 16 MG/DL — SIGNIFICANT CHANGE UP (ref 7–23)
CALCIUM SERPL-MCNC: 10 MG/DL — SIGNIFICANT CHANGE UP (ref 8.4–10.5)
CHLORIDE SERPL-SCNC: 102 MMOL/L — SIGNIFICANT CHANGE UP (ref 96–108)
CO2 SERPL-SCNC: 28 MMOL/L — SIGNIFICANT CHANGE UP (ref 22–31)
CREAT SERPL-MCNC: 0.99 MG/DL — SIGNIFICANT CHANGE UP (ref 0.5–1.3)
EGFR: 86 ML/MIN/1.73M2 — SIGNIFICANT CHANGE UP
EGFR: 86 ML/MIN/1.73M2 — SIGNIFICANT CHANGE UP
EOSINOPHIL # BLD AUTO: 0.12 K/UL — SIGNIFICANT CHANGE UP (ref 0–0.5)
EOSINOPHIL NFR BLD AUTO: 1.4 % — SIGNIFICANT CHANGE UP (ref 0–6)
ESTIMATED AVERAGE GLUCOSE: 194 MG/DL — HIGH (ref 68–114)
GLUCOSE SERPL-MCNC: 159 MG/DL — HIGH (ref 70–99)
HCT VFR BLD CALC: 32.3 % — LOW (ref 39–50)
HGB BLD-MCNC: 10.7 G/DL — LOW (ref 13–17)
IMM GRANULOCYTES NFR BLD AUTO: 1.7 % — HIGH (ref 0–0.9)
LDH SERPL L TO P-CCNC: 351 U/L — HIGH (ref 50–242)
LYMPHOCYTES # BLD AUTO: 0.91 K/UL — LOW (ref 1–3.3)
LYMPHOCYTES # BLD AUTO: 10.9 % — LOW (ref 13–44)
MCHC RBC-ENTMCNC: 26.8 PG — LOW (ref 27–34)
MCHC RBC-ENTMCNC: 33.1 G/DL — SIGNIFICANT CHANGE UP (ref 32–36)
MCV RBC AUTO: 80.8 FL — SIGNIFICANT CHANGE UP (ref 80–100)
MONOCYTES # BLD AUTO: 0.57 K/UL — SIGNIFICANT CHANGE UP (ref 0–0.9)
MONOCYTES NFR BLD AUTO: 6.8 % — SIGNIFICANT CHANGE UP (ref 2–14)
NEUTROPHILS # BLD AUTO: 6.51 K/UL — SIGNIFICANT CHANGE UP (ref 1.8–7.4)
NEUTROPHILS NFR BLD AUTO: 77.6 % — HIGH (ref 43–77)
NRBC BLD AUTO-RTO: 0 /100 WBCS — SIGNIFICANT CHANGE UP (ref 0–0)
PHOSPHATE SERPL-MCNC: 4.2 MG/DL — SIGNIFICANT CHANGE UP (ref 2.5–4.5)
PLATELET # BLD AUTO: 619 K/UL — HIGH (ref 150–400)
POTASSIUM SERPL-MCNC: 4.9 MMOL/L — SIGNIFICANT CHANGE UP (ref 3.5–5.3)
POTASSIUM SERPL-SCNC: 4.9 MMOL/L — SIGNIFICANT CHANGE UP (ref 3.5–5.3)
PROT SERPL-MCNC: 7.3 G/DL — SIGNIFICANT CHANGE UP (ref 6–8.3)
RBC # BLD: 4 M/UL — LOW (ref 4.2–5.8)
RBC # FLD: 19.2 % — HIGH (ref 10.3–14.5)
SODIUM SERPL-SCNC: 141 MMOL/L — SIGNIFICANT CHANGE UP (ref 135–145)
URATE SERPL-MCNC: 5.1 MG/DL — SIGNIFICANT CHANGE UP (ref 3.4–8.8)
WBC # BLD: 8.38 K/UL — SIGNIFICANT CHANGE UP (ref 3.8–10.5)
WBC # FLD AUTO: 8.38 K/UL — SIGNIFICANT CHANGE UP (ref 3.8–10.5)

## 2025-03-24 DIAGNOSIS — E86.0 DEHYDRATION: ICD-10-CM

## 2025-03-24 DIAGNOSIS — C83.30 DIFFUSE LARGE B-CELL LYMPHOMA, UNSPECIFIED SITE: ICD-10-CM

## 2025-04-01 ENCOUNTER — RESULT REVIEW (OUTPATIENT)
Age: 65
End: 2025-04-01

## 2025-04-01 ENCOUNTER — APPOINTMENT (OUTPATIENT)
Dept: HEMATOLOGY ONCOLOGY | Facility: CLINIC | Age: 65
End: 2025-04-01
Payer: COMMERCIAL

## 2025-04-01 VITALS
WEIGHT: 184.83 LBS | SYSTOLIC BLOOD PRESSURE: 114 MMHG | TEMPERATURE: 97.4 F | OXYGEN SATURATION: 98 % | HEIGHT: 69 IN | HEART RATE: 84 BPM | BODY MASS INDEX: 27.38 KG/M2 | DIASTOLIC BLOOD PRESSURE: 69 MMHG | RESPIRATION RATE: 16 BRPM

## 2025-04-01 DIAGNOSIS — C83.00 SMALL CELL B-CELL LYMPHOMA, UNSPECIFIED SITE: ICD-10-CM

## 2025-04-01 DIAGNOSIS — Z78.9 OTHER SPECIFIED HEALTH STATUS: ICD-10-CM

## 2025-04-01 DIAGNOSIS — C83.30 DIFFUSE LARGE B-CELL LYMPHOMA, UNSPECIFIED SITE: ICD-10-CM

## 2025-04-01 DIAGNOSIS — E11.9 TYPE 2 DIABETES MELLITUS W/OUT COMPLICATIONS: ICD-10-CM

## 2025-04-01 DIAGNOSIS — D47.2 MONOCLONAL GAMMOPATHY: ICD-10-CM

## 2025-04-01 DIAGNOSIS — Z51.11 ENCOUNTER FOR ANTINEOPLASTIC CHEMOTHERAPY: ICD-10-CM

## 2025-04-01 LAB
ACANTHOCYTES BLD QL SMEAR: SLIGHT — SIGNIFICANT CHANGE UP
ANISOCYTOSIS BLD QL: SIGNIFICANT CHANGE UP
BASO STIPL BLD QL SMEAR: PRESENT — SIGNIFICANT CHANGE UP
BASOPHILS # BLD AUTO: 0 K/UL — SIGNIFICANT CHANGE UP (ref 0–0.2)
BASOPHILS NFR BLD AUTO: 0 % — SIGNIFICANT CHANGE UP (ref 0–2)
EOSINOPHIL # BLD AUTO: 0 K/UL — SIGNIFICANT CHANGE UP (ref 0–0.5)
EOSINOPHIL NFR BLD AUTO: 0 % — SIGNIFICANT CHANGE UP (ref 0–6)
HCT VFR BLD CALC: 30 % — LOW (ref 39–50)
HGB BLD-MCNC: 10.3 G/DL — LOW (ref 13–17)
HOWELL-JOLLY BOD BLD QL SMEAR: PRESENT — SIGNIFICANT CHANGE UP
LYMPHOCYTES # BLD AUTO: 1.62 K/UL — SIGNIFICANT CHANGE UP (ref 1–3.3)
LYMPHOCYTES # BLD AUTO: 15 % — SIGNIFICANT CHANGE UP (ref 13–44)
MCHC RBC-ENTMCNC: 27.5 PG — SIGNIFICANT CHANGE UP (ref 27–34)
MCHC RBC-ENTMCNC: 34.3 G/DL — SIGNIFICANT CHANGE UP (ref 32–36)
MCV RBC AUTO: 80 FL — SIGNIFICANT CHANGE UP (ref 80–100)
METAMYELOCYTES # FLD: 0.5 % — HIGH (ref 0–0)
METAMYELOCYTES NFR BLD: 0.5 % — HIGH (ref 0–0)
MONOCYTES # BLD AUTO: 1.68 K/UL — HIGH (ref 0–0.9)
MONOCYTES NFR BLD AUTO: 15.5 % — HIGH (ref 2–14)
MYELOCYTES NFR BLD: 13 % — HIGH (ref 0–0)
NEUTROPHILS # BLD AUTO: 6.06 K/UL — SIGNIFICANT CHANGE UP (ref 1.8–7.4)
NEUTROPHILS NFR BLD AUTO: 56 % — SIGNIFICANT CHANGE UP (ref 43–77)
NRBC # BLD: 5 /100 WBCS — HIGH (ref 0–0)
NRBC BLD AUTO-RTO: SIGNIFICANT CHANGE UP /100 WBCS (ref 0–0)
NRBC BLD-RTO: 5 /100 WBCS — HIGH (ref 0–0)
PLAT MORPH BLD: NORMAL — SIGNIFICANT CHANGE UP
PLATELET # BLD AUTO: 299 K/UL — SIGNIFICANT CHANGE UP (ref 150–400)
POIKILOCYTOSIS BLD QL AUTO: SLIGHT — SIGNIFICANT CHANGE UP
POLYCHROMASIA BLD QL SMEAR: SLIGHT — SIGNIFICANT CHANGE UP
RBC # BLD: 3.75 M/UL — LOW (ref 4.2–5.8)
RBC # FLD: 19.9 % — HIGH (ref 10.3–14.5)
RBC BLD AUTO: ABNORMAL
TARGETS BLD QL SMEAR: SLIGHT — SIGNIFICANT CHANGE UP
WBC # BLD: 10.82 K/UL — HIGH (ref 3.8–10.5)
WBC # FLD AUTO: 10.82 K/UL — HIGH (ref 3.8–10.5)

## 2025-04-01 PROCEDURE — 99214 OFFICE O/P EST MOD 30 MIN: CPT

## 2025-04-07 ENCOUNTER — NON-APPOINTMENT (OUTPATIENT)
Age: 65
End: 2025-04-07

## 2025-04-11 ENCOUNTER — RESULT REVIEW (OUTPATIENT)
Age: 65
End: 2025-04-11

## 2025-04-11 ENCOUNTER — NON-APPOINTMENT (OUTPATIENT)
Age: 65
End: 2025-04-11

## 2025-04-11 ENCOUNTER — APPOINTMENT (OUTPATIENT)
Dept: INFUSION THERAPY | Facility: HOSPITAL | Age: 65
End: 2025-04-11

## 2025-04-11 ENCOUNTER — APPOINTMENT (OUTPATIENT)
Dept: HEMATOLOGY ONCOLOGY | Facility: CLINIC | Age: 65
End: 2025-04-11

## 2025-04-11 LAB
A1C WITH ESTIMATED AVERAGE GLUCOSE RESULT: 9.8 % — HIGH (ref 4–5.6)
ACANTHOCYTES BLD QL SMEAR: SLIGHT — SIGNIFICANT CHANGE UP
ALBUMIN SERPL ELPH-MCNC: 4.4 G/DL — SIGNIFICANT CHANGE UP (ref 3.3–5)
ALP SERPL-CCNC: 186 U/L — HIGH (ref 40–120)
ALT FLD-CCNC: 47 U/L — HIGH (ref 10–45)
ANION GAP SERPL CALC-SCNC: 11 MMOL/L — SIGNIFICANT CHANGE UP (ref 5–17)
ANISOCYTOSIS BLD QL: SIGNIFICANT CHANGE UP
AST SERPL-CCNC: 37 U/L — SIGNIFICANT CHANGE UP (ref 10–40)
BASOPHILS # BLD AUTO: 0.13 K/UL — SIGNIFICANT CHANGE UP (ref 0–0.2)
BASOPHILS NFR BLD AUTO: 2 % — SIGNIFICANT CHANGE UP (ref 0–2)
BILIRUB SERPL-MCNC: 0.2 MG/DL — SIGNIFICANT CHANGE UP (ref 0.2–1.2)
BUN SERPL-MCNC: 19 MG/DL — SIGNIFICANT CHANGE UP (ref 7–23)
BURR CELLS BLD QL SMEAR: PRESENT — SIGNIFICANT CHANGE UP
CALCIUM SERPL-MCNC: 9.8 MG/DL — SIGNIFICANT CHANGE UP (ref 8.4–10.5)
CHLORIDE SERPL-SCNC: 101 MMOL/L — SIGNIFICANT CHANGE UP (ref 96–108)
CO2 SERPL-SCNC: 27 MMOL/L — SIGNIFICANT CHANGE UP (ref 22–31)
CREAT SERPL-MCNC: 0.8 MG/DL — SIGNIFICANT CHANGE UP (ref 0.5–1.3)
EGFR: 100 ML/MIN/1.73M2 — SIGNIFICANT CHANGE UP
EGFR: 100 ML/MIN/1.73M2 — SIGNIFICANT CHANGE UP
ELLIPTOCYTES BLD QL SMEAR: SLIGHT — SIGNIFICANT CHANGE UP
EOSINOPHIL # BLD AUTO: 0.07 K/UL — SIGNIFICANT CHANGE UP (ref 0–0.5)
EOSINOPHIL NFR BLD AUTO: 1 % — SIGNIFICANT CHANGE UP (ref 0–6)
ESTIMATED AVERAGE GLUCOSE: 235 MG/DL — HIGH (ref 68–114)
GLUCOSE SERPL-MCNC: 249 MG/DL — HIGH (ref 70–99)
HCT VFR BLD CALC: 34 % — LOW (ref 39–50)
HGB BLD-MCNC: 11.5 G/DL — LOW (ref 13–17)
HOWELL-JOLLY BOD BLD QL SMEAR: PRESENT — SIGNIFICANT CHANGE UP
LDH SERPL L TO P-CCNC: 276 U/L — HIGH (ref 50–242)
LG PLATELETS BLD QL AUTO: SLIGHT — SIGNIFICANT CHANGE UP
LYMPHOCYTES # BLD AUTO: 0.64 K/UL — LOW (ref 1–3.3)
LYMPHOCYTES # BLD AUTO: 9.5 % — LOW (ref 13–44)
MCHC RBC-ENTMCNC: 27.3 PG — SIGNIFICANT CHANGE UP (ref 27–34)
MCHC RBC-ENTMCNC: 33.8 G/DL — SIGNIFICANT CHANGE UP (ref 32–36)
MCV RBC AUTO: 80.8 FL — SIGNIFICANT CHANGE UP (ref 80–100)
MONOCYTES # BLD AUTO: 0.77 K/UL — SIGNIFICANT CHANGE UP (ref 0–0.9)
MONOCYTES NFR BLD AUTO: 11.5 % — SIGNIFICANT CHANGE UP (ref 2–14)
MYELOCYTES NFR BLD: 0.5 % — HIGH (ref 0–0)
NEUTROPHILS # BLD AUTO: 5.06 K/UL — SIGNIFICANT CHANGE UP (ref 1.8–7.4)
NEUTROPHILS NFR BLD AUTO: 75.5 % — SIGNIFICANT CHANGE UP (ref 43–77)
NRBC # BLD: 1 /100 WBCS — HIGH (ref 0–0)
NRBC BLD AUTO-RTO: SIGNIFICANT CHANGE UP /100 WBCS (ref 0–0)
NRBC BLD-RTO: 1 /100 WBCS — HIGH (ref 0–0)
PLAT MORPH BLD: ABNORMAL
PLATELET # BLD AUTO: 575 K/UL — HIGH (ref 150–400)
POIKILOCYTOSIS BLD QL AUTO: SLIGHT — SIGNIFICANT CHANGE UP
POTASSIUM SERPL-MCNC: 4.6 MMOL/L — SIGNIFICANT CHANGE UP (ref 3.5–5.3)
POTASSIUM SERPL-SCNC: 4.6 MMOL/L — SIGNIFICANT CHANGE UP (ref 3.5–5.3)
PROT SERPL-MCNC: 7.1 G/DL — SIGNIFICANT CHANGE UP (ref 6–8.3)
RBC # BLD: 4.21 M/UL — SIGNIFICANT CHANGE UP (ref 4.2–5.8)
RBC # FLD: 21.8 % — HIGH (ref 10.3–14.5)
RBC BLD AUTO: ABNORMAL
SCHISTOCYTES BLD QL AUTO: SLIGHT — SIGNIFICANT CHANGE UP
SODIUM SERPL-SCNC: 139 MMOL/L — SIGNIFICANT CHANGE UP (ref 135–145)
TARGETS BLD QL SMEAR: SLIGHT — SIGNIFICANT CHANGE UP
WBC # BLD: 6.7 K/UL — SIGNIFICANT CHANGE UP (ref 3.8–10.5)
WBC # FLD AUTO: 6.7 K/UL — SIGNIFICANT CHANGE UP (ref 3.8–10.5)

## 2025-04-17 ENCOUNTER — OUTPATIENT (OUTPATIENT)
Dept: OUTPATIENT SERVICES | Facility: HOSPITAL | Age: 65
LOS: 1 days | Discharge: HOME | End: 2025-04-17

## 2025-04-17 DIAGNOSIS — C83.00 SMALL CELL B-CELL LYMPHOMA, UNSPECIFIED SITE: ICD-10-CM

## 2025-04-22 ENCOUNTER — RX RENEWAL (OUTPATIENT)
Age: 65
End: 2025-04-22

## 2025-04-22 ENCOUNTER — RESULT REVIEW (OUTPATIENT)
Age: 65
End: 2025-04-22

## 2025-04-22 ENCOUNTER — APPOINTMENT (OUTPATIENT)
Dept: HEMATOLOGY ONCOLOGY | Facility: CLINIC | Age: 65
End: 2025-04-22
Payer: COMMERCIAL

## 2025-04-22 ENCOUNTER — APPOINTMENT (OUTPATIENT)
Dept: HEMATOLOGY ONCOLOGY | Facility: CLINIC | Age: 65
End: 2025-04-22

## 2025-04-22 VITALS
RESPIRATION RATE: 16 BRPM | SYSTOLIC BLOOD PRESSURE: 118 MMHG | TEMPERATURE: 97.7 F | DIASTOLIC BLOOD PRESSURE: 72 MMHG | OXYGEN SATURATION: 98 % | HEART RATE: 87 BPM | BODY MASS INDEX: 27.84 KG/M2 | WEIGHT: 188.47 LBS

## 2025-04-22 DIAGNOSIS — D47.2 MONOCLONAL GAMMOPATHY: ICD-10-CM

## 2025-04-22 DIAGNOSIS — C83.30 DIFFUSE LARGE B-CELL LYMPHOMA, UNSPECIFIED SITE: ICD-10-CM

## 2025-04-22 DIAGNOSIS — D63.0 ANEMIA IN NEOPLASTIC DISEASE: ICD-10-CM

## 2025-04-22 DIAGNOSIS — C83.00 SMALL CELL B-CELL LYMPHOMA, UNSPECIFIED SITE: ICD-10-CM

## 2025-04-22 DIAGNOSIS — E11.9 TYPE 2 DIABETES MELLITUS W/OUT COMPLICATIONS: ICD-10-CM

## 2025-04-22 DIAGNOSIS — Z80.3 FAMILY HISTORY OF MALIGNANT NEOPLASM OF BREAST: ICD-10-CM

## 2025-04-22 DIAGNOSIS — Z90.81 ACQUIRED ABSENCE OF SPLEEN: ICD-10-CM

## 2025-04-22 DIAGNOSIS — Z51.11 ENCOUNTER FOR ANTINEOPLASTIC CHEMOTHERAPY: ICD-10-CM

## 2025-04-22 LAB
ANISOCYTOSIS BLD QL: SLIGHT — SIGNIFICANT CHANGE UP
BASOPHILS # BLD AUTO: 0.02 K/UL — SIGNIFICANT CHANGE UP (ref 0–0.2)
BASOPHILS NFR BLD AUTO: 0.5 % — SIGNIFICANT CHANGE UP (ref 0–2)
DACRYOCYTES BLD QL SMEAR: SLIGHT — SIGNIFICANT CHANGE UP
ELLIPTOCYTES BLD QL SMEAR: SLIGHT — SIGNIFICANT CHANGE UP
EOSINOPHIL # BLD AUTO: 0.12 K/UL — SIGNIFICANT CHANGE UP (ref 0–0.5)
EOSINOPHIL NFR BLD AUTO: 3 % — SIGNIFICANT CHANGE UP (ref 0–6)
HCT VFR BLD CALC: 32 % — LOW (ref 39–50)
HGB BLD-MCNC: 11.1 G/DL — LOW (ref 13–17)
LYMPHOCYTES # BLD AUTO: 1.08 K/UL — SIGNIFICANT CHANGE UP (ref 1–3.3)
LYMPHOCYTES # BLD AUTO: 27.5 % — SIGNIFICANT CHANGE UP (ref 13–44)
MCHC RBC-ENTMCNC: 28 PG — SIGNIFICANT CHANGE UP (ref 27–34)
MCHC RBC-ENTMCNC: 34.7 G/DL — SIGNIFICANT CHANGE UP (ref 32–36)
MCV RBC AUTO: 80.8 FL — SIGNIFICANT CHANGE UP (ref 80–100)
METAMYELOCYTES # FLD: 1 % — HIGH (ref 0–0)
METAMYELOCYTES NFR BLD: 1 % — HIGH (ref 0–0)
MONOCYTES # BLD AUTO: 0.74 K/UL — SIGNIFICANT CHANGE UP (ref 0–0.9)
MONOCYTES NFR BLD AUTO: 19 % — HIGH (ref 2–14)
MYELOCYTES NFR BLD: 3.5 % — HIGH (ref 0–0)
NEUTROPHILS # BLD AUTO: 1.78 K/UL — LOW (ref 1.8–7.4)
NEUTROPHILS NFR BLD AUTO: 45.5 % — SIGNIFICANT CHANGE UP (ref 43–77)
NRBC # BLD: 3 /100 WBCS — HIGH (ref 0–0)
NRBC BLD AUTO-RTO: SIGNIFICANT CHANGE UP /100 WBCS (ref 0–0)
NRBC BLD-RTO: 3 /100 WBCS — HIGH (ref 0–0)
PLAT MORPH BLD: NORMAL — SIGNIFICANT CHANGE UP
PLATELET # BLD AUTO: 258 K/UL — SIGNIFICANT CHANGE UP (ref 150–400)
POIKILOCYTOSIS BLD QL AUTO: SLIGHT — SIGNIFICANT CHANGE UP
RBC # BLD: 3.96 M/UL — LOW (ref 4.2–5.8)
RBC # FLD: 22.4 % — HIGH (ref 10.3–14.5)
RBC BLD AUTO: ABNORMAL
SCHISTOCYTES BLD QL AUTO: SLIGHT — SIGNIFICANT CHANGE UP
SMUDGE CELLS # BLD: PRESENT — SIGNIFICANT CHANGE UP
TARGETS BLD QL SMEAR: SLIGHT — SIGNIFICANT CHANGE UP
WBC # BLD: 3.92 K/UL — SIGNIFICANT CHANGE UP (ref 3.8–10.5)
WBC # FLD AUTO: 3.92 K/UL — SIGNIFICANT CHANGE UP (ref 3.8–10.5)

## 2025-04-22 PROCEDURE — G2211 COMPLEX E/M VISIT ADD ON: CPT | Mod: NC

## 2025-04-22 PROCEDURE — 99214 OFFICE O/P EST MOD 30 MIN: CPT

## 2025-04-23 PROBLEM — D63.0 ANEMIA IN NEOPLASTIC DISEASE: Status: ACTIVE | Noted: 2025-04-23

## 2025-04-23 PROBLEM — Z90.81 S/P SPLENECTOMY: Status: ACTIVE | Noted: 2025-04-23

## 2025-04-28 ENCOUNTER — APPOINTMENT (OUTPATIENT)
Dept: NUCLEAR MEDICINE | Facility: IMAGING CENTER | Age: 65
End: 2025-04-28

## 2025-04-28 ENCOUNTER — OUTPATIENT (OUTPATIENT)
Dept: OUTPATIENT SERVICES | Facility: HOSPITAL | Age: 65
LOS: 1 days | End: 2025-04-28
Payer: COMMERCIAL

## 2025-04-28 DIAGNOSIS — Z00.8 ENCOUNTER FOR OTHER GENERAL EXAMINATION: ICD-10-CM

## 2025-04-28 DIAGNOSIS — C83.30 DIFFUSE LARGE B-CELL LYMPHOMA, UNSPECIFIED SITE: ICD-10-CM

## 2025-04-28 PROCEDURE — 78815 PET IMAGE W/CT SKULL-THIGH: CPT

## 2025-04-28 PROCEDURE — 78815 PET IMAGE W/CT SKULL-THIGH: CPT | Mod: 26,PS

## 2025-04-28 PROCEDURE — A9552: CPT

## 2025-05-02 ENCOUNTER — APPOINTMENT (OUTPATIENT)
Dept: INFUSION THERAPY | Facility: HOSPITAL | Age: 65
End: 2025-05-02

## 2025-05-02 ENCOUNTER — RESULT REVIEW (OUTPATIENT)
Age: 65
End: 2025-05-02

## 2025-05-02 ENCOUNTER — APPOINTMENT (OUTPATIENT)
Dept: HEMATOLOGY ONCOLOGY | Facility: CLINIC | Age: 65
End: 2025-05-02

## 2025-05-02 DIAGNOSIS — R11.2 NAUSEA WITH VOMITING, UNSPECIFIED: ICD-10-CM

## 2025-05-02 DIAGNOSIS — Z51.89 ENCOUNTER FOR OTHER SPECIFIED AFTERCARE: ICD-10-CM

## 2025-05-02 DIAGNOSIS — Z51.11 ENCOUNTER FOR ANTINEOPLASTIC CHEMOTHERAPY: ICD-10-CM

## 2025-05-02 LAB
ALBUMIN SERPL ELPH-MCNC: 4.5 G/DL — SIGNIFICANT CHANGE UP (ref 3.3–5)
ALP SERPL-CCNC: 155 U/L — HIGH (ref 40–120)
ALT FLD-CCNC: 46 U/L — HIGH (ref 10–45)
ANION GAP SERPL CALC-SCNC: 14 MMOL/L — SIGNIFICANT CHANGE UP (ref 5–17)
AST SERPL-CCNC: 36 U/L — SIGNIFICANT CHANGE UP (ref 10–40)
BASOPHILS # BLD AUTO: 0.05 K/UL — SIGNIFICANT CHANGE UP (ref 0–0.2)
BASOPHILS NFR BLD AUTO: 0.8 % — SIGNIFICANT CHANGE UP (ref 0–2)
BILIRUB SERPL-MCNC: 0.4 MG/DL — SIGNIFICANT CHANGE UP (ref 0.2–1.2)
BUN SERPL-MCNC: 14 MG/DL — SIGNIFICANT CHANGE UP (ref 7–23)
CALCIUM SERPL-MCNC: 9.6 MG/DL — SIGNIFICANT CHANGE UP (ref 8.4–10.5)
CHLORIDE SERPL-SCNC: 101 MMOL/L — SIGNIFICANT CHANGE UP (ref 96–108)
CO2 SERPL-SCNC: 23 MMOL/L — SIGNIFICANT CHANGE UP (ref 22–31)
CREAT SERPL-MCNC: 0.82 MG/DL — SIGNIFICANT CHANGE UP (ref 0.5–1.3)
EGFR: 99 ML/MIN/1.73M2 — SIGNIFICANT CHANGE UP
EGFR: 99 ML/MIN/1.73M2 — SIGNIFICANT CHANGE UP
EOSINOPHIL # BLD AUTO: 0.05 K/UL — SIGNIFICANT CHANGE UP (ref 0–0.5)
EOSINOPHIL NFR BLD AUTO: 0.8 % — SIGNIFICANT CHANGE UP (ref 0–6)
GLUCOSE SERPL-MCNC: 271 MG/DL — HIGH (ref 70–99)
HCT VFR BLD CALC: 32 % — LOW (ref 39–50)
HGB BLD-MCNC: 11 G/DL — LOW (ref 13–17)
IMM GRANULOCYTES NFR BLD AUTO: 1 % — HIGH (ref 0–0.9)
LDH SERPL L TO P-CCNC: 285 U/L — HIGH (ref 50–242)
LYMPHOCYTES # BLD AUTO: 0.74 K/UL — LOW (ref 1–3.3)
LYMPHOCYTES # BLD AUTO: 12.3 % — LOW (ref 13–44)
MCHC RBC-ENTMCNC: 28.1 PG — SIGNIFICANT CHANGE UP (ref 27–34)
MCHC RBC-ENTMCNC: 34.4 G/DL — SIGNIFICANT CHANGE UP (ref 32–36)
MCV RBC AUTO: 81.6 FL — SIGNIFICANT CHANGE UP (ref 80–100)
MONOCYTES # BLD AUTO: 0.79 K/UL — SIGNIFICANT CHANGE UP (ref 0–0.9)
MONOCYTES NFR BLD AUTO: 13.2 % — SIGNIFICANT CHANGE UP (ref 2–14)
NEUTROPHILS # BLD AUTO: 4.31 K/UL — SIGNIFICANT CHANGE UP (ref 1.8–7.4)
NEUTROPHILS NFR BLD AUTO: 71.9 % — SIGNIFICANT CHANGE UP (ref 43–77)
NRBC BLD AUTO-RTO: 0 /100 WBCS — SIGNIFICANT CHANGE UP (ref 0–0)
PLATELET # BLD AUTO: 582 K/UL — HIGH (ref 150–400)
POTASSIUM SERPL-MCNC: 4.4 MMOL/L — SIGNIFICANT CHANGE UP (ref 3.5–5.3)
POTASSIUM SERPL-SCNC: 4.4 MMOL/L — SIGNIFICANT CHANGE UP (ref 3.5–5.3)
PROT SERPL-MCNC: 7.4 G/DL — SIGNIFICANT CHANGE UP (ref 6–8.3)
RBC # BLD: 3.92 M/UL — LOW (ref 4.2–5.8)
RBC # FLD: 23.3 % — HIGH (ref 10.3–14.5)
SODIUM SERPL-SCNC: 137 MMOL/L — SIGNIFICANT CHANGE UP (ref 135–145)
WBC # BLD: 6 K/UL — SIGNIFICANT CHANGE UP (ref 3.8–10.5)
WBC # FLD AUTO: 6 K/UL — SIGNIFICANT CHANGE UP (ref 3.8–10.5)

## 2025-05-13 ENCOUNTER — RESULT REVIEW (OUTPATIENT)
Age: 65
End: 2025-05-13

## 2025-05-13 ENCOUNTER — APPOINTMENT (OUTPATIENT)
Dept: HEMATOLOGY ONCOLOGY | Facility: CLINIC | Age: 65
End: 2025-05-13
Payer: COMMERCIAL

## 2025-05-13 VITALS
RESPIRATION RATE: 16 BRPM | BODY MASS INDEX: 27.48 KG/M2 | DIASTOLIC BLOOD PRESSURE: 75 MMHG | SYSTOLIC BLOOD PRESSURE: 126 MMHG | WEIGHT: 186.07 LBS | HEART RATE: 80 BPM | TEMPERATURE: 97.3 F | OXYGEN SATURATION: 97 %

## 2025-05-13 DIAGNOSIS — E03.9 HYPOTHYROIDISM, UNSPECIFIED: ICD-10-CM

## 2025-05-13 DIAGNOSIS — E11.9 TYPE 2 DIABETES MELLITUS W/OUT COMPLICATIONS: ICD-10-CM

## 2025-05-13 DIAGNOSIS — C83.30 DIFFUSE LARGE B-CELL LYMPHOMA, UNSPECIFIED SITE: ICD-10-CM

## 2025-05-13 DIAGNOSIS — L73.2 HIDRADENITIS SUPPURATIVA: ICD-10-CM

## 2025-05-13 LAB
ACANTHOCYTES BLD QL SMEAR: SLIGHT — SIGNIFICANT CHANGE UP
ANISOCYTOSIS BLD QL: SIGNIFICANT CHANGE UP
BASOPHILS # BLD AUTO: 0 K/UL — SIGNIFICANT CHANGE UP (ref 0–0.2)
BASOPHILS NFR BLD AUTO: 0 % — SIGNIFICANT CHANGE UP (ref 0–2)
DACRYOCYTES BLD QL SMEAR: SIGNIFICANT CHANGE UP
EOSINOPHIL # BLD AUTO: 0.05 K/UL — SIGNIFICANT CHANGE UP (ref 0–0.5)
EOSINOPHIL NFR BLD AUTO: 0.5 % — SIGNIFICANT CHANGE UP (ref 0–6)
HCT VFR BLD CALC: 29.3 % — LOW (ref 39–50)
HGB BLD-MCNC: 10.5 G/DL — LOW (ref 13–17)
HOWELL-JOLLY BOD BLD QL SMEAR: PRESENT — SIGNIFICANT CHANGE UP
LYMPHOCYTES # BLD AUTO: 1.8 K/UL — SIGNIFICANT CHANGE UP (ref 1–3.3)
LYMPHOCYTES # BLD AUTO: 17.5 % — SIGNIFICANT CHANGE UP (ref 13–44)
MCHC RBC-ENTMCNC: 29.2 PG — SIGNIFICANT CHANGE UP (ref 27–34)
MCHC RBC-ENTMCNC: 35.8 G/DL — SIGNIFICANT CHANGE UP (ref 32–36)
MCV RBC AUTO: 81.6 FL — SIGNIFICANT CHANGE UP (ref 80–100)
METAMYELOCYTES # FLD: 1 % — HIGH (ref 0–0)
METAMYELOCYTES NFR BLD: 1 % — HIGH (ref 0–0)
MONOCYTES # BLD AUTO: 1.34 K/UL — HIGH (ref 0–0.9)
MONOCYTES NFR BLD AUTO: 13 % — SIGNIFICANT CHANGE UP (ref 2–14)
MYELOCYTES NFR BLD: 7 % — HIGH (ref 0–0)
NEUTROPHILS # BLD AUTO: 6.23 K/UL — SIGNIFICANT CHANGE UP (ref 1.8–7.4)
NEUTROPHILS NFR BLD AUTO: 60.5 % — SIGNIFICANT CHANGE UP (ref 43–77)
NRBC # BLD: 7 /100 WBCS — HIGH (ref 0–0)
NRBC BLD AUTO-RTO: SIGNIFICANT CHANGE UP /100 WBCS (ref 0–0)
NRBC BLD-RTO: 7 /100 WBCS — HIGH (ref 0–0)
PLAT MORPH BLD: NORMAL — SIGNIFICANT CHANGE UP
PLATELET # BLD AUTO: 203 K/UL — SIGNIFICANT CHANGE UP (ref 150–400)
POIKILOCYTOSIS BLD QL AUTO: SIGNIFICANT CHANGE UP
RBC # BLD: 3.59 M/UL — LOW (ref 4.2–5.8)
RBC # FLD: 23 % — HIGH (ref 10.3–14.5)
RBC BLD AUTO: ABNORMAL
SCHISTOCYTES BLD QL AUTO: SLIGHT — SIGNIFICANT CHANGE UP
SMUDGE CELLS # BLD: PRESENT — SIGNIFICANT CHANGE UP
TARGETS BLD QL SMEAR: SLIGHT — SIGNIFICANT CHANGE UP
VARIANT LYMPHS # BLD: 0.5 % — SIGNIFICANT CHANGE UP (ref 0–6)
VARIANT LYMPHS NFR BLD MANUAL: 0.5 % — SIGNIFICANT CHANGE UP (ref 0–6)
WBC # BLD: 10.3 K/UL — SIGNIFICANT CHANGE UP (ref 3.8–10.5)
WBC # FLD AUTO: 10.3 K/UL — SIGNIFICANT CHANGE UP (ref 3.8–10.5)

## 2025-05-13 PROCEDURE — 99214 OFFICE O/P EST MOD 30 MIN: CPT

## 2025-05-14 ENCOUNTER — APPOINTMENT (OUTPATIENT)
Dept: MRI IMAGING | Facility: IMAGING CENTER | Age: 65
End: 2025-05-14
Payer: COMMERCIAL

## 2025-05-14 ENCOUNTER — OUTPATIENT (OUTPATIENT)
Dept: OUTPATIENT SERVICES | Facility: HOSPITAL | Age: 65
LOS: 1 days | End: 2025-05-14
Payer: COMMERCIAL

## 2025-05-14 DIAGNOSIS — Z00.8 ENCOUNTER FOR OTHER GENERAL EXAMINATION: ICD-10-CM

## 2025-05-14 DIAGNOSIS — C83.30 DIFFUSE LARGE B-CELL LYMPHOMA, UNSPECIFIED SITE: ICD-10-CM

## 2025-05-14 PROCEDURE — 74183 MRI ABD W/O CNTR FLWD CNTR: CPT | Mod: 26

## 2025-05-14 PROCEDURE — A9581: CPT

## 2025-05-14 PROCEDURE — 74183 MRI ABD W/O CNTR FLWD CNTR: CPT

## 2025-05-14 PROCEDURE — A9585: CPT

## 2025-05-22 ENCOUNTER — APPOINTMENT (OUTPATIENT)
Dept: INTERNAL MEDICINE | Facility: CLINIC | Age: 65
End: 2025-05-22

## 2025-05-23 ENCOUNTER — RESULT REVIEW (OUTPATIENT)
Age: 65
End: 2025-05-23

## 2025-05-23 ENCOUNTER — APPOINTMENT (OUTPATIENT)
Dept: INFUSION THERAPY | Facility: HOSPITAL | Age: 65
End: 2025-05-23

## 2025-05-23 ENCOUNTER — APPOINTMENT (OUTPATIENT)
Dept: HEMATOLOGY ONCOLOGY | Facility: CLINIC | Age: 65
End: 2025-05-23

## 2025-05-23 LAB
A1C WITH ESTIMATED AVERAGE GLUCOSE RESULT: 9.8 % — HIGH (ref 4–5.6)
ALBUMIN SERPL ELPH-MCNC: 4.3 G/DL — SIGNIFICANT CHANGE UP (ref 3.3–5)
ALP SERPL-CCNC: 170 U/L — HIGH (ref 40–120)
ALT FLD-CCNC: 50 U/L — HIGH (ref 10–45)
ANION GAP SERPL CALC-SCNC: 13 MMOL/L — SIGNIFICANT CHANGE UP (ref 5–17)
AST SERPL-CCNC: 37 U/L — SIGNIFICANT CHANGE UP (ref 10–40)
BASOPHILS # BLD AUTO: 0.06 K/UL — SIGNIFICANT CHANGE UP (ref 0–0.2)
BASOPHILS NFR BLD AUTO: 0.9 % — SIGNIFICANT CHANGE UP (ref 0–2)
BILIRUB SERPL-MCNC: 0.2 MG/DL — SIGNIFICANT CHANGE UP (ref 0.2–1.2)
BUN SERPL-MCNC: 18 MG/DL — SIGNIFICANT CHANGE UP (ref 7–23)
CALCIUM SERPL-MCNC: 9.4 MG/DL — SIGNIFICANT CHANGE UP (ref 8.4–10.5)
CHLORIDE SERPL-SCNC: 101 MMOL/L — SIGNIFICANT CHANGE UP (ref 96–108)
CO2 SERPL-SCNC: 22 MMOL/L — SIGNIFICANT CHANGE UP (ref 22–31)
CREAT SERPL-MCNC: 0.95 MG/DL — SIGNIFICANT CHANGE UP (ref 0.5–1.3)
EGFR: 90 ML/MIN/1.73M2 — SIGNIFICANT CHANGE UP
EGFR: 90 ML/MIN/1.73M2 — SIGNIFICANT CHANGE UP
EOSINOPHIL # BLD AUTO: 0.06 K/UL — SIGNIFICANT CHANGE UP (ref 0–0.5)
EOSINOPHIL NFR BLD AUTO: 0.9 % — SIGNIFICANT CHANGE UP (ref 0–6)
ESTIMATED AVERAGE GLUCOSE: 235 MG/DL — HIGH (ref 68–114)
GLUCOSE SERPL-MCNC: 326 MG/DL — HIGH (ref 70–99)
HCT VFR BLD CALC: 30.8 % — LOW (ref 39–50)
HGB BLD-MCNC: 10.5 G/DL — LOW (ref 13–17)
IMM GRANULOCYTES NFR BLD AUTO: 0.8 % — SIGNIFICANT CHANGE UP (ref 0–0.9)
LDH SERPL L TO P-CCNC: 290 U/L — HIGH (ref 50–242)
LYMPHOCYTES # BLD AUTO: 0.89 K/UL — LOW (ref 1–3.3)
LYMPHOCYTES # BLD AUTO: 13.7 % — SIGNIFICANT CHANGE UP (ref 13–44)
MCHC RBC-ENTMCNC: 29.1 PG — SIGNIFICANT CHANGE UP (ref 27–34)
MCHC RBC-ENTMCNC: 34.1 G/DL — SIGNIFICANT CHANGE UP (ref 32–36)
MCV RBC AUTO: 85.3 FL — SIGNIFICANT CHANGE UP (ref 80–100)
MONOCYTES # BLD AUTO: 1.17 K/UL — HIGH (ref 0–0.9)
MONOCYTES NFR BLD AUTO: 18 % — HIGH (ref 2–14)
NEUTROPHILS # BLD AUTO: 4.28 K/UL — SIGNIFICANT CHANGE UP (ref 1.8–7.4)
NEUTROPHILS NFR BLD AUTO: 65.7 % — SIGNIFICANT CHANGE UP (ref 43–77)
NRBC BLD AUTO-RTO: 0 /100 WBCS — SIGNIFICANT CHANGE UP (ref 0–0)
PLATELET # BLD AUTO: 657 K/UL — HIGH (ref 150–400)
POTASSIUM SERPL-MCNC: 4.6 MMOL/L — SIGNIFICANT CHANGE UP (ref 3.5–5.3)
POTASSIUM SERPL-SCNC: 4.6 MMOL/L — SIGNIFICANT CHANGE UP (ref 3.5–5.3)
PROT SERPL-MCNC: 6.9 G/DL — SIGNIFICANT CHANGE UP (ref 6–8.3)
RBC # BLD: 3.61 M/UL — LOW (ref 4.2–5.8)
RBC # FLD: 23.4 % — HIGH (ref 10.3–14.5)
SODIUM SERPL-SCNC: 137 MMOL/L — SIGNIFICANT CHANGE UP (ref 135–145)
WBC # BLD: 6.51 K/UL — SIGNIFICANT CHANGE UP (ref 3.8–10.5)
WBC # FLD AUTO: 6.51 K/UL — SIGNIFICANT CHANGE UP (ref 3.8–10.5)

## 2025-05-27 ENCOUNTER — NON-APPOINTMENT (OUTPATIENT)
Age: 65
End: 2025-05-27

## 2025-06-03 ENCOUNTER — RESULT REVIEW (OUTPATIENT)
Age: 65
End: 2025-06-03

## 2025-06-03 ENCOUNTER — APPOINTMENT (OUTPATIENT)
Dept: HEMATOLOGY ONCOLOGY | Facility: CLINIC | Age: 65
End: 2025-06-03
Payer: COMMERCIAL

## 2025-06-03 VITALS
HEART RATE: 79 BPM | WEIGHT: 195.33 LBS | DIASTOLIC BLOOD PRESSURE: 83 MMHG | TEMPERATURE: 97.7 F | SYSTOLIC BLOOD PRESSURE: 139 MMHG | BODY MASS INDEX: 28.84 KG/M2 | OXYGEN SATURATION: 99 % | RESPIRATION RATE: 16 BRPM

## 2025-06-03 DIAGNOSIS — C83.30 DIFFUSE LARGE B-CELL LYMPHOMA, UNSPECIFIED SITE: ICD-10-CM

## 2025-06-03 DIAGNOSIS — E03.9 HYPOTHYROIDISM, UNSPECIFIED: ICD-10-CM

## 2025-06-03 DIAGNOSIS — E11.9 TYPE 2 DIABETES MELLITUS W/OUT COMPLICATIONS: ICD-10-CM

## 2025-06-03 DIAGNOSIS — C83.00 SMALL CELL B-CELL LYMPHOMA, UNSPECIFIED SITE: ICD-10-CM

## 2025-06-03 DIAGNOSIS — Z51.11 ENCOUNTER FOR ANTINEOPLASTIC CHEMOTHERAPY: ICD-10-CM

## 2025-06-03 DIAGNOSIS — L73.2 HIDRADENITIS SUPPURATIVA: ICD-10-CM

## 2025-06-03 LAB
ANISOCYTOSIS BLD QL: SIGNIFICANT CHANGE UP
BASOPHILS # BLD AUTO: 0.08 K/UL — SIGNIFICANT CHANGE UP (ref 0–0.2)
BASOPHILS NFR BLD AUTO: 1 % — SIGNIFICANT CHANGE UP (ref 0–2)
BURR CELLS BLD QL SMEAR: PRESENT — SIGNIFICANT CHANGE UP
DACRYOCYTES BLD QL SMEAR: SIGNIFICANT CHANGE UP
EOSINOPHIL # BLD AUTO: 0.08 K/UL — SIGNIFICANT CHANGE UP (ref 0–0.5)
EOSINOPHIL NFR BLD AUTO: 1 % — SIGNIFICANT CHANGE UP (ref 0–6)
HCT VFR BLD CALC: 27.9 % — LOW (ref 39–50)
HGB BLD-MCNC: 9.8 G/DL — LOW (ref 13–17)
HOWELL-JOLLY BOD BLD QL SMEAR: PRESENT — SIGNIFICANT CHANGE UP
LYMPHOCYTES # BLD AUTO: 1.11 K/UL — SIGNIFICANT CHANGE UP (ref 1–3.3)
LYMPHOCYTES # BLD AUTO: 14 % — SIGNIFICANT CHANGE UP (ref 13–44)
MCHC RBC-ENTMCNC: 30.3 PG — SIGNIFICANT CHANGE UP (ref 27–34)
MCHC RBC-ENTMCNC: 35.1 G/DL — SIGNIFICANT CHANGE UP (ref 32–36)
MCV RBC AUTO: 86.4 FL — SIGNIFICANT CHANGE UP (ref 80–100)
METAMYELOCYTES # FLD: 1 % — HIGH (ref 0–0)
METAMYELOCYTES NFR BLD: 1 % — HIGH (ref 0–0)
MONOCYTES # BLD AUTO: 0.87 K/UL — SIGNIFICANT CHANGE UP (ref 0–0.9)
MONOCYTES NFR BLD AUTO: 11 % — SIGNIFICANT CHANGE UP (ref 2–14)
MYELOCYTES NFR BLD: 8 % — HIGH (ref 0–0)
NEUTROPHILS # BLD AUTO: 5.08 K/UL — SIGNIFICANT CHANGE UP (ref 1.8–7.4)
NEUTROPHILS NFR BLD AUTO: 64 % — SIGNIFICANT CHANGE UP (ref 43–77)
NRBC # BLD: 7 /100 WBCS — HIGH (ref 0–0)
NRBC BLD AUTO-RTO: SIGNIFICANT CHANGE UP /100 WBCS (ref 0–0)
NRBC BLD-RTO: 7 /100 WBCS — HIGH (ref 0–0)
PLAT MORPH BLD: NORMAL — SIGNIFICANT CHANGE UP
PLATELET # BLD AUTO: 218 K/UL — SIGNIFICANT CHANGE UP (ref 150–400)
POIKILOCYTOSIS BLD QL AUTO: SIGNIFICANT CHANGE UP
RBC # BLD: 3.23 M/UL — LOW (ref 4.2–5.8)
RBC # FLD: 21.2 % — HIGH (ref 10.3–14.5)
RBC BLD AUTO: ABNORMAL
SCHISTOCYTES BLD QL AUTO: SLIGHT — SIGNIFICANT CHANGE UP
TARGETS BLD QL SMEAR: SIGNIFICANT CHANGE UP
WBC # BLD: 7.93 K/UL — SIGNIFICANT CHANGE UP (ref 3.8–10.5)
WBC # FLD AUTO: 7.93 K/UL — SIGNIFICANT CHANGE UP (ref 3.8–10.5)

## 2025-06-03 PROCEDURE — 99214 OFFICE O/P EST MOD 30 MIN: CPT

## 2025-06-13 ENCOUNTER — RESULT REVIEW (OUTPATIENT)
Age: 65
End: 2025-06-13

## 2025-06-13 ENCOUNTER — APPOINTMENT (OUTPATIENT)
Dept: HEMATOLOGY ONCOLOGY | Facility: CLINIC | Age: 65
End: 2025-06-13

## 2025-06-13 ENCOUNTER — APPOINTMENT (OUTPATIENT)
Dept: INFUSION THERAPY | Facility: HOSPITAL | Age: 65
End: 2025-06-13

## 2025-06-13 LAB
A1C WITH ESTIMATED AVERAGE GLUCOSE RESULT: 9 % — HIGH (ref 4–5.6)
ALBUMIN SERPL ELPH-MCNC: 4.3 G/DL — SIGNIFICANT CHANGE UP (ref 3.3–5)
ALP SERPL-CCNC: 139 U/L — HIGH (ref 40–120)
ALT FLD-CCNC: 53 U/L — HIGH (ref 10–45)
ANION GAP SERPL CALC-SCNC: 13 MMOL/L — SIGNIFICANT CHANGE UP (ref 5–17)
AST SERPL-CCNC: 47 U/L — HIGH (ref 10–40)
BASOPHILS # BLD AUTO: 0.07 K/UL — SIGNIFICANT CHANGE UP (ref 0–0.2)
BASOPHILS NFR BLD AUTO: 1.3 % — SIGNIFICANT CHANGE UP (ref 0–2)
BILIRUB SERPL-MCNC: 0.3 MG/DL — SIGNIFICANT CHANGE UP (ref 0.2–1.2)
BUN SERPL-MCNC: 14 MG/DL — SIGNIFICANT CHANGE UP (ref 7–23)
CALCIUM SERPL-MCNC: 9.3 MG/DL — SIGNIFICANT CHANGE UP (ref 8.4–10.5)
CHLORIDE SERPL-SCNC: 103 MMOL/L — SIGNIFICANT CHANGE UP (ref 96–108)
CO2 SERPL-SCNC: 23 MMOL/L — SIGNIFICANT CHANGE UP (ref 22–31)
CREAT SERPL-MCNC: 0.91 MG/DL — SIGNIFICANT CHANGE UP (ref 0.5–1.3)
EGFR: 95 ML/MIN/1.73M2 — SIGNIFICANT CHANGE UP
EGFR: 95 ML/MIN/1.73M2 — SIGNIFICANT CHANGE UP
EOSINOPHIL # BLD AUTO: 0.05 K/UL — SIGNIFICANT CHANGE UP (ref 0–0.5)
EOSINOPHIL NFR BLD AUTO: 1 % — SIGNIFICANT CHANGE UP (ref 0–6)
ESTIMATED AVERAGE GLUCOSE: 212 MG/DL — HIGH (ref 68–114)
GLUCOSE SERPL-MCNC: 230 MG/DL — HIGH (ref 70–99)
HCT VFR BLD CALC: 30.4 % — LOW (ref 39–50)
HGB BLD-MCNC: 10.4 G/DL — LOW (ref 13–17)
IMM GRANULOCYTES NFR BLD AUTO: 0.6 % — SIGNIFICANT CHANGE UP (ref 0–0.9)
LDH SERPL L TO P-CCNC: 385 U/L — HIGH (ref 50–242)
LYMPHOCYTES # BLD AUTO: 0.8 K/UL — LOW (ref 1–3.3)
LYMPHOCYTES # BLD AUTO: 15.3 % — SIGNIFICANT CHANGE UP (ref 13–44)
MCHC RBC-ENTMCNC: 30.3 PG — SIGNIFICANT CHANGE UP (ref 27–34)
MCHC RBC-ENTMCNC: 34.2 G/DL — SIGNIFICANT CHANGE UP (ref 32–36)
MCV RBC AUTO: 88.6 FL — SIGNIFICANT CHANGE UP (ref 80–100)
MONOCYTES # BLD AUTO: 0.8 K/UL — SIGNIFICANT CHANGE UP (ref 0–0.9)
MONOCYTES NFR BLD AUTO: 15.3 % — HIGH (ref 2–14)
NEUTROPHILS # BLD AUTO: 3.49 K/UL — SIGNIFICANT CHANGE UP (ref 1.8–7.4)
NEUTROPHILS NFR BLD AUTO: 66.5 % — SIGNIFICANT CHANGE UP (ref 43–77)
NRBC BLD AUTO-RTO: 0 /100 WBCS — SIGNIFICANT CHANGE UP (ref 0–0)
PLATELET # BLD AUTO: 636 K/UL — HIGH (ref 150–400)
POTASSIUM SERPL-MCNC: 4.9 MMOL/L — SIGNIFICANT CHANGE UP (ref 3.5–5.3)
POTASSIUM SERPL-SCNC: 4.9 MMOL/L — SIGNIFICANT CHANGE UP (ref 3.5–5.3)
PROT SERPL-MCNC: 6.9 G/DL — SIGNIFICANT CHANGE UP (ref 6–8.3)
RBC # BLD: 3.43 M/UL — LOW (ref 4.2–5.8)
RBC # FLD: 19.6 % — HIGH (ref 10.3–14.5)
SODIUM SERPL-SCNC: 138 MMOL/L — SIGNIFICANT CHANGE UP (ref 135–145)
WBC # BLD: 5.24 K/UL — SIGNIFICANT CHANGE UP (ref 3.8–10.5)
WBC # FLD AUTO: 5.24 K/UL — SIGNIFICANT CHANGE UP (ref 3.8–10.5)

## 2025-06-16 ENCOUNTER — OUTPATIENT (OUTPATIENT)
Dept: OUTPATIENT SERVICES | Facility: HOSPITAL | Age: 65
LOS: 1 days | Discharge: HOME | End: 2025-06-16

## 2025-06-16 DIAGNOSIS — C83.00 SMALL CELL B-CELL LYMPHOMA, UNSPECIFIED SITE: ICD-10-CM

## 2025-06-17 ENCOUNTER — RX CHANGE (OUTPATIENT)
Age: 65
End: 2025-06-17

## 2025-06-18 ENCOUNTER — APPOINTMENT (OUTPATIENT)
Dept: HEMATOLOGY ONCOLOGY | Facility: CLINIC | Age: 65
End: 2025-06-18
Payer: COMMERCIAL

## 2025-06-18 VITALS
BODY MASS INDEX: 29.22 KG/M2 | TEMPERATURE: 98.6 F | WEIGHT: 197.31 LBS | RESPIRATION RATE: 16 BRPM | OXYGEN SATURATION: 99 % | HEIGHT: 69.06 IN | SYSTOLIC BLOOD PRESSURE: 118 MMHG | HEART RATE: 74 BPM | DIASTOLIC BLOOD PRESSURE: 71 MMHG

## 2025-06-18 PROBLEM — Z01.818 PRE-TRANSPLANT EVALUATION FOR STEM CELL TRANSPLANT: Status: ACTIVE | Noted: 2025-06-18

## 2025-06-18 PROCEDURE — 99205 OFFICE O/P NEW HI 60 MIN: CPT

## 2025-06-18 PROCEDURE — 99417 PROLNG OP E/M EACH 15 MIN: CPT

## 2025-06-23 ENCOUNTER — APPOINTMENT (OUTPATIENT)
Dept: INTERNAL MEDICINE | Facility: CLINIC | Age: 65
End: 2025-06-23
Payer: COMMERCIAL

## 2025-06-23 ENCOUNTER — LABORATORY RESULT (OUTPATIENT)
Age: 65
End: 2025-06-23

## 2025-06-23 VITALS
RESPIRATION RATE: 16 BRPM | HEART RATE: 68 BPM | HEIGHT: 69.06 IN | OXYGEN SATURATION: 100 % | WEIGHT: 197 LBS | SYSTOLIC BLOOD PRESSURE: 140 MMHG | BODY MASS INDEX: 29.18 KG/M2 | DIASTOLIC BLOOD PRESSURE: 80 MMHG

## 2025-06-23 VITALS — SYSTOLIC BLOOD PRESSURE: 128 MMHG | DIASTOLIC BLOOD PRESSURE: 70 MMHG

## 2025-06-23 PROCEDURE — 99386 PREV VISIT NEW AGE 40-64: CPT

## 2025-06-23 PROCEDURE — 36415 COLL VENOUS BLD VENIPUNCTURE: CPT

## 2025-06-23 RX ORDER — INSULIN GLARGINE 100 [IU]/ML
100 INJECTION, SOLUTION SUBCUTANEOUS
Qty: 9 | Refills: 1 | Status: ACTIVE | COMMUNITY
Start: 2025-06-17 | End: 1900-01-01

## 2025-06-24 ENCOUNTER — APPOINTMENT (OUTPATIENT)
Dept: HEMATOLOGY ONCOLOGY | Facility: CLINIC | Age: 65
End: 2025-06-24
Payer: COMMERCIAL

## 2025-06-24 ENCOUNTER — RESULT REVIEW (OUTPATIENT)
Age: 65
End: 2025-06-24

## 2025-06-24 VITALS
DIASTOLIC BLOOD PRESSURE: 75 MMHG | TEMPERATURE: 98.6 F | WEIGHT: 197.75 LBS | HEART RATE: 72 BPM | OXYGEN SATURATION: 98 % | BODY MASS INDEX: 29.15 KG/M2 | RESPIRATION RATE: 16 BRPM | SYSTOLIC BLOOD PRESSURE: 127 MMHG

## 2025-06-24 LAB
ALBUMIN SERPL ELPH-MCNC: 4.2 G/DL
ALP BLD-CCNC: 168 U/L
ALT SERPL-CCNC: 47 U/L
ANION GAP SERPL CALC-SCNC: 15 MMOL/L
ANISOCYTOSIS BLD QL: SLIGHT — SIGNIFICANT CHANGE UP
AST SERPL-CCNC: 51 U/L
BASOPHILS # BLD AUTO: 0 K/UL — SIGNIFICANT CHANGE UP (ref 0–0.2)
BASOPHILS # BLD AUTO: 0.09 K/UL
BASOPHILS NFR BLD AUTO: 0 % — SIGNIFICANT CHANGE UP (ref 0–2)
BASOPHILS NFR BLD AUTO: 0.9 %
BILIRUB SERPL-MCNC: 0.2 MG/DL
BUN SERPL-MCNC: 9 MG/DL
CALCIUM SERPL-MCNC: 8.9 MG/DL
CHLORIDE SERPL-SCNC: 99 MMOL/L
CHOLEST SERPL-MCNC: 84 MG/DL
CO2 SERPL-SCNC: 23 MMOL/L
CREAT SERPL-MCNC: 0.85 MG/DL
DACRYOCYTES BLD QL SMEAR: SLIGHT — SIGNIFICANT CHANGE UP
EGFRCR SERPLBLD CKD-EPI 2021: 98 ML/MIN/1.73M2
EOSINOPHIL # BLD AUTO: 0.08 K/UL — SIGNIFICANT CHANGE UP (ref 0–0.5)
EOSINOPHIL # BLD AUTO: 0.09 K/UL
EOSINOPHIL NFR BLD AUTO: 0.9 %
EOSINOPHIL NFR BLD AUTO: 1 % — SIGNIFICANT CHANGE UP (ref 0–6)
ESTIMATED AVERAGE GLUCOSE: 212 MG/DL
FOLATE SERPL-MCNC: >20 NG/ML
GLUCOSE SERPL-MCNC: 376 MG/DL
HAV IGM SER QL: NONREACTIVE
HBA1C MFR BLD HPLC: 9 %
HBV CORE IGM SER QL: NONREACTIVE
HBV SURFACE AG SER QL: NONREACTIVE
HCT VFR BLD CALC: 26.5 % — LOW (ref 39–50)
HCT VFR BLD CALC: 28.2 %
HCV AB SER QL: NONREACTIVE
HCV S/CO RATIO: 0.07 S/CO
HDLC SERPL-MCNC: 19 MG/DL
HGB BLD-MCNC: 9.2 G/DL — LOW (ref 13–17)
HGB BLD-MCNC: 9.5 G/DL
LDLC SERPL-MCNC: 31 MG/DL
LYMPHOCYTES # BLD AUTO: 0.87 K/UL — LOW (ref 1–3.3)
LYMPHOCYTES # BLD AUTO: 0.89 K/UL
LYMPHOCYTES # BLD AUTO: 11 % — LOW (ref 13–44)
LYMPHOCYTES NFR BLD AUTO: 9.3 %
MAN DIFF?: NORMAL
MCHC RBC-ENTMCNC: 31.2 PG — SIGNIFICANT CHANGE UP (ref 27–34)
MCHC RBC-ENTMCNC: 31.6 PG
MCHC RBC-ENTMCNC: 33.7 G/DL
MCHC RBC-ENTMCNC: 34.7 G/DL — SIGNIFICANT CHANGE UP (ref 32–36)
MCV RBC AUTO: 89.8 FL — SIGNIFICANT CHANGE UP (ref 80–100)
MCV RBC AUTO: 93.7 FL
MONOCYTES # BLD AUTO: 1.18 K/UL — HIGH (ref 0–0.9)
MONOCYTES # BLD AUTO: 1.85 K/UL
MONOCYTES NFR BLD AUTO: 15 % — HIGH (ref 2–14)
MONOCYTES NFR BLD AUTO: 19.4 %
MYELOCYTES NFR BLD: 4 % — HIGH (ref 0–0)
NEUTROPHILS # BLD AUTO: 5.44 K/UL — SIGNIFICANT CHANGE UP (ref 1.8–7.4)
NEUTROPHILS # BLD AUTO: 5.9 K/UL
NEUTROPHILS NFR BLD AUTO: 58.3 %
NEUTROPHILS NFR BLD AUTO: 69 % — SIGNIFICANT CHANGE UP (ref 43–77)
NONHDLC SERPL-MCNC: 65 MG/DL
NRBC # BLD: 6 /100 WBCS — HIGH (ref 0–0)
NRBC BLD AUTO-RTO: SIGNIFICANT CHANGE UP /100 WBCS (ref 0–0)
NRBC BLD-RTO: 6 /100 WBCS — HIGH (ref 0–0)
PLAT MORPH BLD: NORMAL — SIGNIFICANT CHANGE UP
PLATELET # BLD AUTO: 193 K/UL
PLATELET # BLD AUTO: 211 K/UL — SIGNIFICANT CHANGE UP (ref 150–400)
POIKILOCYTOSIS BLD QL AUTO: SLIGHT — SIGNIFICANT CHANGE UP
POTASSIUM SERPL-SCNC: 3.9 MMOL/L
PROT SERPL-MCNC: 6.3 G/DL
PSA FREE FLD-MCNC: 18 %
PSA FREE SERPL-MCNC: 0.18 NG/ML
PSA SERPL-MCNC: 1 NG/ML
RBC # BLD: 2.95 M/UL — LOW (ref 4.2–5.8)
RBC # BLD: 3.01 M/UL
RBC # FLD: 17.2 % — HIGH (ref 10.3–14.5)
RBC # FLD: 17.9 %
RBC BLD AUTO: ABNORMAL
SODIUM SERPL-SCNC: 137 MMOL/L
TARGETS BLD QL SMEAR: SLIGHT — SIGNIFICANT CHANGE UP
TRIGL SERPL-MCNC: 213 MG/DL
TSH SERPL-ACNC: 1.79 UIU/ML
VIT B12 SERPL-MCNC: >2000 PG/ML
WBC # BLD: 7.88 K/UL — SIGNIFICANT CHANGE UP (ref 3.8–10.5)
WBC # FLD AUTO: 7.88 K/UL — SIGNIFICANT CHANGE UP (ref 3.8–10.5)
WBC # FLD AUTO: 9.52 K/UL

## 2025-06-24 PROCEDURE — 99214 OFFICE O/P EST MOD 30 MIN: CPT

## 2025-06-24 RX ORDER — 70%ISOPROPYL ALCOHOL 0.7 ML/ML
70 SWAB TOPICAL
Qty: 2 | Refills: 1 | Status: ACTIVE | COMMUNITY
Start: 2025-06-24 | End: 1900-01-01

## 2025-06-24 RX ORDER — LANCETS 33 GAUGE
EACH MISCELLANEOUS
Qty: 2 | Refills: 1 | Status: ACTIVE | COMMUNITY
Start: 2025-06-24 | End: 1900-01-01

## 2025-07-09 ENCOUNTER — RESULT REVIEW (OUTPATIENT)
Age: 65
End: 2025-07-09

## 2025-07-09 ENCOUNTER — APPOINTMENT (OUTPATIENT)
Dept: HEMATOLOGY ONCOLOGY | Facility: CLINIC | Age: 65
End: 2025-07-09
Payer: COMMERCIAL

## 2025-07-09 VITALS
SYSTOLIC BLOOD PRESSURE: 149 MMHG | DIASTOLIC BLOOD PRESSURE: 78 MMHG | OXYGEN SATURATION: 97 % | TEMPERATURE: 98.7 F | RESPIRATION RATE: 16 BRPM | BODY MASS INDEX: 29.28 KG/M2 | WEIGHT: 198.61 LBS | HEART RATE: 77 BPM

## 2025-07-09 PROCEDURE — 99215 OFFICE O/P EST HI 40 MIN: CPT

## 2025-07-09 PROCEDURE — G2211 COMPLEX E/M VISIT ADD ON: CPT | Mod: NC

## 2025-07-11 LAB
CMV IGG SERPL QL: <0.2 U/ML
CMV IGG SERPL-IMP: NEGATIVE

## 2025-07-18 ENCOUNTER — NON-APPOINTMENT (OUTPATIENT)
Age: 65
End: 2025-07-18

## 2025-07-24 ENCOUNTER — APPOINTMENT (OUTPATIENT)
Dept: RADIOLOGY | Facility: IMAGING CENTER | Age: 65
End: 2025-07-24

## 2025-08-06 ENCOUNTER — APPOINTMENT (OUTPATIENT)
Dept: PULMONOLOGY | Facility: CLINIC | Age: 65
End: 2025-08-06
Payer: COMMERCIAL

## 2025-08-06 ENCOUNTER — RESULT REVIEW (OUTPATIENT)
Age: 65
End: 2025-08-06

## 2025-08-06 ENCOUNTER — OUTPATIENT (OUTPATIENT)
Dept: OUTPATIENT SERVICES | Facility: HOSPITAL | Age: 65
LOS: 1 days | End: 2025-08-06
Payer: COMMERCIAL

## 2025-08-06 ENCOUNTER — APPOINTMENT (OUTPATIENT)
Dept: CV DIAGNOSITCS | Facility: HOSPITAL | Age: 65
End: 2025-08-06

## 2025-08-06 ENCOUNTER — APPOINTMENT (OUTPATIENT)
Dept: HEMATOLOGY ONCOLOGY | Facility: CLINIC | Age: 65
End: 2025-08-06
Payer: COMMERCIAL

## 2025-08-06 VITALS
SYSTOLIC BLOOD PRESSURE: 122 MMHG | OXYGEN SATURATION: 99 % | WEIGHT: 200.18 LBS | TEMPERATURE: 98 F | BODY MASS INDEX: 29.51 KG/M2 | HEART RATE: 72 BPM | DIASTOLIC BLOOD PRESSURE: 77 MMHG | RESPIRATION RATE: 16 BRPM

## 2025-08-06 DIAGNOSIS — Z01.818 ENCOUNTER FOR OTHER PREPROCEDURAL EXAMINATION: ICD-10-CM

## 2025-08-06 DIAGNOSIS — I25.10 ATHEROSCLEROTIC HEART DISEASE OF NATIVE CORONARY ARTERY WITHOUT ANGINA PECTORIS: ICD-10-CM

## 2025-08-06 DIAGNOSIS — C83.30 DIFFUSE LARGE B-CELL LYMPHOMA, UNSPECIFIED SITE: ICD-10-CM

## 2025-08-06 PROCEDURE — 71046 X-RAY EXAM CHEST 2 VIEWS: CPT

## 2025-08-06 PROCEDURE — 93306 TTE W/DOPPLER COMPLETE: CPT | Mod: 26

## 2025-08-06 PROCEDURE — 94010 BREATHING CAPACITY TEST: CPT

## 2025-08-06 PROCEDURE — 99215 OFFICE O/P EST HI 40 MIN: CPT

## 2025-08-06 PROCEDURE — G2211 COMPLEX E/M VISIT ADD ON: CPT | Mod: NC

## 2025-08-06 PROCEDURE — 94729 DIFFUSING CAPACITY: CPT

## 2025-08-06 PROCEDURE — 71046 X-RAY EXAM CHEST 2 VIEWS: CPT | Mod: 26

## 2025-08-06 PROCEDURE — 93356 MYOCRD STRAIN IMG SPCKL TRCK: CPT

## 2025-08-06 PROCEDURE — 94726 PLETHYSMOGRAPHY LUNG VOLUMES: CPT

## 2025-08-12 LAB
ALBUMIN SERPL ELPH-MCNC: 4.7 G/DL
ALP BLD-CCNC: 131 U/L
ALT SERPL-CCNC: 66 U/L
ANION GAP SERPL CALC-SCNC: 12 MMOL/L
APPEARANCE: CLEAR
APTT BLD: 36.1 SEC
AST SERPL-CCNC: 58 U/L
BACTERIA: NEGATIVE /HPF
BILIRUB SERPL-MCNC: 0.4 MG/DL
BILIRUBIN URINE: NEGATIVE
BLOOD URINE: NEGATIVE
BUN SERPL-MCNC: 12 MG/DL
CALCIUM SERPL-MCNC: 9.6 MG/DL
CAST: 0 /LPF
CHLORIDE SERPL-SCNC: 103 MMOL/L
CO2 SERPL-SCNC: 28 MMOL/L
COLOR: YELLOW
CREAT SERPL-MCNC: 1.04 MG/DL
EBV EA AB SER IA-ACNC: <5 U/ML
EBV EA AB TITR SER IF: POSITIVE
EBV EA IGG SER QL IA: >600 U/ML
EBV EA IGG SER-ACNC: NEGATIVE
EBV EA IGM SER IA-ACNC: NEGATIVE
EBV PATRN SPEC IB-IMP: NORMAL
EBV VCA IGG SER IA-ACNC: >750 U/ML
EBV VCA IGM SER QL IA: 12.9 U/ML
EGFRCR SERPLBLD CKD-EPI 2021: 81 ML/MIN/1.73M2
EPITHELIAL CELLS: 0 /HPF
EPSTEIN-BARR VIRUS CAPSID ANTIGEN IGG: POSITIVE
GLUCOSE QUALITATIVE U: NEGATIVE MG/DL
GLUCOSE SERPL-MCNC: 93 MG/DL
HAV IGM SER QL: NONREACTIVE
HBV SURFACE AB SER QL: NONREACTIVE
HGB A MFR BLD: 55.6 %
HGB A2 MFR BLD: 2.8 %
HGB F MFR BLD: 0.6 %
HGB FRACT BLD-IMP: NORMAL
HGB S BLD QL SOLY: POSITIVE
HGB S MFR BLD: 41 %
HSV 1+2 IGG SER IA-IMP: NEGATIVE
HSV 1+2 IGG SER IA-IMP: NEGATIVE
HSV1 IGG SER QL: <0.01 INDEX
HSV2 IGG SER QL: 0.05 INDEX
INR PPP: 1.02 RATIO
KETONES URINE: NEGATIVE MG/DL
LDH SERPL-CCNC: 333 U/L
LEUKOCYTE ESTERASE URINE: NEGATIVE
M TB IFN-G BLD-IMP: NEGATIVE
MAGNESIUM SERPL-MCNC: 2.3 MG/DL
MEV IGG FLD QL IA: >300 AU/ML
MEV IGG+IGM SER-IMP: POSITIVE
MICROSCOPIC-UA: NORMAL
NITRITE URINE: NEGATIVE
PH URINE: 5.5
PHOSPHATE SERPL-MCNC: 4.1 MG/DL
POTASSIUM SERPL-SCNC: 4.5 MMOL/L
PROT SERPL-MCNC: 7.5 G/DL
PROTEIN URINE: NEGATIVE MG/DL
PT BLD: 12 SEC
QUANTIFERON TB PLUS MITOGEN MINUS NIL: >10 IU/ML
QUANTIFERON TB PLUS NIL: 0.02 IU/ML
QUANTIFERON TB PLUS TB1 MINUS NIL: 0 IU/ML
QUANTIFERON TB PLUS TB2 MINUS NIL: 0 IU/ML
RED BLOOD CELLS URINE: 0 /HPF
RUBV IGG FLD-ACNC: 5.38 INDEX
RUBV IGG SER-IMP: POSITIVE
SODIUM SERPL-SCNC: 142 MMOL/L
SPECIFIC GRAVITY URINE: 1.01
STRONGYLOIDES AB SER IA-ACNC: NEGATIVE
T GONDII AB SER-IMP: NEGATIVE
T GONDII AB SER-IMP: NEGATIVE
T GONDII IGG SER QL: <3 IU/ML
T GONDII IGM SER QL: <3 AU/ML
UROBILINOGEN URINE: 0.2 MG/DL
VZV AB TITR SER: POSITIVE
VZV IGG SER IF-ACNC: 18.7 S/CO
WHITE BLOOD CELLS URINE: 0 /HPF

## 2025-08-14 ENCOUNTER — NON-APPOINTMENT (OUTPATIENT)
Age: 65
End: 2025-08-14

## 2025-08-15 LAB — G6PD SER-CCNC: 14.9 U/G HGB

## 2025-09-07 ENCOUNTER — OUTPATIENT (OUTPATIENT)
Dept: OUTPATIENT SERVICES | Facility: HOSPITAL | Age: 65
LOS: 1 days | End: 2025-09-07
Payer: COMMERCIAL

## 2025-09-07 DIAGNOSIS — C83.30 DIFFUSE LARGE B-CELL LYMPHOMA, UNSPECIFIED SITE: ICD-10-CM

## 2025-09-07 PROCEDURE — 78815 PET IMAGE W/CT SKULL-THIGH: CPT

## 2025-09-07 PROCEDURE — A9552: CPT

## 2025-09-08 ENCOUNTER — LABORATORY RESULT (OUTPATIENT)
Age: 65
End: 2025-09-08

## 2025-09-08 ENCOUNTER — APPOINTMENT (OUTPATIENT)
Dept: HEMATOLOGY ONCOLOGY | Facility: CLINIC | Age: 65
End: 2025-09-08
Payer: COMMERCIAL

## 2025-09-08 VITALS
HEIGHT: 69.06 IN | TEMPERATURE: 98.1 F | WEIGHT: 202.59 LBS | SYSTOLIC BLOOD PRESSURE: 124 MMHG | RESPIRATION RATE: 16 BRPM | HEART RATE: 70 BPM | OXYGEN SATURATION: 97 % | BODY MASS INDEX: 30 KG/M2 | DIASTOLIC BLOOD PRESSURE: 78 MMHG

## 2025-09-08 DIAGNOSIS — C85.10 UNSPECIFIED B-CELL LYMPHOMA, UNSPECIFIED SITE: ICD-10-CM

## 2025-09-08 PROCEDURE — 38222 DX BONE MARROW BX & ASPIR: CPT

## 2025-09-11 ENCOUNTER — NON-APPOINTMENT (OUTPATIENT)
Age: 65
End: 2025-09-11

## 2025-09-12 ENCOUNTER — RESULT REVIEW (OUTPATIENT)
Age: 65
End: 2025-09-12

## 2025-09-12 ENCOUNTER — APPOINTMENT (OUTPATIENT)
Dept: HEMATOLOGY ONCOLOGY | Facility: CLINIC | Age: 65
End: 2025-09-12

## 2025-09-15 LAB
ALBUMIN SERPL ELPH-MCNC: 4.5 G/DL
ALP BLD-CCNC: 124 U/L
ALT SERPL-CCNC: 55 U/L
ANION GAP SERPL CALC-SCNC: 17 MMOL/L
APTT BLD: 34.2 SEC
AST SERPL-CCNC: 49 U/L
BILIRUB SERPL-MCNC: 0.2 MG/DL
BUN SERPL-MCNC: 15 MG/DL
CALCIUM SERPL-MCNC: 10.2 MG/DL
CHLORIDE SERPL-SCNC: 103 MMOL/L
CO2 SERPL-SCNC: 20 MMOL/L
CREAT SERPL-MCNC: 0.95 MG/DL
EGFRCR SERPLBLD CKD-EPI 2021: 90 ML/MIN/1.73M2
GLUCOSE SERPL-MCNC: 101 MG/DL
INR PPP: 1.04 RATIO
LDH SERPL-CCNC: 340 U/L
MAGNESIUM SERPL-MCNC: 2.1 MG/DL
POTASSIUM SERPL-SCNC: 4.3 MMOL/L
PROT SERPL-MCNC: 7.5 G/DL
PT BLD: 12.1 SEC
SODIUM SERPL-SCNC: 141 MMOL/L

## 2025-09-16 ENCOUNTER — NON-APPOINTMENT (OUTPATIENT)
Age: 65
End: 2025-09-16

## 2025-09-19 ENCOUNTER — NON-APPOINTMENT (OUTPATIENT)
Age: 65
End: 2025-09-19

## 2025-09-19 ENCOUNTER — APPOINTMENT (OUTPATIENT)
Dept: HEMATOLOGY ONCOLOGY | Facility: CLINIC | Age: 65
End: 2025-09-19

## 2025-09-19 DIAGNOSIS — Z94.84 STEM CELLS TRANSPLANT STATUS: ICD-10-CM

## 2025-09-19 RX ORDER — ONDANSETRON 8 MG/1
8 TABLET, ORALLY DISINTEGRATING ORAL EVERY 8 HOURS
Qty: 30 | Refills: 0 | Status: ACTIVE | COMMUNITY
Start: 2025-09-19 | End: 1900-01-01

## 2025-09-19 RX ORDER — TACROLIMUS 0.5 MG/1
0.5 CAPSULE ORAL
Qty: 300 | Refills: 0 | Status: ACTIVE | COMMUNITY
Start: 2025-09-19 | End: 1900-01-01

## 2025-09-19 RX ORDER — POSACONAZOLE 100 MG/1
100 TABLET, DELAYED RELEASE ORAL DAILY
Qty: 90 | Refills: 0 | Status: ACTIVE | COMMUNITY
Start: 2025-09-19 | End: 1900-01-01

## 2025-09-19 RX ORDER — URSODIOL 300 MG/1
300 CAPSULE ORAL
Qty: 60 | Refills: 0 | Status: ACTIVE | COMMUNITY
Start: 2025-09-19 | End: 1900-01-01

## 2025-09-19 RX ORDER — SULFAMETHOXAZOLE AND TRIMETHOPRIM 400; 80 MG/1; MG/1
400-80 TABLET ORAL DAILY
Qty: 30 | Refills: 5 | Status: ACTIVE | COMMUNITY
Start: 2025-09-19 | End: 1900-01-01

## 2025-09-19 RX ORDER — ACYCLOVIR 800 MG/1
800 TABLET ORAL
Qty: 60 | Refills: 0 | Status: ACTIVE | COMMUNITY
Start: 2025-09-19 | End: 1900-01-01

## 2025-09-19 RX ORDER — PANTOPRAZOLE 40 MG/1
40 TABLET, DELAYED RELEASE ORAL DAILY
Qty: 30 | Refills: 0 | Status: ACTIVE | COMMUNITY
Start: 2025-09-19 | End: 1900-01-01

## (undated) DEVICE — SUT SOFSILK 2-0 30" V-20

## (undated) DEVICE — SOL IRR POUR H2O 250ML

## (undated) DEVICE — DRSG OPSITE 13.75 X 4"

## (undated) DEVICE — DRSG PREVENA PEEL & PLACE KIT 20CM

## (undated) DEVICE — VALVE YELLOW PORT SEAL PLUS 5MM

## (undated) DEVICE — Device

## (undated) DEVICE — DRAPE MAYO STAND 30"

## (undated) DEVICE — MARKING PEN W RULER

## (undated) DEVICE — FOLEY TRAY 16FR 5CC LTX UMETER CLOSED

## (undated) DEVICE — DRSG STERISTRIPS 0.5 X 4"

## (undated) DEVICE — WARMING BLANKET LOWER ADULT

## (undated) DEVICE — TUBING IRRIGATION DAVOL SYSTEM X STREAM

## (undated) DEVICE — PACK ADVANCED LAPAROSCOPIC NS

## (undated) DEVICE — TROCAR APPLIED MEDICAL KII BALLOON BLUNT TIP 12MM X 130MM

## (undated) DEVICE — VENODYNE/SCD SLEEVE CALF MEDIUM

## (undated) DEVICE — VISITEC 4X4

## (undated) DEVICE — SUT SOFSILK 3-0 30" V-20

## (undated) DEVICE — SCOPE WARMER SEAL DISP

## (undated) DEVICE — POSITIONER FOAM EGG CRATE ULNAR 2PCS (PINK)

## (undated) DEVICE — ELCTR BOVIE PENCIL SMOKE EVACUATION

## (undated) DEVICE — NDL HYPO SAFE 22G X 1.5" (BLACK)

## (undated) DEVICE — GOWN TRIMAX LG

## (undated) DEVICE — STAPLER SKIN VISI-STAT 35 WIDE

## (undated) DEVICE — TROCAR COVIDIEN BLUNT TIP HASSAN 10MM

## (undated) DEVICE — ENDOCATCH 10MM

## (undated) DEVICE — CATH NG SALEM SUMP 16FR

## (undated) DEVICE — HANDSET ARGON

## (undated) DEVICE — WARMING BLANKET UPPER ADULT

## (undated) DEVICE — GELPORT LAPAROSCOPIC SYSTEM

## (undated) DEVICE — MEDICATION LABELS W MARKER

## (undated) DEVICE — DRAPE TOWEL BLUE 17" X 24"

## (undated) DEVICE — SUCTION YANKAUER NO CONTROL VENT

## (undated) DEVICE — APPL LAPAROSCOPIC EXTENDED 35CM

## (undated) DEVICE — LAP PAD 18 X 18"

## (undated) DEVICE — BLADE SCALPEL SAFETYLOCK #15

## (undated) DEVICE — TUBING INSUFFLATION LAP FILTER 10FT

## (undated) DEVICE — SPECIMEN CONTAINER 100ML

## (undated) DEVICE — STAPLER COVIDIEN ENDO GIA STANDARD HANDLE

## (undated) DEVICE — TAPE SILK 3"

## (undated) DEVICE — LIGASURE IMPACT

## (undated) DEVICE — TROCAR COVIDIEN VERSASTEP PLUS 12MM STANDARD

## (undated) DEVICE — DRAPE INSTRUMENT POUCH 6.75" X 11"

## (undated) DEVICE — BLADE SCALPEL SAFETYLOCK #10

## (undated) DEVICE — INSUFFLATION NDL COVIDIEN STEP 14G FOR STEP/VERSASTEP

## (undated) DEVICE — DRAIN JACKSON PRATT 10MM FLAT FULL NO TROCAR

## (undated) DEVICE — LIGASURE MARYLAND 5MM X 37CM

## (undated) DEVICE — DRAPE WARMING SOLUTION 44 X 44"

## (undated) DEVICE — SOL IRR POUR NS 0.9% 500ML

## (undated) DEVICE — TUBING SUCTION 20FT

## (undated) DEVICE — DRAIN RESERVOIR FOR JACKSON PRATT 100CC CARDINAL

## (undated) DEVICE — TROCAR COVIDIEN VERSAONE BLADELESS FIXATION 12MM STANDARD